# Patient Record
Sex: FEMALE | URBAN - METROPOLITAN AREA
[De-identification: names, ages, dates, MRNs, and addresses within clinical notes are randomized per-mention and may not be internally consistent; named-entity substitution may affect disease eponyms.]

---

## 2022-09-03 ENCOUNTER — HOSPITAL ENCOUNTER (EMERGENCY)
Age: 47
Discharge: HOME | End: 2022-09-04
Attending: EMERGENCY MEDICINE
Payer: COMMERCIAL

## 2022-09-03 ENCOUNTER — APPOINTMENT (OUTPATIENT)
Dept: GENERAL RADIOLOGY | Age: 47
End: 2022-09-03
Payer: COMMERCIAL

## 2022-09-03 VITALS
WEIGHT: 130 LBS | DIASTOLIC BLOOD PRESSURE: 92 MMHG | OXYGEN SATURATION: 97 % | HEART RATE: 91 BPM | TEMPERATURE: 98.8 F | BODY MASS INDEX: 23.04 KG/M2 | SYSTOLIC BLOOD PRESSURE: 142 MMHG | HEIGHT: 63 IN | RESPIRATION RATE: 16 BRPM

## 2022-09-03 DIAGNOSIS — K05.6 PERIODONTAL DISEASE: ICD-10-CM

## 2022-09-03 DIAGNOSIS — R59.0 ANTERIOR CERVICAL ADENOPATHY: Primary | ICD-10-CM

## 2022-09-03 LAB
ALBUMIN SERPL-MCNC: 4.4 G/DL (ref 3.5–4.6)
ALP BLD-CCNC: 134 U/L (ref 40–130)
ALT SERPL-CCNC: 15 U/L (ref 0–33)
ANION GAP SERPL CALCULATED.3IONS-SCNC: 11 MEQ/L (ref 9–15)
AST SERPL-CCNC: 20 U/L (ref 0–35)
BASOPHILS ABSOLUTE: 0.1 K/UL (ref 0–0.1)
BASOPHILS RELATIVE PERCENT: 0.5 % (ref 0.1–1.2)
BILIRUB SERPL-MCNC: <0.2 MG/DL (ref 0.2–0.7)
BUN BLDV-MCNC: 20 MG/DL (ref 6–20)
CALCIUM SERPL-MCNC: 10.2 MG/DL (ref 8.5–9.9)
CHLORIDE BLD-SCNC: 104 MEQ/L (ref 95–107)
CO2: 25 MEQ/L (ref 20–31)
CREAT SERPL-MCNC: 0.66 MG/DL (ref 0.5–0.9)
EOSINOPHILS ABSOLUTE: 0.4 K/UL (ref 0–0.4)
EOSINOPHILS RELATIVE PERCENT: 3.5 % (ref 0.7–5.8)
GFR AFRICAN AMERICAN: >60
GFR NON-AFRICAN AMERICAN: >60
GLOBULIN: 3.7 G/DL (ref 2.3–3.5)
GLUCOSE BLD-MCNC: 106 MG/DL (ref 70–99)
HCT VFR BLD CALC: 42.6 % (ref 37–47)
HEMOGLOBIN: 13.7 G/DL (ref 11.2–15.7)
IMMATURE GRANULOCYTES #: 0 K/UL
IMMATURE GRANULOCYTES %: 0.4 %
LYMPHOCYTES ABSOLUTE: 2.8 K/UL (ref 1.2–3.7)
LYMPHOCYTES RELATIVE PERCENT: 26 %
MCH RBC QN AUTO: 29.8 PG (ref 25.6–32.2)
MCHC RBC AUTO-ENTMCNC: 32.2 % (ref 32.2–35.5)
MCV RBC AUTO: 92.6 FL (ref 79.4–94.8)
MONOCYTES ABSOLUTE: 0.6 K/UL (ref 0.2–0.9)
MONOCYTES RELATIVE PERCENT: 5.9 % (ref 4.7–12.5)
NEUTROPHILS ABSOLUTE: 6.9 K/UL (ref 1.6–6.1)
NEUTROPHILS RELATIVE PERCENT: 63.7 % (ref 34–71.1)
PDW BLD-RTO: 13.5 % (ref 11.7–14.4)
PLATELET # BLD: 261 K/UL (ref 182–369)
POTASSIUM SERPL-SCNC: 4.1 MEQ/L (ref 3.4–4.9)
RBC # BLD: 4.6 M/UL (ref 3.93–5.22)
SODIUM BLD-SCNC: 140 MEQ/L (ref 135–144)
STREP GRP A PCR: NEGATIVE
TOTAL PROTEIN: 8.1 G/DL (ref 6.3–8)
WBC # BLD: 10.8 K/UL (ref 4–10)

## 2022-09-03 PROCEDURE — 87651 STREP A DNA AMP PROBE: CPT

## 2022-09-03 PROCEDURE — 6370000000 HC RX 637 (ALT 250 FOR IP): Performed by: EMERGENCY MEDICINE

## 2022-09-03 PROCEDURE — 99284 EMERGENCY DEPT VISIT MOD MDM: CPT

## 2022-09-03 PROCEDURE — 80053 COMPREHEN METABOLIC PANEL: CPT

## 2022-09-03 PROCEDURE — 71045 X-RAY EXAM CHEST 1 VIEW: CPT

## 2022-09-03 PROCEDURE — 36415 COLL VENOUS BLD VENIPUNCTURE: CPT

## 2022-09-03 PROCEDURE — 85025 COMPLETE CBC W/AUTO DIFF WBC: CPT

## 2022-09-03 RX ORDER — AMOXICILLIN AND CLAVULANATE POTASSIUM 875; 125 MG/1; MG/1
1 TABLET, FILM COATED ORAL 2 TIMES DAILY
Qty: 20 TABLET | Refills: 0 | Status: SHIPPED | OUTPATIENT
Start: 2022-09-03 | End: 2022-09-13

## 2022-09-03 RX ORDER — CHLORHEXIDINE GLUCONATE 0.12 MG/ML
15 RINSE ORAL 2 TIMES DAILY
Qty: 420 ML | Refills: 0 | Status: SHIPPED | OUTPATIENT
Start: 2022-09-03 | End: 2022-09-17

## 2022-09-03 RX ORDER — KETOROLAC TROMETHAMINE 10 MG/1
10 TABLET, FILM COATED ORAL EVERY 6 HOURS PRN
Qty: 20 TABLET | Refills: 0 | Status: SHIPPED | OUTPATIENT
Start: 2022-09-03

## 2022-09-03 RX ORDER — KETOROLAC TROMETHAMINE 10 MG/1
20 TABLET, FILM COATED ORAL ONCE
Status: COMPLETED | OUTPATIENT
Start: 2022-09-03 | End: 2022-09-03

## 2022-09-03 RX ADMIN — KETOROLAC TROMETHAMINE 20 MG: 10 TABLET, FILM COATED ORAL at 22:50

## 2022-09-03 ASSESSMENT — ENCOUNTER SYMPTOMS
VOMITING: 0
DIARRHEA: 0
WHEEZING: 0
CONSTIPATION: 0
SORE THROAT: 0
EYE PAIN: 0
ABDOMINAL PAIN: 0
SINUS PRESSURE: 0
BACK PAIN: 0
COUGH: 0
RHINORRHEA: 0
SHORTNESS OF BREATH: 0
COLOR CHANGE: 0
NAUSEA: 0
APNEA: 0
ABDOMINAL DISTENTION: 0
PHOTOPHOBIA: 0

## 2022-09-03 ASSESSMENT — PAIN - FUNCTIONAL ASSESSMENT: PAIN_FUNCTIONAL_ASSESSMENT: NONE - DENIES PAIN

## 2022-09-03 ASSESSMENT — PAIN DESCRIPTION - ONSET: ONSET: SUDDEN

## 2022-09-03 ASSESSMENT — PAIN SCALES - GENERAL: PAINLEVEL_OUTOF10: 5

## 2022-09-03 ASSESSMENT — PAIN DESCRIPTION - FREQUENCY: FREQUENCY: CONTINUOUS

## 2022-09-03 ASSESSMENT — PAIN DESCRIPTION - ORIENTATION: ORIENTATION: RIGHT

## 2022-09-03 ASSESSMENT — PAIN DESCRIPTION - PAIN TYPE: TYPE: ACUTE PAIN

## 2022-09-03 ASSESSMENT — PAIN DESCRIPTION - LOCATION: LOCATION: THROAT

## 2022-09-03 ASSESSMENT — PAIN DESCRIPTION - DESCRIPTORS: DESCRIPTORS: SORE

## 2022-09-04 NOTE — ED PROVIDER NOTES
[facility] \[facility]\"" ED  eMERGENCY dEPARTMENT eNCOUnter      Pt Name: January Salcedo  MRN: [id number]  [place] 1975  Date of evaluation: 9/3/2022  Provider: Deysi Sanders MD    CHIEF COMPLAINT       Chief Complaint   Patient presents with    Neck Swelling     Right side of throat swollen x1 day. Positive lymphadenitis to the right anterior cervical area. Right tonsil is reddened and enlarged but denies discomfort when swallowing. HISTORY OF PRESENT ILLNESS   (Location/Symptom, Timing/Onset,Context/Setting, Quality, Duration, Modifying Factors, Severity)  Note limiting factors. January Salcedo is a 40 y.o. female who presents to the emergency department with complaint of swelling right anterior neck node of 6 months duration, more swollen and tender since today. Denies fever or chills. No nausea or vomiting. No cough or congestion. No sore throat, palpitations, orthopnea or PND. No chest pain or shortness of breath. No weight loss or night sweats. Patient is a smoker of 1 pack of cigarettes a day. Denies significant chronic medical conditions. Patient is also complaining of dental decay and cavities of entire mouth with some purulent drainage from time to time. HPI    Nursing Notes were reviewed. REVIEW OF SYSTEMS    (2-9 systems for level 4, 10 or more for level 5)     Review of Systems   Constitutional: Negative. Negative for activity change, appetite change, chills, fatigue and fever. HENT:  Negative for congestion, ear discharge, ear pain, hearing loss, rhinorrhea, sinus pressure and sore throat. Eyes:  Negative for photophobia, pain and visual disturbance. Respiratory:  Negative for apnea, cough, shortness of breath and wheezing. Cardiovascular:  Negative for chest pain, palpitations and leg swelling. Gastrointestinal:  Negative for abdominal distention, abdominal pain, constipation, diarrhea, nausea and vomiting.    Endocrine: Negative for cold intolerance, heat intolerance and polyuria. Genitourinary:  Negative for dysuria, flank pain, frequency and urgency. Musculoskeletal:  Negative for arthralgias, back pain, gait problem, myalgias and neck stiffness. Skin:  Negative for color change, pallor, rash and wound. Allergic/Immunologic: Negative for food allergies and immunocompromised state. Neurological:  Negative for dizziness, tremors, syncope, weakness, light-headedness and headaches. Psychiatric/Behavioral:  Negative for agitation, confusion and hallucinations. All other systems reviewed and are negative. Except as noted above the remainder of the review of systems was reviewed and negative. PAST MEDICAL HISTORY   No past medical history on file. SURGICAL HISTORY     No past surgical history on file. CURRENT MEDICATIONS       Previous Medications    No medications on file       ALLERGIES     Patient has no known allergies. FAMILY HISTORY     No family history on file. SOCIAL HISTORY       Social History     Tobacco Use    Smoking status: Every Day     Packs/day: 1.00     Years: 25.00     Pack years: 25.00     Types: Cigarettes    Smokeless tobacco: Never       SCREENINGS    Nirav Coma Scale  Eye Opening: Spontaneous  Best Verbal Response: Oriented  Best Motor Response: Obeys commands  Nirav Coma Scale Score: 15        PHYSICAL EXAM    (up to 7 for level 4, 8 or more for level 5)     ED Triage Vitals [09/03/22 2214]   BP Temp Temp Source Heart Rate Resp SpO2 Height Weight   (!) 142/92 98.8 °F (37.1 °C) Oral 91 16 97 % 5' 3\" (1.6 m) 130 lb (59 kg)       Physical Exam  Vitals and nursing note reviewed. Constitutional:       General: She is not in acute distress. Appearance: Normal appearance. She is well-developed and normal weight. She is not ill-appearing, toxic-appearing or diaphoretic. HENT:      Head: Normocephalic and atraumatic. Nose: Nose normal. No congestion or rhinorrhea.       Mouth/Throat:      Mouth: Mucous membranes are moist.      Pharynx: Oropharynx is clear. No oropharyngeal exudate or posterior oropharyngeal erythema. Comments: Diffuse dental decay, gingivitis and cavities  Eyes:      General: No scleral icterus. Right eye: No discharge. Left eye: No discharge. Extraocular Movements: Extraocular movements intact. Conjunctiva/sclera: Conjunctivae normal.      Pupils: Pupils are equal, round, and reactive to light. Neck:      Thyroid: No thyromegaly. Vascular: No carotid bruit or JVD. Trachea: No tracheal deviation. Comments: 1 cm palpable right anterior cervical node, tender. Cardiovascular:      Rate and Rhythm: Normal rate and regular rhythm. Pulses: Normal pulses. Heart sounds: Normal heart sounds. No murmur heard. No friction rub. No gallop. Pulmonary:      Effort: Pulmonary effort is normal. No respiratory distress. Breath sounds: Normal breath sounds. No stridor. No wheezing, rhonchi or rales. Chest:      Chest wall: No tenderness. Abdominal:      General: Abdomen is flat. Bowel sounds are normal. There is no distension. Palpations: Abdomen is soft. There is no mass. Tenderness: There is no abdominal tenderness. There is no right CVA tenderness, left CVA tenderness, guarding or rebound. Hernia: No hernia is present. Musculoskeletal:         General: No swelling, tenderness, deformity or signs of injury. Normal range of motion. Cervical back: Normal range of motion and neck supple. No rigidity or tenderness. Right lower leg: No edema. Left lower leg: No edema. Lymphadenopathy:      Cervical: Cervical adenopathy present. Skin:     General: Skin is warm and dry. Capillary Refill: Capillary refill takes less than 2 seconds. Coloration: Skin is not jaundiced or pale. Findings: No bruising, erythema, lesion or rash. Neurological:      General: No focal deficit present.       Mental Status: She is alert and oriented to person, place, and time. Mental status is at baseline. Cranial Nerves: No cranial nerve deficit. Sensory: No sensory deficit. Motor: No weakness or abnormal muscle tone. Coordination: Coordination normal.      Gait: Gait normal.      Deep Tendon Reflexes: Reflexes are normal and symmetric. Reflexes normal.   Psychiatric:         Mood and Affect: Mood normal.         Behavior: Behavior normal.         Thought Content: Thought content normal.         Judgment: Judgment normal.       DIAGNOSTIC RESULTS     EKG: All EKG's are interpreted by the Emergency Department Physician who either signs or Co-signs this chart in the absence of a cardiologist.        RADIOLOGY:   Non-plain film images such as CT, Ultrasound and MRI are read by the radiologist. [facility] radiographicimages are visualized and preliminarily interpreted by the emergency physician with the below findings:        Interpretation per the Radiologist below, if available at the time of this note:    XR CHEST PORTABLE    (Results Pending)         ED BEDSIDE ULTRASOUND:   Performed by ED Physician - none    LABS:  Labs Reviewed   CBC WITH AUTO DIFFERENTIAL - Abnormal; Notable for the following components:       Result Value    WBC 10.8 (*)     Neutrophils Absolute 6.9 (*)     All other components within normal limits   RAPID STREP SCREEN   COMPREHENSIVE METABOLIC PANEL       All other labs were within normal range or not returned as of this dictation. EMERGENCY DEPARTMENT COURSE and DIFFERENTIALDIAGNOSIS/MDM:   Vitals:    Vitals:    09/03/22 2214   BP: (!) 142/92   Pulse: 91   Resp: 16   Temp: 98.8 °F (37.1 °C)   TempSrc: Oral   SpO2: 97%   Weight: 130 lb (59 kg)   Height: 5' 3\" (1.6 m)           MDM      CRITICAL CARE TIME   Total Critical Care time was  minutes, excluding separately reportable procedures.   There was a high probability of clinically significant/life threatening deterioration in the patient's condition which required my urgentintervention. CONSULTS:  None    PROCEDURES:  Unless otherwise noted below, none     Procedures    FINAL IMPRESSION      1. Anterior cervical adenopathy    2.  Periodontal disease          DISPOSITION/PLAN   DISPOSITION Discharge - Pending Orders Complete 09/03/2022 11:52:27 PM      PATIENT REFERRED TO:  [facility]  [address] [facility]  [phone]  In 3 days      Deedee Armendariz MD  [address] [place]  [phone]    In 3 days      DISCHARGE MEDICATIONS:  New Prescriptions    AMOXICILLIN-CLAVULANATE (AUGMENTIN) 875-125 MG PER TABLET    Take 1 tablet by mouth 2 times daily for 10 days    CHLORHEXIDINE (PERIDEX) 0.12 % SOLUTION    Take 15 mLs by mouth 2 times daily for 14 days    KETOROLAC (TORADOL) 10 MG TABLET    Take 1 tablet by mouth every 6 hours as needed for Pain          (Please note that portions of this note were completed with a voice recognitionprogram.  Efforts were made to edit the dictations but occasionally words are mis-transcribed.)    Sal Turcios MD (electronically signed)  Attending Emergency Physician          Sal Turcios MD  09/03/22 [id number]

## 2022-10-24 ENCOUNTER — HOSPITAL ENCOUNTER (EMERGENCY)
Age: 47
Discharge: HOME | End: 2022-10-24
Attending: EMERGENCY MEDICINE
Payer: COMMERCIAL

## 2022-10-24 ENCOUNTER — APPOINTMENT (OUTPATIENT)
Dept: CT IMAGING | Age: 47
End: 2022-10-24
Payer: COMMERCIAL

## 2022-10-24 VITALS
OXYGEN SATURATION: 96 % | RESPIRATION RATE: 18 BRPM | BODY MASS INDEX: 22.15 KG/M2 | DIASTOLIC BLOOD PRESSURE: 69 MMHG | WEIGHT: 125 LBS | TEMPERATURE: 97.8 F | HEART RATE: 85 BPM | HEIGHT: 63 IN | SYSTOLIC BLOOD PRESSURE: 103 MMHG

## 2022-10-24 DIAGNOSIS — R10.9 FLANK PAIN: Primary | ICD-10-CM

## 2022-10-24 LAB
ANION GAP SERPL CALCULATED.3IONS-SCNC: 12 MEQ/L (ref 9–15)
BACTERIA: ABNORMAL /HPF
BASOPHILS ABSOLUTE: 0.1 K/UL (ref 0–0.1)
BASOPHILS RELATIVE PERCENT: 0.8 % (ref 0.1–1.2)
BILIRUBIN URINE: NEGATIVE
BLOOD, URINE: NORMAL
BUN BLDV-MCNC: 18 MG/DL (ref 6–20)
CALCIUM SERPL-MCNC: 9.4 MG/DL (ref 8.5–9.9)
CHLORIDE BLD-SCNC: 103 MEQ/L (ref 95–107)
CLARITY: CLEAR
CO2: 24 MEQ/L (ref 20–31)
COLOR: YELLOW
CREAT SERPL-MCNC: 0.61 MG/DL (ref 0.5–0.9)
EOSINOPHILS ABSOLUTE: 0.3 K/UL (ref 0–0.4)
EOSINOPHILS RELATIVE PERCENT: 3.3 % (ref 0.7–5.8)
EPITHELIAL CELLS, UA: ABNORMAL /HPF
GFR SERPL CREATININE-BSD FRML MDRD: >60 ML/MIN/{1.73_M2}
GLUCOSE BLD-MCNC: 128 MG/DL (ref 70–99)
GLUCOSE URINE: NEGATIVE MG/DL
HCT VFR BLD CALC: 41.5 % (ref 37–47)
HEMOGLOBIN: 13.5 G/DL (ref 11.2–15.7)
IMMATURE GRANULOCYTES #: 0 K/UL
IMMATURE GRANULOCYTES %: 0.3 %
KETONES, URINE: NEGATIVE MG/DL
LEUKOCYTE ESTERASE, URINE: NORMAL
LYMPHOCYTES ABSOLUTE: 2.4 K/UL (ref 1.2–3.7)
LYMPHOCYTES RELATIVE PERCENT: 24.3 %
MCH RBC QN AUTO: 30.1 PG (ref 25.6–32.2)
MCHC RBC AUTO-ENTMCNC: 32.5 % (ref 32.2–35.5)
MCV RBC AUTO: 92.4 FL (ref 79.4–94.8)
MONOCYTES ABSOLUTE: 0.7 K/UL (ref 0.2–0.9)
MONOCYTES RELATIVE PERCENT: 6.8 % (ref 4.7–12.5)
NEUTROPHILS ABSOLUTE: 6.5 K/UL (ref 1.6–6.1)
NEUTROPHILS RELATIVE PERCENT: 64.5 % (ref 34–71.1)
NITRITE, URINE: NEGATIVE
PDW BLD-RTO: 12.7 % (ref 11.7–14.4)
PH UA: 5.5 (ref 5–9)
PLATELET # BLD: 242 K/UL (ref 182–369)
POTASSIUM SERPL-SCNC: 3.9 MEQ/L (ref 3.4–4.9)
PROTEIN UA: NEGATIVE MG/DL
RBC # BLD: 4.49 M/UL (ref 3.93–5.22)
RBC UA: ABNORMAL /HPF (ref 0–2)
SODIUM BLD-SCNC: 139 MEQ/L (ref 135–144)
SPECIFIC GRAVITY UA: 1.02 (ref 1–1.03)
URINE REFLEX TO CULTURE: NORMAL
UROBILINOGEN, URINE: 0.2 E.U./DL
WBC # BLD: 10 K/UL (ref 4–10)
WBC UA: ABNORMAL /HPF (ref 0–5)

## 2022-10-24 PROCEDURE — 36415 COLL VENOUS BLD VENIPUNCTURE: CPT

## 2022-10-24 PROCEDURE — 99284 EMERGENCY DEPT VISIT MOD MDM: CPT

## 2022-10-24 PROCEDURE — 6360000002 HC RX W HCPCS: Performed by: EMERGENCY MEDICINE

## 2022-10-24 PROCEDURE — 81001 URINALYSIS AUTO W/SCOPE: CPT

## 2022-10-24 PROCEDURE — 80048 BASIC METABOLIC PNL TOTAL CA: CPT

## 2022-10-24 PROCEDURE — 96374 THER/PROPH/DIAG INJ IV PUSH: CPT

## 2022-10-24 PROCEDURE — 2580000003 HC RX 258: Performed by: EMERGENCY MEDICINE

## 2022-10-24 PROCEDURE — 85025 COMPLETE CBC W/AUTO DIFF WBC: CPT

## 2022-10-24 PROCEDURE — 74176 CT ABD & PELVIS W/O CONTRAST: CPT

## 2022-10-24 RX ORDER — KETOROLAC TROMETHAMINE 30 MG/ML
30 INJECTION, SOLUTION INTRAMUSCULAR; INTRAVENOUS ONCE
Status: COMPLETED | OUTPATIENT
Start: 2022-10-24 | End: 2022-10-24

## 2022-10-24 RX ORDER — SODIUM CHLORIDE 0.9 % (FLUSH) 0.9 %
3 SYRINGE (ML) INJECTION EVERY 8 HOURS
Status: DISCONTINUED | OUTPATIENT
Start: 2022-10-24 | End: 2022-10-24 | Stop reason: HOSPADM

## 2022-10-24 RX ORDER — CYCLOBENZAPRINE HCL 10 MG
10 TABLET ORAL 3 TIMES DAILY PRN
Qty: 21 TABLET | Refills: 0 | Status: SHIPPED | OUTPATIENT
Start: 2022-10-24 | End: 2022-11-03

## 2022-10-24 RX ORDER — OXYCODONE HYDROCHLORIDE AND ACETAMINOPHEN 5; 325 MG/1; MG/1
1 TABLET ORAL EVERY 6 HOURS PRN
Qty: 12 TABLET | Refills: 0 | Status: SHIPPED | OUTPATIENT
Start: 2022-10-24 | End: 2022-10-27

## 2022-10-24 RX ORDER — 0.9 % SODIUM CHLORIDE 0.9 %
1000 INTRAVENOUS SOLUTION INTRAVENOUS ONCE
Status: COMPLETED | OUTPATIENT
Start: 2022-10-24 | End: 2022-10-24

## 2022-10-24 RX ADMIN — SODIUM CHLORIDE 1000 ML: 9 INJECTION, SOLUTION INTRAVENOUS at 20:14

## 2022-10-24 RX ADMIN — KETOROLAC TROMETHAMINE 30 MG: 30 INJECTION, SOLUTION INTRAMUSCULAR at 20:14

## 2022-10-24 ASSESSMENT — ENCOUNTER SYMPTOMS
BOWEL INCONTINENCE: 0
BACK PAIN: 1
EYE REDNESS: 0
NAUSEA: 0
ABDOMINAL PAIN: 0
SORE THROAT: 0
SHORTNESS OF BREATH: 0
EYE DISCHARGE: 0
VOMITING: 0
COUGH: 0
ABDOMINAL SWELLING: 0

## 2022-10-24 NOTE — Clinical Note
Mitzy Torres was seen and treated in our emergency department on 10/24/2022. She may return to work on 10/26/2022. If you have any questions or concerns, please don't hesitate to call.       Shamir Love, DO

## 2022-10-24 NOTE — ED PROVIDER NOTES
[facility] ED  EMERGENCY DEPARTMENT ENCOUNTER      Pt Name: Lorenzo Nunez  MRN: [id number]  [place] 1975  Date of evaluation: 10/24/2022  Provider: Raffy Lee DO        HISTORY OF PRESENT ILLNESS    Lorenzo Nunez is a 40 y.o. female per chart review has ah/o dental abscess. She presents with left flank pain. She states it started today along with dysuria. The history is provided by the patient. Back Pain  Location:  Lumbar spine  Quality:  Aching  Pain severity:  Severe  Pain is:  Same all the time  Timing:  Intermittent  Progression:  Waxing and waning  Chronicity:  New  Relieved by:  Nothing  Worsened by:  Nothing  Ineffective treatments:  None tried  Associated symptoms: dysuria    Associated symptoms: no abdominal pain, no abdominal swelling, no bladder incontinence, no bowel incontinence, no chest pain and no fever           REVIEW OF SYSTEMS       Review of Systems   Constitutional:  Negative for chills and fever. HENT:  Negative for ear pain and sore throat. Eyes:  Negative for discharge and redness. Respiratory:  Negative for cough and shortness of breath. Cardiovascular:  Negative for chest pain and palpitations. Gastrointestinal:  Negative for abdominal pain, bowel incontinence, nausea and vomiting. Genitourinary:  Positive for dysuria. Negative for bladder incontinence and difficulty urinating. Musculoskeletal:  Positive for back pain. Negative for neck pain. Skin:  Negative for rash and wound. Neurological:  Negative for dizziness and syncope. Psychiatric/Behavioral:  Negative for confusion. The patient is not nervous/anxious. All other systems reviewed and are negative. Except as noted above the remainder of the review of systems was reviewed and negative. PAST MEDICAL HISTORY   History reviewed. No pertinent past medical history. SURGICAL HISTORY     History reviewed. No pertinent surgical history.       CURRENT MEDICATIONS       Discharge Medication List as of 10/24/2022  9:01 PM        CONTINUE these medications which have NOT CHANGED    Details   ketorolac (TORADOL) 10 MG tablet Take 1 tablet by mouth every 6 hours as needed for Pain, Disp-20 tablet, R-0Print             ALLERGIES     Patient has no known allergies. FAMILY HISTORY     History reviewed. No pertinent family history. SOCIAL HISTORY       Social History     Socioeconomic History    Marital status:      Spouse name: None    Number of children: None    Years of education: None    Highest education level: None   Tobacco Use    Smoking status: Every Day     Packs/day: 1.00     Years: 25.00     Pack years: 25.00     Types: Cigarettes    Smokeless tobacco: Never         PHYSICAL EXAM       ED Triage Vitals [10/24/22 1905]   BP Temp Temp Source Heart Rate Resp SpO2 Height Weight   (!) 142/80 97.8 °F (36.6 °C) Temporal 88 20 96 % 5' 3\" (1.6 m) 125 lb (56.7 kg)       Physical Exam  Vitals and nursing note reviewed. Constitutional:       Appearance: Normal appearance. HENT:      Head: Normocephalic and atraumatic. Right Ear: Tympanic membrane normal.      Left Ear: Tympanic membrane normal.      Nose: Nose normal.      Mouth/Throat:      Mouth: Mucous membranes are moist.      Pharynx: Oropharynx is clear. Eyes:      General: Lids are normal.      Extraocular Movements: Extraocular movements intact. Conjunctiva/sclera: Conjunctivae normal.      Pupils: Pupils are equal, round, and reactive to light. Cardiovascular:      Rate and Rhythm: Normal rate and regular rhythm. Pulses: Normal pulses. Heart sounds: Normal heart sounds. Pulmonary:      Effort: Pulmonary effort is normal.      Breath sounds: Normal breath sounds. Abdominal:      General: Abdomen is flat. Bowel sounds are normal.      Palpations: Abdomen is soft. Musculoskeletal:         General: Normal range of motion.       Cervical back: Full passive range of motion without pain, normal range of motion and neck supple. Lumbar back: Spasms and tenderness present. Back:    Skin:     General: Skin is warm. Capillary Refill: Capillary refill takes less than 2 seconds. Neurological:      General: No focal deficit present. Mental Status: She is alert and oriented to person, place, and time. Deep Tendon Reflexes: Reflexes are normal and symmetric. Psychiatric:         Attention and Perception: Attention and perception normal.         Mood and Affect: Mood normal.         Behavior: Behavior normal. Behavior is cooperative. LABS:  Labs Reviewed   MICROSCOPIC URINALYSIS - Abnormal; Notable for the following components:       Result Value    RBC, UA 3-5 (*)     Bacteria, UA RARE (*)     All other components within normal limits   BASIC METABOLIC PANEL - Abnormal; Notable for the following components:    Glucose 128 (*)     All other components within normal limits   CBC WITH AUTO DIFFERENTIAL - Abnormal; Notable for the following components:    Neutrophils Absolute 6.5 (*)     All other components within normal limits   URINALYSIS WITH REFLEX TO CULTURE         MDM:   Vitals:    Vitals:    10/24/22 1905 10/24/22 2108   BP: (!) 142/80 103/69   Pulse: 88 85   Resp: 20 18   Temp: 97.8 °F (36.6 °C)    TempSrc: Temporal    SpO2: 96% 96%   Weight: 125 lb (56.7 kg)    Height: 5' 3\" (1.6 m)        MDM  Number of Diagnoses or Management Options  Flank pain  Diagnosis management comments: Patient presents with left flank pain. Labs ordered, 1 liter IVF NS and toradol 30mg IV. Ct ab/pelvis: no acute changes  She will be discharged home. She was given a work note. She will follow up in 2 days with her primary care doctor.         Amount and/or Complexity of Data Reviewed  Clinical lab tests: ordered and reviewed  Tests in the radiology section of CPT®: ordered and reviewed         CT ABDOMEN PELVIS WO CONTRAST Additional Contrast? None    (Results Pending)           LAKESHA LIN DO LATANYA     The lab results, radiology and test results were reviewed with the patient and family. The patient will follow up in 2 days with their primary care doctor. If their symptoms change or get worse they will return to the ER. CRITICAL CARE TIME   Total CriticalCare time was 0 minutes, excluding separately reportable procedures. There was a high probability of clinically significant/life threatening deterioration in the patient's condition which required my urgent intervention. PROCEDURES:  Unlessotherwise noted below, none     Procedures      FINAL IMPRESSION      1.  Flank pain          DISPOSITION/PLAN   DISPOSITION Decision To Discharge 10/24/2022 08:58:20 PM          LAKESHA Atkins DO (electronically signed)  Attending Emergency Physician         Sunshine Argueta DO  10/25/22 8434

## 2022-10-25 NOTE — ED TRIAGE NOTES
Pt a/ox3 skin w d intact respiratory rate even and unlabored  localized bee sting reaction noted to right underarm  pt shows no distress

## 2023-01-12 ENCOUNTER — OFFICE VISIT (OUTPATIENT)
Dept: FAMILY MEDICINE CLINIC | Age: 48
End: 2023-01-12
Payer: COMMERCIAL

## 2023-01-12 VITALS
SYSTOLIC BLOOD PRESSURE: 110 MMHG | OXYGEN SATURATION: 96 % | TEMPERATURE: 97.7 F | RESPIRATION RATE: 16 BRPM | HEART RATE: 72 BPM | WEIGHT: 155.8 LBS | BODY MASS INDEX: 27.61 KG/M2 | HEIGHT: 63 IN | DIASTOLIC BLOOD PRESSURE: 60 MMHG

## 2023-01-12 DIAGNOSIS — R22.1 MASS OF RIGHT SIDE OF NECK: Primary | ICD-10-CM

## 2023-01-12 DIAGNOSIS — K02.9 DENTAL CARIES: ICD-10-CM

## 2023-01-12 DIAGNOSIS — R73.03 PREDIABETES: ICD-10-CM

## 2023-01-12 DIAGNOSIS — J45.20 MILD INTERMITTENT ASTHMA WITHOUT COMPLICATION: ICD-10-CM

## 2023-01-12 PROCEDURE — G8484 FLU IMMUNIZE NO ADMIN: HCPCS

## 2023-01-12 PROCEDURE — 99214 OFFICE O/P EST MOD 30 MIN: CPT

## 2023-01-12 PROCEDURE — G8419 CALC BMI OUT NRM PARAM NOF/U: HCPCS

## 2023-01-12 PROCEDURE — G8427 DOCREV CUR MEDS BY ELIG CLIN: HCPCS

## 2023-01-12 PROCEDURE — 4004F PT TOBACCO SCREEN RCVD TLK: CPT

## 2023-01-12 RX ORDER — ALBUTEROL SULFATE 90 UG/1
2 AEROSOL, METERED RESPIRATORY (INHALATION) EVERY 6 HOURS PRN
Qty: 18 G | Refills: 3 | Status: SHIPPED | OUTPATIENT
Start: 2023-01-12

## 2023-01-12 SDOH — ECONOMIC STABILITY: FOOD INSECURITY: WITHIN THE PAST 12 MONTHS, YOU WORRIED THAT YOUR FOOD WOULD RUN OUT BEFORE YOU GOT MONEY TO BUY MORE.: NEVER TRUE

## 2023-01-12 SDOH — ECONOMIC STABILITY: FOOD INSECURITY: WITHIN THE PAST 12 MONTHS, THE FOOD YOU BOUGHT JUST DIDN'T LAST AND YOU DIDN'T HAVE MONEY TO GET MORE.: NEVER TRUE

## 2023-01-12 ASSESSMENT — ENCOUNTER SYMPTOMS
DIARRHEA: 0
EYES NEGATIVE: 1
COUGH: 0
SHORTNESS OF BREATH: 0
SINUS PRESSURE: 0
WHEEZING: 1
NAUSEA: 0
RHINORRHEA: 0
ABDOMINAL PAIN: 0
VOMITING: 0
SORE THROAT: 0
CHEST TIGHTNESS: 0

## 2023-01-12 ASSESSMENT — PATIENT HEALTH QUESTIONNAIRE - PHQ9
6. FEELING BAD ABOUT YOURSELF - OR THAT YOU ARE A FAILURE OR HAVE LET YOURSELF OR YOUR FAMILY DOWN: 0
SUM OF ALL RESPONSES TO PHQ QUESTIONS 1-9: 0
3. TROUBLE FALLING OR STAYING ASLEEP: 0
SUM OF ALL RESPONSES TO PHQ9 QUESTIONS 1 & 2: 0
4. FEELING TIRED OR HAVING LITTLE ENERGY: 0
SUM OF ALL RESPONSES TO PHQ QUESTIONS 1-9: 0
10. IF YOU CHECKED OFF ANY PROBLEMS, HOW DIFFICULT HAVE THESE PROBLEMS MADE IT FOR YOU TO DO YOUR WORK, TAKE CARE OF THINGS AT HOME, OR GET ALONG WITH OTHER PEOPLE: 0
SUM OF ALL RESPONSES TO PHQ QUESTIONS 1-9: 0
SUM OF ALL RESPONSES TO PHQ QUESTIONS 1-9: 0
7. TROUBLE CONCENTRATING ON THINGS, SUCH AS READING THE NEWSPAPER OR WATCHING TELEVISION: 0
2. FEELING DOWN, DEPRESSED OR HOPELESS: 0
5. POOR APPETITE OR OVEREATING: 0
1. LITTLE INTEREST OR PLEASURE IN DOING THINGS: 0
9. THOUGHTS THAT YOU WOULD BE BETTER OFF DEAD, OR OF HURTING YOURSELF: 0
8. MOVING OR SPEAKING SO SLOWLY THAT OTHER PEOPLE COULD HAVE NOTICED. OR THE OPPOSITE, BEING SO FIGETY OR RESTLESS THAT YOU HAVE BEEN MOVING AROUND A LOT MORE THAN USUAL: 0

## 2023-01-12 ASSESSMENT — COLUMBIA-SUICIDE SEVERITY RATING SCALE - C-SSRS
2. HAVE YOU ACTUALLY HAD ANY THOUGHTS OF KILLING YOURSELF?: NO
6. HAVE YOU EVER DONE ANYTHING, STARTED TO DO ANYTHING, OR PREPARED TO DO ANYTHING TO END YOUR LIFE?: NO
1. WITHIN THE PAST MONTH, HAVE YOU WISHED YOU WERE DEAD OR WISHED YOU COULD GO TO SLEEP AND NOT WAKE UP?: NO

## 2023-01-12 ASSESSMENT — SOCIAL DETERMINANTS OF HEALTH (SDOH): HOW HARD IS IT FOR YOU TO PAY FOR THE VERY BASICS LIKE FOOD, HOUSING, MEDICAL CARE, AND HEATING?: NOT HARD AT ALL

## 2023-01-12 NOTE — PROGRESS NOTES
[address] [place] [address] Encounter        ASSESSMENT/PLAN     Zoya Hinojosa is a 56 y.o. female who presents with:  Reporting mass to the right side of her neck for 2-1/2 years. Patient reports size of mass increases and decreases at times. Starting 2 days ago she began having mild constant pain she has never had pain with this in the past.  Examination there is a symmetry to the right side of patient's neck mass approximately 6 cm x 8 cm to the upper anterior cervical area. There is half centimeter diameter lymph node palpated at the lower anterior cervical area. Neck is otherwise supple there is no enlargement of the submandibular area patient has multiple dental caries throughout her upper and lower jaws bilaterally. Lung sounds are clear throughout heart sounds normal regular. She is not taking anything for pain currently. Previous treatment with Augmentin did not improve symptoms per patient. History 1 pack a day smoker for 25 years. History of prediabetes, asthma. 1. Mass of right side of neck   Orders for CBC CMP CT with IV contrast of the soft tissues   of the neck. 2. Mild intermittent asthma without complication   Refill order placed for albuterol inhaler   3. Dental caries   Patient to follow-up with dentist.   4. Prediabetes   Denies recent change in weight, feelings of thirst, increased urination, blurry vision, tingling in the hands or feet or new fatigue. Orders for A1c, CMP, lipid panel. PATIENT REFERRED TO:  Return I will call you with appropriate followup after CT is resulted, for Follow up with PCP In 1 month for wellness visit. DISCHARGE MEDICATIONS:  New Prescriptions    ALBUTEROL SULFATE HFA (PROVENTIL HFA) 108 (90 BASE) MCG/ACT INHALER    Inhale 2 puffs into the lungs every 6 hours as needed for Wheezing     Cannot display discharge medications since this is not an admission.        Arsenio Esteban, APRN - CNP    CHIEF COMPLAINT Chief Complaint   Patient presents with    New Patient     Patient presents today to establish care. 2.5 years. Lump on R side of neck. Swollen tonsils. Pt reports pain is radiating up to R ear drum. SUBJECTIVE/REVIEW OF SYSTEMS     Review of Systems   Constitutional:  Negative for chills, fatigue and fever. HENT:  Negative for congestion, ear pain, hearing loss, rhinorrhea, sinus pressure and sore throat. Eyes: Negative. Respiratory:  Positive for wheezing. Negative for cough, chest tightness and shortness of breath. Cardiovascular:  Negative for chest pain and palpitations. Gastrointestinal:  Negative for abdominal pain, diarrhea, nausea and vomiting. Endocrine: Negative. Musculoskeletal:  Positive for neck pain and neck stiffness. Negative for arthralgias and myalgias. Skin:  Negative for rash. Neurological:  Negative for dizziness, weakness, light-headedness and headaches. Hematological:  Negative for adenopathy. Psychiatric/Behavioral: Negative. OBJECTIVE/PHYSICAL EXAM     Physical Exam  Constitutional:       General: She is not in acute distress. Appearance: Normal appearance. HENT:      Head: Normocephalic. Nose: Nose normal.      Mouth/Throat:      Lips: Pink. Mouth: Mucous membranes are moist.      Pharynx: Oropharynx is clear. No posterior oropharyngeal erythema. Tonsils: No tonsillar exudate. 2+ on the right. 0 on the left. Eyes:      Conjunctiva/sclera: Conjunctivae normal.   Neck:      Thyroid: No thyroid mass, thyromegaly or thyroid tenderness. Trachea: No tracheal tenderness. Comments: 6 x 8 cm movable firm mass  Cardiovascular:      Rate and Rhythm: Normal rate. Pulses: Normal pulses. Heart sounds: Normal heart sounds. No murmur heard. No gallop. Pulmonary:      Effort: Pulmonary effort is normal.      Breath sounds: Normal breath sounds. No wheezing or rhonchi.    Abdominal:      General: Bowel sounds are normal. There is no distension. Musculoskeletal:         General: No swelling, deformity or signs of injury. Normal range of motion. Cervical back: Full passive range of motion without pain, normal range of motion and neck supple. Tenderness present. No rigidity. Right lower leg: No edema. Left lower leg: No edema. Lymphadenopathy:      Cervical: Cervical adenopathy present. Right cervical: Superficial cervical adenopathy present. No posterior cervical adenopathy. Left cervical: No superficial or posterior cervical adenopathy. Skin:     General: Skin is warm and dry. Capillary Refill: Capillary refill takes less than 2 seconds. Coloration: Skin is not pale. Neurological:      General: No focal deficit present. Mental Status: She is alert and oriented to person, place, and time. Motor: No weakness. Coordination: Coordination normal.      Gait: Gait normal.   Psychiatric:         Mood and Affect: Mood normal.         Speech: Speech normal.         Behavior: Behavior normal.         Thought Content: Thought content normal.       VITALS  BP: 110/60, Temp: 97.7 °F (36.5 °C), Temp Source: Temporal, Heart Rate: 72, Resp: 16, SpO2: 96 %      PAST MEDICAL HISTORY         Diagnosis Date    Asthma      SURGICAL HISTORY     Patient  has a past surgical history that includes Hysterectomy, vaginal and Partial hysterectomy. CURRENT MEDICATIONS       Previous Medications    No medications on file     ALLERGIES     Patient is has No Known Allergies.   FAMILY HISTORY     Patient'sfamily history includes Alcohol Abuse in her brother, father, and mother; COPD in her father and mother; Cancer in her mother; Diabetes in her mother; Diabetes Type 1  in her brother and mother; Emphysema in her father and mother; Esophageal Cancer in her father; [place] Disease in her brother; Heart Disease in her mother; Liver Cancer in her father; [place] Abler in her father; Stroke in her maternal grandmother. HISTORY     Patient  reports that she has been smoking cigarettes. She has a 25.00 pack-year smoking history. She has never used smokeless tobacco. She reports that she does not currently use drugs. She reports that she does not drink alcohol. READY CARE COURSE     Orders Placed This Encounter   Procedures    CT SOFT TISSUE NECK W CONTRAST     Standing Status:   Future     Standing Expiration Date:   1/12/2024     Order Specific Question:   STAT Creatinine as needed:     Answer:   Yes    CBC with Auto Differential     Standing Status:   Future     Standing Expiration Date:   1/12/2024    Comprehensive Metabolic Panel     Standing Status:   Future     Standing Expiration Date:   1/12/2024    Lipid Panel     Standing Status:   Future     Standing Expiration Date:   1/12/2024     Order Specific Question:   Is Patient Fasting?/# of Hours     Answer:   8    Hemoglobin A1C     Standing Status:   Future     Standing Expiration Date:   1/12/2024        Labs:  No results found for this visit on 01/12/23. IMAGING:  CT SOFT TISSUE NECK W CONTRAST    (Results Pending)     Scheduled Meds:  Continuous Infusions:  PRN Meds:.

## 2023-01-13 ENCOUNTER — HOSPITAL ENCOUNTER (OUTPATIENT)
Dept: LAB | Age: 48
Discharge: HOME | End: 2023-01-13
Payer: COMMERCIAL

## 2023-01-13 DIAGNOSIS — R22.1 MASS OF RIGHT SIDE OF NECK: ICD-10-CM

## 2023-01-13 DIAGNOSIS — R73.03 PREDIABETES: ICD-10-CM

## 2023-01-13 LAB
ALBUMIN SERPL-MCNC: 3.9 G/DL (ref 3.5–4.6)
ALP BLD-CCNC: 117 U/L (ref 40–130)
ALT SERPL-CCNC: 6 U/L (ref 0–33)
ANION GAP SERPL CALCULATED.3IONS-SCNC: 14 MEQ/L (ref 9–15)
AST SERPL-CCNC: 25 U/L (ref 0–35)
BASOPHILS ABSOLUTE: 0.1 K/UL (ref 0–0.2)
BASOPHILS RELATIVE PERCENT: 0.8 %
BILIRUB SERPL-MCNC: 0.3 MG/DL (ref 0.2–0.7)
BUN BLDV-MCNC: 15 MG/DL (ref 6–20)
CALCIUM SERPL-MCNC: 9.6 MG/DL (ref 8.5–9.9)
CHLORIDE BLD-SCNC: 101 MEQ/L (ref 95–107)
CHOLESTEROL, TOTAL: 203 MG/DL (ref 0–199)
CO2: 22 MEQ/L (ref 20–31)
CREAT SERPL-MCNC: 0.65 MG/DL (ref 0.5–0.9)
EOSINOPHILS ABSOLUTE: 0.3 K/UL (ref 0–0.7)
EOSINOPHILS RELATIVE PERCENT: 2.6 %
GFR SERPL CREATININE-BSD FRML MDRD: >60 ML/MIN/{1.73_M2}
GLOBULIN: 4.1 G/DL (ref 2.3–3.5)
GLUCOSE BLD-MCNC: 87 MG/DL (ref 70–99)
HBA1C MFR BLD: 6.1 % (ref 4.8–5.9)
HCT VFR BLD CALC: 40 % (ref 37–47)
HDLC SERPL-MCNC: 40 MG/DL (ref 40–59)
HEMOGLOBIN: 13.2 G/DL (ref 12–16)
LDL CHOLESTEROL CALCULATED: 145 MG/DL (ref 0–129)
LYMPHOCYTES ABSOLUTE: 2.7 K/UL (ref 1–4.8)
LYMPHOCYTES RELATIVE PERCENT: 21.6 %
MCH RBC QN AUTO: 28.9 PG (ref 27–31.3)
MCHC RBC AUTO-ENTMCNC: 33 % (ref 33–37)
MCV RBC AUTO: 87.5 FL (ref 79.4–94.8)
MONOCYTES ABSOLUTE: 0.9 K/UL (ref 0.2–0.8)
MONOCYTES RELATIVE PERCENT: 7.3 %
NEUTROPHILS ABSOLUTE: 8.5 K/UL (ref 1.4–6.5)
NEUTROPHILS RELATIVE PERCENT: 67.7 %
PDW BLD-RTO: 13.2 % (ref 11.5–14.5)
PLATELET # BLD: 309 K/UL (ref 130–400)
POTASSIUM SERPL-SCNC: 4.7 MEQ/L (ref 3.4–4.9)
RBC # BLD: 4.57 M/UL (ref 4.2–5.4)
SODIUM BLD-SCNC: 137 MEQ/L (ref 135–144)
TOTAL PROTEIN: 8 G/DL (ref 6.3–8)
TRIGL SERPL-MCNC: 91 MG/DL (ref 0–150)
WBC # BLD: 12.6 K/UL (ref 4.8–10.8)

## 2023-01-13 PROCEDURE — 80053 COMPREHEN METABOLIC PANEL: CPT

## 2023-01-13 PROCEDURE — 36415 COLL VENOUS BLD VENIPUNCTURE: CPT

## 2023-01-13 PROCEDURE — 85025 COMPLETE CBC W/AUTO DIFF WBC: CPT

## 2023-01-13 PROCEDURE — 83036 HEMOGLOBIN GLYCOSYLATED A1C: CPT

## 2023-01-13 PROCEDURE — 80061 LIPID PANEL: CPT

## 2023-01-14 DIAGNOSIS — R59.1 LYMPHADENOPATHY: Primary | ICD-10-CM

## 2023-01-14 RX ORDER — CLINDAMYCIN HYDROCHLORIDE 300 MG/1
300 CAPSULE ORAL 3 TIMES DAILY
Qty: 30 CAPSULE | Refills: 0 | Status: SHIPPED | OUTPATIENT
Start: 2023-01-14 | End: 2023-01-24

## 2023-01-24 ENCOUNTER — TELEPHONE (OUTPATIENT)
Dept: FAMILY MEDICINE CLINIC | Age: 48
End: 2023-01-24

## 2023-01-24 ENCOUNTER — HOSPITAL ENCOUNTER (OUTPATIENT)
Dept: CT IMAGING | Age: 48
Discharge: HOME | End: 2023-01-26
Payer: COMMERCIAL

## 2023-01-24 DIAGNOSIS — R22.1 MASS OF RIGHT SIDE OF NECK: ICD-10-CM

## 2023-01-24 DIAGNOSIS — R59.0 ANTERIOR CERVICAL LYMPHADENOPATHY: Primary | ICD-10-CM

## 2023-01-24 DIAGNOSIS — R59.0 LYMPHADENOPATHY OF RIGHT CERVICAL REGION: Primary | ICD-10-CM

## 2023-01-24 PROCEDURE — 70491 CT SOFT TISSUE NECK W/DYE: CPT

## 2023-01-24 PROCEDURE — 6360000004 HC RX CONTRAST MEDICATION

## 2023-01-24 RX ADMIN — IOPAMIDOL 75 ML: 755 INJECTION, SOLUTION INTRAVENOUS at 08:33

## 2023-01-24 NOTE — PROGRESS NOTES
Patient reports reduction in swelling of the neck around lymph node after 7 days of clindamycin. Lymph node enlargement his not improved.   Referrals placed for ENT

## 2023-01-25 NOTE — RESULT ENCOUNTER NOTE
Patient reports soft tissue swelling around enlarged lymph node on right side of her neck has improved however lymph node has not reduced in size. Referral for ENT placed, Dr. Ebenezer Flower.   Patient will call today for appointment

## 2023-05-13 ENCOUNTER — HOSPITAL ENCOUNTER (EMERGENCY)
Age: 48
Discharge: HOME | End: 2023-05-13
Attending: EMERGENCY MEDICINE
Payer: COMMERCIAL

## 2023-05-13 ENCOUNTER — APPOINTMENT (OUTPATIENT)
Dept: GENERAL RADIOLOGY | Age: 48
End: 2023-05-13
Payer: COMMERCIAL

## 2023-05-13 VITALS
BODY MASS INDEX: 28.35 KG/M2 | TEMPERATURE: 96.8 F | WEIGHT: 160 LBS | SYSTOLIC BLOOD PRESSURE: 119 MMHG | OXYGEN SATURATION: 98 % | RESPIRATION RATE: 16 BRPM | HEART RATE: 83 BPM | HEIGHT: 63 IN | DIASTOLIC BLOOD PRESSURE: 79 MMHG

## 2023-05-13 DIAGNOSIS — R09.1 PLEURISY: Primary | ICD-10-CM

## 2023-05-13 DIAGNOSIS — M62.838 SPASM OF MUSCLE: ICD-10-CM

## 2023-05-13 LAB
ALBUMIN SERPL-MCNC: 3.7 G/DL (ref 3.5–4.6)
ALP SERPL-CCNC: 161 U/L (ref 40–130)
ALT SERPL-CCNC: 10 U/L (ref 0–33)
ANION GAP SERPL CALCULATED.3IONS-SCNC: 11 MEQ/L (ref 9–15)
AST SERPL-CCNC: 15 U/L (ref 0–35)
BASOPHILS # BLD: 0.1 K/UL (ref 0–0.1)
BASOPHILS NFR BLD: 0.7 % (ref 0.1–1.2)
BILIRUB SERPL-MCNC: <0.2 MG/DL (ref 0.2–0.7)
BUN SERPL-MCNC: 13 MG/DL (ref 6–20)
CALCIUM SERPL-MCNC: 9.3 MG/DL (ref 8.5–9.9)
CHLORIDE SERPL-SCNC: 105 MEQ/L (ref 95–107)
CO2 SERPL-SCNC: 22 MEQ/L (ref 20–31)
CREAT SERPL-MCNC: 0.6 MG/DL (ref 0.5–0.9)
EOSINOPHIL # BLD: 0.4 K/UL (ref 0–0.4)
EOSINOPHIL NFR BLD: 3.7 % (ref 0.7–5.8)
ERYTHROCYTE [DISTWIDTH] IN BLOOD BY AUTOMATED COUNT: 13.3 % (ref 11.7–14.4)
GLOBULIN SER CALC-MCNC: 4.6 G/DL (ref 2.3–3.5)
GLUCOSE SERPL-MCNC: 102 MG/DL (ref 70–99)
HCT VFR BLD AUTO: 41.9 % (ref 37–47)
HGB BLD-MCNC: 13.4 G/DL (ref 11.2–15.7)
IMM GRANULOCYTES # BLD: 0 K/UL
IMM GRANULOCYTES NFR BLD: 0.3 %
INFLUENZA A BY PCR: NEGATIVE
INFLUENZA B BY PCR: NEGATIVE
LYMPHOCYTES # BLD: 3.2 K/UL (ref 1.2–3.7)
LYMPHOCYTES NFR BLD: 26.6 %
MAGNESIUM SERPL-MCNC: 2 MG/DL (ref 1.7–2.4)
MCH RBC QN AUTO: 26.7 PG (ref 25.6–32.2)
MCHC RBC AUTO-ENTMCNC: 32 % (ref 32.2–35.5)
MCV RBC AUTO: 83.6 FL (ref 79.4–94.8)
MONOCYTES # BLD: 0.8 K/UL (ref 0.2–0.9)
MONOCYTES NFR BLD: 6.9 % (ref 4.7–12.5)
NEUTROPHILS # BLD: 7.4 K/UL (ref 1.6–6.1)
NEUTS SEG NFR BLD: 61.8 % (ref 34–71.1)
PLATELET # BLD AUTO: 334 K/UL (ref 182–369)
POTASSIUM SERPL-SCNC: 4.2 MEQ/L (ref 3.4–4.9)
PROT SERPL-MCNC: 8.3 G/DL (ref 6.3–8)
RBC # BLD AUTO: 5.01 M/UL (ref 3.93–5.22)
SARS-COV-2 RDRP RESP QL NAA+PROBE: NOT DETECTED
SODIUM SERPL-SCNC: 138 MEQ/L (ref 135–144)
TROPONIN T SERPL-MCNC: <0.01 NG/ML (ref 0–0.01)
WBC # BLD AUTO: 12 K/UL (ref 4–10)

## 2023-05-13 PROCEDURE — 83735 ASSAY OF MAGNESIUM: CPT

## 2023-05-13 PROCEDURE — 85025 COMPLETE CBC W/AUTO DIFF WBC: CPT

## 2023-05-13 PROCEDURE — 84484 ASSAY OF TROPONIN QUANT: CPT

## 2023-05-13 PROCEDURE — 80053 COMPREHEN METABOLIC PANEL: CPT

## 2023-05-13 PROCEDURE — 99285 EMERGENCY DEPT VISIT HI MDM: CPT

## 2023-05-13 PROCEDURE — 36415 COLL VENOUS BLD VENIPUNCTURE: CPT

## 2023-05-13 PROCEDURE — 87635 SARS-COV-2 COVID-19 AMP PRB: CPT

## 2023-05-13 PROCEDURE — 87502 INFLUENZA DNA AMP PROBE: CPT

## 2023-05-13 PROCEDURE — 71045 X-RAY EXAM CHEST 1 VIEW: CPT

## 2023-05-13 PROCEDURE — 6370000000 HC RX 637 (ALT 250 FOR IP): Performed by: NURSE PRACTITIONER

## 2023-05-13 PROCEDURE — 93005 ELECTROCARDIOGRAM TRACING: CPT

## 2023-05-13 RX ORDER — LIDOCAINE 50 MG/G
1 PATCH TOPICAL DAILY
Qty: 10 PATCH | Refills: 0 | Status: SHIPPED | OUTPATIENT
Start: 2023-05-13 | End: 2023-05-23

## 2023-05-13 RX ORDER — ORPHENADRINE CITRATE 30 MG/ML
60 INJECTION INTRAMUSCULAR; INTRAVENOUS ONCE
Status: DISCONTINUED | OUTPATIENT
Start: 2023-05-13 | End: 2023-05-13 | Stop reason: HOSPADM

## 2023-05-13 RX ORDER — CYCLOBENZAPRINE HCL 10 MG
10 TABLET ORAL ONCE
Status: COMPLETED | OUTPATIENT
Start: 2023-05-13 | End: 2023-05-13

## 2023-05-13 RX ORDER — CYCLOBENZAPRINE HCL 5 MG
5 TABLET ORAL 2 TIMES DAILY PRN
Qty: 10 TABLET | Refills: 0 | Status: SHIPPED | OUTPATIENT
Start: 2023-05-13 | End: 2023-05-23

## 2023-05-13 RX ADMIN — CYCLOBENZAPRINE 10 MG: 10 TABLET, FILM COATED ORAL at 20:24

## 2023-05-13 ASSESSMENT — PAIN SCALES - GENERAL
PAINLEVEL_OUTOF10: 5
PAINLEVEL_OUTOF10: 8
PAINLEVEL_OUTOF10: 5

## 2023-05-13 ASSESSMENT — ENCOUNTER SYMPTOMS
BLOOD IN STOOL: 0
PHOTOPHOBIA: 0
ABDOMINAL PAIN: 0
SINUS PAIN: 0
EYE REDNESS: 0
NAUSEA: 0
COLOR CHANGE: 0
CONSTIPATION: 0
SHORTNESS OF BREATH: 1
APNEA: 0
STRIDOR: 0
COUGH: 0
EYE ITCHING: 0
RHINORRHEA: 0
DIARRHEA: 0
CHOKING: 0
SORE THROAT: 0
ABDOMINAL DISTENTION: 0
EYE PAIN: 0
SINUS PRESSURE: 0
FACIAL SWELLING: 0
BACK PAIN: 1
WHEEZING: 0
ALLERGIC/IMMUNOLOGIC NEGATIVE: 1
TROUBLE SWALLOWING: 0
EYE DISCHARGE: 0
VOICE CHANGE: 0
VOMITING: 0
CHEST TIGHTNESS: 0

## 2023-05-13 ASSESSMENT — PAIN DESCRIPTION - DESCRIPTORS: DESCRIPTORS: ACHING

## 2023-05-13 ASSESSMENT — HEART SCORE: ECG: 0

## 2023-05-13 ASSESSMENT — PAIN DESCRIPTION - ONSET: ONSET: ON-GOING

## 2023-05-13 ASSESSMENT — PAIN - FUNCTIONAL ASSESSMENT: PAIN_FUNCTIONAL_ASSESSMENT: 0-10

## 2023-05-13 ASSESSMENT — PAIN DESCRIPTION - PAIN TYPE: TYPE: ACUTE PAIN;CHRONIC PAIN

## 2023-05-13 ASSESSMENT — PAIN DESCRIPTION - LOCATION
LOCATION: BACK
LOCATION: RIB CAGE
LOCATION: RIB CAGE

## 2023-05-13 ASSESSMENT — PAIN DESCRIPTION - FREQUENCY: FREQUENCY: CONTINUOUS

## 2023-05-13 NOTE — ED PROVIDER NOTES
[facility] ED  EMERGENCY DEPARTMENT ENCOUNTER      Pt Name: Earl Leavitt  MRN: [id number]  [place] 1975  Date of evaluation: 5/13/2023  Provider: Terrell Fitch       Chief Complaint   Patient presents with    Spasms     Onset 2.5 weeks,          HISTORY OF PRESENT ILLNESS   (Location/Symptom, Timing/Onset, Context/Setting, Quality, Duration, Modifying Factors, Severity)  Note limiting factors. Earl Leavitt is a 56 y.o. female who, per chart review, has past medical history of asthma presents to the emergency department with c/o intermittent, sharp, stabbing, spasm like back in R side of her back x 2.5 weeks, worse today. Pain is reproducible. Pt reports she feels the pain every time her heart beats. States pain is on the R side of her back and wraps around to her chest.  Denies cough, denies injuries. States pain makes her feel short of breath. Pt is a smoker. Pt denies  palpitations, fevers, abdominal pain, nausea, vomiting, diarrhea, dysuria, hematuria, flank pain, incontinence. Nursing Notes were reviewed. REVIEW OF SYSTEMS    (2-9 systems for level 4, 10 or more for level 5)     Review of Systems   Constitutional:  Negative for chills, diaphoresis, fatigue and fever. HENT:  Negative for congestion, dental problem, drooling, ear discharge, ear pain, facial swelling, hearing loss, mouth sores, nosebleeds, postnasal drip, rhinorrhea, sinus pressure, sinus pain, sneezing, sore throat, tinnitus, trouble swallowing and voice change. Eyes:  Negative for photophobia, pain, discharge, redness, itching and visual disturbance. Respiratory:  Positive for shortness of breath. Negative for apnea, cough, choking, chest tightness, wheezing and stridor. Cardiovascular:  Positive for chest pain. Negative for palpitations and leg swelling.    Gastrointestinal:  Negative for abdominal distention, abdominal pain, blood in stool, constipation, diarrhea,

## 2023-05-13 NOTE — ED PROVIDER NOTES
[facility] ED  EMERGENCY DEPARTMENT ENCOUNTER      Pt Name: Suzan Castro  MRN: [id number]  [place] 1975  Date of evaluation: 5/13/2023  Provider: Terrell Monroy       Chief Complaint   Patient presents with    Spasms     Onset 2.5 weeks,          HISTORY OF PRESENT ILLNESS   (Location/Symptom, Timing/Onset, Context/Setting, Quality, Duration, Modifying Factors, Severity)  Note limiting factors. Suzan Castro is a 56 y.o. female who, per chart review, has past medical history of asthma presents to the emergency department with c/o intermittent, sharp, stabbing, spasm like back in R side of her back x 2.5 weeks, worse today. Pain is reproducible. Pt reports she feels the pain every time her heart beats. States pain is on the R side of her back and wraps around to her chest.  Denies cough, denies injuries. States pain makes her feel short of breath. Pt is a smoker. Pt denies  palpitations, fevers, abdominal pain, nausea, vomiting, diarrhea, dysuria, hematuria, flank pain, incontinence. Nursing Notes were reviewed. REVIEW OF SYSTEMS    (2-9 systems for level 4, 10 or more for level 5)     Review of Systems   Constitutional:  Negative for chills, diaphoresis, fatigue and fever. HENT:  Negative for congestion, dental problem, drooling, ear discharge, ear pain, facial swelling, hearing loss, mouth sores, nosebleeds, postnasal drip, rhinorrhea, sinus pressure, sinus pain, sneezing, sore throat, tinnitus, trouble swallowing and voice change. Eyes:  Negative for photophobia, pain, discharge, redness, itching and visual disturbance. Respiratory:  Positive for shortness of breath. Negative for apnea, cough, choking, chest tightness, wheezing and stridor. Cardiovascular:  Positive for chest pain. Negative for palpitations and leg swelling.    Gastrointestinal:  Negative for abdominal distention, abdominal pain, blood in stool, constipation, diarrhea,

## 2023-05-14 LAB
EKG ATRIAL RATE: 109 BPM
EKG P AXIS: 16 DEGREES
EKG P-R INTERVAL: 114 MS
EKG Q-T INTERVAL: 330 MS
EKG QRS DURATION: 72 MS
EKG QTC CALCULATION (BAZETT): 444 MS
EKG R AXIS: -20 DEGREES
EKG T AXIS: 46 DEGREES
EKG VENTRICULAR RATE: 109 BPM

## 2023-05-15 ASSESSMENT — ENCOUNTER SYMPTOMS
BACK PAIN: 1
RHINORRHEA: 0
CHEST TIGHTNESS: 0
EYE REDNESS: 0
SINUS PRESSURE: 0
ALLERGIC/IMMUNOLOGIC NEGATIVE: 1
EYE DISCHARGE: 0
SHORTNESS OF BREATH: 1
FACIAL SWELLING: 0
SINUS PAIN: 0
CONSTIPATION: 0
COUGH: 0
CHOKING: 0
STRIDOR: 0
BLOOD IN STOOL: 0
TROUBLE SWALLOWING: 0
EYE ITCHING: 0
DIARRHEA: 0
COLOR CHANGE: 0
ABDOMINAL PAIN: 0
APNEA: 0
SORE THROAT: 0
NAUSEA: 0
PHOTOPHOBIA: 0
ABDOMINAL DISTENTION: 0
WHEEZING: 0
VOICE CHANGE: 0
EYE PAIN: 0
VOMITING: 0

## 2023-05-22 ENCOUNTER — TELEPHONE (OUTPATIENT)
Dept: FAMILY MEDICINE CLINIC | Age: 48
End: 2023-05-22

## 2023-05-22 ENCOUNTER — TELEPHONE (OUTPATIENT)
Dept: GASTROENTEROLOGY | Age: 48
End: 2023-05-22

## 2023-05-22 DIAGNOSIS — Z12.31 ENCOUNTER FOR SCREENING MAMMOGRAM FOR MALIGNANT NEOPLASM OF BREAST: Primary | ICD-10-CM

## 2023-05-30 ENCOUNTER — OFFICE VISIT (OUTPATIENT)
Dept: FAMILY MEDICINE CLINIC | Age: 48
End: 2023-05-30
Payer: COMMERCIAL

## 2023-05-30 ENCOUNTER — HOSPITAL ENCOUNTER (OUTPATIENT)
Dept: GENERAL RADIOLOGY | Age: 48
Discharge: HOME | End: 2023-06-01
Payer: COMMERCIAL

## 2023-05-30 ENCOUNTER — HOSPITAL ENCOUNTER (OUTPATIENT)
Age: 48
Discharge: HOME | End: 2023-06-01
Payer: COMMERCIAL

## 2023-05-30 VITALS
OXYGEN SATURATION: 98 % | DIASTOLIC BLOOD PRESSURE: 68 MMHG | WEIGHT: 160 LBS | HEART RATE: 99 BPM | SYSTOLIC BLOOD PRESSURE: 116 MMHG | BODY MASS INDEX: 28.34 KG/M2 | TEMPERATURE: 97.6 F

## 2023-05-30 DIAGNOSIS — M54.9 MID BACK PAIN: Primary | ICD-10-CM

## 2023-05-30 DIAGNOSIS — M54.9 MID BACK PAIN: ICD-10-CM

## 2023-05-30 PROCEDURE — G8419 CALC BMI OUT NRM PARAM NOF/U: HCPCS

## 2023-05-30 PROCEDURE — 99213 OFFICE O/P EST LOW 20 MIN: CPT

## 2023-05-30 PROCEDURE — G8427 DOCREV CUR MEDS BY ELIG CLIN: HCPCS

## 2023-05-30 PROCEDURE — 72072 X-RAY EXAM THORAC SPINE 3VWS: CPT

## 2023-05-30 PROCEDURE — 4004F PT TOBACCO SCREEN RCVD TLK: CPT

## 2023-05-30 RX ORDER — LIDOCAINE 4 G/G
1 PATCH TOPICAL DAILY
Qty: 30 PATCH | Refills: 1 | Status: SHIPPED | OUTPATIENT
Start: 2023-05-30 | End: 2023-07-29

## 2023-05-30 RX ORDER — KETOROLAC TROMETHAMINE 10 MG/1
10 TABLET, FILM COATED ORAL EVERY 6 HOURS PRN
Qty: 20 TABLET | Refills: 0 | Status: SHIPPED | OUTPATIENT
Start: 2023-05-30 | End: 2024-05-29

## 2023-05-30 SDOH — ECONOMIC STABILITY: FOOD INSECURITY: WITHIN THE PAST 12 MONTHS, THE FOOD YOU BOUGHT JUST DIDN'T LAST AND YOU DIDN'T HAVE MONEY TO GET MORE.: NEVER TRUE

## 2023-05-30 SDOH — ECONOMIC STABILITY: FOOD INSECURITY: WITHIN THE PAST 12 MONTHS, YOU WORRIED THAT YOUR FOOD WOULD RUN OUT BEFORE YOU GOT MONEY TO BUY MORE.: NEVER TRUE

## 2023-05-30 SDOH — ECONOMIC STABILITY: INCOME INSECURITY: HOW HARD IS IT FOR YOU TO PAY FOR THE VERY BASICS LIKE FOOD, HOUSING, MEDICAL CARE, AND HEATING?: NOT HARD AT ALL

## 2023-05-30 SDOH — ECONOMIC STABILITY: HOUSING INSECURITY
IN THE LAST 12 MONTHS, WAS THERE A TIME WHEN YOU DID NOT HAVE A STEADY PLACE TO SLEEP OR SLEPT IN A SHELTER (INCLUDING NOW)?: NO

## 2023-05-30 ASSESSMENT — ENCOUNTER SYMPTOMS
COLOR CHANGE: 0
RESPIRATORY NEGATIVE: 1
BACK PAIN: 1

## 2023-05-30 NOTE — PROGRESS NOTES
pain and myalgias. Negative for joint swelling, neck pain and neck stiffness. Skin:  Negative for color change and wound. Neurological:  Positive for weakness. Negative for numbness. Hematological:  Negative for adenopathy. Does not bruise/bleed easily. Psychiatric/Behavioral:  Negative for agitation. OBJECTIVE/PHYSICAL EXAM     Physical Exam  Vitals reviewed. Constitutional:       Appearance: Normal appearance. Cardiovascular:      Rate and Rhythm: Normal rate. Pulses: Normal pulses. Pulmonary:      Effort: Pulmonary effort is normal.   Abdominal:      General: Abdomen is flat. Musculoskeletal:         General: Tenderness present. No swelling, deformity or signs of injury. Normal range of motion. Thoracic back: Spasms, tenderness and bony tenderness present. No swelling, edema or signs of trauma. Back:    Skin:     General: Skin is warm and dry. Capillary Refill: Capillary refill takes less than 2 seconds. Findings: No bruising, lesion or rash. Neurological:      Mental Status: She is alert and oriented to person, place, and time. Mental status is at baseline. Psychiatric:         Mood and Affect: Mood normal.       VITALS  BP: 116/68, Temp: 97.6 °F (36.4 °C),  , Pulse: 99,  , SpO2: 98 %      PAST MEDICAL HISTORY         Diagnosis Date    Asthma      SURGICAL HISTORY     Patient  has a past surgical history that includes Hysterectomy, vaginal and Partial hysterectomy. CURRENT MEDICATIONS       Previous Medications    ALBUTEROL SULFATE HFA (PROVENTIL HFA) 108 (90 BASE) MCG/ACT INHALER    Inhale 2 puffs into the lungs every 6 hours as needed for Wheezing     ALLERGIES     Patient is has No Known Allergies.   FAMILY HISTORY     Patient'sfamily history includes Alcohol Abuse in her brother, father, and mother; COPD in her father and mother; Cancer in her mother; Diabetes in her mother; Diabetes Type 1  in her brother and mother; Emphysema in her father and mother;

## 2023-06-01 DIAGNOSIS — M54.9 MID BACK PAIN: Primary | ICD-10-CM

## 2023-06-01 RX ORDER — METHYLPREDNISOLONE 4 MG/1
TABLET ORAL
Qty: 1 KIT | Refills: 0 | Status: SHIPPED | OUTPATIENT
Start: 2023-06-01 | End: 2023-06-07

## 2023-06-22 ENCOUNTER — HOSPITAL ENCOUNTER (OUTPATIENT)
Dept: DATA CONVERSION | Facility: HOSPITAL | Age: 48
End: 2023-06-22
Attending: OTOLARYNGOLOGY | Admitting: OTOLARYNGOLOGY
Payer: COMMERCIAL

## 2023-06-22 DIAGNOSIS — J35.1 HYPERTROPHY OF TONSILS: ICD-10-CM

## 2023-06-22 DIAGNOSIS — C81.91 HODGKIN LYMPHOMA, UNSPECIFIED, LYMPH NODES OF HEAD, FACE, AND NECK (MULTI): ICD-10-CM

## 2023-06-22 DIAGNOSIS — E78.5 HYPERLIPIDEMIA, UNSPECIFIED: ICD-10-CM

## 2023-06-22 DIAGNOSIS — K76.9 LIVER DISEASE, UNSPECIFIED: ICD-10-CM

## 2023-06-22 DIAGNOSIS — R59.0 LOCALIZED ENLARGED LYMPH NODES: ICD-10-CM

## 2023-06-22 DIAGNOSIS — J45.909 UNSPECIFIED ASTHMA, UNCOMPLICATED (HHS-HCC): ICD-10-CM

## 2023-06-22 DIAGNOSIS — G95.9 DISEASE OF SPINAL CORD, UNSPECIFIED (MULTI): ICD-10-CM

## 2023-06-30 LAB
CCOLL: NORMAL PER TUBE
CCOUN: 53.86 X10E9/L
COMPLETE PATHOLOGY REPORT: NORMAL
CONVERTED CLINICAL DIAGNOSIS-HISTORY: NORMAL
CONVERTED FINAL DIAGNOSIS: NORMAL
CONVERTED FINAL REPORT PDF LINK TO COPY AND PASTE: NORMAL
CONVERTED GROSS DESCRIPTION: NORMAL
FCTOR: NORMAL
FCTSO: NORMAL
FSITE: NORMAL
GPERC: 2 %
LANTB: NORMAL
LCD19: 46 % OF LYMPH
LCD4: 41 % OF LYMPH
LCD8: 12 % OF LYMPH
LGPD1: NORMAL
LGPNO: NORMAL
LNK: 1 % OF LYMPH
LPERC: 95 %
MPERC: 1 %
PV194: NORMAL
VIAB: NORMAL

## 2023-07-10 ENCOUNTER — HOSPITAL ENCOUNTER (OUTPATIENT)
Dept: DATA CONVERSION | Facility: HOSPITAL | Age: 48
End: 2023-07-10
Attending: RADIOLOGY | Admitting: RADIOLOGY
Payer: COMMERCIAL

## 2023-07-10 DIAGNOSIS — C81.48: ICD-10-CM

## 2023-09-07 VITALS — WEIGHT: 158.73 LBS | BODY MASS INDEX: 28.12 KG/M2 | HEIGHT: 63 IN

## 2023-09-20 VITALS — WEIGHT: 137.35 LBS | BODY MASS INDEX: 25.27 KG/M2 | HEIGHT: 62 IN

## 2023-09-20 DIAGNOSIS — C81.48: Primary | ICD-10-CM

## 2023-09-21 RX ORDER — HEPARIN 100 UNIT/ML
500 SYRINGE INTRAVENOUS AS NEEDED
Status: CANCELLED | OUTPATIENT
Start: 2023-09-30

## 2023-09-21 RX ORDER — DOXORUBICIN HYDROCHLORIDE 2 MG/ML
25 INJECTION, SOLUTION INTRAVENOUS ONCE
Status: CANCELLED | OUTPATIENT
Start: 2023-10-09

## 2023-09-21 RX ORDER — PROCHLORPERAZINE MALEATE 10 MG
10 TABLET ORAL EVERY 6 HOURS PRN
Status: CANCELLED | OUTPATIENT
Start: 2023-10-09

## 2023-09-21 RX ORDER — PROCHLORPERAZINE EDISYLATE 5 MG/ML
10 INJECTION INTRAMUSCULAR; INTRAVENOUS EVERY 6 HOURS PRN
Status: CANCELLED | OUTPATIENT
Start: 2023-10-09

## 2023-09-21 RX ORDER — ALBUTEROL SULFATE 0.83 MG/ML
3 SOLUTION RESPIRATORY (INHALATION) AS NEEDED
Status: CANCELLED | OUTPATIENT
Start: 2023-10-09

## 2023-09-21 RX ORDER — FAMOTIDINE 10 MG/ML
20 INJECTION INTRAVENOUS ONCE AS NEEDED
Status: CANCELLED | OUTPATIENT
Start: 2023-10-09

## 2023-09-21 RX ORDER — DIPHENHYDRAMINE HYDROCHLORIDE 50 MG/ML
50 INJECTION INTRAMUSCULAR; INTRAVENOUS AS NEEDED
Status: CANCELLED | OUTPATIENT
Start: 2023-10-09

## 2023-09-21 RX ORDER — LORAZEPAM 2 MG/ML
0.5 INJECTION INTRAMUSCULAR ONCE
Status: CANCELLED
Start: 2023-10-09 | End: 2023-10-09

## 2023-09-21 RX ORDER — HEPARIN SODIUM,PORCINE/PF 10 UNIT/ML
50 SYRINGE (ML) INTRAVENOUS AS NEEDED
Status: CANCELLED | OUTPATIENT
Start: 2023-09-30

## 2023-09-21 RX ORDER — EPINEPHRINE 0.3 MG/.3ML
0.3 INJECTION SUBCUTANEOUS EVERY 5 MIN PRN
Status: CANCELLED | OUTPATIENT
Start: 2023-10-09

## 2023-09-27 PROBLEM — R53.81 MALAISE AND FATIGUE: Status: ACTIVE | Noted: 2023-09-27

## 2023-09-27 PROBLEM — K02.9 DENTAL CARIES: Status: ACTIVE | Noted: 2023-01-12

## 2023-09-27 PROBLEM — M54.9 CHRONIC BACK PAIN: Status: ACTIVE | Noted: 2023-09-27

## 2023-09-27 PROBLEM — F19.11 HISTORY OF DRUG ABUSE (MULTI): Status: ACTIVE | Noted: 2023-09-27

## 2023-09-27 PROBLEM — R00.1 BRADYCARDIA BY ELECTROCARDIOGRAM: Status: ACTIVE | Noted: 2023-09-27

## 2023-09-27 PROBLEM — E86.0 DEHYDRATION: Status: ACTIVE | Noted: 2023-09-27

## 2023-09-27 PROBLEM — R53.83 MALAISE AND FATIGUE: Status: ACTIVE | Noted: 2023-09-27

## 2023-09-27 PROBLEM — E87.6 HYPOKALEMIA: Status: ACTIVE | Noted: 2023-09-27

## 2023-09-27 PROBLEM — G89.29 CHRONIC BACK PAIN: Status: ACTIVE | Noted: 2023-09-27

## 2023-09-27 PROBLEM — J45.20 MILD INTERMITTENT ASTHMA WITHOUT COMPLICATION (HHS-HCC): Status: ACTIVE | Noted: 2023-01-12

## 2023-09-27 PROBLEM — T81.82XA EMPHYSEMA (SUBCUTANEOUS) (SURGICAL) RESULTING FROM A PROCEDURE: Status: ACTIVE | Noted: 2023-09-27

## 2023-09-27 PROBLEM — T81.82XA EMPHYSEMA (SUBCUTANEOUS) (SURGICAL) RESULTING FROM A PROCEDURE: Status: RESOLVED | Noted: 2023-09-27 | Resolved: 2023-09-27

## 2023-09-27 PROBLEM — R73.03 PREDIABETES: Status: ACTIVE | Noted: 2023-01-12

## 2023-09-27 PROBLEM — F43.23 ADJUSTMENT DISORDER WITH MIXED ANXIETY AND DEPRESSED MOOD: Status: ACTIVE | Noted: 2023-09-27

## 2023-09-27 PROBLEM — L80 VITILIGO: Status: ACTIVE | Noted: 2023-09-27

## 2023-09-27 PROBLEM — R94.31 ABNORMAL EKG: Status: ACTIVE | Noted: 2023-09-27

## 2023-09-27 PROBLEM — J35.8 TONSILLAR MASS: Status: ACTIVE | Noted: 2023-09-27

## 2023-09-27 RX ORDER — ESCITALOPRAM OXALATE 20 MG/1
1 TABLET ORAL DAILY
COMMUNITY
Start: 2023-06-29 | End: 2023-11-07 | Stop reason: SDUPTHER

## 2023-09-27 RX ORDER — IBUPROFEN 600 MG/1
600 TABLET ORAL
COMMUNITY
Start: 2023-07-19 | End: 2023-12-02 | Stop reason: ENTERED-IN-ERROR

## 2023-09-27 RX ORDER — PANTOPRAZOLE SODIUM 40 MG/1
40 TABLET, DELAYED RELEASE ORAL DAILY
COMMUNITY
Start: 2023-09-18 | End: 2024-01-15 | Stop reason: SDUPTHER

## 2023-09-27 RX ORDER — BUSPIRONE HYDROCHLORIDE 5 MG/1
5 TABLET ORAL
COMMUNITY
Start: 2023-07-18 | End: 2023-12-02 | Stop reason: ENTERED-IN-ERROR

## 2023-09-27 RX ORDER — GABAPENTIN 100 MG/1
2 CAPSULE ORAL 3 TIMES DAILY
COMMUNITY
Start: 2023-08-27 | End: 2023-11-06 | Stop reason: WASHOUT

## 2023-09-27 RX ORDER — GABAPENTIN 300 MG/1
300 CAPSULE ORAL
COMMUNITY
Start: 2023-07-18 | End: 2023-11-06 | Stop reason: SDUPTHER

## 2023-09-27 RX ORDER — MORPHINE SULFATE 30 MG/1
30 TABLET, FILM COATED, EXTENDED RELEASE ORAL 2 TIMES DAILY
COMMUNITY
Start: 2023-09-18 | End: 2023-12-06 | Stop reason: SDUPTHER

## 2023-09-27 RX ORDER — SUCRALFATE 1 G/10ML
1 SUSPENSION ORAL
COMMUNITY
Start: 2023-09-18 | End: 2023-12-03 | Stop reason: ENTERED-IN-ERROR

## 2023-09-27 RX ORDER — FERROUS SULFATE TAB 325 MG (65 MG ELEMENTAL FE) 325 (65 FE) MG
1 TAB ORAL EVERY OTHER DAY
COMMUNITY
Start: 2023-08-27 | End: 2023-10-10 | Stop reason: SDUPTHER

## 2023-09-27 RX ORDER — ACYCLOVIR 400 MG/1
400 TABLET ORAL
COMMUNITY
Start: 2023-07-18 | End: 2023-12-02 | Stop reason: ENTERED-IN-ERROR

## 2023-09-27 RX ORDER — OXYCODONE HYDROCHLORIDE 15 MG/1
1 TABLET ORAL EVERY 4 HOURS PRN
COMMUNITY
Start: 2023-09-02 | End: 2024-04-01 | Stop reason: SDUPTHER

## 2023-09-27 RX ORDER — RISPERIDONE 0.5 MG/1
1 TABLET ORAL NIGHTLY
COMMUNITY
Start: 2023-08-27 | End: 2023-10-10 | Stop reason: SDUPTHER

## 2023-09-27 RX ORDER — ALBUTEROL SULFATE 90 UG/1
2 AEROSOL, METERED RESPIRATORY (INHALATION) EVERY 6 HOURS PRN
COMMUNITY
Start: 2023-01-12

## 2023-09-27 RX ORDER — KETOROLAC TROMETHAMINE 10 MG/1
1 TABLET, FILM COATED ORAL EVERY 6 HOURS PRN
COMMUNITY
Start: 2023-05-30 | End: 2023-12-02 | Stop reason: ENTERED-IN-ERROR

## 2023-09-27 RX ORDER — OLANZAPINE 2.5 MG/1
2.5 TABLET ORAL
COMMUNITY
Start: 2023-07-18 | End: 2023-12-02 | Stop reason: ENTERED-IN-ERROR

## 2023-09-27 RX ORDER — ACYCLOVIR 200 MG/1
1 CAPSULE ORAL 2 TIMES DAILY
COMMUNITY
Start: 2023-09-07 | End: 2023-12-11 | Stop reason: SDUPTHER

## 2023-09-27 RX ORDER — OXYCODONE HYDROCHLORIDE 5 MG/1
3 TABLET ORAL EVERY 4 HOURS PRN
COMMUNITY
Start: 2023-08-29 | End: 2023-10-10 | Stop reason: SDUPTHER

## 2023-09-27 RX ORDER — PSYLLIUM HUSK (WITH SUGAR) 3.4 G/12 G
POWDER (GRAM) ORAL
COMMUNITY
Start: 2023-07-19 | End: 2023-12-03 | Stop reason: ENTERED-IN-ERROR

## 2023-09-27 RX ORDER — DOCUSATE SODIUM 50MG AND SENNOSIDES 8.6MG 8.6; 5 MG/1; MG/1
TABLET, FILM COATED ORAL
COMMUNITY
Start: 2023-07-18 | End: 2023-12-02 | Stop reason: ENTERED-IN-ERROR

## 2023-09-27 RX ORDER — POTASSIUM CHLORIDE 750 MG/1
1 TABLET, EXTENDED RELEASE ORAL 2 TIMES DAILY
COMMUNITY
Start: 2023-09-05 | End: 2023-10-10 | Stop reason: SDUPTHER

## 2023-09-27 RX ORDER — ESCITALOPRAM OXALATE 10 MG/1
1 TABLET ORAL DAILY
COMMUNITY
Start: 2023-06-14 | End: 2023-12-02 | Stop reason: ENTERED-IN-ERROR

## 2023-09-29 VITALS — HEIGHT: 63 IN | WEIGHT: 151.24 LBS | BODY MASS INDEX: 26.8 KG/M2

## 2023-09-30 NOTE — H&P
History & Physical Reviewed:   Pregnant/Lactating:  ·  Are You Pregnant no   ·  Are You Currently Breastfeeding no     I have reviewed the History and Physical dated:  30-Jun-2023   History and Physical reviewed and relevant findings noted. Patient examined to review pertinent physical  findings.: No significant changes   Home Medications Reviewed: no changes noted   Allergies Reviewed: no changes noted       Airway/Sedation Assessment:  ·  Emotional Status agitated  anxious  back pain   ·  Neurologic alert & oriented x 3   ·  Respiratory Faint rhonchi and wheezes   ·  Cardiovascular rhythm & rate regular   ·  GI/ soft, nontender     · Pulses present: Pedal Left, Pedal Right, Radial Left, Radial Right     ·  Mouth Opening OK yes   ·  Neck Flexibility OK yes   ·  Loose Teeth no   ·  Oropharyngeal Classification Class II   ·  ASA PS Classification ASA III   ·  Sedation Plan moderate sedation       ERAS (Enhanced Recovery After Surgery):  ·  ERAS Patient: no     Consent:   COVID-19 Consent:  ·  COVID-19 Risk Consent Surgeon has reviewed key risks related to the risk of bina COVID-19 and if they contract COVID-19 what the risks are.     Assessment/Plan:   ·  Assessment and Plan    47 Year old female presents fo Mediport placement with Dr Erazo.      Electronic Signatures:  Silvia Busby (PAC)  (Signed 10-Jul-2023 08:04)   Authored: History & Physical Reviewed, Airway/Sedation,  ERAS, Consent, Assessment/Plan, Note Completion      Last Updated: 10-Jul-2023 08:04 by Silvia Busby (PAC)

## 2023-09-30 NOTE — H&P
History & Physical Reviewed:   Pregnant/Lactating:  ·  Are You Pregnant no   ·  Are You Currently Breastfeeding no     I have reviewed the History and Physical dated:  19-Jun-2023   History and Physical reviewed and relevant findings noted. Patient examined to review pertinent physical  findings.: No significant changes   Home Medications Reviewed: no changes noted   Allergies Reviewed: no changes noted       ERAS (Enhanced Recovery After Surgery):  ·  ERAS Patient: no     Consent:   COVID-19 Consent:  ·  COVID-19 Risk Consent Surgeon has reviewed key risks related to the risk of bina COVID-19 and if they contract COVID-19 what the risks are.     Attestation:   Note Completion:  I am a:  Resident/Fellow   Attending Attestation I saw and evaluated the patient.  I personally obtained the key and critical portions of the history and physical exam or was physically present for key and  critical portions performed by the resident/fellow. I reviewed the resident/fellow?s documentation and discussed the patient with the resident/fellow.  I agree with the resident/fellow?s medical decision making as documented in the note.     I personally evaluated the patient on 22-Jun-2023         Electronic Signatures:  Trav Lovelace (Resident))  (Signed 22-Jun-2023 05:42)   Authored: History & Physical Reviewed, ERAS, Consent,  Note Completion  Jeffery Lemus)  (Signed 22-Jun-2023 06:12)   Authored: Note Completion   Co-Signer: History & Physical Reviewed, ERAS, Consent, Note Completion      Last Updated: 22-Jun-2023 06:12 by Jeffery Lemus)

## 2023-10-02 ENCOUNTER — APPOINTMENT (OUTPATIENT)
Dept: HEMATOLOGY/ONCOLOGY | Facility: CLINIC | Age: 48
End: 2023-10-02
Payer: COMMERCIAL

## 2023-10-02 ENCOUNTER — INFUSION (OUTPATIENT)
Dept: HEMATOLOGY/ONCOLOGY | Facility: CLINIC | Age: 48
End: 2023-10-02
Payer: COMMERCIAL

## 2023-10-02 ENCOUNTER — APPOINTMENT (OUTPATIENT)
Dept: LAB | Facility: CLINIC | Age: 48
End: 2023-10-02
Payer: COMMERCIAL

## 2023-10-02 VITALS
WEIGHT: 130.95 LBS | OXYGEN SATURATION: 97 % | BODY MASS INDEX: 24.1 KG/M2 | HEART RATE: 98 BPM | DIASTOLIC BLOOD PRESSURE: 60 MMHG | RESPIRATION RATE: 14 BRPM | SYSTOLIC BLOOD PRESSURE: 98 MMHG | TEMPERATURE: 98.6 F | HEIGHT: 62 IN

## 2023-10-02 DIAGNOSIS — C81.48: Primary | ICD-10-CM

## 2023-10-02 DIAGNOSIS — C81.48: ICD-10-CM

## 2023-10-02 LAB
ANION GAP SERPL CALC-SCNC: 12 MMOL/L (ref 10–20)
BUN SERPL-MCNC: 11 MG/DL (ref 6–23)
CALCIUM SERPL-MCNC: 9.4 MG/DL (ref 8.6–10.3)
CHLORIDE SERPL-SCNC: 104 MMOL/L (ref 98–107)
CO2 SERPL-SCNC: 26 MMOL/L (ref 21–32)
CREAT SERPL-MCNC: 0.5 MG/DL (ref 0.5–1.05)
GFR SERPL CREATININE-BSD FRML MDRD: >90 ML/MIN/1.73M*2
GLUCOSE SERPL-MCNC: 87 MG/DL (ref 74–99)
MAGNESIUM SERPL-MCNC: 1.5 MG/DL (ref 1.6–2.4)
POTASSIUM SERPL-SCNC: 3.5 MMOL/L (ref 3.5–5.3)
SODIUM SERPL-SCNC: 138 MMOL/L (ref 136–145)

## 2023-10-02 PROCEDURE — 2500000004 HC RX 250 GENERAL PHARMACY W/ HCPCS (ALT 636 FOR OP/ED): Performed by: INTERNAL MEDICINE

## 2023-10-02 PROCEDURE — 96360 HYDRATION IV INFUSION INIT: CPT

## 2023-10-02 PROCEDURE — 80048 BASIC METABOLIC PNL TOTAL CA: CPT

## 2023-10-02 PROCEDURE — 83735 ASSAY OF MAGNESIUM: CPT

## 2023-10-02 RX ADMIN — SODIUM CHLORIDE 1000 ML: 9 INJECTION, SOLUTION INTRAVENOUS at 14:36

## 2023-10-02 ASSESSMENT — PAIN SCALES - GENERAL: PAINLEVEL: 0-NO PAIN

## 2023-10-02 NOTE — OP NOTE
PROCEDURE DETAILS    Preoperative Diagnosis:  Right tonsillar hypertrophy  Cervical lymphadenopathy  Spine lesion  Postoperative Diagnosis:  Right tonsillar hypertrophy  Cervical lymphadenopathy  Spine lesion  Surgeon: Allan  Resident/Fellow/Other Assistant: [place]    Procedure:  1. Direct laryngoscopy  2. Right tonsillectomy  Anesthesia: GET  Estimated Blood Loss: 5cc  Findings: 4+ Asymmetric right tonsillar hypertrophy, tonsillectomy performed with no deep invasion of constrictors, rest of DL negative  Specimens(s) Collected: yes,  Right tonsil   Complications: None        Operative Report:   Indications:   Patient is a 47yoF with right asymmetric FDG avid tonsillar hypertrophy, right cervical lymphadenopathy, and a spine lesion  on PET/CT. Risks, benefits, and alternatives to the above procedures were discussed in great detail and the patient elected to  proceed with surgery. Written informed consent was obtained.    Procedure in Detail:   Patient was seen and evaluated in the pre-operative area. Informed consent was obtained as described above. Patient was then taken to the operative room by the anesthesia team. General anesthesia was induced, and patient was orotracheally intubated without  issue. Patient was   then turned 90 degrees towards the ENT team. Time out was performed by Dr. Lemus.    First, direct laryngoscopy was performed using the Dedo laryngoscope. All sites of the upper aerodigestive tract were visualized including the uvula, soft palate, tonsils, vallecula, epiglottis, base of tongue, arytenoids, false and true vocal cords,  post-cricoid region and pyriform sinuses. There was noted to be asymmetric right tonsillar hypertrophy, otherwise no masses or lesions were identified.    Attention was then turned to the right tonsillectomy. The upper face and eyes were covered with a surgical towel. The McIvor mouth gag was then used to retract the tongue and mandible. The right tonsil was  then removed in the extracapsular plane from  a superior to inferior and lateral to medial fashion using bovie cautery. Hemostasis was obtained using suction bipolar cauterization. The oropharynx was irrigated using normal saline. The tonsillar fossas were examined and excellent hemostasis was assured.  The mouth gag was then released from suspension and the jaw was allowed to rest before re-checking for complete hemostasis. Any bleeds were cauterized. All instruments were removed. The patient was turned over to anesthesia and extubated, having tolerated  the procedure well. The patient was then extubated and escorted to the post anesthesia care unit in stable condition.    Dr. Lemus was present for the key portions of the procedure.                         Attestation:   Note Completion:  Attending Attestation I was present for key portions of the procedure and the procedure lasted longer than 5 minutes.    I am a: Resident/Fellow         Electronic Signatures:  Trav Lovelace (Resident))  (Signed 22-Jun-2023 08:32)   Authored: Post-Operative Note, Chart Review, Note Completion  Jeffery Lemus)  (Signed 22-Jun-2023 09:44)   Authored: Post-Operative Note, Chart Review, Note Completion   Co-Signer: Post-Operative Note, Chart Review, Note Completion      Last Updated: 22-Jun-2023 09:44 by Jeffery Lemus)

## 2023-10-06 ENCOUNTER — ANCILLARY PROCEDURE (OUTPATIENT)
Dept: RADIOLOGY | Facility: CLINIC | Age: 48
End: 2023-10-06
Payer: COMMERCIAL

## 2023-10-06 ENCOUNTER — APPOINTMENT (OUTPATIENT)
Dept: RADIOLOGY | Facility: CLINIC | Age: 48
End: 2023-10-06
Payer: COMMERCIAL

## 2023-10-06 ENCOUNTER — APPOINTMENT (OUTPATIENT)
Dept: HEMATOLOGY/ONCOLOGY | Facility: CLINIC | Age: 48
End: 2023-10-06
Payer: COMMERCIAL

## 2023-10-06 ENCOUNTER — INFUSION (OUTPATIENT)
Dept: HEMATOLOGY/ONCOLOGY | Facility: CLINIC | Age: 48
End: 2023-10-06
Payer: COMMERCIAL

## 2023-10-06 DIAGNOSIS — C81.48: ICD-10-CM

## 2023-10-06 PROCEDURE — 96523 IRRIG DRUG DELIVERY DEVICE: CPT

## 2023-10-06 PROCEDURE — 78815 PET IMAGE W/CT SKULL-THIGH: CPT | Performed by: STUDENT IN AN ORGANIZED HEALTH CARE EDUCATION/TRAINING PROGRAM

## 2023-10-06 NOTE — PROGRESS NOTES
Pt here for port access for CT scan. Port was accessed and positive blood return present.   Patient educated to go scan today and report back to [facility] for port deaccess. Patient verbalized understanding and will be back after her scan.

## 2023-10-09 ENCOUNTER — OFFICE VISIT (OUTPATIENT)
Dept: HEMATOLOGY/ONCOLOGY | Facility: CLINIC | Age: 48
End: 2023-10-09
Payer: COMMERCIAL

## 2023-10-09 ENCOUNTER — APPOINTMENT (OUTPATIENT)
Dept: HEMATOLOGY/ONCOLOGY | Facility: CLINIC | Age: 48
End: 2023-10-09
Payer: COMMERCIAL

## 2023-10-09 ENCOUNTER — APPOINTMENT (OUTPATIENT)
Dept: RADIOLOGY | Facility: CLINIC | Age: 48
End: 2023-10-09
Payer: COMMERCIAL

## 2023-10-09 ENCOUNTER — ANCILLARY PROCEDURE (OUTPATIENT)
Dept: RADIOLOGY | Facility: CLINIC | Age: 48
End: 2023-10-09
Payer: COMMERCIAL

## 2023-10-09 ENCOUNTER — INFUSION (OUTPATIENT)
Dept: HEMATOLOGY/ONCOLOGY | Facility: CLINIC | Age: 48
End: 2023-10-09
Payer: COMMERCIAL

## 2023-10-09 VITALS
BODY MASS INDEX: 25.39 KG/M2 | WEIGHT: 137.79 LBS | SYSTOLIC BLOOD PRESSURE: 97 MMHG | HEART RATE: 95 BPM | DIASTOLIC BLOOD PRESSURE: 64 MMHG | RESPIRATION RATE: 15 BRPM | OXYGEN SATURATION: 92 % | TEMPERATURE: 96.4 F

## 2023-10-09 DIAGNOSIS — C85.90 LYMPHOMA (MULTI): Primary | ICD-10-CM

## 2023-10-09 DIAGNOSIS — C81.48: Primary | ICD-10-CM

## 2023-10-09 DIAGNOSIS — C81.48: ICD-10-CM

## 2023-10-09 LAB
ALBUMIN SERPL BCP-MCNC: 3.3 G/DL (ref 3.4–5)
ALP SERPL-CCNC: 83 U/L (ref 33–110)
ALT SERPL W P-5'-P-CCNC: 13 U/L (ref 7–45)
ANION GAP SERPL CALC-SCNC: 13 MMOL/L (ref 10–20)
AST SERPL W P-5'-P-CCNC: 14 U/L (ref 9–39)
BASOPHILS # BLD AUTO: 0.05 X10*3/UL (ref 0–0.1)
BASOPHILS NFR BLD AUTO: 0.4 %
BILIRUB SERPL-MCNC: 0.4 MG/DL (ref 0–1.2)
BUN SERPL-MCNC: 13 MG/DL (ref 6–23)
CALCIUM SERPL-MCNC: 8.6 MG/DL (ref 8.6–10.3)
CHLORIDE SERPL-SCNC: 101 MMOL/L (ref 98–107)
CO2 SERPL-SCNC: 24 MMOL/L (ref 21–32)
CREAT SERPL-MCNC: 0.51 MG/DL (ref 0.5–1.05)
DACRYOCYTES BLD QL SMEAR: NORMAL
EOSINOPHIL # BLD AUTO: 0.23 X10*3/UL (ref 0–0.7)
EOSINOPHIL NFR BLD AUTO: 1.7 %
ERYTHROCYTE [DISTWIDTH] IN BLOOD BY AUTOMATED COUNT: 22.9 % (ref 11.5–14.5)
GFR SERPL CREATININE-BSD FRML MDRD: >90 ML/MIN/1.73M*2
GLUCOSE SERPL-MCNC: 114 MG/DL (ref 74–99)
HCT VFR BLD AUTO: 29.4 % (ref 36–46)
HGB BLD-MCNC: 9.5 G/DL (ref 12–16)
IMM GRANULOCYTES # BLD AUTO: 0.16 X10*3/UL (ref 0–0.7)
IMM GRANULOCYTES NFR BLD AUTO: 1.2 % (ref 0–0.9)
LYMPHOCYTES # BLD AUTO: 1.34 X10*3/UL (ref 1.2–4.8)
LYMPHOCYTES NFR BLD AUTO: 9.9 %
MAGNESIUM SERPL-MCNC: 1.4 MG/DL (ref 1.6–2.4)
MCH RBC QN AUTO: 30.5 PG (ref 26–34)
MCHC RBC AUTO-ENTMCNC: 32.3 G/DL (ref 32–36)
MCV RBC AUTO: 95 FL (ref 80–100)
MONOCYTES # BLD AUTO: 1.69 X10*3/UL (ref 0.1–1)
MONOCYTES NFR BLD AUTO: 12.5 %
NEUTROPHILS # BLD AUTO: 10.02 X10*3/UL (ref 1.2–7.7)
NEUTROPHILS NFR BLD AUTO: 74.3 %
NRBC BLD-RTO: ABNORMAL /100{WBCS}
OVALOCYTES BLD QL SMEAR: NORMAL
PLATELET # BLD AUTO: 221 X10*3/UL (ref 150–450)
PMV BLD AUTO: 10.8 FL (ref 7.5–11.5)
POLYCHROMASIA BLD QL SMEAR: NORMAL
POTASSIUM SERPL-SCNC: 3.7 MMOL/L (ref 3.5–5.3)
PROT SERPL-MCNC: 5.7 G/DL (ref 6.4–8.2)
RBC # BLD AUTO: 3.11 X10*6/UL (ref 4–5.2)
RBC MORPH BLD: NORMAL
SODIUM SERPL-SCNC: 134 MMOL/L (ref 136–145)
WBC # BLD AUTO: 13.5 X10*3/UL (ref 4.4–11.3)

## 2023-10-09 PROCEDURE — 85025 COMPLETE CBC W/AUTO DIFF WBC: CPT

## 2023-10-09 PROCEDURE — 96375 TX/PRO/DX INJ NEW DRUG ADDON: CPT | Mod: 59

## 2023-10-09 PROCEDURE — 96372 THER/PROPH/DIAG INJ SC/IM: CPT | Mod: 59

## 2023-10-09 PROCEDURE — 96417 CHEMO IV INFUS EACH ADDL SEQ: CPT

## 2023-10-09 PROCEDURE — 2500000004 HC RX 250 GENERAL PHARMACY W/ HCPCS (ALT 636 FOR OP/ED): Mod: JZ | Performed by: INTERNAL MEDICINE

## 2023-10-09 PROCEDURE — 99215 OFFICE O/P EST HI 40 MIN: CPT | Performed by: INTERNAL MEDICINE

## 2023-10-09 PROCEDURE — A9552 F18 FDG: HCPCS | Mod: MUE | Performed by: INTERNAL MEDICINE

## 2023-10-09 PROCEDURE — 96374 THER/PROPH/DIAG INJ IV PUSH: CPT

## 2023-10-09 PROCEDURE — 3430000001 HC RX 343 DIAGNOSTIC RADIOPHARMACEUTICALS: Mod: MUE | Performed by: INTERNAL MEDICINE

## 2023-10-09 PROCEDURE — 96413 CHEMO IV INFUSION 1 HR: CPT

## 2023-10-09 PROCEDURE — 3008F BODY MASS INDEX DOCD: CPT | Performed by: INTERNAL MEDICINE

## 2023-10-09 PROCEDURE — 96523 IRRIG DRUG DELIVERY DEVICE: CPT

## 2023-10-09 PROCEDURE — 36415 COLL VENOUS BLD VENIPUNCTURE: CPT

## 2023-10-09 PROCEDURE — 96365 THER/PROPH/DIAG IV INF INIT: CPT

## 2023-10-09 PROCEDURE — 80053 COMPREHEN METABOLIC PANEL: CPT

## 2023-10-09 PROCEDURE — 96409 CHEMO IV PUSH SNGL DRUG: CPT

## 2023-10-09 PROCEDURE — 1036F TOBACCO NON-USER: CPT | Performed by: INTERNAL MEDICINE

## 2023-10-09 PROCEDURE — 83735 ASSAY OF MAGNESIUM: CPT

## 2023-10-09 PROCEDURE — 96367 TX/PROPH/DG ADDL SEQ IV INF: CPT

## 2023-10-09 PROCEDURE — 96411 CHEMO IV PUSH ADDL DRUG: CPT

## 2023-10-09 PROCEDURE — 78815 PET IMAGE W/CT SKULL-THIGH: CPT

## 2023-10-09 RX ORDER — PALONOSETRON 0.05 MG/ML
0.25 INJECTION, SOLUTION INTRAVENOUS ONCE
Status: CANCELLED | OUTPATIENT
Start: 2023-11-06

## 2023-10-09 RX ORDER — DOXORUBICIN HYDROCHLORIDE 2 MG/ML
25 INJECTION, SOLUTION INTRAVENOUS ONCE
Status: COMPLETED | OUTPATIENT
Start: 2023-10-09 | End: 2023-10-09

## 2023-10-09 RX ORDER — PALONOSETRON 0.05 MG/ML
0.25 INJECTION, SOLUTION INTRAVENOUS ONCE
Status: CANCELLED | OUTPATIENT
Start: 2023-10-23

## 2023-10-09 RX ORDER — DIPHENHYDRAMINE HYDROCHLORIDE 50 MG/ML
50 INJECTION INTRAMUSCULAR; INTRAVENOUS AS NEEDED
Status: DISCONTINUED | OUTPATIENT
Start: 2023-10-09 | End: 2023-10-09 | Stop reason: HOSPADM

## 2023-10-09 RX ORDER — PROCHLORPERAZINE EDISYLATE 5 MG/ML
10 INJECTION INTRAMUSCULAR; INTRAVENOUS EVERY 6 HOURS PRN
Status: CANCELLED | OUTPATIENT
Start: 2023-11-06

## 2023-10-09 RX ORDER — ALBUTEROL SULFATE 0.83 MG/ML
3 SOLUTION RESPIRATORY (INHALATION) AS NEEDED
Status: CANCELLED | OUTPATIENT
Start: 2023-11-06

## 2023-10-09 RX ORDER — PROCHLORPERAZINE MALEATE 10 MG
10 TABLET ORAL EVERY 6 HOURS PRN
Status: CANCELLED | OUTPATIENT
Start: 2023-11-06

## 2023-10-09 RX ORDER — EPINEPHRINE 0.3 MG/.3ML
0.3 INJECTION SUBCUTANEOUS EVERY 5 MIN PRN
Status: CANCELLED | OUTPATIENT
Start: 2023-10-23

## 2023-10-09 RX ORDER — DIPHENHYDRAMINE HYDROCHLORIDE 50 MG/ML
50 INJECTION INTRAMUSCULAR; INTRAVENOUS AS NEEDED
Status: CANCELLED | OUTPATIENT
Start: 2023-11-06

## 2023-10-09 RX ORDER — DOXORUBICIN HYDROCHLORIDE 2 MG/ML
25 INJECTION, SOLUTION INTRAVENOUS ONCE
Status: CANCELLED | OUTPATIENT
Start: 2023-11-06

## 2023-10-09 RX ORDER — EPINEPHRINE 0.3 MG/.3ML
0.3 INJECTION SUBCUTANEOUS EVERY 5 MIN PRN
Status: CANCELLED | OUTPATIENT
Start: 2023-11-06

## 2023-10-09 RX ORDER — DIPHENHYDRAMINE HYDROCHLORIDE 50 MG/ML
50 INJECTION INTRAMUSCULAR; INTRAVENOUS AS NEEDED
Status: CANCELLED | OUTPATIENT
Start: 2023-10-23

## 2023-10-09 RX ORDER — PROCHLORPERAZINE EDISYLATE 5 MG/ML
10 INJECTION INTRAMUSCULAR; INTRAVENOUS EVERY 6 HOURS PRN
Status: CANCELLED | OUTPATIENT
Start: 2023-10-23

## 2023-10-09 RX ORDER — EPINEPHRINE 0.3 MG/.3ML
0.3 INJECTION SUBCUTANEOUS EVERY 5 MIN PRN
Status: DISCONTINUED | OUTPATIENT
Start: 2023-10-09 | End: 2023-10-09 | Stop reason: HOSPADM

## 2023-10-09 RX ORDER — DOXORUBICIN HYDROCHLORIDE 2 MG/ML
25 INJECTION, SOLUTION INTRAVENOUS ONCE
Status: CANCELLED | OUTPATIENT
Start: 2023-10-23

## 2023-10-09 RX ORDER — FAMOTIDINE 10 MG/ML
20 INJECTION INTRAVENOUS ONCE AS NEEDED
Status: DISCONTINUED | OUTPATIENT
Start: 2023-10-09 | End: 2023-10-09 | Stop reason: HOSPADM

## 2023-10-09 RX ORDER — FAMOTIDINE 10 MG/ML
20 INJECTION INTRAVENOUS ONCE AS NEEDED
Status: CANCELLED | OUTPATIENT
Start: 2023-11-06

## 2023-10-09 RX ORDER — LANOLIN ALCOHOL/MO/W.PET/CERES
400 CREAM (GRAM) TOPICAL DAILY
Qty: 30 TABLET | Refills: 11 | Status: SHIPPED | OUTPATIENT
Start: 2023-10-09 | End: 2023-12-06 | Stop reason: SDUPTHER

## 2023-10-09 RX ORDER — PALONOSETRON 0.05 MG/ML
0.25 INJECTION, SOLUTION INTRAVENOUS ONCE
Status: CANCELLED | OUTPATIENT
Start: 2023-10-09

## 2023-10-09 RX ORDER — FLUDEOXYGLUCOSE F 18 200 MCI/ML
12.3 INJECTION, SOLUTION INTRAVENOUS
Status: COMPLETED | OUTPATIENT
Start: 2023-10-09 | End: 2023-10-09

## 2023-10-09 RX ORDER — PROCHLORPERAZINE MALEATE 10 MG
10 TABLET ORAL EVERY 6 HOURS PRN
Status: DISCONTINUED | OUTPATIENT
Start: 2023-10-09 | End: 2023-10-09 | Stop reason: HOSPADM

## 2023-10-09 RX ORDER — PROCHLORPERAZINE EDISYLATE 5 MG/ML
10 INJECTION INTRAMUSCULAR; INTRAVENOUS EVERY 6 HOURS PRN
Status: DISCONTINUED | OUTPATIENT
Start: 2023-10-09 | End: 2023-10-09 | Stop reason: HOSPADM

## 2023-10-09 RX ORDER — ALBUTEROL SULFATE 0.83 MG/ML
3 SOLUTION RESPIRATORY (INHALATION) AS NEEDED
Status: CANCELLED | OUTPATIENT
Start: 2023-10-23

## 2023-10-09 RX ORDER — PALONOSETRON 0.05 MG/ML
0.25 INJECTION, SOLUTION INTRAVENOUS ONCE
Status: COMPLETED | OUTPATIENT
Start: 2023-10-09 | End: 2023-10-09

## 2023-10-09 RX ORDER — FAMOTIDINE 10 MG/ML
20 INJECTION INTRAVENOUS ONCE AS NEEDED
Status: CANCELLED | OUTPATIENT
Start: 2023-10-23

## 2023-10-09 RX ORDER — ALBUTEROL SULFATE 0.83 MG/ML
3 SOLUTION RESPIRATORY (INHALATION) AS NEEDED
Status: DISCONTINUED | OUTPATIENT
Start: 2023-10-09 | End: 2023-10-09 | Stop reason: HOSPADM

## 2023-10-09 RX ORDER — PROCHLORPERAZINE MALEATE 10 MG
10 TABLET ORAL EVERY 6 HOURS PRN
Status: CANCELLED | OUTPATIENT
Start: 2023-10-23

## 2023-10-09 RX ADMIN — BRENTUXIMAB VEDOTIN 74.76 MG: 50 INJECTION, POWDER, LYOPHILIZED, FOR SOLUTION INTRAVENOUS at 15:00

## 2023-10-09 RX ADMIN — SODIUM CHLORIDE 150 MG: 9 INJECTION, SOLUTION INTRAVENOUS at 12:59

## 2023-10-09 RX ADMIN — DOXORUBICIN HYDROCHLORIDE 41.25 MG: 2 INJECTION, SOLUTION INTRAVENOUS at 13:48

## 2023-10-09 RX ADMIN — PALONOSETRON 250 MCG: 0.05 INJECTION, SOLUTION INTRAVENOUS at 12:20

## 2023-10-09 RX ADMIN — DIPHENHYDRAMINE HYDROCHLORIDE 50 MG: 50 INJECTION, SOLUTION INTRAMUSCULAR; INTRAVENOUS at 12:20

## 2023-10-09 RX ADMIN — FLUDEOXYGLUCOSE F 18 12.3 MILLICURIE: 200 INJECTION, SOLUTION INTRAVENOUS at 07:30

## 2023-10-09 RX ADMIN — PEGFILGRASTIM 6 MG: KIT SUBCUTANEOUS at 15:02

## 2023-10-09 RX ADMIN — DACARBAZINE 600 MG: 10 INJECTION, POWDER, FOR SOLUTION INTRAVENOUS at 14:21

## 2023-10-09 RX ADMIN — DEXAMETHASONE SODIUM PHOSPHATE 12 MG: 4 INJECTION, SOLUTION INTRA-ARTICULAR; INTRALESIONAL; INTRAMUSCULAR; INTRAVENOUS; SOFT TISSUE at 12:40

## 2023-10-09 RX ADMIN — VINBLASTINE SULFATE 9.9 MG: 1 INJECTION INTRAVENOUS at 14:03

## 2023-10-09 ASSESSMENT — PATIENT HEALTH QUESTIONNAIRE - PHQ9
4. FEELING TIRED OR HAVING LITTLE ENERGY: NEARLY EVERY DAY
9. THOUGHTS THAT YOU WOULD BE BETTER OFF DEAD, OR OF HURTING YOURSELF: NOT AT ALL
6. FEELING BAD ABOUT YOURSELF - OR THAT YOU ARE A FAILURE OR HAVE LET YOURSELF OR YOUR FAMILY DOWN: 3
6. FEELING BAD ABOUT YOURSELF - OR THAT YOU ARE A FAILURE OR HAVE LET YOURSELF OR YOUR FAMILY DOWN: NEARLY EVERY DAY
3. TROUBLE FALLING OR STAYING ASLEEP OR SLEEPING TOO MUCH: SEVERAL DAYS
10. IF YOU CHECKED OFF ANY PROBLEMS, HOW DIFFICULT HAVE THESE PROBLEMS MADE IT FOR YOU TO DO YOUR WORK, TAKE CARE OF THINGS AT HOME, OR GET ALONG WITH OTHER PEOPLE: SOMEWHAT DIFFICULT
1. LITTLE INTEREST OR PLEASURE IN DOING THINGS: NEARLY EVERY DAY
9. THOUGHTS THAT YOU WOULD BE BETTER OFF DEAD, OR OF HURTING YOURSELF: NOT AT ALL
7. TROUBLE CONCENTRATING ON THINGS, SUCH AS READING THE NEWSPAPER OR WATCHING TELEVISION: 3
2. FEELING DOWN, DEPRESSED, IRRITABLE, OR HOPELESS: NEARLY EVERY DAY
2. FEELING DOWN, DEPRESSED OR HOPELESS: NEARLY EVERY DAY
SUM OF ALL RESPONSES TO PHQ QUESTIONS 1-9: 19
4. FEELING TIRED OR HAVING LITTLE ENERGY: NEARLY EVERY DAY
7. TROUBLE CONCENTRATING ON THINGS, SUCH AS READING THE NEWSPAPER OR WATCHING TELEVISION: 3
5. POOR APPETITE OR OVEREATING: NEARLY EVERY DAY
3. TROUBLE FALLING OR STAYING ASLEEP OR SLEEPING TOO MUCH: SEVERAL DAYS
1. LITTLE INTEREST OR PLEASURE IN DOING THINGS: NEARLY EVERY DAY
8. MOVING OR SPEAKING SO SLOWLY THAT OTHER PEOPLE COULD HAVE NOTICED. OR THE OPPOSITE, BEING SO FIGETY OR RESTLESS THAT YOU HAVE BEEN MOVING AROUND A LOT MORE THAN USUAL: NOT AT ALL
5. POOR APPETITE OR OVEREATING: NEARLY EVERY DAY
5. POOR APPETITE OR OVEREATING: 3
3. TROUBLE FALLING OR STAYING ASLEEP OR SLEEPING TOO MUCH: SEVERAL DAYS
SUM OF ALL RESPONSES TO PHQ QUESTIONS 1-9: 19
8. MOVING OR SPEAKING SO SLOWLY THAT OTHER PEOPLE COULD HAVE NOTICED. OR THE OPPOSITE, BEING SO FIGETY OR RESTLESS THAT YOU HAVE BEEN MOVING AROUND A LOT MORE THAN USUAL: NOT AT ALL
2. FEELING DOWN, DEPRESSED, IRRITABLE, OR HOPELESS: 3
8. MOVING OR SPEAKING SO SLOWLY THAT OTHER PEOPLE COULD HAVE NOTICED. OR THE OPPOSITE, BEING SO FIGETY OR RESTLESS THAT YOU HAVE BEEN MOVING AROUND A LOT MORE THAN USUAL: 0
3. TROUBLE FALLING OR STAYING ASLEEP OR SLEEPING TOO MUCH: SEVERAL DAYS
2. FEELING DOWN, DEPRESSED, IRRITABLE, OR HOPELESS: NEARLY EVERY DAY
7. TROUBLE CONCENTRATING ON THINGS, SUCH AS READING THE NEWSPAPER OR WATCHING TELEVISION: NEARLY EVERY DAY
6. FEELING BAD ABOUT YOURSELF - OR THAT YOU ARE A FAILURE OR HAVE LET YOURSELF OR YOUR FAMILY DOWN: NEARLY EVERY DAY
9. THOUGHTS THAT YOU WOULD BE BETTER OFF DEAD, OR OF HURTING YOURSELF: 0
7. TROUBLE CONCENTRATING ON THINGS, SUCH AS READING THE NEWSPAPER OR WATCHING TELEVISION: NEARLY EVERY DAY
6. FEELING BAD ABOUT YOURSELF - OR THAT YOU ARE A FAILURE OR HAVE LET YOURSELF OR YOUR FAMILY DOWN: NEARLY EVERY DAY
4. FEELING TIRED OR HAVING LITTLE ENERGY: NEARLY EVERY DAY
2. FEELING DOWN, DEPRESSED OR HOPELESS: NEARLY EVERY DAY
6. FEELING BAD ABOUT YOURSELF - OR THAT YOU ARE A FAILURE OR HAVE LET YOURSELF OR YOUR FAMILY DOWN: NEARLY EVERY DAY
3. TROUBLE FALLING OR STAYING ASLEEP OR SLEEPING TOO MUCH: SEVERAL DAYS
SUM OF ALL RESPONSES TO PHQ QUESTIONS 1-9: 19
3. TROUBLE FALLING OR STAYING ASLEEP OR SLEEPING TOO MUCH: 1
2. FEELING DOWN, DEPRESSED, IRRITABLE, OR HOPELESS: 3
4. FEELING TIRED OR HAVING LITTLE ENERGY: 3
1. LITTLE INTEREST OR PLEASURE IN DOING THINGS: 3
6. FEELING BAD ABOUT YOURSELF - OR THAT YOU ARE A FAILURE OR HAVE LET YOURSELF OR YOUR FAMILY DOWN: NEARLY EVERY DAY
9. THOUGHTS THAT YOU WOULD BE BETTER OFF DEAD, OR OF HURTING YOURSELF: NOT AT ALL
5. POOR APPETITE OR OVEREATING: 3
4. FEELING TIRED OR HAVING LITTLE ENERGY: NEARLY EVERY DAY
9. THOUGHTS THAT YOU WOULD BE BETTER OFF DEAD, OR OF HURTING YOURSELF: 0
1. LITTLE INTEREST OR PLEASURE IN DOING THINGS: NEARLY EVERY DAY
8. MOVING OR SPEAKING SO SLOWLY THAT OTHER PEOPLE COULD HAVE NOTICED. OR THE OPPOSITE, BEING SO FIGETY OR RESTLESS THAT YOU HAVE BEEN MOVING AROUND A LOT MORE THAN USUAL: 0
2. FEELING DOWN, DEPRESSED, IRRITABLE, OR HOPELESS: 3
SUM OF ALL RESPONSES TO PHQ QUESTIONS 1-9: 19
7. TROUBLE CONCENTRATING ON THINGS, SUCH AS READING THE NEWSPAPER OR WATCHING TELEVISION: NEARLY EVERY DAY
5. POOR APPETITE OR OVEREATING: 3
9. THOUGHTS THAT YOU WOULD BE BETTER OFF DEAD, OR OF HURTING YOURSELF: NOT AT ALL
5. POOR APPETITE OR OVEREATING: NEARLY EVERY DAY
4. FEELING TIRED OR HAVING LITTLE ENERGY: NEARLY EVERY DAY
8. MOVING OR SPEAKING SO SLOWLY THAT OTHER PEOPLE COULD HAVE NOTICED. OR THE OPPOSITE, BEING SO FIGETY OR RESTLESS THAT YOU HAVE BEEN MOVING AROUND A LOT MORE THAN USUAL: NOT AT ALL
1. LITTLE INTEREST OR PLEASURE IN DOING THINGS: NEARLY EVERY DAY
6. FEELING BAD ABOUT YOURSELF - OR THAT YOU ARE A FAILURE OR HAVE LET YOURSELF OR YOUR FAMILY DOWN: NEARLY EVERY DAY
3. TROUBLE FALLING OR STAYING ASLEEP OR SLEEPING TOO MUCH: 1
2. FEELING DOWN, DEPRESSED, IRRITABLE, OR HOPELESS: NEARLY EVERY DAY
7. TROUBLE CONCENTRATING ON THINGS, SUCH AS READING THE NEWSPAPER OR WATCHING TELEVISION: NEARLY EVERY DAY
2. FEELING DOWN, DEPRESSED OR HOPELESS: NEARLY EVERY DAY
8. MOVING OR SPEAKING SO SLOWLY THAT OTHER PEOPLE COULD HAVE NOTICED. OR THE OPPOSITE, BEING SO FIGETY OR RESTLESS THAT YOU HAVE BEEN MOVING AROUND A LOT MORE THAN USUAL: NOT AT ALL
4. FEELING TIRED OR HAVING LITTLE ENERGY: 3
8. MOVING OR SPEAKING SO SLOWLY THAT OTHER PEOPLE COULD HAVE NOTICED. OR THE OPPOSITE, BEING SO FIGETY OR RESTLESS THAT YOU HAVE BEEN MOVING AROUND A LOT MORE THAN USUAL: NOT AT ALL
6. FEELING BAD ABOUT YOURSELF - OR THAT YOU ARE A FAILURE OR HAVE LET YOURSELF OR YOUR FAMILY DOWN: 3
1. LITTLE INTEREST OR PLEASURE IN DOING THINGS: 3
4. FEELING TIRED OR HAVING LITTLE ENERGY: NEARLY EVERY DAY
9. THOUGHTS THAT YOU WOULD BE BETTER OFF DEAD, OR OF HURTING YOURSELF: NOT AT ALL
8. MOVING OR SPEAKING SO SLOWLY THAT OTHER PEOPLE COULD HAVE NOTICED. OR THE OPPOSITE, BEING SO FIGETY OR RESTLESS THAT YOU HAVE BEEN MOVING AROUND A LOT MORE THAN USUAL: 0
9. THOUGHTS THAT YOU WOULD BE BETTER OFF DEAD, OR OF HURTING YOURSELF: NOT AT ALL
5. POOR APPETITE OR OVEREATING: NEARLY EVERY DAY
1. LITTLE INTEREST OR PLEASURE IN DOING THINGS: NEARLY EVERY DAY
10. IF YOU CHECKED OFF ANY PROBLEMS, HOW DIFFICULT HAVE THESE PROBLEMS MADE IT FOR YOU TO DO YOUR WORK, TAKE CARE OF THINGS AT HOME, OR GET ALONG WITH OTHER PEOPLE: SOMEWHAT DIFFICULT
9. THOUGHTS THAT YOU WOULD BE BETTER OFF DEAD, OR OF HURTING YOURSELF: 0
1. LITTLE INTEREST OR PLEASURE IN DOING THINGS: NEARLY EVERY DAY
7. TROUBLE CONCENTRATING ON THINGS, SUCH AS READING THE NEWSPAPER OR WATCHING TELEVISION: NEARLY EVERY DAY
7. TROUBLE CONCENTRATING ON THINGS, SUCH AS READING THE NEWSPAPER OR WATCHING TELEVISION: 3
5. POOR APPETITE OR OVEREATING: NEARLY EVERY DAY
1. LITTLE INTEREST OR PLEASURE IN DOING THINGS: 3
10. IF YOU CHECKED OFF ANY PROBLEMS, HOW DIFFICULT HAVE THESE PROBLEMS MADE IT FOR YOU TO DO YOUR WORK, TAKE CARE OF THINGS AT HOME, OR GET ALONG WITH OTHER PEOPLE: SOMEWHAT DIFFICULT
7. TROUBLE CONCENTRATING ON THINGS, SUCH AS READING THE NEWSPAPER OR WATCHING TELEVISION: NEARLY EVERY DAY
4. FEELING TIRED OR HAVING LITTLE ENERGY: 3
SUM OF ALL RESPONSES TO PHQ QUESTIONS 1-9: 19
6. FEELING BAD ABOUT YOURSELF - OR THAT YOU ARE A FAILURE OR HAVE LET YOURSELF OR YOUR FAMILY DOWN: 3
SUM OF ALL RESPONSES TO PHQ QUESTIONS 1-9: 19
3. TROUBLE FALLING OR STAYING ASLEEP OR SLEEPING TOO MUCH: 1
3. TROUBLE FALLING OR STAYING ASLEEP OR SLEEPING TOO MUCH: SEVERAL DAYS
5. POOR APPETITE OR OVEREATING: NEARLY EVERY DAY
10. IF YOU CHECKED OFF ANY PROBLEMS, HOW DIFFICULT HAVE THESE PROBLEMS MADE IT FOR YOU TO DO YOUR WORK, TAKE CARE OF THINGS AT HOME, OR GET ALONG WITH OTHER PEOPLE: SOMEWHAT DIFFICULT
8. MOVING OR SPEAKING SO SLOWLY THAT OTHER PEOPLE COULD HAVE NOTICED. OR THE OPPOSITE, BEING SO FIGETY OR RESTLESS THAT YOU HAVE BEEN MOVING AROUND A LOT MORE THAN USUAL: NOT AT ALL

## 2023-10-09 ASSESSMENT — PAIN SCALES - GENERAL: PAINLEVEL: 5

## 2023-10-09 NOTE — PROGRESS NOTES
Patient ID: Pamela Lopez is a 47 y.o. female.    Cancer Staging   No matching staging information was found for the patient.    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 Pamela Lopez is a 46 y/o F presenting for follow-up for Hodgkin's lymphoma. Here for consideration of cycle 3 day 15.     Patient did have repeat imaging today and is having a good clinical response with much decreased FDG activity in the bones.     Since last visit, patient reports that overall she is feeling good. States that her last cycle was much easier to tolerate. Denies any recent infections, fevers or chills. She reports some chest discomfort that she thinks might be due to muscle strain. No new chest pain, cough or SOB. No new GI issues. No new lumps or bumps. No other complaints today.          PMHx: Lymphoma, hyperlipemia and asthma.      PSHx:   none     FHx:  None     Social Hx:  Not . Lives with her 3 grown children. Former smoker. No EtOH use. Hx of drug use with meth x8 years. Quit 1 year ago. 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     Objective    Vitals:    10/09/23 0937   BP: 97/64   Pulse: 95   Resp: 15   Temp: 35.8 °C (96.4 °F)   SpO2: 92%      Review of Systems:   Review of Systems:    Positive per HPI, otherwise negative.      Physical Exam:      Constitutional: Well nourished, alert/oriented, cooperative,  no acute distress, pacing in pain   Eyes: PERRL. sclera anicteric   ENMT: mucous membranes moist   Head/Neck: Neck supple with no apparent injury. Trachea  midline.   Respiratory/Thorax: Breathing equal and unlabored   Cardiovascular: bradycardia, regular  rhythm   Gastrointestinal: Nondistended, soft. Normoactive  bowel sounds.   Musculoskeletal: ROM intact, no joint swelling, normal  strength   Extremities: normal extremities, no cyanosis, no  edema, no clubbing   Neurological: Patient is alert and oriented x3. Nonfocal  exam. No myoclonus   Psychological: Appropriate mood, behavior, and interactions;  verbalizes concerns.   Skin: Warm and dry, well hydrated, no cyanosis or  jaundice, no rashes, generalized scabbing of skin without appearing infected      Performance Status:  Symptomatic; fully ambulatory    Labs:  Personally reviewed.    Component      Latest Ref Rng 9/18/2023 9/25/2023 10/2/2023   MAGNESIUM      1.60 - 2.40 mg/dL 1.30 (L)  CANCELED  1.50 (L)    MAGNESIUM        1.60        Component      Latest Ref Rng 9/18/2023 9/25/2023 10/2/2023   GLUCOSE      74 - 99 mg/dL 122 (H)  102 (H)  87    SODIUM      136 - 145 mmol/L 138  137  138    POTASSIUM      3.5 - 5.3 mmol/L 3.2 (L)  3.5  3.5    CHLORIDE      98 - 107 mmol/L 101  103  104    Bicarbonate      21 - 32 mmol/L 26  23  26    Anion Gap      10 - 20 mmol/L 14  15  12    Blood Urea Nitrogen      6 - 23 mg/dL 9  9  11    Creatinine      0.50 - 1.05 mg/dL 0.70  0.69  0.50    Calcium      8.6 - 10.3 mg/dL 9.3  9.3  9.4    EGFR      >60 mL/min/1.73m*2   >90       Component      Latest Ref Rng 9/18/2023 9/25/2023 10/2/2023   MAGNESIUM      1.60 - 2.40 mg/dL 1.30 (L)  CANCELED  1.50 (L)    MAGNESIUM        1.60         9/25/2023   RBC Morphology See Below    Polychromasia Mild    Ovalocytes Few    Teardrop Cells Few    Geovanna Cells Few        Component      Latest Ref Rng 9/25/2023   WBC      4.4 - 11.3 x10E9/L 9.0    RBC      4.00 - 5.20 x10E12/L 3.74 (L)    HEMOGLOBIN      12.0 - 16.0 g/dL 11.0 (L)    HEMATOCRIT      36.0 - 46.0 % 34.5 (L)    MCV      80 - 100 fL 92    MCHC      32.0 - 36.0 g/dL 31.9 (L)    Platelets      150 - 450 x10E9/L 229    RED CELL DISTRIBUTION WIDTH      11.5 - 14.5 % 22.2 (H)     Neutrophils %      40.0 - 80.0 % 71.4    Immature Granulocytes %, Automated      0.0 - 0.9 % 0.8    Lymphocytes %      13.0 - 44.0 % 15.2    Monocytes %      2.0 - 10.0 % 11.2    Eosinophils %      0.0 - 6.0 % 0.1    Basophils %      0.0 - 2.0 % 1.3    Neutrophils Absolute      1.20 - 7.70 x10E9/L 6.44    Lymphocytes Absolute      1.20 - 4.80 x10E9/L 1.37    Monocytes Absolute      0.10 - 1.00 x10E9/L 1.01 (H)    Eosinophils Absolute      0.00 - 0.70 x10E9/L 0.01    Basophils Absolute      0.00 - 0.10 x10E9/L 0.12 (H)    nRBC      0.0 - 0.0 /100 WBC    MANUAL DIFFERENTIAL Y/N       Cardiology:  Personally reviewed.     Electrocardiogram 12 Lead    Height: Not recorded   Weight: Not recorded   Blood Pressure: Not recorded    Date of Study: 09/18/2023   Ordering Provider: THOMAS Arguelles   Clinical Indications: None Listed       Interpreting Physicians  Performing Staff   Isaiah Joseph MD No performing staff assigned to study.           Indications  Priority: Routine  Not on file       Interpretation Summary    Marked sinus bradycardia  T wave abnormality, consider anterior ischemia  Abnormal ECG  When compared with ECG of 25-AUG-2023 10:47,  Premature supraventricular complexes are no longer Present  Sinus rhythm is no longer with 2nd degree A-V block (Mobitz I)  Vent. rate has decreased BY  51 BPM  Inverted T waves have replaced nonspecific T wave abnormality in Anterior leads  QT has shortened  Confirmed by Isaiah Joseph ([id number]) on 9/18/2023 10:29:24 AM    Measurements    P Axis 35 degrees         P Offset 194 ms         MO Interval 142 ms         Q Onset 213 ms         QTC Calculation(Bazett) 400 ms         QTC Fredericia 436 ms         R Axis -14 degrees          T Axis -27 degrees         Ventricular Rate 36 BPM         Atrial Rate 36 BPM         P Onset 142 ms          QRS Count 6 beats         QRS Duration 86 ms         QT Interval 518 ms         T Offset 472 ms                Assessment/Plan     1. Classical Hodgkin lymphoma, lymphocyte- rich subtype, stage IVB     - developed tonsil enlargement initially over two years ago and thought to be related to infection  - 6/5/23- 6/723 admitted for new pain and found to have multiple spine mets, neck lymphadenopathy and possible liver lesions  -  biopsy 6/9/23 of her L3 was nondiagnostic  - 6/13/23 PET/CT with FDG avid right palantine tonsil, b/l cervical, portacaval, RP nodes, hepatic met, osseous lesions in axial skeleton  - 6/19/23 planned for 10 fx to T7  - 6/22/23 tonsillectomy path consistent with classical Hodgkin lymphoma  - Discussed diagnosis of classical Hodgkin's lymphoma with patient and given stage IV disease with B symptom along with history of strong tobacco use, recommend brentuximab plus AVD per ECHELON-1 trial (Lisa, et al. [facility] 2018)  - Reviewed side effects and consent signed  - pt is planning to see dental next week as she has active abscess with pain and still has RT next week  - 7/17/12 AVD+ brentuximab  -hospitalized for fever and discharge 8/27/23 and no infectious cause found.  - delays due to neutropenia, cycle 3 day on 9/25/23.  - 10/9/23 PET CT interim with significant treatment response    10/9/23:  - We reviewed her PET images from this morning. Overall, she is having an excellent clinical response with much of the FDG actvity decreased in her bones, lymph nodes and neck and I do not see any new sites of disease  which is consistent with a strong clinical response.  - Patient does state her last cycle was much easier to tolerate without any new toxicities.   - We will proceed with day 15 today and I will have her come back in 1 week for hydration and supportive care with Lamonte.  - I will see her again with cycle 4 day 1.  - RTC on day 15 with Lamonte and I will see her again with cycle 4 day 1.       RTC on day 15 with Lamonte and I will see her again with cycle 4 day 1. This note has been transcribed using a medical scribe and there  is a possibility of unintentional typing misprints.           Jess Gomez MD  Hematology/Oncology  [facility]    Amauri Attestation  By signing my name below, I, Amauri Bolton attest that this documentation has been prepared under the direction and in the presence of Jess Gomez MD.

## 2023-10-09 NOTE — PATIENT INSTRUCTIONS
Will return as instructed for next cycle chemo.   Aware that she may come in between for fluids/labs if needed.

## 2023-10-10 ENCOUNTER — TELEMEDICINE (OUTPATIENT)
Dept: PALLIATIVE MEDICINE | Facility: CLINIC | Age: 48
End: 2023-10-10
Payer: COMMERCIAL

## 2023-10-10 DIAGNOSIS — G89.3 CANCER RELATED PAIN: ICD-10-CM

## 2023-10-10 DIAGNOSIS — G47.00 INSOMNIA, UNSPECIFIED TYPE: Primary | ICD-10-CM

## 2023-10-10 DIAGNOSIS — Z51.5 ENCOUNTER FOR PALLIATIVE CARE: ICD-10-CM

## 2023-10-10 PROCEDURE — 99213 OFFICE O/P EST LOW 20 MIN: CPT

## 2023-10-10 PROCEDURE — 99213 OFFICE O/P EST LOW 20 MIN: CPT | Mod: GT

## 2023-10-10 RX ORDER — MORPHINE SULFATE 30 MG/1
30 TABLET, FILM COATED, EXTENDED RELEASE ORAL EVERY 12 HOURS
Qty: 60 TABLET | Refills: 0 | Status: SHIPPED | OUTPATIENT
Start: 2023-10-10 | End: 2023-11-06 | Stop reason: WASHOUT

## 2023-10-10 RX ORDER — RISPERIDONE 0.5 MG/1
0.5 TABLET ORAL NIGHTLY
Qty: 30 TABLET | Refills: 2 | Status: SHIPPED | OUTPATIENT
Start: 2023-10-10 | End: 2024-04-01 | Stop reason: SDUPTHER

## 2023-10-10 RX ORDER — FERROUS SULFATE TAB 325 MG (65 MG ELEMENTAL FE) 325 (65 FE) MG
1 TAB ORAL EVERY OTHER DAY
Qty: 15 TABLET | Refills: 3 | Status: SHIPPED | OUTPATIENT
Start: 2023-10-10 | End: 2023-12-06 | Stop reason: SDUPTHER

## 2023-10-10 RX ORDER — OXYCODONE HYDROCHLORIDE 5 MG/1
10 TABLET ORAL EVERY 6 HOURS PRN
Qty: 180 TABLET | Refills: 0 | Status: SHIPPED | OUTPATIENT
Start: 2023-10-10 | End: 2023-11-09

## 2023-10-10 RX ORDER — POTASSIUM CHLORIDE 750 MG/1
10 TABLET, EXTENDED RELEASE ORAL 2 TIMES DAILY
Qty: 60 TABLET | Refills: 0 | Status: SHIPPED | OUTPATIENT
Start: 2023-10-10 | End: 2023-11-13 | Stop reason: SDUPTHER

## 2023-10-10 NOTE — PROGRESS NOTES
SUPPORTIVE AND PALLIATIVE ONCOLOGY OUTPATIENT FOLLOW-UP      SERVICE DATE: 10/10/2023    Virtual or Telephone Consent    A telephone visit (audio only) between the patient (at the originating site) and the provider (at the distant site) was utilized to provide this telehealth service.   Verbal consent was requested and obtained from Pamela Lopez on this date, 10/12/23 for a telehealth visit.     Subjective   HISTORY OF PRESENT ILLNESS: Pamela Lopez is a 47 y.o. female who presents with newly diagnosed Hodgkin Lymphoma. She was admitted d/t back pain and a CT c/a/p revealed multiple hepatic masses and several osteoblastic  lesions. MRI brain was negative. She underwent biopsy of L3 on 6/9/23 which was nondiagnostic. PET scan showed FDG-avid lymph nodes in the neck, tonsil, bones, and liver. Biopsy of right tonsil 6/22 revealed Hodgkin Lymphoma. She underwent RT to T7 x10  fractions. She started treatment with AVD + Brentuximab 7/17/23. Recent PET scan with excellent response to treatment.     Pain Assessment:  Pain Score: Pain at its worst is an 8/10. At its best, pain is a 4/10 which is tolerable.   Location: legs, arms, and shoulders  Description: Describes aching in the legs. She has numbness and tingling in her legs, arms, and shoulders. She also describes pins and needles in her arms. The numbness and tingling in her legs is exacerbated by walking. She has been taking oxycodone 5-15 mg daily in the morning. She does occasionally take another dose at bedtime with good relief. She continues to take Morphine Er 30 mg BID.     Symptom Assessment:  Pain:a little  Headache: none  Dizziness:none  Lack of energy: a little  Difficulty sleeping: a little. Up and down. 3-4 good days.   Worrying: none  Anxiety: a little  Depression: a little. She is crying less now since increasing her Lexapro back to 20 mg.   Pain in mouth/swallowing: none  Dry mouth: none  Taste changes: none  Shortness of breath: none  Lack of  appetite: a little   Nausea: none  Vomiting: none  Constipation: none  Diarrhea: none  Sore muscles: none  Numbness or tingling in hands/feet/other: somewhat  Weight loss: a little. States that she lost 6 pounds and then gained it back.   Other: none      Information obtained from: interview of patient  ______________________________________________________________________        Objective     Results for orders placed or performed in visit on 10/09/23 (from the past 96 hour(s))   Magnesium   Result Value Ref Range    Magnesium 1.40 (L) 1.60 - 2.40 mg/dL   Comprehensive metabolic panel   Result Value Ref Range    Glucose 114 (H) 74 - 99 mg/dL    Sodium 134 (L) 136 - 145 mmol/L    Potassium 3.7 3.5 - 5.3 mmol/L    Chloride 101 98 - 107 mmol/L    Bicarbonate 24 21 - 32 mmol/L    Anion Gap 13 10 - 20 mmol/L    Urea Nitrogen 13 6 - 23 mg/dL    Creatinine 0.51 0.50 - 1.05 mg/dL    eGFR >90 >60 mL/min/1.73m*2    Calcium 8.6 8.6 - 10.3 mg/dL    Albumin 3.3 (L) 3.4 - 5.0 g/dL    Alkaline Phosphatase 83 33 - 110 U/L    Total Protein 5.7 (L) 6.4 - 8.2 g/dL    AST 14 9 - 39 U/L    Bilirubin, Total 0.4 0.0 - 1.2 mg/dL    ALT 13 7 - 45 U/L   CBC and Auto Differential   Result Value Ref Range    WBC 13.5 (H) 4.4 - 11.3 x10*3/uL    nRBC      RBC 3.11 (L) 4.00 - 5.20 x10*6/uL    Hemoglobin 9.5 (L) 12.0 - 16.0 g/dL    Hematocrit 29.4 (L) 36.0 - 46.0 %    MCV 95 80 - 100 fL    MCH 30.5 26.0 - 34.0 pg    MCHC 32.3 32.0 - 36.0 g/dL    RDW 22.9 (H) 11.5 - 14.5 %    Platelets 221 150 - 450 x10*3/uL    MPV 10.8 7.5 - 11.5 fL    Neutrophils % 74.3 40.0 - 80.0 %    Immature Granulocytes %, Automated 1.2 (H) 0.0 - 0.9 %    Lymphocytes % 9.9 13.0 - 44.0 %    Monocytes % 12.5 2.0 - 10.0 %    Eosinophils % 1.7 0.0 - 6.0 %    Basophils % 0.4 0.0 - 2.0 %    Neutrophils Absolute 10.02 (H) 1.20 - 7.70 x10*3/uL    Immature Granulocytes Absolute, Automated 0.16 0.00 - 0.70 x10*3/uL    Lymphocytes Absolute 1.34 1.20 - 4.80 x10*3/uL    Monocytes  Absolute 1.69 (H) 0.10 - 1.00 x10*3/uL    Eosinophils Absolute 0.23 0.00 - 0.70 x10*3/uL    Basophils Absolute 0.05 0.00 - 0.10 x10*3/uL   Morphology   Result Value Ref Range    RBC Morphology See Below     Polychromasia Mild     Ovalocytes Few     Teardrop Cells Few      NM PET CT lymphoma staging    Result Date: 10/9/2023  Interpreted By:  Russell Elise and Afshari Mirak Sohrab STUDY: NM PET CT LYMPHOMA STAGING;  10/9/2023 9:43 am   INDICATION: Signs/Symptoms:patient with lymphoma on treatmetn assessing disease. Per EMR 47-year-old with Hodgkin's lymphoma status post 2 cycles of chemotherapy   COMPARISON: PET-CT 06/13/2023. CT chest 07/10/2023. CT abdomen pelvis 07/03/2023.   ACCESSION NUMBER(S): [id number]   ORDERING CLINICIAN: DANILO GERBER   TECHNIQUE: DIVISION OF NUCLEAR MEDICINE POSITRON EMISSION TOMOGRAPHY (PET-CT)   The patient received an intravenous dose of 12.5 mCi of Fluorine-18 fluorodeoxyglucose (FDG).   Positron emission tomographic (PET) images from skull base to mid-thigh were then acquired after a one hour delay.  Also acquired was a contemporaneous low dose noncontrast CT scan performed for attenuation correction of PET images and anatomic localization.  The PET and CT images were digitally fused for display.  All images were acquired on a combined PET-CT scanner unit.  Some areas of FDG accumulation may be described in standardized uptake value (SUV) units.   CODING:   Subsequent Treatment Strategy (PS)   CALIBRATION:   Dose Injection-to-Scan Interval (mins): 73 Mediastinal bloodpool SUV (normal 1.5-2.5): 0.1 Blood glucose: 109 mg/dL   FINDINGS: NECK: *No evidence of paranasal sinus disease *Interval metabolic resolution of FDG avid right palatine tonsil. FDG avidity in the left palatine tonsil, likely reactive *Interval metabolic resolution of previously seen FDG avid bilateral cervical nodes. No new enlarged or FDG avid lymphadenopathy in the head and neck. *Thyroid glands are unremarkable.    Chest: *Right chest MediPort with the tip at the superior cavoatrial junction. *Again noted is distal esophageal hypermetabolic activity with an SUV of 3.5, likely representing esophagitis. *No enlarged or FDG avid hilar, mediastinal, or axillary lymphadenopathy. *No concerning pulmonary nodules. *Interval slightly increased small pericardial effusion. There is a small right pleural effusion. *Both breasts are unremarkable   ABDOMEN and pelvis: *Interval metabolic resolution of previously seen FDG avid lesion in hepatic segment 4B. *Interval metabolic resolution of previously seen FDG avid kelly hepatis, aortocaval and retro aortic nodes. No new enlarged or FDG avid lymphadenopathy in the abdomen and pelvis. *Again noted is colonic diverticulosis.   MUSCULOSKELETAL: *There is diffuse heterogenous uptake within the bone marrow. This finding can be seen with activated bone marrow, which is often seen in a patient on chemotherapy. Interval complete metabolic resolution of hypermetabolic activity of the lytic lesions involving the left hemisacrum, left 2nd anterior rib, right posterior 5th rib and T7 vertebral body. There is no new  FDG avid osseous lesion identified       1. Interval metabolic resolution of previously seen FDG avid right palatine tonsil, lymph nodes above and below diaphragm, liver and bone lesions, suggestive of great response to therapy. Deauville 1 2. No PET evidence of new FDG avid kylah or extranodal disease 3. Slight increased small pericardial effusion and grossly stable small right pleural effusion.   This study has been interpreted at [facility] in [place], [place].   MACRO: None   Signed by: Russell Elise 10/9/2023 10:24 AM Dictation workstation:   [id number]      PHYSICAL EXAMINATION   Vital Signs: not completed due to phone visit      Physical Exam not completed due to phone visit     Social  Not . Lives with her 3 grown children.  History of drug use  with Meth x8 years. Quit 1 year ago.  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     ASSESSMENT/PLAN    Pain  Pain is: cancer related pain  Type: somatic and neuropathic  Pain control: well-controlled  Home regimen:   - Continue oxycodone 15 mg every 4 hours as needed  - Continue Morphine ER 30 mg BID.   - Continue Tylenol 500 mg every 4 hours as needed  - increase back to Gabapentin 300 mg TID- she was decreased to 200 mg in the hospital r/t kidney function but this is back to normal so will slowly increase  - referred to aquatherapy for pain and weakness    Opioid Use  Medication Management:   - OARRS report reviewed with no aberrant behavior; consistent with  prescriptions/records and patient history  - MED 45.  Overdose Risk Score 290.   This has been discussed with patient.   - We will continue to closely monitor the patient for signs of prescription misuse including UDS, OARRS review and subjective reports at each visit.  - No concurrent benzodiazepine use   - I am a provider who either is or has consulted and collaborated with a provider certified in Hospice and Palliative Medicine and have conducted a face-face visit and examination for this patient.  - Routine Urine Drug Screen completed 6/6/23 appropriately positive for opioids and negative for illicit substances. repeat yearly   - Controlled Substance Agreement: completed 9/12/23. repeat yearly   - Specifically discussed that controlled substance prescriptions will only be provided by our group as outlined in the completed agreement  - Prescribed naloxone: Has prescription at home  - Red Flags: History of drug use with Meth. Quit 1 year ago     Nausea   Intermittent nausea without vomiting related to chemotherapy   - Continue Prochlorperazine 10 mg every 6 hours as needed  - Continue Ondansetron 8 mg every 8 hours as needed    Constipation  At risk for constipation related to opioids,  currently not  constipated  Usual bowel pattern: daily  Home regimen:   - Continue Senna-S 2 tabs BID  - Start Magnesium Citrate - may start with 1/2 bottle to 1 bottle if no BM within 48-72 hours     Altered Mood  Chronic anxiety and depression related to health concerns   controlled with home regimen  Home regimen:   - Continue Lexapro back to 20 mg- she was decreased to 10 mg inpatient and feels that she has been more depressed and crying  - She is scheduled to see Trav Arriaga in onco-psychiatry 6/29/23. She did not attend this visit.   - Discussed meeting with a community therapist for therapy as well    Sleeping Difficulty:  Home regimen:    - Trazodone stopped inpatient r/t QTc concerns  - Continue Risperidone 0.5 mg every night     Decreased appetite  Related to malignancy, chemotherapy, and disease process  Nutrition following  Home regimen:    - Olanzapine 2.5 mg every day at bedtime- this was stopped inpatient d/t QTc  - Following with Martha Khan      Advance Directives  Existence of Advance Directives:Yes, documentation or copy in medical record  Decision maker: STORMYOA is Cynthia Xie  Code Status: Full code    Next Follow-Up Visit:  Return to clinic in 1 month    Signature and billing  Medical complexity was low level due to due to complexity of problems, extensive data review, and high risk of management/treatment.  Time was spent on the following: Prep Time, Time Directly with Patient/Family/Caregiver, Documentation Time. Total time spent: 25      Data  Diagnostic tests and information reviewed for today's visit:  Most recent labs, Most recent imaging, Medications         Some elements copied from Cynthia Johnson note on 9/12/23, the elements have been updated and all reflect current decision making from today, 10/10/2023.      Plan of Care discussed with: Patient    SIGNATURE: MAGDA Avilez-CNP    Contact information:  Supportive and Palliative Oncology  Monday-Friday 8 AM-5 PM  Phone:   [phone], press option #5, then option #1.   Or Epic Secure Chat

## 2023-10-15 NOTE — PROGRESS NOTES
"Patient ID: Pamela Lopez is a 47 y.o. female.    Cancer Staging   No matching staging information was found for the patient.    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 Pamela Lopez is a 48 y/o F presenting for follow-up for Hodgkin's lymphoma.     Patient is here for a supportive care visit. She reports having diarrhea 2-3 episodes per day and has continued taking her senna, other than last night, due to fear of constipation from her pain medications. She says the stool is non-bloody. She denies fevers and chills. She does endorse body aches especially in the legs. She denies nausea, vomiting, chest pain, SOB, mouth sores, edema, lightheaded or dizziness, and abdominal pain. She says she has worse fatigue and low appetite, she is not drinking nutritional shakes but does have them at home. She is hopeful IV fluids and steroids today would help her feel improved. She is drinking 2 liters per day she estimates.        PMHx: Lymphoma, hyperlipemia and asthma.      PSHx:   none     FHx:  None     Social Hx:  Not . Lives with her 3 grown children. Former smoker. No EtOH use. Hx of drug use with meth x8 years. Quit 1 year ago. 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     Objective    Visit Vitals  BP 88/59  Pulse 98  Temp 36.5 °C (97.7 °F) (Temporal)  Resp 16  Ht 1.569 m (5' 1.77\")  Wt 58.6 kg (129 lb 3 oz)  SpO2 98%  BMI 23.80 kg/m²  Smoking Status Former  BSA 1.6 m²        Review of Systems:   Review of Systems:    Positive per HPI, otherwise negative.      Physical Exam:      Constitutional: Well nourished, alert/oriented, cooperative,  no acute " distress, pacing in pain   Eyes: PERRL. sclera anicteric   ENMT: mucous membranes moist   Head/Neck: Neck supple with no apparent injury. Trachea  midline.   Respiratory/Thorax: Breathing equal and unlabored   Cardiovascular: bradycardia, regular rhythm   Gastrointestinal: Nondistended, soft. Normoactive  bowel sounds.   Musculoskeletal: ROM intact, no joint swelling, normal  strength   Extremities: normal extremities, no cyanosis, no  edema, no clubbing   Neurological: Patient is alert and oriented x3. Nonfocal  exam. No myoclonus   Psychological: Appropriate mood, behavior, and interactions;  verbalizes concerns.   Skin: Warm and dry, well hydrated, no cyanosis or  jaundice, no rashes, generalized scabbing of skin without appearing infected      Performance Status:  Symptomatic; fully ambulatory    Labs:  Personally reviewed.    Labs:  Lab Results  Component Value Date   WBC 13.5 (H) 10/09/2023   NEUTROABS 10.02 (H) 10/09/2023   IGABSOL 0.16 10/09/2023   LYMPHSABS 1.34 10/09/2023   MONOSABS 1.69 (H) 10/09/2023   EOSABS 0.23 10/09/2023   BASOSABS 0.05 10/09/2023   RBC 3.11 (L) 10/09/2023   MCV 95 10/09/2023   MCHC 32.3 10/09/2023   HGB 9.5 (L) 10/09/2023   HCT 29.4 (L) 10/09/2023    10/09/2023    Lab Results  Component Value Date   CREATININE 0.50 10/16/2023   BUN 11 10/16/2023   EGFR >90 10/16/2023    10/16/2023   K 3.4 (L) 10/16/2023    10/16/2023   CO2 26 10/16/2023     Lab Results  Component Value Date   ALT 13 10/09/2023   AST 14 10/09/2023   ALKPHOS 83 10/09/2023   BILITOT 0.4 10/09/2023         Assessment/Plan    1. Classical Hodgkin lymphoma, lymphocyte- rich subtype, stage IVB     - developed tonsil enlargement initially over two years ago and thought to be related to infection  - 6/5/23- 6/723 admitted for new pain and found to have multiple spine mets, neck lymphadenopathy and possible liver lesions  -  biopsy 6/9/23 of her L3 was nondiagnostic  - 6/13/23 PET/CT with FDG avid right  palantine tonsil, b/l cervical, portacaval, RP nodes, hepatic met, osseous lesions in axial skeleton  - 6/19/23 planned for 10 fx to T7  - 6/22/23 tonsillectomy path consistent with classical Hodgkin lymphoma  - Discussed diagnosis of classical Hodgkin's lymphoma with patient and given stage IV disease with B symptom along with history of strong tobacco use, recommend brentuximab plus AVD per ECHELON-1 trial (Lisa, et al. [facility] 2018)  - Reviewed side effects and consent signed  - pt is planning to see dental next week as she has active abscess with pain and still has RT next week  - 7/17/12 AVD+ brentuximab  -hospitalized for fever and discharge 8/27/23 and no infectious cause found.  - delays due to neutropenia, cycle 3 day on 9/25/23.  - 10/9/23 PET CT interim with significant treatment response    10/9/23:  - We reviewed her PET images from this morning. Overall, she is having an excellent clinical response with much of the FDG actvity decreased in her bones, lymph nodes and neck and I do not see any new sites of disease  which is consistent with a strong clinical response.  - Patient does state her last cycle was much easier to tolerate without any new toxicities.   - We will proceed with day 15 today and I will have her come back in 1 week for hydration and supportive care with Lamonte.  - I will see her again with cycle 4 day 1.  - RTC on day 15 with Lamonte and I will see her again with cycle 4 day 1.       10/16/2023:   - Presents for supportive care visit  - Having diarrhea 2-3 times per day   - Advised her to hold Senna or at least decrease to 1 tab/day until diarrhea resolves   - Encouraged ongoing oral hydration & report signs of symptoms of dehydration and infection  - 1 liter of NS today over 1 hour  - Will replete electrolytes if needed  - IV decadron for fatigue and appetite   - Will recheck BP after IV fluids as she was below baseline when she arrived   - Instructed patient to call if diarrhea worsens  or other symptoms arise as we can bring her in for another supportive care visit this week as she is due to start next cycle of treatment next Monday   - RTC as scheduled in 1 week for FU, labs, and treatment (cycle 4 day 1)      Lamonte Mckenzie MSN, APRN, FNP-C  [facility]  Division of Hematology & Medical Oncology   Collaborating Physician Dr. Jess Gomez  [facility] [place] [address]  Phone: [phone]  Available via Secure Chat

## 2023-10-16 ENCOUNTER — OFFICE VISIT (OUTPATIENT)
Dept: HEMATOLOGY/ONCOLOGY | Facility: CLINIC | Age: 48
End: 2023-10-16
Payer: COMMERCIAL

## 2023-10-16 ENCOUNTER — INFUSION (OUTPATIENT)
Dept: HEMATOLOGY/ONCOLOGY | Facility: CLINIC | Age: 48
End: 2023-10-16
Payer: COMMERCIAL

## 2023-10-16 VITALS
WEIGHT: 129.19 LBS | RESPIRATION RATE: 16 BRPM | BODY MASS INDEX: 23.77 KG/M2 | OXYGEN SATURATION: 98 % | SYSTOLIC BLOOD PRESSURE: 88 MMHG | HEART RATE: 98 BPM | TEMPERATURE: 97.7 F | HEIGHT: 62 IN | DIASTOLIC BLOOD PRESSURE: 59 MMHG

## 2023-10-16 DIAGNOSIS — C81.48: ICD-10-CM

## 2023-10-16 LAB
ANION GAP SERPL CALC-SCNC: 15 MMOL/L (ref 10–20)
BUN SERPL-MCNC: 11 MG/DL (ref 6–23)
CALCIUM SERPL-MCNC: 9.8 MG/DL (ref 8.6–10.3)
CHLORIDE SERPL-SCNC: 101 MMOL/L (ref 98–107)
CO2 SERPL-SCNC: 26 MMOL/L (ref 21–32)
CREAT SERPL-MCNC: 0.5 MG/DL (ref 0.5–1.05)
GFR SERPL CREATININE-BSD FRML MDRD: >90 ML/MIN/1.73M*2
GLUCOSE SERPL-MCNC: 130 MG/DL (ref 74–99)
MAGNESIUM SERPL-MCNC: 1.7 MG/DL (ref 1.6–2.4)
POTASSIUM SERPL-SCNC: 3.4 MMOL/L (ref 3.5–5.3)
SODIUM SERPL-SCNC: 139 MMOL/L (ref 136–145)

## 2023-10-16 PROCEDURE — 99213 OFFICE O/P EST LOW 20 MIN: CPT | Mod: 25

## 2023-10-16 PROCEDURE — 1036F TOBACCO NON-USER: CPT

## 2023-10-16 PROCEDURE — 82374 ASSAY BLOOD CARBON DIOXIDE: CPT

## 2023-10-16 PROCEDURE — 2500000004 HC RX 250 GENERAL PHARMACY W/ HCPCS (ALT 636 FOR OP/ED): Performed by: INTERNAL MEDICINE

## 2023-10-16 PROCEDURE — 3008F BODY MASS INDEX DOCD: CPT

## 2023-10-16 PROCEDURE — 83735 ASSAY OF MAGNESIUM: CPT

## 2023-10-16 PROCEDURE — 2500000004 HC RX 250 GENERAL PHARMACY W/ HCPCS (ALT 636 FOR OP/ED)

## 2023-10-16 PROCEDURE — 96360 HYDRATION IV INFUSION INIT: CPT

## 2023-10-16 PROCEDURE — 96523 IRRIG DRUG DELIVERY DEVICE: CPT

## 2023-10-16 PROCEDURE — 99213 OFFICE O/P EST LOW 20 MIN: CPT

## 2023-10-16 PROCEDURE — 96365 THER/PROPH/DIAG IV INF INIT: CPT

## 2023-10-16 RX ORDER — DEXAMETHASONE SODIUM PHOSPHATE 4 MG/ML
10 INJECTION, SOLUTION INTRA-ARTICULAR; INTRALESIONAL; INTRAMUSCULAR; INTRAVENOUS; SOFT TISSUE ONCE
Status: COMPLETED | OUTPATIENT
Start: 2023-10-16 | End: 2023-10-16

## 2023-10-16 RX ORDER — POTASSIUM CHLORIDE 14.9 MG/ML
20 INJECTION INTRAVENOUS ONCE
Status: CANCELLED | OUTPATIENT
Start: 2023-10-16 | End: 2023-10-16

## 2023-10-16 RX ORDER — POTASSIUM CHLORIDE 14.9 MG/ML
20 INJECTION INTRAVENOUS ONCE
Status: COMPLETED | OUTPATIENT
Start: 2023-10-16 | End: 2023-10-16

## 2023-10-16 RX ADMIN — DEXAMETHASONE SODIUM PHOSPHATE 10 MG: 4 INJECTION, SOLUTION INTRA-ARTICULAR; INTRALESIONAL; INTRAMUSCULAR; INTRAVENOUS; SOFT TISSUE at 13:27

## 2023-10-16 RX ADMIN — POTASSIUM CHLORIDE 20 MEQ: 14.9 INJECTION, SOLUTION INTRAVENOUS at 14:01

## 2023-10-16 RX ADMIN — SODIUM CHLORIDE 1000 ML: 9 INJECTION, SOLUTION INTRAVENOUS at 13:03

## 2023-10-16 ASSESSMENT — PAIN SCALES - GENERAL: PAINLEVEL: 0-NO PAIN

## 2023-10-23 ENCOUNTER — OFFICE VISIT (OUTPATIENT)
Dept: HEMATOLOGY/ONCOLOGY | Facility: CLINIC | Age: 48
End: 2023-10-23
Payer: COMMERCIAL

## 2023-10-23 ENCOUNTER — INFUSION (OUTPATIENT)
Dept: HEMATOLOGY/ONCOLOGY | Facility: CLINIC | Age: 48
End: 2023-10-23
Payer: COMMERCIAL

## 2023-10-23 ENCOUNTER — NUTRITION (OUTPATIENT)
Dept: HEMATOLOGY/ONCOLOGY | Facility: CLINIC | Age: 48
End: 2023-10-23
Payer: COMMERCIAL

## 2023-10-23 VITALS
BODY MASS INDEX: 24.29 KG/M2 | SYSTOLIC BLOOD PRESSURE: 86 MMHG | DIASTOLIC BLOOD PRESSURE: 57 MMHG | TEMPERATURE: 97.3 F | OXYGEN SATURATION: 94 % | WEIGHT: 131.84 LBS | HEART RATE: 79 BPM | RESPIRATION RATE: 16 BRPM

## 2023-10-23 VITALS — HEIGHT: 62 IN | WEIGHT: 131.84 LBS | BODY MASS INDEX: 24.26 KG/M2

## 2023-10-23 VITALS — DIASTOLIC BLOOD PRESSURE: 65 MMHG | SYSTOLIC BLOOD PRESSURE: 94 MMHG

## 2023-10-23 DIAGNOSIS — C81.48: ICD-10-CM

## 2023-10-23 DIAGNOSIS — C81.48: Primary | ICD-10-CM

## 2023-10-23 LAB
ALBUMIN SERPL BCP-MCNC: 3.6 G/DL (ref 3.4–5)
ALP SERPL-CCNC: 88 U/L (ref 33–110)
ALT SERPL W P-5'-P-CCNC: 11 U/L (ref 7–45)
ANION GAP SERPL CALC-SCNC: 14 MMOL/L (ref 10–20)
AST SERPL W P-5'-P-CCNC: 14 U/L (ref 9–39)
BASOPHILS # BLD AUTO: 0.05 X10*3/UL (ref 0–0.1)
BASOPHILS NFR BLD AUTO: 0.5 %
BILIRUB SERPL-MCNC: 0.2 MG/DL (ref 0–1.2)
BUN SERPL-MCNC: 14 MG/DL (ref 6–23)
CALCIUM SERPL-MCNC: 9.2 MG/DL (ref 8.6–10.3)
CHLORIDE SERPL-SCNC: 105 MMOL/L (ref 98–107)
CO2 SERPL-SCNC: 26 MMOL/L (ref 21–32)
CREAT SERPL-MCNC: 0.57 MG/DL (ref 0.5–1.05)
DACRYOCYTES BLD QL SMEAR: NORMAL
EOSINOPHIL # BLD AUTO: 0.03 X10*3/UL (ref 0–0.7)
EOSINOPHIL NFR BLD AUTO: 0.3 %
ERYTHROCYTE [DISTWIDTH] IN BLOOD BY AUTOMATED COUNT: 22.2 % (ref 11.5–14.5)
GFR SERPL CREATININE-BSD FRML MDRD: >90 ML/MIN/1.73M*2
GIANT PLATELETS BLD QL SMEAR: NORMAL
GLUCOSE SERPL-MCNC: 94 MG/DL (ref 74–99)
HCT VFR BLD AUTO: 29.4 % (ref 36–46)
HGB BLD-MCNC: 9.3 G/DL (ref 12–16)
HYPOCHROMIA BLD QL SMEAR: NORMAL
IMM GRANULOCYTES # BLD AUTO: 0.44 X10*3/UL (ref 0–0.7)
IMM GRANULOCYTES NFR BLD AUTO: 4.4 % (ref 0–0.9)
LDH SERPL L TO P-CCNC: 197 U/L (ref 84–246)
LYMPHOCYTES # BLD AUTO: 1.57 X10*3/UL (ref 1.2–4.8)
LYMPHOCYTES NFR BLD AUTO: 15.8 %
MAGNESIUM SERPL-MCNC: 1.8 MG/DL (ref 1.6–2.4)
MCH RBC QN AUTO: 31.4 PG (ref 26–34)
MCHC RBC AUTO-ENTMCNC: 31.6 G/DL (ref 32–36)
MCV RBC AUTO: 99 FL (ref 80–100)
MONOCYTES # BLD AUTO: 1.4 X10*3/UL (ref 0.1–1)
MONOCYTES NFR BLD AUTO: 14.1 %
NEUTROPHILS # BLD AUTO: 6.46 X10*3/UL (ref 1.2–7.7)
NEUTROPHILS NFR BLD AUTO: 64.9 %
NRBC BLD-RTO: ABNORMAL /100{WBCS}
OVALOCYTES BLD QL SMEAR: NORMAL
PLATELET # BLD AUTO: 263 X10*3/UL (ref 150–450)
PMV BLD AUTO: 11.1 FL (ref 7.5–11.5)
POLYCHROMASIA BLD QL SMEAR: NORMAL
POTASSIUM SERPL-SCNC: 3.8 MMOL/L (ref 3.5–5.3)
PROT SERPL-MCNC: 6.2 G/DL (ref 6.4–8.2)
RBC # BLD AUTO: 2.96 X10*6/UL (ref 4–5.2)
RBC MORPH BLD: NORMAL
SODIUM SERPL-SCNC: 141 MMOL/L (ref 136–145)
URATE SERPL-MCNC: 7.3 MG/DL (ref 2.3–6.7)
WBC # BLD AUTO: 10 X10*3/UL (ref 4.4–11.3)

## 2023-10-23 PROCEDURE — 80053 COMPREHEN METABOLIC PANEL: CPT

## 2023-10-23 PROCEDURE — 96375 TX/PRO/DX INJ NEW DRUG ADDON: CPT | Mod: INF

## 2023-10-23 PROCEDURE — 96365 THER/PROPH/DIAG IV INF INIT: CPT | Mod: INF

## 2023-10-23 PROCEDURE — 96417 CHEMO IV INFUS EACH ADDL SEQ: CPT

## 2023-10-23 PROCEDURE — 96413 CHEMO IV INFUSION 1 HR: CPT

## 2023-10-23 PROCEDURE — 83735 ASSAY OF MAGNESIUM: CPT

## 2023-10-23 PROCEDURE — 2500000004 HC RX 250 GENERAL PHARMACY W/ HCPCS (ALT 636 FOR OP/ED): Performed by: INTERNAL MEDICINE

## 2023-10-23 PROCEDURE — 1036F TOBACCO NON-USER: CPT

## 2023-10-23 PROCEDURE — 99213 OFFICE O/P EST LOW 20 MIN: CPT

## 2023-10-23 PROCEDURE — 83615 LACTATE (LD) (LDH) ENZYME: CPT

## 2023-10-23 PROCEDURE — 96409 CHEMO IV PUSH SNGL DRUG: CPT | Mod: 59

## 2023-10-23 PROCEDURE — 96411 CHEMO IV PUSH ADDL DRUG: CPT

## 2023-10-23 PROCEDURE — 96374 THER/PROPH/DIAG INJ IV PUSH: CPT | Mod: INF

## 2023-10-23 PROCEDURE — 84550 ASSAY OF BLOOD/URIC ACID: CPT

## 2023-10-23 PROCEDURE — 85025 COMPLETE CBC W/AUTO DIFF WBC: CPT

## 2023-10-23 PROCEDURE — 2500000004 HC RX 250 GENERAL PHARMACY W/ HCPCS (ALT 636 FOR OP/ED): Mod: JZ | Performed by: INTERNAL MEDICINE

## 2023-10-23 PROCEDURE — 99213 OFFICE O/P EST LOW 20 MIN: CPT | Mod: 25

## 2023-10-23 PROCEDURE — 3008F BODY MASS INDEX DOCD: CPT

## 2023-10-23 PROCEDURE — 96372 THER/PROPH/DIAG INJ SC/IM: CPT | Mod: 59

## 2023-10-23 RX ORDER — PALONOSETRON 0.05 MG/ML
0.25 INJECTION, SOLUTION INTRAVENOUS ONCE
Status: CANCELLED | OUTPATIENT
Start: 2023-12-27

## 2023-10-23 RX ORDER — ALBUTEROL SULFATE 0.83 MG/ML
3 SOLUTION RESPIRATORY (INHALATION) AS NEEDED
Status: CANCELLED | OUTPATIENT
Start: 2023-11-20

## 2023-10-23 RX ORDER — FAMOTIDINE 10 MG/ML
20 INJECTION INTRAVENOUS ONCE AS NEEDED
Status: DISCONTINUED | OUTPATIENT
Start: 2023-10-23 | End: 2023-10-23 | Stop reason: HOSPADM

## 2023-10-23 RX ORDER — DIPHENHYDRAMINE HYDROCHLORIDE 50 MG/ML
50 INJECTION INTRAMUSCULAR; INTRAVENOUS AS NEEDED
Status: CANCELLED | OUTPATIENT
Start: 2023-11-20

## 2023-10-23 RX ORDER — PALONOSETRON 0.05 MG/ML
0.25 INJECTION, SOLUTION INTRAVENOUS ONCE
Status: CANCELLED | OUTPATIENT
Start: 2023-11-20

## 2023-10-23 RX ORDER — DOXORUBICIN HYDROCHLORIDE 2 MG/ML
25 INJECTION, SOLUTION INTRAVENOUS ONCE
Status: CANCELLED | OUTPATIENT
Start: 2023-12-27

## 2023-10-23 RX ORDER — PROCHLORPERAZINE EDISYLATE 5 MG/ML
10 INJECTION INTRAMUSCULAR; INTRAVENOUS EVERY 6 HOURS PRN
Status: DISCONTINUED | OUTPATIENT
Start: 2023-10-23 | End: 2023-10-23 | Stop reason: HOSPADM

## 2023-10-23 RX ORDER — FAMOTIDINE 10 MG/ML
20 INJECTION INTRAVENOUS ONCE AS NEEDED
Status: CANCELLED | OUTPATIENT
Start: 2023-11-20

## 2023-10-23 RX ORDER — PROCHLORPERAZINE MALEATE 10 MG
10 TABLET ORAL EVERY 6 HOURS PRN
Status: CANCELLED | OUTPATIENT
Start: 2023-12-27

## 2023-10-23 RX ORDER — PROCHLORPERAZINE EDISYLATE 5 MG/ML
10 INJECTION INTRAMUSCULAR; INTRAVENOUS EVERY 6 HOURS PRN
Status: CANCELLED | OUTPATIENT
Start: 2023-12-27

## 2023-10-23 RX ORDER — DIPHENHYDRAMINE HYDROCHLORIDE 50 MG/ML
50 INJECTION INTRAMUSCULAR; INTRAVENOUS AS NEEDED
Status: CANCELLED | OUTPATIENT
Start: 2023-12-27

## 2023-10-23 RX ORDER — EPINEPHRINE 0.3 MG/.3ML
0.3 INJECTION SUBCUTANEOUS EVERY 5 MIN PRN
Status: DISCONTINUED | OUTPATIENT
Start: 2023-10-23 | End: 2023-10-23 | Stop reason: HOSPADM

## 2023-10-23 RX ORDER — EPINEPHRINE 0.3 MG/.3ML
0.3 INJECTION SUBCUTANEOUS EVERY 5 MIN PRN
Status: CANCELLED | OUTPATIENT
Start: 2023-12-27

## 2023-10-23 RX ORDER — DIPHENHYDRAMINE HYDROCHLORIDE 50 MG/ML
50 INJECTION INTRAMUSCULAR; INTRAVENOUS AS NEEDED
Status: DISCONTINUED | OUTPATIENT
Start: 2023-10-23 | End: 2023-10-23 | Stop reason: HOSPADM

## 2023-10-23 RX ORDER — ALBUTEROL SULFATE 0.83 MG/ML
3 SOLUTION RESPIRATORY (INHALATION) AS NEEDED
Status: DISCONTINUED | OUTPATIENT
Start: 2023-10-23 | End: 2023-10-23 | Stop reason: HOSPADM

## 2023-10-23 RX ORDER — ALBUTEROL SULFATE 0.83 MG/ML
3 SOLUTION RESPIRATORY (INHALATION) AS NEEDED
Status: CANCELLED | OUTPATIENT
Start: 2023-12-27

## 2023-10-23 RX ORDER — EPINEPHRINE 0.3 MG/.3ML
0.3 INJECTION SUBCUTANEOUS EVERY 5 MIN PRN
Status: CANCELLED | OUTPATIENT
Start: 2023-11-20

## 2023-10-23 RX ORDER — PROCHLORPERAZINE EDISYLATE 5 MG/ML
10 INJECTION INTRAMUSCULAR; INTRAVENOUS EVERY 6 HOURS PRN
Status: CANCELLED | OUTPATIENT
Start: 2023-11-20

## 2023-10-23 RX ORDER — PROCHLORPERAZINE MALEATE 10 MG
10 TABLET ORAL EVERY 6 HOURS PRN
Status: DISCONTINUED | OUTPATIENT
Start: 2023-10-23 | End: 2023-10-23 | Stop reason: HOSPADM

## 2023-10-23 RX ORDER — DOXORUBICIN HYDROCHLORIDE 2 MG/ML
25 INJECTION, SOLUTION INTRAVENOUS ONCE
Status: COMPLETED | OUTPATIENT
Start: 2023-10-23 | End: 2023-10-23

## 2023-10-23 RX ORDER — PROCHLORPERAZINE MALEATE 10 MG
10 TABLET ORAL EVERY 6 HOURS PRN
Status: CANCELLED | OUTPATIENT
Start: 2023-11-20

## 2023-10-23 RX ORDER — FAMOTIDINE 10 MG/ML
20 INJECTION INTRAVENOUS ONCE AS NEEDED
Status: CANCELLED | OUTPATIENT
Start: 2023-12-27

## 2023-10-23 RX ORDER — PALONOSETRON 0.05 MG/ML
0.25 INJECTION, SOLUTION INTRAVENOUS ONCE
Status: COMPLETED | OUTPATIENT
Start: 2023-10-23 | End: 2023-10-23

## 2023-10-23 RX ORDER — DOXORUBICIN HYDROCHLORIDE 2 MG/ML
25 INJECTION, SOLUTION INTRAVENOUS ONCE
Status: CANCELLED | OUTPATIENT
Start: 2023-11-20

## 2023-10-23 RX ADMIN — FOSAPREPITANT DIMEGLUMINE 150 MG: 150 INJECTION, POWDER, LYOPHILIZED, FOR SOLUTION INTRAVENOUS at 12:39

## 2023-10-23 RX ADMIN — DEXAMETHASONE SODIUM PHOSPHATE 12 MG: 10 INJECTION, SOLUTION INTRAMUSCULAR; INTRAVENOUS at 12:15

## 2023-10-23 RX ADMIN — DACARBAZINE 600 MG: 10 INJECTION, POWDER, FOR SOLUTION INTRAVENOUS at 13:55

## 2023-10-23 RX ADMIN — PALONOSETRON 250 MCG: 0.05 INJECTION, SOLUTION INTRAVENOUS at 12:15

## 2023-10-23 RX ADMIN — VINBLASTINE SULFATE 9.9 MG: 1 INJECTION INTRAVENOUS at 13:41

## 2023-10-23 RX ADMIN — BRENTUXIMAB VEDOTIN 74.76 MG: 50 INJECTION, POWDER, LYOPHILIZED, FOR SOLUTION INTRAVENOUS at 14:39

## 2023-10-23 RX ADMIN — DOXORUBICIN HYDROCHLORIDE 41.25 MG: 2 INJECTION, SOLUTION INTRAVENOUS at 13:23

## 2023-10-23 RX ADMIN — PEGFILGRASTIM 6 MG: KIT SUBCUTANEOUS at 14:39

## 2023-10-23 ASSESSMENT — PATIENT HEALTH QUESTIONNAIRE - PHQ9
1. LITTLE INTEREST OR PLEASURE IN DOING THINGS: NOT AT ALL
2. FEELING DOWN, DEPRESSED OR HOPELESS: NOT AT ALL
SUM OF ALL RESPONSES TO PHQ9 QUESTIONS 1 AND 2: 0

## 2023-10-23 ASSESSMENT — PAIN SCALES - GENERAL: PAINLEVEL: 4

## 2023-10-23 ASSESSMENT — COLUMBIA-SUICIDE SEVERITY RATING SCALE - C-SSRS
6. HAVE YOU EVER DONE ANYTHING, STARTED TO DO ANYTHING, OR PREPARED TO DO ANYTHING TO END YOUR LIFE?: NO
1. IN THE PAST MONTH, HAVE YOU WISHED YOU WERE DEAD OR WISHED YOU COULD GO TO SLEEP AND NOT WAKE UP?: NO
2. HAVE YOU ACTUALLY HAD ANY THOUGHTS OF KILLING YOURSELF?: NO

## 2023-10-23 NOTE — PROGRESS NOTES
"Patient ID: Pamela Lopez is a 47 y.o. female.    Cancer Staging   No matching staging information was found for the patient.    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 Pamlea Lopez is a 48 y/o F presenting for follow-up for Hodgkin's lymphoma.     Pamela presents for consideration for cycle 4 day 1. She says her diarrhea has improved after stopping the Senna. She reports some left neck this morning with palpitation and swallowing, She says it feels it is improving. She denies fevers, chills, nausea, vomiting, diarrhea, constipation, edema, chest pain and SOB. She is eating better and drinking water throughout the day. She took her morphine this morning and her pain is controlled.      ROS 14 points performed, See HPI for exceptions    PMHx: Lymphoma, hyperlipemia and asthma.      PSHx:   none     FHx:  None     Social Hx:  Not . Lives with her 3 grown children. Former smoker. No EtOH use. Hx of drug use with meth x8 years. Quit 1 year ago. 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     Objective    Visit Vitals  BP 88/59  Pulse 98  Temp 36.5 °C (97.7 °F) (Temporal)  Resp 16  Ht 1.569 m (5' 1.77\")  Wt 58.6 kg (129 lb 3 oz)  SpO2 98%  BMI 23.80 kg/m²  Smoking Status Former  BSA 1.6 m²        Review of Systems:   Review of Systems:    Positive per HPI, otherwise negative.      Physical Exam:      Constitutional: Well nourished, alert/oriented, cooperative,  no acute distress, pacing in pain   Eyes: PERRL. sclera anicteric   ENMT: mucous membranes moist   Head/Neck: Neck supple with no apparent injury. Trachea  midline.   Respiratory/Thorax: Breathing " equal and unlabored   Cardiovascular: bradycardia, regular rhythm   Gastrointestinal: Nondistended, soft. Normoactive  bowel sounds.   Musculoskeletal: ROM intact, no joint swelling, normal  strength   Extremities: normal extremities, no cyanosis, no  edema, no clubbing   Neurological: Patient is alert and oriented x3. Nonfocal  exam. No myoclonus   Psychological: Appropriate mood, behavior, and interactions;  verbalizes concerns.   Skin: Warm and dry, well hydrated, no cyanosis or  jaundice, no rashes, generalized scabbing of skin without appearing infected      Performance Status:  Symptomatic; fully ambulatory    Labs:  Personally reviewed.    Labs:  Lab Results  Component Value Date   WBC 13.5 (H) 10/09/2023   NEUTROABS 10.02 (H) 10/09/2023   IGABSOL 0.16 10/09/2023   LYMPHSABS 1.34 10/09/2023   MONOSABS 1.69 (H) 10/09/2023   EOSABS 0.23 10/09/2023   BASOSABS 0.05 10/09/2023   RBC 3.11 (L) 10/09/2023   MCV 95 10/09/2023   MCHC 32.3 10/09/2023   HGB 9.5 (L) 10/09/2023   HCT 29.4 (L) 10/09/2023    10/09/2023    Lab Results  Component Value Date   CREATININE 0.50 10/16/2023   BUN 11 10/16/2023   EGFR >90 10/16/2023    10/16/2023   K 3.4 (L) 10/16/2023    10/16/2023   CO2 26 10/16/2023     Lab Results  Component Value Date   ALT 13 10/09/2023   AST 14 10/09/2023   ALKPHOS 83 10/09/2023   BILITOT 0.4 10/09/2023         Assessment/Plan    1. Classical Hodgkin lymphoma, lymphocyte- rich subtype, stage IVB     - developed tonsil enlargement initially over two years ago and thought to be related to infection  - 6/5/23- 6/723 admitted for new pain and found to have multiple spine mets, neck lymphadenopathy and possible liver lesions  -  biopsy 6/9/23 of her L3 was nondiagnostic  - 6/13/23 PET/CT with FDG avid right palantine tonsil, b/l cervical, portacaval, RP nodes, hepatic met, osseous lesions in axial skeleton  - 6/19/23 planned for 10 fx to T7  - 6/22/23 tonsillectomy path consistent with classical  Hodgkin lymphoma  - Discussed diagnosis of classical Hodgkin's lymphoma with patient and given stage IV disease with B symptom along with history of strong tobacco use, recommend brentuximab plus AVD per ECHELON-1 trial (Lisa, et al. [facility] 2018)  - Reviewed side effects and consent signed  - pt is planning to see dental next week as she has active abscess with pain and still has RT next week  - 7/17/12 AVD+ brentuximab  -hospitalized for fever and discharge 8/27/23 and no infectious cause found.  - delays due to neutropenia, cycle 3 day on 9/25/23.  - 10/9/23 PET CT interim with significant treatment response    10/9/23:  - We reviewed her PET images from this morning. Overall, she is having an excellent clinical response with much of the FDG actvity decreased in her bones, lymph nodes and neck and I do not see any new sites of disease  which is consistent with a strong clinical response.  - Patient does state her last cycle was much easier to tolerate without any new toxicities.   - We will proceed with day 15 today and I will have her come back in 1 week for hydration and supportive care with Lamonte.  - I will see her again with cycle 4 day 1.  - RTC on day 15 with Lamonte and I will see her again with cycle 4 day 1.       10/16/2023:   - Presents for supportive care visit  - Having diarrhea 2-3 times per day   - Advised her to hold Senna or at least decrease to 1 tab/day until diarrhea resolves   - Encouraged ongoing oral hydration & report signs of symptoms of dehydration and infection  - 1 liter of NS today over 1 hour  - Will replete electrolytes if needed  - IV decadron for fatigue and appetite   - Will recheck BP after IV fluids as she was below baseline when she arrived   - Instructed patient to call if diarrhea worsens or other symptoms arise as we can bring her in for another supportive care visit this week as she is due to start next cycle of treatment next Monday   - RTC as scheduled in 1 week for FU,  labs, and treatment (cycle 4 day 1)    10/23/2023:  - Here for cycle 4 day 1  - Reports the diarrhea has resolved after holding senna, educated to monitor for constipation and resume senna if BM slow down  - Feeling well and appetite is improving, weight it up a bit   - Left neck discomfort started this morning, nothing on exam, says it is improving but we will reassess at next visit  - Proceed with cycle 4 day 1 without changes to doses  - Return for day 8 IV fluids  - Return for day 15 and a FU with Dr. Gomez that day      Lamonte Mckenzie MSN, APRN, FNP-C  [facility]  Division of Hematology & Medical Oncology   Collaborating Physician Dr. Jess Gomez  [facility] at [place] [address]  Phone: [phone]  Available via Secure Chat

## 2023-10-23 NOTE — PROGRESS NOTES
"NUTRITION Assessment NOTE  Reason for Visit:  Pamela Lopez is a 47 y.o. female who presents for A+AVD.     Lab Results   Component Value Date/Time    GLUCOSE 94 10/23/2023 1043     10/23/2023 1043    K 3.8 10/23/2023 1043     10/23/2023 1043    CO2 26 10/23/2023 1043    ANIONGAP 14 10/23/2023 1043    BUN 14 10/23/2023 1043    CREATININE 0.57 10/23/2023 1043    EGFR >90 10/23/2023 1043    CALCIUM 9.2 10/23/2023 1043    ALBUMIN 3.6 10/23/2023 1043    ALKPHOS 88 10/23/2023 1043    PROT 6.2 (L) 10/23/2023 1043    AST 14 10/23/2023 1043    BILITOT 0.2 10/23/2023 1043    ALT 11 10/23/2023 1043     Anthropometrics:  Anthropometrics  Height: 156.9 cm (5' 1.77\")  Weight: 59.8 kg (131 lb 13.4 oz)  BMI (Calculated): 24.29  IBW/kg (Dietitian Calculated): 49.32 kg  Percent of IBW: 82.47 %  Weight Change  Weight History / % Weight Change: 10 kg (14%) weight loss x 4 months  Significant Weight Loss: Yes  Interpretation of Weight Loss: >10% in 6 months    Wt Readings from Last 10 Encounters:   10/23/23 59.8 kg (131 lb 13.4 oz)   10/23/23 59.8 kg (131 lb 13.4 oz)   10/16/23 58.6 kg (129 lb 3 oz)   10/09/23 62.5 kg (137 lb 12.6 oz)   10/02/23 59.4 kg (130 lb 15.3 oz)   09/20/23 62.3 kg (137 lb 5.6 oz)   06/30/23 69.8 kg (153 lb 14.1 oz)       Food And Nutrient Intake:    Weight fluctuating by 3-4 lbs per chemo cycle.  No appetite. Sometimes the thought of food makes her want to gag. No vomiting.     1st week after chemo is rough with appetite. Taste changes affecting PO intake. Cereal feels good to eat this week. Protein shakes that week. Tries to drink quickly. Drinking Ensure (220 kcal, 30g protein). Will drink between 1-2 per day depending on PO intake. Trying to drink water and milk during 1st week. Drinking a gallon in 4 days. Has metallic taste.     Denies N/V/D/C. No mouth sores. Taste returns second week.    2nd week: appetite starts to return and she starts to eat better.       Nutrition Focused Physical " "Exam:   Deferred    Energy Needs  Calculated Energy Needs Using Equations  Height: 156.9 cm (5' 1.77\")  Estimated Energy Needs  Total Energy Estimated Needs (kCal): 1794 kCal  Method for Estimating Needs: 30 kcal/kg CBW  Estimated Fluid Needs  Total Fluid Estimated Needs (mL): 1794 mL  Method for Estimating Needs: 1 mL/kcal  Estimated Protein Needs  Total Protein Estimated Needs (g/kg): 71.76 g/kg  Method for Estimating Needs: 1.2g/kg CBW        Diagnosis      Nutrition Diagnosis  Patient has Nutrition Diagnosis: Yes  Nutrition Diagnosis 1: Inadequate energy intake  Related to (1): decreased appetite during the 1st week after chemo  As Evidenced by (1): subjective reports of inadequate oral intake x 1 week; weight loss  Additional Assessment Information (1): Patient reports that PO intake recovers during second week    Interventions/Recommendations   Food and Nutrition Delivery  Meals & Snacks: Energy-modified diet, Protein-modified diet, Texture-Modified Diet  Goals: Pateint to adopt a high protein/high calorie diet to meet EEN. Suggseted focusing on soft, cold foods as these are often easier to eat when appetite is poor. Also advised small, frequent meals/snacks. Provided info re: taste changes as well.  Medical Food Supplement: Commercial beverage  Goals: Continue to utilize Ensure (220 kcal, 30g PRO) to help meet kcal and PRO needs when appetite is poor. Suggested mixing into shake/smoothie to increase energy density. Verbally reviewed high protein/high calorie ingredients with patient and son.        Monitoring and Evaluation   Food/Nutrient Related History Monitoring  Monitoring and Evaluation Plan: Energy intake, Fluid intake, Amount of food, Meal/snack pattern, Protein intake  Nutrition Focused Physical Findings  Monitoring and Evaluation Plan: Digestive System  Digestive System: Other (Comment)  Criteria: Implent strategies for dysgeusia management--try baking soda/salt rinse pre/post meals.    This service " will continue to follow.

## 2023-10-23 NOTE — PATIENT INSTRUCTIONS
Tolerated todays infusions without incident.   Discussed options for appointment later this week if she is not feeling well for IVF/anti-emetics.   Son with patient today.   Encouraging fluid intake.    Encouraging temperature taking daily.   Will return Monday for IVF if no issues this week.

## 2023-10-30 ENCOUNTER — INFUSION (OUTPATIENT)
Dept: HEMATOLOGY/ONCOLOGY | Facility: CLINIC | Age: 48
End: 2023-10-30
Payer: COMMERCIAL

## 2023-10-30 VITALS
WEIGHT: 125.66 LBS | OXYGEN SATURATION: 96 % | BODY MASS INDEX: 23.15 KG/M2 | TEMPERATURE: 98.4 F | SYSTOLIC BLOOD PRESSURE: 106 MMHG | RESPIRATION RATE: 16 BRPM | HEART RATE: 95 BPM | DIASTOLIC BLOOD PRESSURE: 68 MMHG

## 2023-10-30 DIAGNOSIS — C81.48: ICD-10-CM

## 2023-10-30 LAB
ANION GAP SERPL CALC-SCNC: 12 MMOL/L (ref 10–20)
BUN SERPL-MCNC: 11 MG/DL (ref 6–23)
CALCIUM SERPL-MCNC: 9.4 MG/DL (ref 8.6–10.3)
CHLORIDE SERPL-SCNC: 105 MMOL/L (ref 98–107)
CO2 SERPL-SCNC: 24 MMOL/L (ref 21–32)
CREAT SERPL-MCNC: 0.42 MG/DL (ref 0.5–1.05)
GFR SERPL CREATININE-BSD FRML MDRD: >90 ML/MIN/1.73M*2
GLUCOSE SERPL-MCNC: 109 MG/DL (ref 74–99)
MAGNESIUM SERPL-MCNC: 1.9 MG/DL (ref 1.6–2.4)
POTASSIUM SERPL-SCNC: 3.4 MMOL/L (ref 3.5–5.3)
SODIUM SERPL-SCNC: 138 MMOL/L (ref 136–145)

## 2023-10-30 PROCEDURE — 83735 ASSAY OF MAGNESIUM: CPT

## 2023-10-30 PROCEDURE — 96360 HYDRATION IV INFUSION INIT: CPT | Mod: INF

## 2023-10-30 PROCEDURE — 2500000004 HC RX 250 GENERAL PHARMACY W/ HCPCS (ALT 636 FOR OP/ED): Performed by: INTERNAL MEDICINE

## 2023-10-30 PROCEDURE — 80048 BASIC METABOLIC PNL TOTAL CA: CPT

## 2023-10-30 PROCEDURE — 2500000004 HC RX 250 GENERAL PHARMACY W/ HCPCS (ALT 636 FOR OP/ED)

## 2023-10-30 RX ORDER — POTASSIUM CHLORIDE 750 MG/1
10 TABLET, FILM COATED, EXTENDED RELEASE ORAL ONCE
Status: COMPLETED | OUTPATIENT
Start: 2023-10-30 | End: 2023-10-30

## 2023-10-30 RX ADMIN — SODIUM CHLORIDE 1000 ML: 9 INJECTION, SOLUTION INTRAVENOUS at 10:03

## 2023-10-30 RX ADMIN — POTASSIUM CHLORIDE 10 MEQ: 750 TABLET, FILM COATED, EXTENDED RELEASE ORAL at 11:29

## 2023-10-30 ASSESSMENT — PAIN SCALES - GENERAL: PAINLEVEL: 6

## 2023-11-03 NOTE — PROGRESS NOTES
Patient ID: Pamela Lopez is a 47 y.o. female.    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 Pamela Lopez is a 46 y/o F presenting for follow-up for Hodgkin's lymphoma. Here for cycle 4 day 15.    Since last visit, patient reports that overall she has felt well. She does report that she had a painful infected right tooth, however notes that the pain has now resolved. She also reports generalized body aches and pains. No new GI issues. Denies any new lumps or bumps or rashes. No other complaints today. Patient is interested in a smoking cessation aid.     Patient's past medical history, surgical history, family history and social history reviewed.     Objective    Vitals:    11/06/23 0910   BP: 94/60   Pulse: 101   Resp: 16   Temp: 36 °C (96.8 °F)   SpO2: 93%       Review of Systems:   Review of Systems:    Positive per HPI, otherwise negative.     Physical Exam:   Constitutional: Patient appears in no acute distress.   Sitting comfortably in chair.  Eyes: EOMI, clear sclera  ENMT: mucous membranes moist, no apparent injury  Head/Neck: Neck supple, no JVD  Respiratory/Thorax: Patent airways, CTAB, normal  breath sounds, no increased work of breathing  Cardiovascular: Regular, rate and rhythm, no murmurs  Gastrointestinal: Nondistended, soft, non-tender,  no rebound tenderness or guarding, no masses palpable  Extremities: normal extremities, no cyanosis edema,  no swelling  Neurological: alert and oriented x3, nonfocal, normal  speech and hearing  Lymphatic: No palpable lymphadenopathy in cervical,  axillary  lymph nodes.  Spleen appears normal size.  Psychological: Appropriate mood and behavior, normal   affect  Skin: Warm and dry, no lesions, no rashes     Performance Status:  Symptomatic; fully ambulatory    Labs/Imaging/Pathology: personally reviewed reports and images in Epic electronic medical record system. Pertinent results as it related to the plan represented in below in assessment and plan.     Assessment/Plan   1. Classical Hodgkin lymphoma, lymphocyte- rich subtype, stage IVB     - developed tonsil enlargement initially over two years ago and thought to be related to infection  - 6/5/23- 6/723 admitted for new pain and found to have multiple spine mets, neck lymphadenopathy and possible liver lesions  -  biopsy 6/9/23 of her L3 was nondiagnostic  - 6/13/23 PET/CT with FDG avid right palantine tonsil, b/l cervical, portacaval, RP nodes, hepatic met, osseous lesions in axial skeleton  - 6/19/23 planned for 10 fx to T7  - 6/22/23 tonsillectomy path consistent with classical Hodgkin lymphoma  - Discussed diagnosis of classical Hodgkin's lymphoma with patient and given stage IV disease with B symptom along with history of strong tobacco use, recommend brentuximab plus AVD per ECHELON-1 trial (Lisa, et al. [facility] 2018)  - Reviewed side effects and consent signed  - pt is planning to see dental next week as she has active abscess with pain and still has RT next week  - 7/17/12 AVD+ brentuximab  -hospitalized for fever and discharge 8/27/23 and no infectious cause found.  - delays due to neutropenia, cycle 3 day on 9/25/23.  - 10/9/23 PET CT interim with significant treatment response     10/9/23:  - We reviewed her PET images from this morning. Overall, she is having an excellent clinical response with much of the FDG actvity decreased in her bones, lymph nodes and neck and I do not see any new sites of disease  which is consistent with a strong clinical response.  - Patient does state her last cycle was much easier to tolerate without any new toxicities.   - We will proceed with day 15 today and I will have  her come back in 1 week for hydration and supportive care with Lamonte.  - I will see her again with cycle 4 day 1.  - RTC on day 15 with Lamonte and I will see her again with cycle 4 day 1.      10/16/23:   - Presents for supportive care visit  - Having diarrhea 2-3 times per day   - Advised her to hold Senna or at least decrease to 1 tab/day until diarrhea resolves   - Encouraged ongoing oral hydration & report signs of symptoms of dehydration and infection  - 1 liter of NS today over 1 hour  - Will replete electrolytes if needed  - IV decadron for fatigue and appetite   - Will recheck BP after IV fluids as she was below baseline when she arrived   - Instructed patient to call if diarrhea worsens or other symptoms arise as we can bring her in for another supportive care visit this week as she is due to start next cycle of treatment next Monday   - RTC as scheduled in 1 week for FU, labs, and treatment (cycle 4 day 1)    10/23/23:  - Here for cycle 4 day 1  - Reports the diarrhea has resolved after holding senna, educated to monitor for constipation and resume senna if BM slow down  - Feeling well and appetite is improving, weight it up a bit   - Left neck discomfort started this morning, nothing on exam, says it is improving but we will reassess at next visit  - Proceed with cycle 4 day 1 without changes to doses  - Return for day 8 IV fluids  - Return for day 15 and a FU with Dr. Gomez that day    11/6/23:  - No contraindications to proceed with cycle 4 day 15 today.  - Patient reports that she did have an infected right tooth. She does have a swollen right neck node that I believe is related. We will plan for a course of clindamycin. She knows to call if symptoms get worse, however the pain has now resolved.  - She is interested in helping with tobacco cessation. I will place a prescription for Wellbutrin.   - She also reports that she has generalized aches and pains. We discussed that she can do Aleve as needed  along with the oxycodone. We also recommended medical massage and will place a referral.  - RTC for cycle 5 day 1 with Lamonte and I will see her again with day 15.  - RTC in 2 weeks with Lamonte.    RTC in 2 weeks with Lamonte. This note has been transcribed using a medical scribe and there is a possibility of unintentional typing misprints.      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)  -     Clinic Appointment Request  -     Infusion Appointment Request; Future  -     Magnesium; Future  -     CBC and Auto Differential; Future  -     Comprehensive metabolic panel; Future  -     Lactate dehydrogenase; Future  -     Uric Acid; Future  -     Infusion Appointment Request; Future  -     Clinic Appointment Request; Future  -     Magnesium; Future  -     Magnesium; Future  -     Magnesium; Future  -     Basic Metabolic Panel; Future  -     Infusion Appointment Request; Future  -     Clinic Appointment Request; Future  -     Magnesium; Future  -     Magnesium; Future  -     Infusion Appointment Request; Future  -     CBC and Auto Differential; Future  -     Comprehensive metabolic panel; Future  -     Basic Metabolic Panel; Future  -     Magnesium; Future  -     Infusion Appointment Request; Future  -     Clinic Appointment Request [facility]; Future  -     Clinic Appointment Request LAMONTE FRANCIS; Future  -     Referral to [facility]; Future  Encounter for tobacco use cessation counseling  -     buPROPion SR (Wellbutrin SR) 150 mg 12 hr tablet; Take 1 tablet (150 mg) by mouth 2 times a day. Do not crush, chew, or split.  Generalized body aches  -     Referral to [facility]; Future  Infected tooth  -     clindamycin (Cleocin) 300 mg capsule; Take 1 capsule (300 mg) by mouth 3 times a day for 7 days.  Other orders  -     palonosetron (Aloxi) injection 250 mcg  -      dexAMETHasone (Decadron) in dextrose 5% 50 mL IV 12 mg  -     fosaprepitant (Emend) 150 mg in sodium chloride 0.9% 250 mL IV  -     prochlorperazine (Compazine) tablet 10 mg  -     prochlorperazine (Compazine) injection 10 mg  -     DOXOrubicin (Adriamycin) IV syringe 25 mg/m2 = 42 mg 21 mL  -     vinBLAStine (Velban) 9.9 mg in sodium chloride 0.9% 59.9 mL IV  -     dacarbazine (DTIC) 600 mg in sodium chloride 0.9% 310 mL IV  -     brentuximab vedotin (Adcetris) 74.76 mg in sodium chloride 0.9% 114.95 mL IV  -     pegfilgrastim (Neulasta Onpro) injection 6 mg  -     sodium chloride 0.9 % bolus 500 mL  -     dextrose 5 % in water (D5W) bolus  -     diphenhydrAMINE (BENADryl) injection 50 mg  -     methylPREDNISolone sod succinate (PF) (SOLU-Medrol) 40 mg/mL injection 40 mg  -     famotidine PF (Pepcid) injection 20 mg  -     EPINEPHrine (Epipen) injection syringe 0.3 mg  -     albuterol 2.5 mg /3 mL (0.083 %) nebulizer solution 3 mL  -     sodium chloride 0.9 % bolus 1,000 mL  -     sodium chloride 0.9 % bolus 1,000 mL  -     palonosetron (Aloxi) injection 250 mcg  -     dexAMETHasone (Decadron) in dextrose 5% 50 mL IV 12 mg  -     fosaprepitant (Emend) 150 mg in sodium chloride 0.9% 250 mL IV  -     prochlorperazine (Compazine) tablet 10 mg  -     prochlorperazine (Compazine) injection 10 mg  -     DOXOrubicin (Adriamycin) IV syringe 25 mg/m2 = 42 mg 21 mL  -     vinBLAStine (Velban) 9.9 mg in sodium chloride 0.9% 59.9 mL IV  -     dacarbazine (DTIC) 600 mg in sodium chloride 0.9% 310 mL IV  -     brentuximab vedotin (Adcetris) 74.76 mg in sodium chloride 0.9% 114.95 mL IV  -     pegfilgrastim (Neulasta Onpro) injection 6 mg  -     sodium chloride 0.9 % bolus 500 mL  -     dextrose 5 % in water (D5W) bolus  -     diphenhydrAMINE (BENADryl) injection 50 mg  -     methylPREDNISolone sod succinate (PF) (SOLU-Medrol) 40 mg/mL injection 40 mg  -     famotidine PF (Pepcid) injection 20 mg  -     EPINEPHrine (Epipen)  injection syringe 0.3 mg  -     albuterol 2.5 mg /3 mL (0.083 %) nebulizer solution 3 mL  -     sodium chloride 0.9 % bolus 1,000 mL    Jess Gomez MD  Hematology/Oncology  [facility]    Scribe Attestation  By signing my name below, IVita Scribe attest that this documentation has been prepared under the direction and in the presence of Jess Gomez MD.

## 2023-11-06 ENCOUNTER — OFFICE VISIT (OUTPATIENT)
Dept: HEMATOLOGY/ONCOLOGY | Facility: CLINIC | Age: 48
End: 2023-11-06
Payer: COMMERCIAL

## 2023-11-06 ENCOUNTER — INFUSION (OUTPATIENT)
Dept: HEMATOLOGY/ONCOLOGY | Facility: CLINIC | Age: 48
End: 2023-11-06
Payer: COMMERCIAL

## 2023-11-06 VITALS
WEIGHT: 129.85 LBS | DIASTOLIC BLOOD PRESSURE: 60 MMHG | OXYGEN SATURATION: 93 % | BODY MASS INDEX: 23.93 KG/M2 | HEART RATE: 101 BPM | RESPIRATION RATE: 16 BRPM | SYSTOLIC BLOOD PRESSURE: 94 MMHG | TEMPERATURE: 96.8 F

## 2023-11-06 DIAGNOSIS — G62.0 CHEMOTHERAPY-INDUCED PERIPHERAL NEUROPATHY (MULTI): Primary | ICD-10-CM

## 2023-11-06 DIAGNOSIS — C81.48: ICD-10-CM

## 2023-11-06 DIAGNOSIS — R52 GENERALIZED BODY ACHES: ICD-10-CM

## 2023-11-06 DIAGNOSIS — K04.7 INFECTED TOOTH: ICD-10-CM

## 2023-11-06 DIAGNOSIS — Z71.6 ENCOUNTER FOR TOBACCO USE CESSATION COUNSELING: ICD-10-CM

## 2023-11-06 DIAGNOSIS — T45.1X5A CHEMOTHERAPY-INDUCED PERIPHERAL NEUROPATHY (MULTI): Primary | ICD-10-CM

## 2023-11-06 LAB
ALBUMIN SERPL BCP-MCNC: 3.8 G/DL (ref 3.4–5)
ALP SERPL-CCNC: 102 U/L (ref 33–110)
ALT SERPL W P-5'-P-CCNC: 16 U/L (ref 7–45)
ANION GAP SERPL CALC-SCNC: 12 MMOL/L (ref 10–20)
AST SERPL W P-5'-P-CCNC: 19 U/L (ref 9–39)
BASOPHILS # BLD AUTO: 0.04 X10*3/UL (ref 0–0.1)
BASOPHILS NFR BLD AUTO: 0.5 %
BILIRUB SERPL-MCNC: 0.3 MG/DL (ref 0–1.2)
BUN SERPL-MCNC: 13 MG/DL (ref 6–23)
CALCIUM SERPL-MCNC: 9.3 MG/DL (ref 8.6–10.3)
CHLORIDE SERPL-SCNC: 107 MMOL/L (ref 98–107)
CO2 SERPL-SCNC: 26 MMOL/L (ref 21–32)
CREAT SERPL-MCNC: 0.5 MG/DL (ref 0.5–1.05)
EOSINOPHIL # BLD AUTO: 0.04 X10*3/UL (ref 0–0.7)
EOSINOPHIL NFR BLD AUTO: 0.5 %
ERYTHROCYTE [DISTWIDTH] IN BLOOD BY AUTOMATED COUNT: 19.8 % (ref 11.5–14.5)
GFR SERPL CREATININE-BSD FRML MDRD: >90 ML/MIN/1.73M*2
GLUCOSE SERPL-MCNC: 103 MG/DL (ref 74–99)
HCT VFR BLD AUTO: 28.5 % (ref 36–46)
HGB BLD-MCNC: 9 G/DL (ref 12–16)
IMM GRANULOCYTES # BLD AUTO: 0.15 X10*3/UL (ref 0–0.7)
IMM GRANULOCYTES NFR BLD AUTO: 1.8 % (ref 0–0.9)
LYMPHOCYTES # BLD AUTO: 0.9 X10*3/UL (ref 1.2–4.8)
LYMPHOCYTES NFR BLD AUTO: 11 %
MAGNESIUM SERPL-MCNC: 1.9 MG/DL (ref 1.6–2.4)
MCH RBC QN AUTO: 33 PG (ref 26–34)
MCHC RBC AUTO-ENTMCNC: 31.6 G/DL (ref 32–36)
MCV RBC AUTO: 104 FL (ref 80–100)
MONOCYTES # BLD AUTO: 1.02 X10*3/UL (ref 0.1–1)
MONOCYTES NFR BLD AUTO: 12.4 %
NEUTROPHILS # BLD AUTO: 6.06 X10*3/UL (ref 1.2–7.7)
NEUTROPHILS NFR BLD AUTO: 73.8 %
PLATELET # BLD AUTO: 195 X10*3/UL (ref 150–450)
POTASSIUM SERPL-SCNC: 3.9 MMOL/L (ref 3.5–5.3)
PROT SERPL-MCNC: 6.2 G/DL (ref 6.4–8.2)
RBC # BLD AUTO: 2.73 X10*6/UL (ref 4–5.2)
SODIUM SERPL-SCNC: 141 MMOL/L (ref 136–145)
WBC # BLD AUTO: 8.2 X10*3/UL (ref 4.4–11.3)

## 2023-11-06 PROCEDURE — 3008F BODY MASS INDEX DOCD: CPT | Performed by: INTERNAL MEDICINE

## 2023-11-06 PROCEDURE — 83735 ASSAY OF MAGNESIUM: CPT

## 2023-11-06 PROCEDURE — 1036F TOBACCO NON-USER: CPT | Performed by: INTERNAL MEDICINE

## 2023-11-06 PROCEDURE — 2500000004 HC RX 250 GENERAL PHARMACY W/ HCPCS (ALT 636 FOR OP/ED): Performed by: INTERNAL MEDICINE

## 2023-11-06 PROCEDURE — 96372 THER/PROPH/DIAG INJ SC/IM: CPT

## 2023-11-06 PROCEDURE — 96375 TX/PRO/DX INJ NEW DRUG ADDON: CPT | Mod: INF

## 2023-11-06 PROCEDURE — 84075 ASSAY ALKALINE PHOSPHATASE: CPT

## 2023-11-06 PROCEDURE — 96413 CHEMO IV INFUSION 1 HR: CPT

## 2023-11-06 PROCEDURE — 96417 CHEMO IV INFUS EACH ADDL SEQ: CPT

## 2023-11-06 PROCEDURE — 99214 OFFICE O/P EST MOD 30 MIN: CPT | Performed by: INTERNAL MEDICINE

## 2023-11-06 PROCEDURE — 96409 CHEMO IV PUSH SNGL DRUG: CPT

## 2023-11-06 PROCEDURE — 96411 CHEMO IV PUSH ADDL DRUG: CPT

## 2023-11-06 PROCEDURE — 96377 APPLICATON ON-BODY INJECTOR: CPT

## 2023-11-06 PROCEDURE — 96365 THER/PROPH/DIAG IV INF INIT: CPT | Mod: INF

## 2023-11-06 PROCEDURE — 99214 OFFICE O/P EST MOD 30 MIN: CPT | Mod: 25 | Performed by: INTERNAL MEDICINE

## 2023-11-06 PROCEDURE — 85025 COMPLETE CBC W/AUTO DIFF WBC: CPT

## 2023-11-06 RX ORDER — DOXORUBICIN HYDROCHLORIDE 2 MG/ML
25 INJECTION, SOLUTION INTRAVENOUS ONCE
Status: CANCELLED | OUTPATIENT
Start: 2024-01-08

## 2023-11-06 RX ORDER — DIPHENHYDRAMINE HYDROCHLORIDE 50 MG/ML
50 INJECTION INTRAMUSCULAR; INTRAVENOUS AS NEEDED
Status: CANCELLED | OUTPATIENT
Start: 2024-01-08

## 2023-11-06 RX ORDER — FAMOTIDINE 10 MG/ML
20 INJECTION INTRAVENOUS ONCE AS NEEDED
Status: DISCONTINUED | OUTPATIENT
Start: 2023-11-06 | End: 2023-11-06 | Stop reason: HOSPADM

## 2023-11-06 RX ORDER — GABAPENTIN 300 MG/1
300 CAPSULE ORAL NIGHTLY
Qty: 90 CAPSULE | Refills: 3 | Status: SHIPPED | OUTPATIENT
Start: 2023-11-06 | End: 2023-11-07 | Stop reason: SDUPTHER

## 2023-11-06 RX ORDER — FAMOTIDINE 10 MG/ML
20 INJECTION INTRAVENOUS ONCE AS NEEDED
Status: CANCELLED | OUTPATIENT
Start: 2024-01-08

## 2023-11-06 RX ORDER — EPINEPHRINE 0.3 MG/.3ML
0.3 INJECTION SUBCUTANEOUS EVERY 5 MIN PRN
Status: CANCELLED | OUTPATIENT
Start: 2024-01-22

## 2023-11-06 RX ORDER — PALONOSETRON 0.05 MG/ML
0.25 INJECTION, SOLUTION INTRAVENOUS ONCE
Status: DISCONTINUED | OUTPATIENT
Start: 2023-11-06 | End: 2023-11-06 | Stop reason: HOSPADM

## 2023-11-06 RX ORDER — DIPHENHYDRAMINE HYDROCHLORIDE 50 MG/ML
50 INJECTION INTRAMUSCULAR; INTRAVENOUS AS NEEDED
Status: CANCELLED | OUTPATIENT
Start: 2024-01-22

## 2023-11-06 RX ORDER — PALONOSETRON 0.05 MG/ML
0.25 INJECTION, SOLUTION INTRAVENOUS ONCE
Status: CANCELLED | OUTPATIENT
Start: 2024-01-08

## 2023-11-06 RX ORDER — CLINDAMYCIN HYDROCHLORIDE 300 MG/1
300 CAPSULE ORAL 3 TIMES DAILY
Qty: 21 CAPSULE | Refills: 0 | Status: SHIPPED | OUTPATIENT
Start: 2023-11-06 | End: 2023-11-13

## 2023-11-06 RX ORDER — EPINEPHRINE 0.3 MG/.3ML
0.3 INJECTION SUBCUTANEOUS EVERY 5 MIN PRN
Status: CANCELLED | OUTPATIENT
Start: 2024-01-08

## 2023-11-06 RX ORDER — PROCHLORPERAZINE MALEATE 10 MG
10 TABLET ORAL EVERY 6 HOURS PRN
Status: CANCELLED | OUTPATIENT
Start: 2024-01-22

## 2023-11-06 RX ORDER — PROCHLORPERAZINE EDISYLATE 5 MG/ML
10 INJECTION INTRAMUSCULAR; INTRAVENOUS EVERY 6 HOURS PRN
Status: DISCONTINUED | OUTPATIENT
Start: 2023-11-06 | End: 2023-11-06 | Stop reason: HOSPADM

## 2023-11-06 RX ORDER — PALONOSETRON 0.05 MG/ML
0.25 INJECTION, SOLUTION INTRAVENOUS ONCE
Status: CANCELLED | OUTPATIENT
Start: 2024-01-22

## 2023-11-06 RX ORDER — DOXORUBICIN HYDROCHLORIDE 2 MG/ML
25 INJECTION, SOLUTION INTRAVENOUS ONCE
Status: COMPLETED | OUTPATIENT
Start: 2023-11-06 | End: 2023-11-06

## 2023-11-06 RX ORDER — DIPHENHYDRAMINE HYDROCHLORIDE 50 MG/ML
50 INJECTION INTRAMUSCULAR; INTRAVENOUS AS NEEDED
Status: DISCONTINUED | OUTPATIENT
Start: 2023-11-06 | End: 2023-11-06 | Stop reason: HOSPADM

## 2023-11-06 RX ORDER — PROCHLORPERAZINE EDISYLATE 5 MG/ML
10 INJECTION INTRAMUSCULAR; INTRAVENOUS EVERY 6 HOURS PRN
Status: CANCELLED | OUTPATIENT
Start: 2024-01-22

## 2023-11-06 RX ORDER — ALBUTEROL SULFATE 0.83 MG/ML
3 SOLUTION RESPIRATORY (INHALATION) AS NEEDED
Status: CANCELLED | OUTPATIENT
Start: 2024-01-08

## 2023-11-06 RX ORDER — ALBUTEROL SULFATE 0.83 MG/ML
3 SOLUTION RESPIRATORY (INHALATION) AS NEEDED
Status: CANCELLED | OUTPATIENT
Start: 2024-01-22

## 2023-11-06 RX ORDER — FAMOTIDINE 10 MG/ML
20 INJECTION INTRAVENOUS ONCE AS NEEDED
Status: CANCELLED | OUTPATIENT
Start: 2024-01-22

## 2023-11-06 RX ORDER — PROCHLORPERAZINE MALEATE 10 MG
10 TABLET ORAL EVERY 6 HOURS PRN
Status: CANCELLED | OUTPATIENT
Start: 2024-01-08

## 2023-11-06 RX ORDER — BUPROPION HYDROCHLORIDE 150 MG/1
150 TABLET, EXTENDED RELEASE ORAL 2 TIMES DAILY
Qty: 60 TABLET | Refills: 11 | Status: SHIPPED | OUTPATIENT
Start: 2023-11-06 | End: 2024-04-01 | Stop reason: SDUPTHER

## 2023-11-06 RX ORDER — EPINEPHRINE 0.3 MG/.3ML
0.3 INJECTION SUBCUTANEOUS EVERY 5 MIN PRN
Status: DISCONTINUED | OUTPATIENT
Start: 2023-11-06 | End: 2023-11-06 | Stop reason: HOSPADM

## 2023-11-06 RX ORDER — DOXORUBICIN HYDROCHLORIDE 2 MG/ML
25 INJECTION, SOLUTION INTRAVENOUS ONCE
Status: CANCELLED | OUTPATIENT
Start: 2024-01-22

## 2023-11-06 RX ORDER — PROCHLORPERAZINE MALEATE 10 MG
10 TABLET ORAL EVERY 6 HOURS PRN
Status: DISCONTINUED | OUTPATIENT
Start: 2023-11-06 | End: 2023-11-06 | Stop reason: HOSPADM

## 2023-11-06 RX ORDER — ALBUTEROL SULFATE 0.83 MG/ML
3 SOLUTION RESPIRATORY (INHALATION) AS NEEDED
Status: DISCONTINUED | OUTPATIENT
Start: 2023-11-06 | End: 2023-11-06 | Stop reason: HOSPADM

## 2023-11-06 RX ORDER — PROCHLORPERAZINE EDISYLATE 5 MG/ML
10 INJECTION INTRAMUSCULAR; INTRAVENOUS EVERY 6 HOURS PRN
Status: CANCELLED | OUTPATIENT
Start: 2024-01-08

## 2023-11-06 RX ADMIN — BRENTUXIMAB VEDOTIN 74.76 MG: 50 INJECTION, POWDER, LYOPHILIZED, FOR SOLUTION INTRAVENOUS at 13:34

## 2023-11-06 RX ADMIN — FOSAPREPITANT 150 MG: 150 INJECTION, POWDER, LYOPHILIZED, FOR SOLUTION INTRAVENOUS at 11:28

## 2023-11-06 RX ADMIN — VINBLASTINE SULFATE 9.9 MG: 1 INJECTION INTRAVENOUS at 12:36

## 2023-11-06 RX ADMIN — PEGFILGRASTIM 6 MG: KIT SUBCUTANEOUS at 13:39

## 2023-11-06 RX ADMIN — DOXORUBICIN HYDROCHLORIDE 42 MG: 2 INJECTION, SOLUTION INTRAVENOUS at 12:21

## 2023-11-06 RX ADMIN — DACARBAZINE 600 MG: 10 INJECTION, POWDER, FOR SOLUTION INTRAVENOUS at 12:48

## 2023-11-06 ASSESSMENT — PAIN SCALES - GENERAL: PAINLEVEL: 0-NO PAIN

## 2023-11-13 ENCOUNTER — INFUSION (OUTPATIENT)
Dept: HEMATOLOGY/ONCOLOGY | Facility: CLINIC | Age: 48
End: 2023-11-13
Payer: COMMERCIAL

## 2023-11-13 VITALS
BODY MASS INDEX: 23.03 KG/M2 | RESPIRATION RATE: 20 BRPM | SYSTOLIC BLOOD PRESSURE: 115 MMHG | OXYGEN SATURATION: 96 % | DIASTOLIC BLOOD PRESSURE: 78 MMHG | WEIGHT: 125 LBS | TEMPERATURE: 97.7 F | HEART RATE: 112 BPM

## 2023-11-13 DIAGNOSIS — Z51.5 ENCOUNTER FOR PALLIATIVE CARE: ICD-10-CM

## 2023-11-13 DIAGNOSIS — C81.48: ICD-10-CM

## 2023-11-13 LAB
ANION GAP SERPL CALC-SCNC: 12 MMOL/L (ref 10–20)
BUN SERPL-MCNC: 13 MG/DL (ref 6–23)
CALCIUM SERPL-MCNC: 9.6 MG/DL (ref 8.6–10.3)
CHLORIDE SERPL-SCNC: 103 MMOL/L (ref 98–107)
CO2 SERPL-SCNC: 24 MMOL/L (ref 21–32)
CREAT SERPL-MCNC: 0.4 MG/DL (ref 0.5–1.05)
GFR SERPL CREATININE-BSD FRML MDRD: >90 ML/MIN/1.73M*2
GLUCOSE SERPL-MCNC: 117 MG/DL (ref 74–99)
MAGNESIUM SERPL-MCNC: 1.8 MG/DL (ref 1.6–2.4)
POTASSIUM SERPL-SCNC: 3.6 MMOL/L (ref 3.5–5.3)
SODIUM SERPL-SCNC: 135 MMOL/L (ref 136–145)

## 2023-11-13 PROCEDURE — 80048 BASIC METABOLIC PNL TOTAL CA: CPT

## 2023-11-13 PROCEDURE — 83735 ASSAY OF MAGNESIUM: CPT

## 2023-11-13 PROCEDURE — 2500000004 HC RX 250 GENERAL PHARMACY W/ HCPCS (ALT 636 FOR OP/ED): Performed by: INTERNAL MEDICINE

## 2023-11-13 PROCEDURE — 96360 HYDRATION IV INFUSION INIT: CPT | Mod: INF

## 2023-11-13 RX ORDER — HEPARIN SODIUM,PORCINE/PF 10 UNIT/ML
50 SYRINGE (ML) INTRAVENOUS AS NEEDED
Status: CANCELLED | OUTPATIENT
Start: 2023-11-13

## 2023-11-13 RX ORDER — HEPARIN SODIUM,PORCINE/PF 10 UNIT/ML
50 SYRINGE (ML) INTRAVENOUS AS NEEDED
Status: DISCONTINUED | OUTPATIENT
Start: 2023-11-13 | End: 2023-11-13 | Stop reason: HOSPADM

## 2023-11-13 RX ORDER — POTASSIUM CHLORIDE 750 MG/1
10 TABLET, EXTENDED RELEASE ORAL 2 TIMES DAILY
Qty: 60 TABLET | Refills: 0 | Status: SHIPPED | OUTPATIENT
Start: 2023-11-13 | End: 2023-12-06 | Stop reason: SDUPTHER

## 2023-11-13 RX ORDER — HEPARIN 100 UNIT/ML
500 SYRINGE INTRAVENOUS AS NEEDED
Status: CANCELLED | OUTPATIENT
Start: 2023-11-13

## 2023-11-13 RX ORDER — HEPARIN 100 UNIT/ML
500 SYRINGE INTRAVENOUS AS NEEDED
Status: DISCONTINUED | OUTPATIENT
Start: 2023-11-13 | End: 2023-11-13 | Stop reason: HOSPADM

## 2023-11-13 RX ADMIN — SODIUM CHLORIDE 1000 ML: 9 INJECTION, SOLUTION INTRAVENOUS at 09:40

## 2023-11-13 ASSESSMENT — PAIN SCALES - GENERAL: PAINLEVEL: 7

## 2023-11-15 ENCOUNTER — TELEPHONE (OUTPATIENT)
Dept: HEMATOLOGY/ONCOLOGY | Facility: CLINIC | Age: 48
End: 2023-11-15
Payer: COMMERCIAL

## 2023-11-15 NOTE — TELEPHONE ENCOUNTER
Per office request, this  called patient to advise of upcoming appointment on 11/20/23 with Lamonte Mckenzie and infusion.  Patient to arrive at 8:10am.  Left this detailed information on patient's identifiable voicemail asking patient to also verify with office receipt of message.

## 2023-11-20 ENCOUNTER — OFFICE VISIT (OUTPATIENT)
Dept: HEMATOLOGY/ONCOLOGY | Facility: CLINIC | Age: 48
End: 2023-11-20
Payer: COMMERCIAL

## 2023-11-20 ENCOUNTER — INFUSION (OUTPATIENT)
Dept: HEMATOLOGY/ONCOLOGY | Facility: CLINIC | Age: 48
End: 2023-11-20
Payer: COMMERCIAL

## 2023-11-20 VITALS
OXYGEN SATURATION: 95 % | BODY MASS INDEX: 23.78 KG/M2 | SYSTOLIC BLOOD PRESSURE: 90 MMHG | WEIGHT: 129.08 LBS | DIASTOLIC BLOOD PRESSURE: 48 MMHG | RESPIRATION RATE: 16 BRPM | HEART RATE: 81 BPM | TEMPERATURE: 97.5 F

## 2023-11-20 DIAGNOSIS — C81.48: ICD-10-CM

## 2023-11-20 LAB
ALBUMIN SERPL BCP-MCNC: 3.6 G/DL (ref 3.4–5)
ALP SERPL-CCNC: 93 U/L (ref 33–110)
ALT SERPL W P-5'-P-CCNC: 8 U/L (ref 7–45)
ANION GAP SERPL CALC-SCNC: 11 MMOL/L (ref 10–20)
AST SERPL W P-5'-P-CCNC: 13 U/L (ref 9–39)
BASOPHILS # BLD AUTO: 0.06 X10*3/UL (ref 0–0.1)
BASOPHILS NFR BLD AUTO: 1.3 %
BILIRUB SERPL-MCNC: 0.3 MG/DL (ref 0–1.2)
BUN SERPL-MCNC: 11 MG/DL (ref 6–23)
CALCIUM SERPL-MCNC: 9 MG/DL (ref 8.6–10.3)
CHLORIDE SERPL-SCNC: 104 MMOL/L (ref 98–107)
CO2 SERPL-SCNC: 27 MMOL/L (ref 21–32)
CREAT SERPL-MCNC: 0.56 MG/DL (ref 0.5–1.05)
EOSINOPHIL # BLD AUTO: 0.09 X10*3/UL (ref 0–0.7)
EOSINOPHIL NFR BLD AUTO: 1.9 %
ERYTHROCYTE [DISTWIDTH] IN BLOOD BY AUTOMATED COUNT: 18.3 % (ref 11.5–14.5)
GFR SERPL CREATININE-BSD FRML MDRD: >90 ML/MIN/1.73M*2
GLUCOSE SERPL-MCNC: 88 MG/DL (ref 74–99)
HCT VFR BLD AUTO: 26.9 % (ref 36–46)
HGB BLD-MCNC: 8.6 G/DL (ref 12–16)
IMM GRANULOCYTES # BLD AUTO: 0.05 X10*3/UL (ref 0–0.7)
IMM GRANULOCYTES NFR BLD AUTO: 1 % (ref 0–0.9)
LDH SERPL L TO P-CCNC: 199 U/L (ref 84–246)
LYMPHOCYTES # BLD AUTO: 0.87 X10*3/UL (ref 1.2–4.8)
LYMPHOCYTES NFR BLD AUTO: 18.2 %
MAGNESIUM SERPL-MCNC: 1.9 MG/DL (ref 1.6–2.4)
MCH RBC QN AUTO: 34.3 PG (ref 26–34)
MCHC RBC AUTO-ENTMCNC: 32 G/DL (ref 32–36)
MCV RBC AUTO: 107 FL (ref 80–100)
MONOCYTES # BLD AUTO: 0.79 X10*3/UL (ref 0.1–1)
MONOCYTES NFR BLD AUTO: 16.5 %
NEUTROPHILS # BLD AUTO: 2.93 X10*3/UL (ref 1.2–7.7)
NEUTROPHILS NFR BLD AUTO: 61.1 %
PLATELET # BLD AUTO: 205 X10*3/UL (ref 150–450)
POTASSIUM SERPL-SCNC: 3.9 MMOL/L (ref 3.5–5.3)
PROT SERPL-MCNC: 6 G/DL (ref 6.4–8.2)
RBC # BLD AUTO: 2.51 X10*6/UL (ref 4–5.2)
SODIUM SERPL-SCNC: 138 MMOL/L (ref 136–145)
URATE SERPL-MCNC: 5.3 MG/DL (ref 2.3–6.7)
WBC # BLD AUTO: 4.8 X10*3/UL (ref 4.4–11.3)

## 2023-11-20 PROCEDURE — 96365 THER/PROPH/DIAG IV INF INIT: CPT | Mod: INF

## 2023-11-20 PROCEDURE — 84550 ASSAY OF BLOOD/URIC ACID: CPT

## 2023-11-20 PROCEDURE — 83615 LACTATE (LD) (LDH) ENZYME: CPT

## 2023-11-20 PROCEDURE — 96417 CHEMO IV INFUS EACH ADDL SEQ: CPT

## 2023-11-20 PROCEDURE — 96360 HYDRATION IV INFUSION INIT: CPT | Mod: INF

## 2023-11-20 PROCEDURE — 3008F BODY MASS INDEX DOCD: CPT

## 2023-11-20 PROCEDURE — 2500000004 HC RX 250 GENERAL PHARMACY W/ HCPCS (ALT 636 FOR OP/ED)

## 2023-11-20 PROCEDURE — 96409 CHEMO IV PUSH SNGL DRUG: CPT

## 2023-11-20 PROCEDURE — 2500000004 HC RX 250 GENERAL PHARMACY W/ HCPCS (ALT 636 FOR OP/ED): Mod: JZ | Performed by: INTERNAL MEDICINE

## 2023-11-20 PROCEDURE — 96367 TX/PROPH/DG ADDL SEQ IV INF: CPT

## 2023-11-20 PROCEDURE — 80053 COMPREHEN METABOLIC PANEL: CPT

## 2023-11-20 PROCEDURE — 99213 OFFICE O/P EST LOW 20 MIN: CPT

## 2023-11-20 PROCEDURE — 1036F TOBACCO NON-USER: CPT

## 2023-11-20 PROCEDURE — 2500000004 HC RX 250 GENERAL PHARMACY W/ HCPCS (ALT 636 FOR OP/ED): Performed by: INTERNAL MEDICINE

## 2023-11-20 PROCEDURE — 96375 TX/PRO/DX INJ NEW DRUG ADDON: CPT | Mod: INF

## 2023-11-20 PROCEDURE — 96411 CHEMO IV PUSH ADDL DRUG: CPT

## 2023-11-20 PROCEDURE — 83735 ASSAY OF MAGNESIUM: CPT

## 2023-11-20 PROCEDURE — 96413 CHEMO IV INFUSION 1 HR: CPT

## 2023-11-20 PROCEDURE — 96377 APPLICATON ON-BODY INJECTOR: CPT

## 2023-11-20 PROCEDURE — 96374 THER/PROPH/DIAG INJ IV PUSH: CPT | Mod: INF

## 2023-11-20 PROCEDURE — 85025 COMPLETE CBC W/AUTO DIFF WBC: CPT

## 2023-11-20 PROCEDURE — 96361 HYDRATE IV INFUSION ADD-ON: CPT | Mod: INF

## 2023-11-20 PROCEDURE — 96372 THER/PROPH/DIAG INJ SC/IM: CPT

## 2023-11-20 RX ORDER — EPINEPHRINE 0.3 MG/.3ML
0.3 INJECTION SUBCUTANEOUS EVERY 5 MIN PRN
Status: DISCONTINUED | OUTPATIENT
Start: 2023-11-20 | End: 2023-11-20 | Stop reason: HOSPADM

## 2023-11-20 RX ORDER — ALBUTEROL SULFATE 0.83 MG/ML
3 SOLUTION RESPIRATORY (INHALATION) AS NEEDED
Status: DISCONTINUED | OUTPATIENT
Start: 2023-11-20 | End: 2023-11-20 | Stop reason: HOSPADM

## 2023-11-20 RX ORDER — DOXORUBICIN HYDROCHLORIDE 2 MG/ML
25 INJECTION, SOLUTION INTRAVENOUS ONCE
Status: COMPLETED | OUTPATIENT
Start: 2023-11-20 | End: 2023-11-20

## 2023-11-20 RX ORDER — FAMOTIDINE 10 MG/ML
20 INJECTION INTRAVENOUS ONCE AS NEEDED
Status: DISCONTINUED | OUTPATIENT
Start: 2023-11-20 | End: 2023-11-20 | Stop reason: HOSPADM

## 2023-11-20 RX ORDER — PROCHLORPERAZINE MALEATE 10 MG
10 TABLET ORAL EVERY 6 HOURS PRN
Status: DISCONTINUED | OUTPATIENT
Start: 2023-11-20 | End: 2023-11-20 | Stop reason: HOSPADM

## 2023-11-20 RX ORDER — DIPHENHYDRAMINE HYDROCHLORIDE 50 MG/ML
50 INJECTION INTRAMUSCULAR; INTRAVENOUS AS NEEDED
Status: DISCONTINUED | OUTPATIENT
Start: 2023-11-20 | End: 2023-11-20 | Stop reason: HOSPADM

## 2023-11-20 RX ORDER — PALONOSETRON 0.05 MG/ML
0.25 INJECTION, SOLUTION INTRAVENOUS ONCE
Status: COMPLETED | OUTPATIENT
Start: 2023-11-20 | End: 2023-11-20

## 2023-11-20 RX ORDER — PROCHLORPERAZINE EDISYLATE 5 MG/ML
10 INJECTION INTRAMUSCULAR; INTRAVENOUS EVERY 6 HOURS PRN
Status: DISCONTINUED | OUTPATIENT
Start: 2023-11-20 | End: 2023-11-20 | Stop reason: HOSPADM

## 2023-11-20 RX ADMIN — DOXORUBICIN HYDROCHLORIDE 42 MG: 2 INJECTION, SOLUTION INTRAVENOUS at 12:02

## 2023-11-20 RX ADMIN — DEXAMETHASONE SODIUM PHOSPHATE 12 MG: 10 INJECTION, SOLUTION INTRAMUSCULAR; INTRAVENOUS at 11:00

## 2023-11-20 RX ADMIN — BRENTUXIMAB VEDOTIN 56.07 MG: 50 INJECTION, POWDER, LYOPHILIZED, FOR SOLUTION INTRAVENOUS at 13:16

## 2023-11-20 RX ADMIN — VINBLASTINE SULFATE 9.9 MG: 1 INJECTION INTRAVENOUS at 12:18

## 2023-11-20 RX ADMIN — FOSAPREPITANT 150 MG: 150 INJECTION, POWDER, LYOPHILIZED, FOR SOLUTION INTRAVENOUS at 11:19

## 2023-11-20 RX ADMIN — SODIUM CHLORIDE 600 MG: 9 INJECTION, SOLUTION INTRAVENOUS at 12:33

## 2023-11-20 RX ADMIN — PALONOSETRON 250 MCG: 0.05 INJECTION, SOLUTION INTRAVENOUS at 10:32

## 2023-11-20 RX ADMIN — PEGFILGRASTIM 6 MG: KIT SUBCUTANEOUS at 13:16

## 2023-11-20 RX ADMIN — SODIUM CHLORIDE 500 ML: 9 INJECTION, SOLUTION INTRAVENOUS at 10:00

## 2023-11-20 NOTE — PATIENT INSTRUCTIONS
Tolerated Treatment without incident today.    Labs look great!   We will see you Monday for hydration and counts.    Please call us if you run into issues with side effects (N/V/D) or if the numbness in your hands begins to interfere with your daily living activities.    Cynthia Johnson is also available to you as well.   Read over decreased appetite handout we provided to you.  It may be helpful while you feel like you have no appetite.

## 2023-11-20 NOTE — PROGRESS NOTES
Patient ID: Pamela Lopez is a 47 y.o. female.    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 Pamela Lopez is a 46 y/o F presenting for follow-up for Hodgkin's lymphoma. Here for cycle 5 day 1.    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 She reports the numbness is constant and it is affecting her ADL's for example she cannot button her shirts. Palliative care has her on gabapentin 300 mg three times per day. Her feet are less severe. She reports some occasional lightheadedness. She says her appetite is stable and no GI issues. She reports her tooth is now fine. She denies fevers, chills, new or worsening lumps or bumps, falls, edema, SOB or cough.     Patient's past medical history, surgical history, family history and social history reviewed.     Objective       Physical Exam:   Constitutional: Patient appears in no acute distress.   Sitting comfortably in chair.  Eyes: EOMI, clear sclera  ENMT: mucous membranes moist, no apparent injury  Head/Neck: Neck supple, no JVD  Respiratory/Thorax: Patent airways, CTAB, normal  breath sounds, no increased work of breathing  Cardiovascular: Regular, rate and rhythm, no murmurs  Gastrointestinal: Nondistended, soft, non-tender,  no rebound tenderness or guarding, no masses palpable  Extremities: normal extremities, no cyanosis edema,  no swelling  Neurological: alert and oriented x3, nonfocal, normal  speech and hearing  Lymphatic: No palpable lymphadenopathy in cervical,  axillary  lymph nodes.  Spleen appears normal size.  Psychological: Appropriate mood and behavior, normal  affect  Skin: Warm and dry, no  lesions, no rashes     Performance Status:  Symptomatic; fully ambulatory    Labs:  Lab Results  Component Value Date   WBC 4.8 11/20/2023   NEUTROABS 2.93 11/20/2023   IGABSOL 0.05 11/20/2023   LYMPHSABS 0.87 (L) 11/20/2023   MONOSABS 0.79 11/20/2023   EOSABS 0.09 11/20/2023   BASOSABS 0.06 11/20/2023   RBC 2.51 (L) 11/20/2023    (H) 11/20/2023   MCHC 32.0 11/20/2023   HGB 8.6 (L) 11/20/2023   HCT 26.9 (L) 11/20/2023    11/20/2023    Lab Results  Component Value Date   CREATININE 0.56 11/20/2023   BUN 11 11/20/2023   EGFR >90 11/20/2023    11/20/2023   K 3.9 11/20/2023    11/20/2023   CO2 27 11/20/2023     Lab Results  Component Value Date   ALT 8 11/20/2023   AST 13 11/20/2023   ALKPHOS 93 11/20/2023   BILITOT 0.3 11/20/2023       Assessment/Plan   1. Classical Hodgkin lymphoma, lymphocyte- rich subtype, stage IVB     - developed tonsil enlargement initially over two years ago and thought to be related to infection  - 6/5/23- 6/723 admitted for new pain and found to have multiple spine mets, neck lymphadenopathy and possible liver lesions  -  biopsy 6/9/23 of her L3 was nondiagnostic  - 6/13/23 PET/CT with FDG avid right palantine tonsil, b/l cervical, portacaval, RP nodes, hepatic met, osseous lesions in axial skeleton  - 6/19/23 planned for 10 fx to T7  - 6/22/23 tonsillectomy path consistent with classical Hodgkin lymphoma  - Discussed diagnosis of classical Hodgkin's lymphoma with patient and given stage IV disease with B symptom along with history of strong tobacco use, recommend brentuximab plus AVD per ECHELON-1 trial (Lisa, et al. [facility] 2018)  - Reviewed side effects and consent signed  - pt is planning to see dental next week as she has active abscess with pain and still has RT next week  - 7/17/12 AVD+ brentuximab  -hospitalized for fever and discharge 8/27/23 and no infectious cause found.  - delays due to neutropenia, cycle 3 day on 9/25/23.  - 10/9/23 PET CT interim with  significant treatment response     10/9/23:  - We reviewed her PET images from this morning. Overall, she is having an excellent clinical response with much of the FDG actvity decreased in her bones, lymph nodes and neck and I do not see any new sites of disease  which is consistent with a strong clinical response.  - Patient does state her last cycle was much easier to tolerate without any new toxicities.   - We will proceed with day 15 today and I will have her come back in 1 week for hydration and supportive care with Lamonte.  - I will see her again with cycle 4 day 1.  - RTC on day 15 with Lamonte and I will see her again with cycle 4 day 1.      10/16/23:   - Presents for supportive care visit  - Having diarrhea 2-3 times per day   - Advised her to hold Senna or at least decrease to 1 tab/day until diarrhea resolves   - Encouraged ongoing oral hydration & report signs of symptoms of dehydration and infection  - 1 liter of NS today over 1 hour  - Will replete electrolytes if needed  - IV decadron for fatigue and appetite   - Will recheck BP after IV fluids as she was below baseline when she arrived   - Instructed patient to call if diarrhea worsens or other symptoms arise as we can bring her in for another supportive care visit this week as she is due to start next cycle of treatment next Monday   - RTC as scheduled in 1 week for FU, labs, and treatment (cycle 4 day 1)    10/23/23:  - Here for cycle 4 day 1  - Reports the diarrhea has resolved after holding senna, educated to monitor for constipation and resume senna if BM slow down  - Feeling well and appetite is improving, weight it up a bit   - Left neck discomfort started this morning, nothing on exam, says it is improving but we will reassess at next visit  - Proceed with cycle 4 day 1 without changes to doses  - Return for day 8 IV fluids  - Return for day 15 and a FU with Dr. Gomez that day    11/6/23:  - No contraindications to proceed with cycle 4 day 15  today.  - Patient reports that she did have an infected right tooth. She does have a swollen right neck node that I believe is related. We will plan for a course of clindamycin. She knows to call if symptoms get worse, however the pain has now resolved.  - She is interested in helping with tobacco cessation. I will place a prescription for Wellbutrin.   - She also reports that she has generalized aches and pains. We discussed that she can do Aleve as needed along with the oxycodone. We also recommended medical massage and will place a referral.  - RTC for cycle 5 day 1 with Lamonte and I will see her again with day 15.  - RTC in 2 weeks with Lamonte.    11/19/23:  - Presents for cycle 5 day 1  - Reports feeling well overall but her neuropathy has progressed to grade 2-3   - Dr. Gomez therefore dose reduced her Brentuximab to 0.9 mg/kg from 1.2 mg/kg  - Patinet declined further intervention such as vitamin B6 daily   - She will receive a 500 ml NS bolus for BP below her baseline with slight lightheadedness   - She is already scheduled to return for supportive care visit on ly in 1 week  - RTC 2 weeks for cycle 2 day 15 and a FUV same day with Dr. 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 Jess Gomez  [facility] at [place] [address]  Phone: [phone]  Available via Secure Chat

## 2023-11-27 ENCOUNTER — INFUSION (OUTPATIENT)
Dept: HEMATOLOGY/ONCOLOGY | Facility: CLINIC | Age: 48
End: 2023-11-27
Payer: COMMERCIAL

## 2023-11-27 VITALS
RESPIRATION RATE: 16 BRPM | DIASTOLIC BLOOD PRESSURE: 58 MMHG | BODY MASS INDEX: 22.46 KG/M2 | WEIGHT: 121.91 LBS | TEMPERATURE: 98.8 F | HEART RATE: 90 BPM | SYSTOLIC BLOOD PRESSURE: 89 MMHG | OXYGEN SATURATION: 95 %

## 2023-11-27 DIAGNOSIS — C81.48: Primary | ICD-10-CM

## 2023-11-27 DIAGNOSIS — C81.48: ICD-10-CM

## 2023-11-27 LAB
ANION GAP SERPL CALC-SCNC: 12 MMOL/L (ref 10–20)
BASOPHILS # BLD AUTO: 0.03 X10*3/UL (ref 0–0.1)
BASOPHILS NFR BLD AUTO: 1.4 %
BUN SERPL-MCNC: 17 MG/DL (ref 6–23)
CALCIUM SERPL-MCNC: 9.5 MG/DL (ref 8.6–10.3)
CHLORIDE SERPL-SCNC: 105 MMOL/L (ref 98–107)
CO2 SERPL-SCNC: 26 MMOL/L (ref 21–32)
CREAT SERPL-MCNC: 0.51 MG/DL (ref 0.5–1.05)
EOSINOPHIL # BLD AUTO: 0.02 X10*3/UL (ref 0–0.7)
EOSINOPHIL NFR BLD AUTO: 0.9 %
ERYTHROCYTE [DISTWIDTH] IN BLOOD BY AUTOMATED COUNT: 16.5 % (ref 11.5–14.5)
GFR SERPL CREATININE-BSD FRML MDRD: >90 ML/MIN/1.73M*2
GLUCOSE SERPL-MCNC: 112 MG/DL (ref 74–99)
HCT VFR BLD AUTO: 28.8 % (ref 36–46)
HGB BLD-MCNC: 9.2 G/DL (ref 12–16)
HOLD SPECIMEN: NORMAL
IMM GRANULOCYTES # BLD AUTO: 0.02 X10*3/UL (ref 0–0.7)
IMM GRANULOCYTES NFR BLD AUTO: 0.9 % (ref 0–0.9)
LYMPHOCYTES # BLD AUTO: 0.78 X10*3/UL (ref 1.2–4.8)
LYMPHOCYTES NFR BLD AUTO: 36.1 %
MAGNESIUM SERPL-MCNC: 1.9 MG/DL (ref 1.6–2.4)
MCH RBC QN AUTO: 33.7 PG (ref 26–34)
MCHC RBC AUTO-ENTMCNC: 31.9 G/DL (ref 32–36)
MCV RBC AUTO: 106 FL (ref 80–100)
MONOCYTES # BLD AUTO: 0.14 X10*3/UL (ref 0.1–1)
MONOCYTES NFR BLD AUTO: 6.5 %
NEUTROPHILS # BLD AUTO: 1.17 X10*3/UL (ref 1.2–7.7)
NEUTROPHILS NFR BLD AUTO: 54.2 %
PLATELET # BLD AUTO: 87 X10*3/UL (ref 150–450)
POTASSIUM SERPL-SCNC: 3.9 MMOL/L (ref 3.5–5.3)
RBC # BLD AUTO: 2.73 X10*6/UL (ref 4–5.2)
SODIUM SERPL-SCNC: 139 MMOL/L (ref 136–145)
WBC # BLD AUTO: 2.2 X10*3/UL (ref 4.4–11.3)

## 2023-11-27 PROCEDURE — 80048 BASIC METABOLIC PNL TOTAL CA: CPT

## 2023-11-27 PROCEDURE — 85025 COMPLETE CBC W/AUTO DIFF WBC: CPT

## 2023-11-27 PROCEDURE — 83735 ASSAY OF MAGNESIUM: CPT

## 2023-11-27 PROCEDURE — 96360 HYDRATION IV INFUSION INIT: CPT | Mod: INF

## 2023-11-27 PROCEDURE — 2500000004 HC RX 250 GENERAL PHARMACY W/ HCPCS (ALT 636 FOR OP/ED): Performed by: INTERNAL MEDICINE

## 2023-11-27 RX ORDER — SODIUM CHLORIDE 9 MG/ML
1000 INJECTION, SOLUTION INTRAVENOUS ONCE
OUTPATIENT
Start: 2023-11-27 | End: 2023-11-27

## 2023-11-27 RX ORDER — HEPARIN 100 UNIT/ML
500 SYRINGE INTRAVENOUS AS NEEDED
Status: CANCELLED | OUTPATIENT
Start: 2023-11-27

## 2023-11-27 RX ORDER — HEPARIN SODIUM,PORCINE/PF 10 UNIT/ML
50 SYRINGE (ML) INTRAVENOUS AS NEEDED
Status: CANCELLED | OUTPATIENT
Start: 2023-11-27

## 2023-11-27 RX ORDER — HEPARIN 100 UNIT/ML
500 SYRINGE INTRAVENOUS AS NEEDED
Status: DISCONTINUED | OUTPATIENT
Start: 2023-11-27 | End: 2023-11-27 | Stop reason: HOSPADM

## 2023-11-27 RX ORDER — HEPARIN SODIUM,PORCINE/PF 10 UNIT/ML
50 SYRINGE (ML) INTRAVENOUS AS NEEDED
Status: DISCONTINUED | OUTPATIENT
Start: 2023-11-27 | End: 2023-11-27 | Stop reason: HOSPADM

## 2023-11-27 RX ADMIN — SODIUM CHLORIDE 1000 ML: 9 INJECTION, SOLUTION INTRAVENOUS at 15:14

## 2023-12-01 NOTE — PROGRESS NOTES
Patient ID: Pamela Lopez is a 47 y.o. female.    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 Pamela Lopez is a 48 y/o F presenting for follow-up for Hodgkin's lymphoma. Here for cycle 5 day 15.    Since last visit, patient reports that she was seen in the Emergency Department on 12/2/2023 for left facial swelling and pain due to a tooth abscess. Currently on Augmentin. She was also diagnosed with a UTI. Denies any fevers or chills or new GI issues. Denies any new chest pain, cough or shortness of breath. No other complaints today.     Patient's past medical history, surgical history, family history and social history reviewed.     Objective    Vitals:    12/04/23 0906   BP: (!) 64/42   Pulse: 69   Resp: 16   Temp: 36.3 °C (97.3 °F)   SpO2: 95%       Review of Systems:   Review of Systems:    Positive per HPI, otherwise negative.     Physical Exam:   Constitutional: Patient appears in no acute distress.   Sitting comfortably in chair.  Eyes: EOMI, clear sclera  ENMT: mucous membranes moist, no apparent injury  Head/Neck: Neck supple, no JVD  Respiratory/Thorax: Patent airways, CTAB, normal  breath sounds, no increased work of breathing  Cardiovascular: Regular, rate and rhythm, no murmurs  Gastrointestinal: Nondistended, soft, non-tender,  no rebound tenderness or guarding, no masses palpable  Extremities: normal extremities, no cyanosis edema,  no swelling  Neurological: alert and oriented x3, nonfocal, normal  speech and hearing  Lymphatic: No palpable lymphadenopathy in cervical,  axillary  lymph nodes.  Spleen appears normal size.  Psychological: Appropriate mood and behavior, normal  affect  Skin:  Warm and dry, no lesions, no rashes     Performance Status:  Symptomatic; fully ambulatory    Labs/Imaging/Pathology: personally reviewed reports and images in Epic electronic medical record system. Pertinent results as it related to the plan represented in below in assessment and plan.     Assessment/Plan   1. Classical Hodgkin lymphoma, lymphocyte- rich subtype, stage IVB     - developed tonsil enlargement initially over two years ago and thought to be related to infection  - 6/5/23- 6/723 admitted for new pain and found to have multiple spine mets, neck lymphadenopathy and possible liver lesions  -  biopsy 6/9/23 of her L3 was nondiagnostic  - 6/13/23 PET/CT with FDG avid right palantine tonsil, b/l cervical, portacaval, RP nodes, hepatic met, osseous lesions in axial skeleton  - 6/19/23 planned for 10 fx to T7  - 6/22/23 tonsillectomy path consistent with classical Hodgkin lymphoma  - Discussed diagnosis of classical Hodgkin's lymphoma with patient and given stage IV disease with B symptom along with history of strong tobacco use, recommend brentuximab plus AVD per ECHELON-1 trial (Lisa, et al. [facility] 2018)  - Reviewed side effects and consent signed  - pt is planning to see dental next week as she has active abscess with pain and still has RT next week  - 7/17/12 AVD+ brentuximab  -hospitalized for fever and discharge 8/27/23 and no infectious cause found.  - delays due to neutropenia, cycle 3 day on 9/25/23.  - 10/9/23 PET CT interim with significant treatment response   11/19/23 C5D1 reduced brentuximab to 0.9 mg/mkg     12/4/23:  - Since last visit, patient had a left-side infection in her mouth related to a molar that is likely infected. She was started on antibiotics in the hospital with Augmentin BID and then took herself out of the hospital to come for treatment today.   - Today her blood pressure is 60s over 40s and recommended ED for further management  - We discussed that we will hold her  chemotherapy with a plan to get her in with [facility] as soon as possible.   - At this point, we will delay cycle 5 day 15 by 1 week.  - RTC in 1 week with Lamonte.    RTC in 1 week with Lamonte. This note has been transcribed using a medical scribe and there is a possibility of unintentional typing misprints.      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

## 2023-12-02 ENCOUNTER — APPOINTMENT (OUTPATIENT)
Dept: RADIOLOGY | Facility: HOSPITAL | Age: 48
End: 2023-12-02
Payer: COMMERCIAL

## 2023-12-02 ENCOUNTER — HOSPITAL ENCOUNTER (OUTPATIENT)
Facility: HOSPITAL | Age: 48
Setting detail: OBSERVATION
Discharge: HOME | End: 2023-12-03
Attending: EMERGENCY MEDICINE | Admitting: INTERNAL MEDICINE
Payer: COMMERCIAL

## 2023-12-02 DIAGNOSIS — K04.7 DENTAL INFECTION: ICD-10-CM

## 2023-12-02 DIAGNOSIS — D84.9 IMMUNOSUPPRESSION (MULTI): ICD-10-CM

## 2023-12-02 DIAGNOSIS — R22.0 MANDIBULAR MASS: Primary | ICD-10-CM

## 2023-12-02 DIAGNOSIS — Z85.72 HISTORY OF LYMPHOMA: ICD-10-CM

## 2023-12-02 LAB
ALBUMIN SERPL BCP-MCNC: 3.6 G/DL (ref 3.4–5)
ALP SERPL-CCNC: 95 U/L (ref 33–110)
ALT SERPL W P-5'-P-CCNC: 12 U/L (ref 7–45)
AMPHETAMINES UR QL SCN: ABNORMAL
ANION GAP SERPL CALC-SCNC: 11 MMOL/L (ref 10–20)
APPEARANCE UR: ABNORMAL
APTT PPP: 30 SECONDS (ref 27–38)
AST SERPL W P-5'-P-CCNC: 15 U/L (ref 9–39)
BACTERIA #/AREA URNS AUTO: ABNORMAL /HPF
BARBITURATES UR QL SCN: ABNORMAL
BASOPHILS # BLD AUTO: 0.1 X10*3/UL (ref 0–0.1)
BASOPHILS NFR BLD AUTO: 1.2 %
BENZODIAZ UR QL SCN: ABNORMAL
BILIRUB SERPL-MCNC: 0.3 MG/DL (ref 0–1.2)
BILIRUB UR STRIP.AUTO-MCNC: NEGATIVE MG/DL
BUN SERPL-MCNC: 11 MG/DL (ref 6–23)
BZE UR QL SCN: ABNORMAL
CALCIUM SERPL-MCNC: 9.5 MG/DL (ref 8.6–10.3)
CANNABINOIDS UR QL SCN: ABNORMAL
CHLORIDE SERPL-SCNC: 101 MMOL/L (ref 98–107)
CO2 SERPL-SCNC: 25 MMOL/L (ref 21–32)
COLOR UR: YELLOW
CREAT SERPL-MCNC: 0.64 MG/DL (ref 0.5–1.05)
EOSINOPHIL # BLD AUTO: 0.07 X10*3/UL (ref 0–0.7)
EOSINOPHIL NFR BLD AUTO: 0.9 %
ERYTHROCYTE [DISTWIDTH] IN BLOOD BY AUTOMATED COUNT: 17.3 % (ref 11.5–14.5)
FENTANYL+NORFENTANYL UR QL SCN: ABNORMAL
GFR SERPL CREATININE-BSD FRML MDRD: >90 ML/MIN/1.73M*2
GLUCOSE SERPL-MCNC: 85 MG/DL (ref 74–99)
GLUCOSE UR STRIP.AUTO-MCNC: NEGATIVE MG/DL
HCT VFR BLD AUTO: 30.6 % (ref 36–46)
HGB BLD-MCNC: 9.8 G/DL (ref 12–16)
IMM GRANULOCYTES # BLD AUTO: 0.22 X10*3/UL (ref 0–0.7)
IMM GRANULOCYTES NFR BLD AUTO: 2.7 % (ref 0–0.9)
INR PPP: 1.2 (ref 0.9–1.1)
KETONES UR STRIP.AUTO-MCNC: NEGATIVE MG/DL
LACTATE SERPL-SCNC: 1 MMOL/L (ref 0.4–2)
LEUKOCYTE ESTERASE UR QL STRIP.AUTO: ABNORMAL
LYMPHOCYTES # BLD AUTO: 1.11 X10*3/UL (ref 1.2–4.8)
LYMPHOCYTES NFR BLD AUTO: 13.8 %
MCH RBC QN AUTO: 33.7 PG (ref 26–34)
MCHC RBC AUTO-ENTMCNC: 32 G/DL (ref 32–36)
MCV RBC AUTO: 105 FL (ref 80–100)
MONOCYTES # BLD AUTO: 1.05 X10*3/UL (ref 0.1–1)
MONOCYTES NFR BLD AUTO: 13.1 %
MUCOUS THREADS #/AREA URNS AUTO: ABNORMAL /LPF
NEUTROPHILS # BLD AUTO: 5.48 X10*3/UL (ref 1.2–7.7)
NEUTROPHILS NFR BLD AUTO: 68.3 %
NITRITE UR QL STRIP.AUTO: NEGATIVE
NRBC BLD-RTO: 0.5 /100 WBCS (ref 0–0)
OPIATES UR QL SCN: ABNORMAL
OXYCODONE+OXYMORPHONE UR QL SCN: ABNORMAL
PCP UR QL SCN: ABNORMAL
PH UR STRIP.AUTO: 6 [PH]
PLATELET # BLD AUTO: 225 X10*3/UL (ref 150–450)
POTASSIUM SERPL-SCNC: 4.2 MMOL/L (ref 3.5–5.3)
PROT SERPL-MCNC: 6.3 G/DL (ref 6.4–8.2)
PROT UR STRIP.AUTO-MCNC: NEGATIVE MG/DL
PROTHROMBIN TIME: 13.6 SECONDS (ref 9.8–12.8)
RBC # BLD AUTO: 2.91 X10*6/UL (ref 4–5.2)
RBC # UR STRIP.AUTO: NEGATIVE /UL
RBC #/AREA URNS AUTO: ABNORMAL /HPF
SODIUM SERPL-SCNC: 133 MMOL/L (ref 136–145)
SP GR UR STRIP.AUTO: 1.01
SQUAMOUS #/AREA URNS AUTO: ABNORMAL /HPF
UROBILINOGEN UR STRIP.AUTO-MCNC: <2 MG/DL
WBC # BLD AUTO: 8 X10*3/UL (ref 4.4–11.3)
WBC #/AREA URNS AUTO: ABNORMAL /HPF
WBC CLUMPS #/AREA URNS AUTO: ABNORMAL /HPF

## 2023-12-02 PROCEDURE — 87075 CULTR BACTERIA EXCEPT BLOOD: CPT | Mod: PARLAB | Performed by: PHYSICIAN ASSISTANT

## 2023-12-02 PROCEDURE — 2500000004 HC RX 250 GENERAL PHARMACY W/ HCPCS (ALT 636 FOR OP/ED): Performed by: PHYSICIAN ASSISTANT

## 2023-12-02 PROCEDURE — 87086 URINE CULTURE/COLONY COUNT: CPT | Mod: PARLAB | Performed by: STUDENT IN AN ORGANIZED HEALTH CARE EDUCATION/TRAINING PROGRAM

## 2023-12-02 PROCEDURE — 87040 BLOOD CULTURE FOR BACTERIA: CPT | Mod: PARLAB | Performed by: PHYSICIAN ASSISTANT

## 2023-12-02 PROCEDURE — 80307 DRUG TEST PRSMV CHEM ANLYZR: CPT | Performed by: STUDENT IN AN ORGANIZED HEALTH CARE EDUCATION/TRAINING PROGRAM

## 2023-12-02 PROCEDURE — 99285 EMERGENCY DEPT VISIT HI MDM: CPT | Performed by: EMERGENCY MEDICINE

## 2023-12-02 PROCEDURE — 83605 ASSAY OF LACTIC ACID: CPT | Performed by: PHYSICIAN ASSISTANT

## 2023-12-02 PROCEDURE — 80053 COMPREHEN METABOLIC PANEL: CPT | Performed by: PHYSICIAN ASSISTANT

## 2023-12-02 PROCEDURE — 81001 URINALYSIS AUTO W/SCOPE: CPT | Performed by: STUDENT IN AN ORGANIZED HEALTH CARE EDUCATION/TRAINING PROGRAM

## 2023-12-02 PROCEDURE — G0378 HOSPITAL OBSERVATION PER HR: HCPCS

## 2023-12-02 PROCEDURE — 36415 COLL VENOUS BLD VENIPUNCTURE: CPT | Performed by: PHYSICIAN ASSISTANT

## 2023-12-02 PROCEDURE — 96375 TX/PRO/DX INJ NEW DRUG ADDON: CPT | Mod: 59

## 2023-12-02 PROCEDURE — 2550000001 HC RX 255 CONTRASTS: Performed by: STUDENT IN AN ORGANIZED HEALTH CARE EDUCATION/TRAINING PROGRAM

## 2023-12-02 PROCEDURE — 70487 CT MAXILLOFACIAL W/DYE: CPT | Performed by: RADIOLOGY

## 2023-12-02 PROCEDURE — 99222 1ST HOSP IP/OBS MODERATE 55: CPT | Performed by: NURSE PRACTITIONER

## 2023-12-02 PROCEDURE — 85610 PROTHROMBIN TIME: CPT | Performed by: PHYSICIAN ASSISTANT

## 2023-12-02 PROCEDURE — 99235 HOSP IP/OBS SAME DATE MOD 70: CPT | Performed by: INTERNAL MEDICINE

## 2023-12-02 PROCEDURE — 85730 THROMBOPLASTIN TIME PARTIAL: CPT | Performed by: PHYSICIAN ASSISTANT

## 2023-12-02 PROCEDURE — 96361 HYDRATE IV INFUSION ADD-ON: CPT

## 2023-12-02 PROCEDURE — 70487 CT MAXILLOFACIAL W/DYE: CPT

## 2023-12-02 PROCEDURE — 85025 COMPLETE CBC W/AUTO DIFF WBC: CPT | Performed by: PHYSICIAN ASSISTANT

## 2023-12-02 PROCEDURE — 96365 THER/PROPH/DIAG IV INF INIT: CPT | Mod: 59

## 2023-12-02 RX ORDER — HYDROMORPHONE HYDROCHLORIDE 1 MG/ML
1 INJECTION, SOLUTION INTRAMUSCULAR; INTRAVENOUS; SUBCUTANEOUS ONCE
Status: COMPLETED | OUTPATIENT
Start: 2023-12-02 | End: 2023-12-02

## 2023-12-02 RX ORDER — ONDANSETRON HYDROCHLORIDE 2 MG/ML
4 INJECTION, SOLUTION INTRAVENOUS ONCE
Status: COMPLETED | OUTPATIENT
Start: 2023-12-02 | End: 2023-12-02

## 2023-12-02 RX ADMIN — ONDANSETRON 4 MG: 2 INJECTION INTRAMUSCULAR; INTRAVENOUS at 18:09

## 2023-12-02 RX ADMIN — SODIUM CHLORIDE 500 ML: 9 INJECTION, SOLUTION INTRAVENOUS at 18:08

## 2023-12-02 RX ADMIN — SODIUM CHLORIDE 3 G: 900 INJECTION INTRAVENOUS at 18:09

## 2023-12-02 RX ADMIN — IOHEXOL 60 ML: 350 INJECTION, SOLUTION INTRAVENOUS at 18:53

## 2023-12-02 RX ADMIN — HYDROMORPHONE HYDROCHLORIDE 1 MG: 1 INJECTION, SOLUTION INTRAMUSCULAR; INTRAVENOUS; SUBCUTANEOUS at 18:09

## 2023-12-02 ASSESSMENT — COLUMBIA-SUICIDE SEVERITY RATING SCALE - C-SSRS
1. IN THE PAST MONTH, HAVE YOU WISHED YOU WERE DEAD OR WISHED YOU COULD GO TO SLEEP AND NOT WAKE UP?: NO
6. HAVE YOU EVER DONE ANYTHING, STARTED TO DO ANYTHING, OR PREPARED TO DO ANYTHING TO END YOUR LIFE?: NO
2. HAVE YOU ACTUALLY HAD ANY THOUGHTS OF KILLING YOURSELF?: NO

## 2023-12-02 ASSESSMENT — LIFESTYLE VARIABLES
EVER HAD A DRINK FIRST THING IN THE MORNING TO STEADY YOUR NERVES TO GET RID OF A HANGOVER: NO
REASON UNABLE TO ASSESS: NO
EVER FELT BAD OR GUILTY ABOUT YOUR DRINKING: NO
HAVE PEOPLE ANNOYED YOU BY CRITICIZING YOUR DRINKING: NO
HAVE YOU EVER FELT YOU SHOULD CUT DOWN ON YOUR DRINKING: NO

## 2023-12-02 ASSESSMENT — PAIN DESCRIPTION - PAIN TYPE: TYPE: ACUTE PAIN

## 2023-12-02 ASSESSMENT — PAIN SCALES - GENERAL: PAINLEVEL_OUTOF10: 4

## 2023-12-02 ASSESSMENT — PAIN - FUNCTIONAL ASSESSMENT: PAIN_FUNCTIONAL_ASSESSMENT: 0-10

## 2023-12-02 ASSESSMENT — PAIN DESCRIPTION - LOCATION: LOCATION: MOUTH

## 2023-12-02 NOTE — PROGRESS NOTES
I received Pamela Lopez in signout from Dr. Rodriguez.  Please see the previous note for all HPI, PE and MDM up to the time of signout at 1720.    In brief Pamela Lopez is an 47 y.o. female presenting for   Chief Complaint   Patient presents with    Abscess     PT. STATES STARTED WITH SWELLING TO LEFT LOWER FACE YESTERDAY, TODAY AWOKE WITH INCREASED SWELLING. PT. STATES RECEIVING TREATMENT FOR HODGKINS LYMPHOMA, STATES WBC WAS 2.2 ON MONDAY. DENIES FEVERS.    At the time of signout we were awaiting:  -CT imaging for concerns of neck abscess in setting of lymphoma on chemotherapy    ED Course as of 12/02/23 2346   Sat Dec 02, 2023   1920 CT IMPRESSION:  Asymmetric soft tissue edema with fat stranding in the left buccal  and submandibular region. This is probably associated with the  periapical abscess involving the left mandibular 1st molar tooth. No  definite soft tissue abscess is identified. Clinical correlation is  recommended.      Scattered nonspecific bilateral neck nodes may reflect reactive  adenopathy, however these findings may relate to patient's underlying  lymphoma.   [DS]   1920 WBC: 8.0  UA concerning for UTI [DS]   1924 WBC, Urine(!): 11-20 [AB]   1924 Bacteria, Urine(!): 3+ [AB]   1924 Leukocyte Esterase, Urine(!): TRACE [AB]   1924 CT maxillofacial bones w IV contrast  Will discuss with OMFS regarding need for transfer or IV Abx at [facility], given no obvious drainable fluid collection on exam. [AB]   2001 Spoke with Dr. Medel with OMFS.  He does state that it sounds reasonable to start the patient at [facility] with IV antibiotics.  If symptoms worsen, we could escalate to [facility]. [AB]   2228 Urine toxicology screen added on, as per the request of the admitting physician, Dr. Raghu Fuller. [AB]   2228 Spoke with Dr. Raghu Fuller, who accepts the patient for admission. [AB]      ED Course User Index  [AB] Artemio Garces MD  [DS] oKdy Naidu PA-C         Diagnoses as of 12/02/23  2346   Mandibular mass   History of lymphoma   Immunosuppression (CMS/HCC)   Dental infection       Pt Disposition: Admission

## 2023-12-02 NOTE — ED TRIAGE NOTES
PT. STATES STARTED WITH SWELLING TO LEFT LOWER FACE YESTERDAY, TODAY AWOKE WITH INCREASED SWELLING. PT. STATES RECEIVING TREATMENT FOR HODGKINS LYMPHOMA, STATES WBC WAS 2.2 ON MONDAY. DENIES FEVERS.

## 2023-12-02 NOTE — ED PROVIDER NOTES
HPI   Chief Complaint   Patient presents with    Abscess     PT. STATES STARTED WITH SWELLING TO LEFT LOWER FACE YESTERDAY, TODAY AWOKE WITH INCREASED SWELLING. PT. STATES RECEIVING TREATMENT FOR HODGKINS LYMPHOMA, STATES WBC WAS 2.2 ON MONDAY. DENIES FEVERS.        HPI  This is a 47 year old female with a history of Hodgkin's lymphoma who is currently actively on treatment who presents  for left facial mass.  Her next treatment is tomorrow.  States it just popped up a few days ago.  She does have a bad tooth and she does believe she needs antibiotic.  She states her original lymphoma occurred on the right side of her neck and that was removed.  She states her last white count was 2.2.  She is denying any fever or any cough.  She states that she does have pain in the area is very sore and tender on that left side of her face.  She is able to open and close her mouth without that much difficulty.  Denies any headache or visual symptoms denies any chest pain or shortness of breath.  No nausea or vomiting.                  Mal[place] Coma Scale Score: 15                  Patient History   Past Medical History:   Diagnosis Date    Asthma     Hodgkin's lymphoma (CMS/HCC)     Hyperlipidemia      Past Surgical History:   Procedure Laterality Date    CT GUIDED PERCUTANEOUS BIOPSY BONE DEEP  2023    CT GUIDED PERCUTANEOUS BIOPSY BONE DEEP 2023 [facility] CT     No family history on file.  Social History     Tobacco Use    Smoking status: Former     Packs/day: 0.25     Years: 33.00     Additional pack years: 0.00     Total pack years: 8.25     Types: Cigarettes     Quit date:      Years since quittin.9    Smokeless tobacco: Never   Substance Use Topics    Alcohol use: Never    Drug use: Never       Physical Exam   ED Triage Vitals [23 1345]   Temp Heart Rate Resp BP   36.5 °C (97.7 °F) 52 16 95/53      SpO2 Temp Source Heart Rate Source Patient Position   95 % Temporal Monitor Sitting      BP Location FiO2 (%)      Right arm --       Physical Exam    Reviewed family history social history and allergies and were noncontributory to current problem.    Review of systems as noted in history of present illness  otherwise negative. All other systems were reviewed and negative.     PMHX: Chronic conditions: reviewd in EMR, relevant history noted in HPI                Surgeries, hospitalizations: reviewed in EMR , relevant history noted in HPI                Medications: reviewed in EMR, relevant history noted in HPI                Allergies: reviewed in EMR, relevant history noted in HPI      PHYSICAL EXAM:    GENERAL/ CONSTITUTIONAL: Vitals noted, no distress. Alert and oriented  x 3. Non-toxic.  No Drooling or stridor .    HEAD: Normocephalic Atraumatic    EENT: Posterior oropharynx unremarkable. No meningismus. No LAD.  No exudate present.      MOUTH/DENTITION: There is a large broken tooth on the bottom molar aspect.  There is minimal tenderness in the cheek area internally however on the external mandibular aspect there is a palpable 2 cm lesion noted. No pus or drainage noted.    EYES: PERRLA EOMI     NECK: Supple. Nontender. No midline tenderness.  Full range of motion    CARDIAC: bradycardic rate, rhythm. No murmurs rubs or gallops. No JVD    PULMONARY: Lungs clear bilaterally with good aeration. No wheezes rales or rhonchi. No respiratory distress.     GI: Soft, . Nontender. No peritoneal signs. Normoactive bowel sounds. No pulsatile masses.  No guarding or rebound    EXTREMITIES/MUSCULOSKELTAL: No peripheral edema. Negative Homans bilaterally, NVIT, dorsal pedis pulses +2 /4 equal. FROM in all extremities and equal.     SKIN: No rash. Intact.     NEURO: No focal neurologic deficits,     PSYCH: appropriate mood and affect    MEDICAL DECISION MAKING:    ED Course & MDM   Diagnoses as of 12/02/23 1641   Mandibular mass   Entertain the possibility of Incision and drainage ;however ,with patient's history we will first perform  a scan.  I did do blood work in case patient needs to be admitted for surgical drainage and assess further course needed. Pain meds given and antibiotic.     Medical Decision Making  CAT scan labs pending at time of this dictation.  Case signed out to Kody Naidu PA-C for final disposition    Procedure  Procedures     Caitlyn Chávez PA-C  12/02/23 1666

## 2023-12-03 VITALS
TEMPERATURE: 97.3 F | DIASTOLIC BLOOD PRESSURE: 55 MMHG | HEIGHT: 62 IN | RESPIRATION RATE: 16 BRPM | OXYGEN SATURATION: 96 % | SYSTOLIC BLOOD PRESSURE: 96 MMHG | WEIGHT: 120 LBS | HEART RATE: 85 BPM | BODY MASS INDEX: 22.08 KG/M2

## 2023-12-03 LAB
ANION GAP SERPL CALC-SCNC: 9 MMOL/L (ref 10–20)
BUN SERPL-MCNC: 10 MG/DL (ref 6–23)
CALCIUM SERPL-MCNC: 8.5 MG/DL (ref 8.6–10.3)
CHLORIDE SERPL-SCNC: 106 MMOL/L (ref 98–107)
CO2 SERPL-SCNC: 26 MMOL/L (ref 21–32)
CREAT SERPL-MCNC: 0.85 MG/DL (ref 0.5–1.05)
ERYTHROCYTE [DISTWIDTH] IN BLOOD BY AUTOMATED COUNT: 17.5 % (ref 11.5–14.5)
GFR SERPL CREATININE-BSD FRML MDRD: 86 ML/MIN/1.73M*2
GLUCOSE SERPL-MCNC: 93 MG/DL (ref 74–99)
HCT VFR BLD AUTO: 27.2 % (ref 36–46)
HGB BLD-MCNC: 8.6 G/DL (ref 12–16)
MCH RBC QN AUTO: 33.9 PG (ref 26–34)
MCHC RBC AUTO-ENTMCNC: 31.6 G/DL (ref 32–36)
MCV RBC AUTO: 107 FL (ref 80–100)
NRBC BLD-RTO: 0.3 /100 WBCS (ref 0–0)
PLATELET # BLD AUTO: 213 X10*3/UL (ref 150–450)
POTASSIUM SERPL-SCNC: 4.1 MMOL/L (ref 3.5–5.3)
RBC # BLD AUTO: 2.54 X10*6/UL (ref 4–5.2)
SODIUM SERPL-SCNC: 137 MMOL/L (ref 136–145)
WBC # BLD AUTO: 6.3 X10*3/UL (ref 4.4–11.3)

## 2023-12-03 PROCEDURE — 96366 THER/PROPH/DIAG IV INF ADDON: CPT

## 2023-12-03 PROCEDURE — 96376 TX/PRO/DX INJ SAME DRUG ADON: CPT

## 2023-12-03 PROCEDURE — 85027 COMPLETE CBC AUTOMATED: CPT | Performed by: NURSE PRACTITIONER

## 2023-12-03 PROCEDURE — 80048 BASIC METABOLIC PNL TOTAL CA: CPT | Performed by: NURSE PRACTITIONER

## 2023-12-03 PROCEDURE — 96361 HYDRATE IV INFUSION ADD-ON: CPT

## 2023-12-03 PROCEDURE — 2500000004 HC RX 250 GENERAL PHARMACY W/ HCPCS (ALT 636 FOR OP/ED): Performed by: NURSE PRACTITIONER

## 2023-12-03 PROCEDURE — 36415 COLL VENOUS BLD VENIPUNCTURE: CPT | Performed by: NURSE PRACTITIONER

## 2023-12-03 PROCEDURE — G0378 HOSPITAL OBSERVATION PER HR: HCPCS

## 2023-12-03 PROCEDURE — 2500000001 HC RX 250 WO HCPCS SELF ADMINISTERED DRUGS (ALT 637 FOR MEDICARE OP): Performed by: NURSE PRACTITIONER

## 2023-12-03 RX ORDER — AMOXICILLIN AND CLAVULANATE POTASSIUM 875; 125 MG/1; MG/1
1 TABLET, FILM COATED ORAL EVERY 12 HOURS
Qty: 20 TABLET | Refills: 0 | Status: SHIPPED | OUTPATIENT
Start: 2023-12-03 | End: 2023-12-13

## 2023-12-03 RX ORDER — ESCITALOPRAM OXALATE 10 MG/1
20 TABLET ORAL DAILY
Status: DISCONTINUED | OUTPATIENT
Start: 2023-12-03 | End: 2023-12-03 | Stop reason: HOSPADM

## 2023-12-03 RX ORDER — SUCRALFATE 1 G/10ML
1 SUSPENSION ORAL
Status: DISCONTINUED | OUTPATIENT
Start: 2023-12-03 | End: 2023-12-03 | Stop reason: HOSPADM

## 2023-12-03 RX ORDER — GABAPENTIN 300 MG/1
300 CAPSULE ORAL NIGHTLY
Status: DISCONTINUED | OUTPATIENT
Start: 2023-12-03 | End: 2023-12-03 | Stop reason: HOSPADM

## 2023-12-03 RX ORDER — SODIUM CHLORIDE 9 MG/ML
50 INJECTION, SOLUTION INTRAVENOUS CONTINUOUS
Status: DISCONTINUED | OUTPATIENT
Start: 2023-12-03 | End: 2023-12-03 | Stop reason: HOSPADM

## 2023-12-03 RX ORDER — FERROUS SULFATE 325(65) MG
1 TABLET ORAL EVERY OTHER DAY
Status: DISCONTINUED | OUTPATIENT
Start: 2023-12-04 | End: 2023-12-03 | Stop reason: HOSPADM

## 2023-12-03 RX ORDER — MORPHINE SULFATE 15 MG/1
30 TABLET, FILM COATED, EXTENDED RELEASE ORAL EVERY 12 HOURS SCHEDULED
Status: DISCONTINUED | OUTPATIENT
Start: 2023-12-03 | End: 2023-12-03 | Stop reason: HOSPADM

## 2023-12-03 RX ORDER — PANTOPRAZOLE SODIUM 40 MG/1
40 TABLET, DELAYED RELEASE ORAL DAILY
Status: DISCONTINUED | OUTPATIENT
Start: 2023-12-03 | End: 2023-12-03 | Stop reason: HOSPADM

## 2023-12-03 RX ORDER — RISPERIDONE 0.25 MG/1
0.5 TABLET ORAL NIGHTLY
Status: DISCONTINUED | OUTPATIENT
Start: 2023-12-03 | End: 2023-12-03 | Stop reason: HOSPADM

## 2023-12-03 RX ORDER — ALBUTEROL SULFATE 90 UG/1
2 AEROSOL, METERED RESPIRATORY (INHALATION) EVERY 2 HOUR PRN
Status: DISCONTINUED | OUTPATIENT
Start: 2023-12-03 | End: 2023-12-03 | Stop reason: HOSPADM

## 2023-12-03 RX ORDER — ACYCLOVIR 200 MG/1
200 CAPSULE ORAL 2 TIMES DAILY
Status: DISCONTINUED | OUTPATIENT
Start: 2023-12-03 | End: 2023-12-03 | Stop reason: HOSPADM

## 2023-12-03 RX ORDER — AMOXICILLIN 250 MG
2 CAPSULE ORAL 2 TIMES DAILY PRN
Status: DISCONTINUED | OUTPATIENT
Start: 2023-12-03 | End: 2023-12-03 | Stop reason: HOSPADM

## 2023-12-03 RX ORDER — NALOXONE HYDROCHLORIDE 4 MG/.1ML
4 SPRAY NASAL AS NEEDED
Status: DISCONTINUED | OUTPATIENT
Start: 2023-12-03 | End: 2023-12-03 | Stop reason: HOSPADM

## 2023-12-03 RX ORDER — OXYCODONE HYDROCHLORIDE 5 MG/1
15 TABLET ORAL EVERY 4 HOURS PRN
Status: DISCONTINUED | OUTPATIENT
Start: 2023-12-03 | End: 2023-12-03 | Stop reason: HOSPADM

## 2023-12-03 RX ORDER — ALBUTEROL SULFATE 90 UG/1
2 AEROSOL, METERED RESPIRATORY (INHALATION) EVERY 6 HOURS PRN
Status: DISCONTINUED | OUTPATIENT
Start: 2023-12-03 | End: 2023-12-03

## 2023-12-03 RX ORDER — ONDANSETRON HYDROCHLORIDE 2 MG/ML
4 INJECTION, SOLUTION INTRAVENOUS EVERY 4 HOURS PRN
Status: DISCONTINUED | OUTPATIENT
Start: 2023-12-03 | End: 2023-12-03 | Stop reason: HOSPADM

## 2023-12-03 RX ORDER — PROCHLORPERAZINE MALEATE 10 MG
10 TABLET ORAL EVERY 8 HOURS PRN
Status: DISCONTINUED | OUTPATIENT
Start: 2023-12-03 | End: 2023-12-03 | Stop reason: HOSPADM

## 2023-12-03 RX ORDER — BUPROPION HYDROCHLORIDE 150 MG/1
150 TABLET, EXTENDED RELEASE ORAL 2 TIMES DAILY
Status: DISCONTINUED | OUTPATIENT
Start: 2023-12-03 | End: 2023-12-03 | Stop reason: HOSPADM

## 2023-12-03 RX ORDER — POTASSIUM CHLORIDE 20 MEQ/1
10 TABLET, EXTENDED RELEASE ORAL 2 TIMES DAILY
Status: DISCONTINUED | OUTPATIENT
Start: 2023-12-03 | End: 2023-12-03 | Stop reason: HOSPADM

## 2023-12-03 RX ORDER — LANOLIN ALCOHOL/MO/W.PET/CERES
400 CREAM (GRAM) TOPICAL DAILY
Status: DISCONTINUED | OUTPATIENT
Start: 2023-12-03 | End: 2023-12-03 | Stop reason: HOSPADM

## 2023-12-03 RX ADMIN — ACYCLOVIR 200 MG: 200 CAPSULE ORAL at 11:05

## 2023-12-03 RX ADMIN — PANTOPRAZOLE SODIUM 40 MG: 40 TABLET, DELAYED RELEASE ORAL at 11:04

## 2023-12-03 RX ADMIN — ONDANSETRON 4 MG: 2 INJECTION INTRAMUSCULAR; INTRAVENOUS at 02:02

## 2023-12-03 RX ADMIN — POTASSIUM CHLORIDE 10 MEQ: 1500 TABLET, EXTENDED RELEASE ORAL at 11:05

## 2023-12-03 RX ADMIN — SODIUM CHLORIDE 3 G: 900 INJECTION INTRAVENOUS at 08:02

## 2023-12-03 RX ADMIN — SODIUM CHLORIDE 3 G: 900 INJECTION INTRAVENOUS at 11:58

## 2023-12-03 RX ADMIN — ESCITALOPRAM OXALATE 20 MG: 10 TABLET ORAL at 11:05

## 2023-12-03 RX ADMIN — MORPHINE SULFATE 30 MG: 15 TABLET, EXTENDED RELEASE ORAL at 11:05

## 2023-12-03 RX ADMIN — SODIUM CHLORIDE 3 G: 900 INJECTION INTRAVENOUS at 02:02

## 2023-12-03 RX ADMIN — BUPROPION HYDROCHLORIDE 150 MG: 150 TABLET, EXTENDED RELEASE ORAL at 11:05

## 2023-12-03 RX ADMIN — SODIUM CHLORIDE 50 ML/HR: 9 INJECTION, SOLUTION INTRAVENOUS at 02:02

## 2023-12-03 RX ADMIN — OXYCODONE HYDROCHLORIDE 15 MG: 5 TABLET ORAL at 11:10

## 2023-12-03 RX ADMIN — MAGNESIUM OXIDE TAB 400 MG (241.3 MG ELEMENTAL MG) 400 MG: 400 (241.3 MG) TAB at 11:05

## 2023-12-03 ASSESSMENT — ACTIVITIES OF DAILY LIVING (ADL): LACK_OF_TRANSPORTATION: NO

## 2023-12-03 ASSESSMENT — PAIN SCALES - GENERAL
PAINLEVEL_OUTOF10: 10 - WORST POSSIBLE PAIN
PAINLEVEL_OUTOF10: 4
PAINLEVEL_OUTOF10: 10 - WORST POSSIBLE PAIN

## 2023-12-03 ASSESSMENT — PAIN - FUNCTIONAL ASSESSMENT
PAIN_FUNCTIONAL_ASSESSMENT: 0-10
PAIN_FUNCTIONAL_ASSESSMENT: 0-10

## 2023-12-03 NOTE — PROGRESS NOTES
Pharmacy Medication History Review    Pamela Lopez is a 47 y.o. female admitted for Mandibular mass. Pharmacy reviewed the patient's [id number] medications and allergies for accuracy.    The list below reflectives the updated PTA list. Please review each medication in order reconciliation for additional clarification and justification.  (Not in a hospital admission)       The list below reflectives the updated allergy list. Please review each documented allergy for additional clarification and justification.  Allergies  Reviewed by Cynthia Vee CPhT on 12/3/2023        Severity Reactions Comments    Benadryl [diphenhydramine Hcl] Medium Confusion, Drowsiness     Lorazepam Medium Confusion, Drowsiness             Below are additional concerns with the patient's PTA list.    See PTA med list    Cynthia Vee CPhT

## 2023-12-03 NOTE — ED PROVIDER NOTES
Patient was handed off to me by the previous provider.  At the time of handoff blood work and imaging was pending.  Patient presented with left-sided facial swelling.  Patient has a history of Hodgkin's lymphoma and is currently receiving active treatment.  Previous provider ordered blood work which revealed no evidence of leukocytosis.  CT contrast imaging was obtained and revealed asymmetric soft tissue edema with fat stranding in the left buccal and submandibular region. This is probably associated with the periapical abscess involving the left mandibular 1st molar tooth. No definite soft tissue abscess is identified.  Patient was notified of the findings.  Previous provider provided patient with Unasyn.  Case will be discussed with OMFS through the transfer center.  Plan will be to admit the patient to [facility] for further evaluation     Kody Naidu PA-C  12/02/23 1942

## 2023-12-03 NOTE — PROGRESS NOTES
This TCC met with patient at bedside, introduced self and explained role.  Demographic information and insurance verified.  Patient is from home with children.  Independent, driving PTA.  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  Patient is discharged today after dose of IV abx and plans to return home.  Patient's son will transport patient home.  TCC will continue to follow care progression for discharge planning needs.     12/03/23 1155   Discharge Planning   Living Arrangements Children   Support Systems Children   Assistance Needed Independent, driving   Type of Residence Private residence   Do you have animals or pets at home? Yes   Type of Animals or Pets 7 dogs, 1 cat   Home or Post Acute Services None   Patient expects to be discharged to: Home   Does the patient need discharge transport arranged? No   Financial Resource Strain   How hard is it for you to pay for the very basics like food, housing, medical care, and heating?   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 N   In the last 12 months, how many places have you lived? 1   In the last 12 months, was there a time when you did not have a steady place to sleep or slept in a shelter (including now)? N   Transportation Needs   In the past 12 months, has lack of transportation kept you from medical appointments or from getting medications? no   In the past 12 months, has lack of transportation kept you from meetings, work, or from getting things needed for daily living? No     Darya Whitehead RN ED TCC

## 2023-12-03 NOTE — DISCHARGE SUMMARY
Discharge Diagnosis  Mandibular mass    Issues Requiring Follow-Up  Dental abscess    Test Results Pending At Discharge  Pending Labs       Order Current Status    Urinalysis with Reflex Microscopic and Culture In process    Urine Culture In process    Blood Culture Preliminary result    Blood Culture Preliminary result            Hospital Course   Pamela Lopez is a 47 y.o. female presenting to emergency department for evaluation of a left facial mass.  Patient has a history of Hodgkin's lymphoma and is currently receiving active treatment.  Patient's next treatment is tomorrow.  Patient states that the mass developed a few days ago.  Patient also admits to having a bad tooth and does believe she needs antibiotics.  Patient states her original lymphoma occurred on the right side of her neck and that was removed.  Patient states her last white blood cell count was 2.2.  Patient denies fever or cough.  Patient states that she does have pain in the area and it is very sore and she is tender on the left side of her face.  Patient denies chest pain or dizziness.  Patient denies nausea, vomiting, diarrhea.     In ED, sodium 133, PT 13.6, INR 1.2, hemoglobin 9.8, hematocrit 30.6.  Urinalysis showing trace leukocytes.  Maxillofacial CT showing asymmetric soft tissue edema with fat stranding concerning for tooth abscess, bilateral neck nodes concerning for adenopathy and suggesting follow-up due to patient's history of lymphoma per radiology review.  Blood pressure 80/50, heart rate 82, respirations 16, temperature 36.5 °C, SPO2 94% on room air.  Patient medicated for pain and nausea in ED, started on IV antibiotics and IV fluids infusing.      Patient's swelling improved overnight with IV Unasyn. The next morning patient was anxious for discharge to make it to chemotherapy the next day. She states she felt significantly better and denied fever, chills, and respiratory issues. She was given a final dose of IV Unasyn and  discharged on Augmentin x10 days.    Pertinent Physical Exam At Time of Discharge  Physical Exam  Constitutional:       Appearance: She is normal weight.   HENT:      Mouth/Throat:      Mouth: Mucous membranes are moist.      Dentition: Dental caries present.      Pharynx: Oropharynx is clear.      Comments: Broken tooth  Eyes:      Pupils: Pupils are equal, round, and reactive to light.   Neck:      Trachea: Trachea normal.   Cardiovascular:      Rate and Rhythm: Normal rate and regular rhythm.   Pulmonary:      Effort: Pulmonary effort is normal.      Breath sounds: Normal breath sounds.   Abdominal:      General: Bowel sounds are normal.      Palpations: Abdomen is soft.   Musculoskeletal:         General: Normal range of motion.      Cervical back: Tenderness present.   Skin:     General: Skin is warm and dry.      Capillary Refill: Capillary refill takes less than 2 seconds.   Neurological:      General: No focal deficit present.      Mental Status: She is alert and oriented to person, place, and time.   Psychiatric:         Mood and Affect: Mood normal.         Behavior: Behavior normal.     Home Medications     Medication List      START taking these medications     amoxicillin-pot clavulanate 875-125 mg tablet; Commonly known as:   Augmentin; Take 1 tablet (875 mg) by mouth every 12 hours for 10 days.     CHANGE how you take these medications     gabapentin 300 mg capsule; Commonly known as: Neurontin; Take 1 capsule   (300 mg) by mouth once daily at bedtime.; What changed: when to take this     CONTINUE taking these medications     acyclovir 200 mg capsule; Commonly known as: Zovirax   albuterol 90 mcg/actuation inhaler   buPROPion  mg 12 hr tablet; Commonly known as: Wellbutrin SR; Take   1 tablet (150 mg) by mouth 2 times a day. Do not crush, chew, or split.   escitalopram 20 mg tablet; Commonly known as: Lexapro; Take 1 tablet (20   mg) by mouth once daily.   FeroSuL tablet; Generic drug: ferrous  sulfate (325 mg ferrous sulfate);   Take 1 tablet (325 mg) by mouth every other day.   magnesium oxide 400 mg (241.3 mg magnesium) tablet; Commonly known as:   Mag-Ox; Take 1 tablet (400 mg) by mouth once daily.   morphine CR 30 mg 12 hr tablet; Commonly known as: MS Contin   naloxone 4 mg/0.1 mL nasal spray; Commonly known as: Narcan; INSTILL 1   SPRAY IN ONE NOSTRIL AS NEEDED FOR ACCIDENTAL OPIOID OVERDOSE; REPEAT WITH   SECOND DOSE IN 5 MINUTES IF NO RESPONSE   oxyCODONE 15 mg immediate release tablet; Commonly known as: Roxicodone   pantoprazole 40 mg EC tablet; Commonly known as: ProtoNix   potassium chloride CR 10 mEq ER tablet; Commonly known as: Klor-Con;   Take 1 tablet (10 mEq) by mouth 2 times a day.   prochlorperazine 10 mg tablet; Commonly known as: Compazine; TAKE 1   TABLET BY MOUTH EVERY 8 HOURS, AS NEEDED FOR NAUSEA AND VOMITING, DO NOT   TAKE AT THE SAME TIME AS ZOFRAN WAIT AT LEAST 1 HOUR IN BETWEEN   MEDICATIONS   risperiDONE 0.5 mg tablet; Commonly known as: RisperDAL; Take 1 tablet   (0.5 mg) by mouth once daily at bedtime.   Senexon-S 8.6-50 mg tablet; Generic drug: sennosides-docusate sodium;   TAKE 2 TABLETS BY MOUTH TWO TIMES A DAY AS NEEDED FOR CONSTIPATION       Outpatient Follow-Up  Future Appointments   Date Time Provider Department [place]   12/4/2023  9:00 AM Jess Gomez MD [id number] [place]   12/4/2023  9:00 AM INF 12 [id number] [place]   12/11/2023 11:30 AM Cynthia Johnson APRN-CNP [id number] [place]   12/11/2023  1:45 PM INF 16 [id number] [place]   3/21/2024  2:30 PM Constantino Wagner MD PhD [id number] [place]       Raghu Fuller DO

## 2023-12-03 NOTE — H&P
History Of Present Illness  Pamela Lopez is a 47 y.o. female presenting to emergency department for evaluation of a left facial mass.  Patient has a history of Hodgkin's lymphoma and is currently receiving active treatment.  Patient's next treatment is tomorrow.  Patient states that the mass developed a few days ago.  Patient also admits to having a bad tooth and does believe she needs antibiotics.  Patient states her original lymphoma occurred on the right side of her neck and that was removed.  Patient states her last white blood cell count was 2.2.  Patient denies fever or cough.  Patient states that she does have pain in the area and it is very sore and she is tender on the left side of her face.  Patient denies chest pain or dizziness.  Patient denies nausea, vomiting, diarrhea.    In ED, sodium 133, PT 13.6, INR 1.2, hemoglobin 9.8, hematocrit 30.6.  Urinalysis showing trace leukocytes.  Maxillofacial CT showing asymmetric soft tissue edema with fat stranding concerning for tooth abscess, bilateral neck nodes concerning for adenopathy and suggesting follow-up due to patient's history of lymphoma per radiology review.  Blood pressure 80/50, heart rate 82, respirations 16, temperature 36.5 °C, SPO2 94% on room air.  Patient medicated for pain and nausea in ED, started on IV antibiotics and IV fluids infusing.  Patient admitted to telemetry observation under the care of Dr. Raghu Fuller will continue to follow.  I was asked to do H&P and place initial admission orders.     Past Medical History  Asthma, Hodgkin's lymphoma    Surgical History  Hysterectomy     Social History  Former smoker, no drug use, no alcohol use  Family History  Alcohol abuse, diabetes     Allergies  Benadryl [diphenhydramine hcl] and Lorazepam    Review of Systems  A 10 point review of systems was completed and negative except what is listed in HPI  Physical Exam  Constitutional:       Appearance: She is normal weight.   HENT:       "Mouth/Throat:      Mouth: Mucous membranes are moist.      Dentition: Dental caries present.      Pharynx: Oropharynx is clear.      Comments: Broken tooth  Eyes:      Pupils: Pupils are equal, round, and reactive to light.   Neck:      Trachea: Trachea normal.   Cardiovascular:      Rate and Rhythm: Normal rate and regular rhythm.   Pulmonary:      Effort: Pulmonary effort is normal.      Breath sounds: Normal breath sounds.   Abdominal:      General: Bowel sounds are normal.      Palpations: Abdomen is soft.   Musculoskeletal:         General: Normal range of motion.      Cervical back: Tenderness present.   Skin:     General: Skin is warm and dry.      Capillary Refill: Capillary refill takes less than 2 seconds.   Neurological:      General: No focal deficit present.      Mental Status: She is alert and oriented to person, place, and time.   Psychiatric:         Mood and Affect: Mood normal.         Behavior: Behavior normal.          Last Recorded Vitals  Blood pressure 79/50, pulse 81, temperature 36.5 °C (97.7 °F), temperature source Temporal, resp. rate 16, height 1.575 m (5' 2\"), weight 54.4 kg (120 lb), SpO2 93 %.    Relevant Results  CT maxillofacial bones w IV contrast    Result Date: 12/2/2023  Interpreted By:  Polly Campos, STUDY: CT MAXILLOFACIAL BONES W IV CONTRAST  12/2/2023 6:54 pm   INDICATION: Signs/Symptoms:LEFT MANDIBULAR MASS, LYMPHOMA   COMPARISON: None.   ACCESSION NUMBER(S): [id number]   ORDERING CLINICIAN: ELY SAUCEDA   TECHNIQUE: Thin cut axial CT images through the facial bones were obtained and reconstructed in the coronal  and sagittal plane. Patient received intravenous contrast agent.   FINDINGS: Oral cavity:   Evaluation is mildly degraded by artifact from dental hardware. Several teeth demonstrate periapical lucencies and cavitations. There is a large cavitation involving the left mandibular 1st molar tooth with prominent periapical lucencies surrounding its anterior and " posterior roots. There is mild sclerosis of the surrounding mandible. There is associated gingival soft tissue thickening without any definite subperiosteal fluid collection. There is extensive edema and fat stranding involving the left buccal space and within the left submandibular region with thickening of the left platysma. There is mild edema along the left parotid duct, however the left parotid gland is unremarkable.   Parotid and submandibular glands. Bilateral parotid glands are unremarkable. Bilateral submandibular glands are also unremarkable. There is a prominent venous structure wrapping around the lateral aspect of the left submandibular gland which may cause focal soft tissue asymmetry in this region on external examination.   Lymph nodes:   There are scattered bilateral neck nodes, the largest right level 2 node measures 8 mm in short axis and demonstrates mild surrounding fat stranding. There are additional scattered subcentimeter nodes bilaterally.   Orbits: The bony orbits are intact. The orbital contents are unremarkable.   Facial Bones: There is no displaced facial bone fracture.   Mandible/Temporomandibular Joints: Visualized portions of mandible and bilateral temporomandibular joints are intact.   Paranasal Sinuses/Mastoids: Mild mucosal thickening is noted within bilateral maxillary sinuses. Bilateral mastoids are clear.       Asymmetric soft tissue edema with fat stranding in the left buccal and submandibular region. This is probably associated with the periapical abscess involving the left mandibular 1st molar tooth. No definite soft tissue abscess is identified. Clinical correlation is recommended.   Scattered nonspecific bilateral neck nodes may reflect reactive adenopathy, however these findings may relate to patient's underlying lymphoma.   Signed by: Polly Campos 12/2/2023 7:04 PM Dictation workstation:   [id number]         Results for orders placed or performed during the hospital  encounter of 12/02/23 (from the past 24 hour(s))   CBC and Auto Differential   Result Value Ref Range    WBC 8.0 4.4 - 11.3 x10*3/uL    nRBC 0.5 (H) 0.0 - 0.0 /100 WBCs    RBC 2.91 (L) 4.00 - 5.20 x10*6/uL    Hemoglobin 9.8 (L) 12.0 - 16.0 g/dL    Hematocrit 30.6 (L) 36.0 - 46.0 %     (H) 80 - 100 fL    MCH 33.7 26.0 - 34.0 pg    MCHC 32.0 32.0 - 36.0 g/dL    RDW 17.3 (H) 11.5 - 14.5 %    Platelets 225 150 - 450 x10*3/uL    Neutrophils % 68.3 40.0 - 80.0 %    Immature Granulocytes %, Automated 2.7 (H) 0.0 - 0.9 %    Lymphocytes % 13.8 13.0 - 44.0 %    Monocytes % 13.1 2.0 - 10.0 %    Eosinophils % 0.9 0.0 - 6.0 %    Basophils % 1.2 0.0 - 2.0 %    Neutrophils Absolute 5.48 1.20 - 7.70 x10*3/uL    Immature Granulocytes Absolute, Automated 0.22 0.00 - 0.70 x10*3/uL    Lymphocytes Absolute 1.11 (L) 1.20 - 4.80 x10*3/uL    Monocytes Absolute 1.05 (H) 0.10 - 1.00 x10*3/uL    Eosinophils Absolute 0.07 0.00 - 0.70 x10*3/uL    Basophils Absolute 0.10 0.00 - 0.10 x10*3/uL   Comprehensive metabolic panel   Result Value Ref Range    Glucose 85 74 - 99 mg/dL    Sodium 133 (L) 136 - 145 mmol/L    Potassium 4.2 3.5 - 5.3 mmol/L    Chloride 101 98 - 107 mmol/L    Bicarbonate 25 21 - 32 mmol/L    Anion Gap 11 10 - 20 mmol/L    Urea Nitrogen 11 6 - 23 mg/dL    Creatinine 0.64 0.50 - 1.05 mg/dL    eGFR >90 >60 mL/min/1.73m*2    Calcium 9.5 8.6 - 10.3 mg/dL    Albumin 3.6 3.4 - 5.0 g/dL    Alkaline Phosphatase 95 33 - 110 U/L    Total Protein 6.3 (L) 6.4 - 8.2 g/dL    AST 15 9 - 39 U/L    Bilirubin, Total 0.3 0.0 - 1.2 mg/dL    ALT 12 7 - 45 U/L   Coagulation Screen   Result Value Ref Range    Protime 13.6 (H) 9.8 - 12.8 seconds    INR 1.2 (H) 0.9 - 1.1    aPTT 30 27 - 38 seconds   Lactate   Result Value Ref Range    Lactate 1.0 0.4 - 2.0 mmol/L   Blood Culture    Specimen: Peripheral Venipuncture; Blood culture   Result Value Ref Range    Blood Culture Loaded on Instrument - Culture in progress    Urinalysis with Reflex  Microscopic and Culture   Result Value Ref Range    Color, Urine Yellow Straw, Yellow    Appearance, Urine Hazy (N) Clear    Specific Gravity, Urine 1.008 1.005 - 1.035    pH, Urine 6.0 5.0, 5.5, 6.0, 6.5, 7.0, 7.5, 8.0    Protein, Urine NEGATIVE NEGATIVE mg/dL    Glucose, Urine NEGATIVE NEGATIVE mg/dL    Blood, Urine NEGATIVE NEGATIVE    Ketones, Urine NEGATIVE NEGATIVE mg/dL    Bilirubin, Urine NEGATIVE NEGATIVE    Urobilinogen, Urine <2.0 <2.0 mg/dL    Nitrite, Urine NEGATIVE NEGATIVE    Leukocyte Esterase, Urine TRACE (A) NEGATIVE   Microscopic Only, Urine   Result Value Ref Range    WBC, Urine 11-20 (A) 1-5, NONE /HPF    WBC Clumps, Urine RARE Reference range not established. /HPF    RBC, Urine NONE NONE, 1-2, 3-5 /HPF    Squamous Epithelial Cells, Urine 1-9 (SPARSE) Reference range not established. /HPF    Bacteria, Urine 3+ (A) NONE SEEN /HPF    Mucus, Urine 2+ Reference range not established. /LPF   Blood Culture    Specimen: Peripheral Venipuncture; Blood culture   Result Value Ref Range    Blood Culture Loaded on Instrument - Culture in progress    Drug Screen, Urine   Result Value Ref Range    Amphetamine Screen, Urine Presumptive Negative Presumptive Negative    Barbiturate Screen, Urine Presumptive Negative Presumptive Negative    Benzodiazepines Screen, Urine Presumptive Negative Presumptive Negative    Cannabinoid Screen, Urine Presumptive Negative Presumptive Negative    Cocaine Metabolite Screen, Urine Presumptive Negative Presumptive Negative    Fentanyl Screen, Urine Presumptive Negative Presumptive Negative    Opiate Screen, Urine Presumptive Positive (A) Presumptive Negative    Oxycodone Screen, Urine Presumptive Positive (A) Presumptive Negative    PCP Screen, Urine Presumptive Negative Presumptive Negative          Assessment/Plan   Pamela is a 47-year-old female patient with a history of Hodgkin's lymphoma presenting to emergency department for complaint of a left neck mass.  Patient states  that she noticed this a few days ago.  Patient also admits to having cavities and a bad tooth.  Patient denies fever or cough.  CT showing concern for neck lymph nodes due to patient's history of lymphoma.  Patient requesting more pain medications and very upset.  Patient's blood pressure 80/50.  Patient was explained that she cannot receive pain medications with a blood pressure 80/50.  Patient states that she typically runs low.  Will contact attending to proceed with pain medications.  Patient given IV fluids, started on IV antibiotics, admission for further medical management.    Mandibular mass  Admit to telemetry observation per Dr. Raghu Fuller  See imaging results above  Continue IV fluids  Continue IV antibiotics  Zofran as needed  Continue home medications when med rec is complete  Hold pain medications 2/2 hypotension (contact attending)  History of Hodgkin's lymphoma  Repeat labs in a.m.    DVT Ppx  SCDs  Up to chair/activity as tolerated          I spent 25 minutes in the professional and overall care of this patient.      Monica Stokes, APRN-CNP

## 2023-12-04 ENCOUNTER — OFFICE VISIT (OUTPATIENT)
Dept: HEMATOLOGY/ONCOLOGY | Facility: CLINIC | Age: 48
End: 2023-12-04
Payer: COMMERCIAL

## 2023-12-04 ENCOUNTER — INFUSION (OUTPATIENT)
Dept: HEMATOLOGY/ONCOLOGY | Facility: CLINIC | Age: 48
End: 2023-12-04
Payer: COMMERCIAL

## 2023-12-04 ENCOUNTER — HOSPITAL ENCOUNTER (EMERGENCY)
Facility: HOSPITAL | Age: 48
Discharge: HOME | End: 2023-12-04
Attending: EMERGENCY MEDICINE
Payer: COMMERCIAL

## 2023-12-04 VITALS
RESPIRATION RATE: 16 BRPM | OXYGEN SATURATION: 95 % | TEMPERATURE: 97.3 F | BODY MASS INDEX: 22.78 KG/M2 | SYSTOLIC BLOOD PRESSURE: 64 MMHG | WEIGHT: 124.56 LBS | HEART RATE: 69 BPM | DIASTOLIC BLOOD PRESSURE: 42 MMHG

## 2023-12-04 VITALS
TEMPERATURE: 96.8 F | SYSTOLIC BLOOD PRESSURE: 99 MMHG | DIASTOLIC BLOOD PRESSURE: 60 MMHG | HEIGHT: 62 IN | OXYGEN SATURATION: 95 % | HEART RATE: 80 BPM | WEIGHT: 124.6 LBS | RESPIRATION RATE: 18 BRPM | BODY MASS INDEX: 22.93 KG/M2

## 2023-12-04 VITALS — SYSTOLIC BLOOD PRESSURE: 66 MMHG | DIASTOLIC BLOOD PRESSURE: 38 MMHG

## 2023-12-04 DIAGNOSIS — C81.48: ICD-10-CM

## 2023-12-04 DIAGNOSIS — T45.1X5S ADVERSE EFFECT OF CHEMOTHERAPY, SEQUELA: Primary | ICD-10-CM

## 2023-12-04 LAB
ANION GAP SERPL CALC-SCNC: 14 MMOL/L (ref 10–20)
APPEARANCE UR: CLEAR
BASOPHILS # BLD AUTO: 0.09 X10*3/UL (ref 0–0.1)
BASOPHILS NFR BLD AUTO: 1 %
BILIRUB UR STRIP.AUTO-MCNC: NEGATIVE MG/DL
BUN SERPL-MCNC: 6 MG/DL (ref 6–23)
CALCIUM SERPL-MCNC: 8.8 MG/DL (ref 8.6–10.6)
CHLORIDE SERPL-SCNC: 105 MMOL/L (ref 98–107)
CO2 SERPL-SCNC: 23 MMOL/L (ref 21–32)
COLOR UR: YELLOW
CREAT SERPL-MCNC: 0.65 MG/DL (ref 0.5–1.05)
EOSINOPHIL # BLD AUTO: 0.08 X10*3/UL (ref 0–0.7)
EOSINOPHIL NFR BLD AUTO: 0.9 %
ERYTHROCYTE [DISTWIDTH] IN BLOOD BY AUTOMATED COUNT: 17.7 % (ref 11.5–14.5)
GFR SERPL CREATININE-BSD FRML MDRD: >90 ML/MIN/1.73M*2
GLUCOSE SERPL-MCNC: 100 MG/DL (ref 74–99)
GLUCOSE UR STRIP.AUTO-MCNC: NEGATIVE MG/DL
HCT VFR BLD AUTO: 27 % (ref 36–46)
HGB BLD-MCNC: 8.6 G/DL (ref 12–16)
HYALINE CASTS #/AREA URNS AUTO: ABNORMAL /LPF
IMM GRANULOCYTES # BLD AUTO: 0.19 X10*3/UL (ref 0–0.7)
IMM GRANULOCYTES NFR BLD AUTO: 2.1 % (ref 0–0.9)
KETONES UR STRIP.AUTO-MCNC: NEGATIVE MG/DL
LEUKOCYTE ESTERASE UR QL STRIP.AUTO: NEGATIVE
LYMPHOCYTES # BLD AUTO: 1.27 X10*3/UL (ref 1.2–4.8)
LYMPHOCYTES NFR BLD AUTO: 13.8 %
MCH RBC QN AUTO: 33.9 PG (ref 26–34)
MCHC RBC AUTO-ENTMCNC: 31.9 G/DL (ref 32–36)
MCV RBC AUTO: 106 FL (ref 80–100)
MONOCYTES # BLD AUTO: 1.01 X10*3/UL (ref 0.1–1)
MONOCYTES NFR BLD AUTO: 11 %
NEUTROPHILS # BLD AUTO: 6.56 X10*3/UL (ref 1.2–7.7)
NEUTROPHILS NFR BLD AUTO: 71.2 %
NITRITE UR QL STRIP.AUTO: NEGATIVE
NRBC BLD-RTO: 0.2 /100 WBCS (ref 0–0)
PH UR STRIP.AUTO: 6 [PH]
PLATELET # BLD AUTO: 228 X10*3/UL (ref 150–450)
POTASSIUM SERPL-SCNC: 3.9 MMOL/L (ref 3.5–5.3)
PROT UR STRIP.AUTO-MCNC: NEGATIVE MG/DL
RBC # BLD AUTO: 2.54 X10*6/UL (ref 4–5.2)
RBC # UR STRIP.AUTO: ABNORMAL /UL
RBC #/AREA URNS AUTO: ABNORMAL /HPF
SODIUM SERPL-SCNC: 138 MMOL/L (ref 136–145)
SP GR UR STRIP.AUTO: 1.01
SQUAMOUS #/AREA URNS AUTO: ABNORMAL /HPF
UROBILINOGEN UR STRIP.AUTO-MCNC: <2 MG/DL
WBC # BLD AUTO: 9.2 X10*3/UL (ref 4.4–11.3)
WBC #/AREA URNS AUTO: ABNORMAL /HPF

## 2023-12-04 PROCEDURE — 3008F BODY MASS INDEX DOCD: CPT | Performed by: INTERNAL MEDICINE

## 2023-12-04 PROCEDURE — 2500000004 HC RX 250 GENERAL PHARMACY W/ HCPCS (ALT 636 FOR OP/ED): Mod: SE

## 2023-12-04 PROCEDURE — 81003 URINALYSIS AUTO W/O SCOPE: CPT

## 2023-12-04 PROCEDURE — 80048 BASIC METABOLIC PNL TOTAL CA: CPT

## 2023-12-04 PROCEDURE — 96360 HYDRATION IV INFUSION INIT: CPT | Performed by: EMERGENCY MEDICINE

## 2023-12-04 PROCEDURE — 99214 OFFICE O/P EST MOD 30 MIN: CPT | Performed by: INTERNAL MEDICINE

## 2023-12-04 PROCEDURE — 99284 EMERGENCY DEPT VISIT MOD MDM: CPT | Performed by: EMERGENCY MEDICINE

## 2023-12-04 PROCEDURE — 85025 COMPLETE CBC W/AUTO DIFF WBC: CPT

## 2023-12-04 PROCEDURE — 1036F TOBACCO NON-USER: CPT | Performed by: INTERNAL MEDICINE

## 2023-12-04 RX ORDER — SODIUM CHLORIDE 9 MG/ML
1000 INJECTION, SOLUTION INTRAVENOUS ONCE
OUTPATIENT
Start: 2023-12-04 | End: 2023-12-04

## 2023-12-04 RX ORDER — HEPARIN SODIUM (PORCINE) LOCK FLUSH IV SOLN 100 UNIT/ML 100 UNIT/ML
5 SOLUTION INTRAVENOUS ONCE
Status: DISCONTINUED | OUTPATIENT
Start: 2023-12-04 | End: 2023-12-04 | Stop reason: HOSPADM

## 2023-12-04 RX ORDER — HEPARIN 100 UNIT/ML
SYRINGE INTRAVENOUS
Status: COMPLETED
Start: 2023-12-04 | End: 2023-12-04

## 2023-12-04 RX ADMIN — ALTEPLASE 1 MG: 2.2 INJECTION, POWDER, LYOPHILIZED, FOR SOLUTION INTRAVENOUS at 15:02

## 2023-12-04 RX ADMIN — SODIUM CHLORIDE, POTASSIUM CHLORIDE, SODIUM LACTATE AND CALCIUM CHLORIDE 1000 ML: 600; 310; 30; 20 INJECTION, SOLUTION INTRAVENOUS at 12:34

## 2023-12-04 RX ADMIN — HEPARIN 500 UNITS: 100 SYRINGE at 15:21

## 2023-12-04 ASSESSMENT — LIFESTYLE VARIABLES
REASON UNABLE TO ASSESS: NO
HAVE PEOPLE ANNOYED YOU BY CRITICIZING YOUR DRINKING: NO
HAVE YOU EVER FELT YOU SHOULD CUT DOWN ON YOUR DRINKING: NO
EVER FELT BAD OR GUILTY ABOUT YOUR DRINKING: NO
EVER HAD A DRINK FIRST THING IN THE MORNING TO STEADY YOUR NERVES TO GET RID OF A HANGOVER: NO

## 2023-12-04 ASSESSMENT — PAIN SCALES - GENERAL
PAINLEVEL: 4
PAINLEVEL_OUTOF10: 5 - MODERATE PAIN

## 2023-12-04 ASSESSMENT — PAIN - FUNCTIONAL ASSESSMENT: PAIN_FUNCTIONAL_ASSESSMENT: 0-10

## 2023-12-04 ASSESSMENT — PAIN DESCRIPTION - LOCATION: LOCATION: BACK

## 2023-12-04 NOTE — ED TRIAGE NOTES
Pt with hx of lymphoma reports to ED from oncologist's office with c/o hypotension. Was there to get chemo and was sent to ED for BP. Was 64/42 at [facility], but 86/46 in ED triage, which is a normal blood pressure for her since her CA dx. Denies any symptoms or complaints.

## 2023-12-04 NOTE — ED PROVIDER NOTES
HPI   Chief Complaint   Patient presents with    Hypotension       HPI     Patient is a 47-year-old female with past medical history significant for Hodgkin's lymphoma currently receiving treatment who presents to the emergency department after referral from her chemotherapy clinic where she was hypotensive with a blood pressure of 60s over 40s. Patient was recently seen at [facility] where she was a questionable left upper periapical abscess as well as a UTI. There, she was admitted for IV Unasyn and was adamant about discharge the next day to make her chemotherapy appointment. She was sent home with course of Augmentin for her infection. Today, she went to her chemotherapy appointment where she was found to be hypotensive and they referred her to the emergency department. Here, was 84/46 which she states is normal for her ever since she was diagnosed with cancer. She states that since she was diagnosed with cancer, her blood pressures have been persistently 80s over 50s which is also confirmed with the son in the room. She states that she does not feel lightheaded, has not fainted, denies shortness of breath or chest pains. No palpitations. She states that she feels like her normal self ever since beginning treatment. No fevers at home. No cough. She states that her urine has been very cloudy, but that has been ever since she started the chemotherapy. She reports being adherent to the Augmentin that she was sent home on.                 Syed Coma Scale Score: 15                  Patient History   Past Medical History:   Diagnosis Date    Asthma     Hodgkin's lymphoma (CMS/HCC)     Hyperlipidemia      Past Surgical History:   Procedure Laterality Date    CT GUIDED PERCUTANEOUS BIOPSY BONE DEEP  6/9/2023    CT GUIDED PERCUTANEOUS BIOPSY BONE DEEP 6/9/2023 [facility] CT     No family history on file.  Social History     Tobacco Use    Smoking status: Former     Packs/day: 0.25     Years: 33.00     Additional pack years: 0.00      Total pack years: 8.25     Types: Cigarettes     Quit date:      Years since quittin.9    Smokeless tobacco: Never   Substance Use Topics    Alcohol use: Never    Drug use: Never       Physical Exam   ED Triage Vitals [23 1058]   Temp Heart Rate Resp BP   37.1 °C (98.8 °F) 100 18 (!) 86/46      SpO2 Temp src Heart Rate Source Patient Position   94 % -- -- --      BP Location FiO2 (%)     -- --       Physical Exam  Vitals and nursing note reviewed.   Constitutional:       Appearance: She is well-developed.      Comments: A chronically ill appearing woman who appears older than her stated age, but very energetic and standing throughout interview.   HENT:      Head: Normocephalic and atraumatic.      Mouth/Throat:      Comments: Severe decay throughout the mouth. No obvious drainable abscess or fluctuance in the left upper mouth where questionable periapical abscess was noted on CT yesterday.  Eyes:      Conjunctiva/sclera: Conjunctivae normal.   Cardiovascular:      Rate and Rhythm: Normal rate and regular rhythm.      Heart sounds: No murmur heard.  Pulmonary:      Effort: Pulmonary effort is normal. No respiratory distress.      Breath sounds: Normal breath sounds.   Abdominal:      Palpations: Abdomen is soft.      Tenderness: There is no abdominal tenderness.   Musculoskeletal:         General: No swelling.      Cervical back: Neck supple.   Skin:     General: Skin is warm and dry.      Capillary Refill: Capillary refill takes less than 2 seconds.      Coloration: Skin is pale.   Psychiatric:         Mood and Affect: Mood normal.       ED Course & MDM   Diagnoses as of 23 1453   Adverse effect of chemotherapy, sequela       Medical Decision Making  Patient is a 47-year-old female with past medical history significant for Hodgkin lymphoma coming in for hypotension from her chemotherapy session. Patient states that her normal blood pressure is 80s over 60s which is how she presented in triage  today. Despite patient stating that this was normal for her and that her blood pressure improved from her chemotherapy session, patient was given 1 L of LR and CBC/BMP were collected urinalysis also collected, but expected to be positive given positive culture recently. BMP is reassuring. CBC shows a white blood cell count of 9.2 and patient is anemic at 8.6 which is stable from yesterday's labs. After 1 L of LR, patient's blood pressure responded and is now 106/59 which is reassuring. She feels well for being on chemotherapy and does not have clinical signs of anemia at this time. She feels like her normal self with cancer she now reports. On evaluation, patient does not have any drainable abscess of the mouth or periapical abscess. I advised patient to continue taking her Augmentin at home and finish the clinical course. Given patient's reassuring physical exam and response of blood pressure to 1 L of LR, I feel comfortable sending the patient home given the very low likelihood of sepsis at this time. Discussed with the patient all of these findings and she was agreeable for discharge home and to return for any concerning symptoms that we went over. As a result of the work-up, patient was discharged home.  They were informed of their diagnosis and instructed to come back with any concerns or worsening of condition and was agreeable to the plan as discussed above.  The patient was given the opportunity to ask questions.  All of the patient's questions were answered.  The patient remained stable under my care.      Procedure  Procedures     Eleazar Fu MD  Resident  12/04/23 [id number]

## 2023-12-04 NOTE — PROGRESS NOTES
Rechecked patient's BP prior to bringing her back to infusion.    66/40.  Pt denies light-headedness, dizzienss, CP, SOB.   States she feels fine and needs a bag of fluids.     Unfortunately, patient cannot be treated here d/t ambulatory protocol.   Pt going to ED with son as .     Report called to Avelina at [facility] ED.

## 2023-12-05 LAB — BACTERIA UR CULT: ABNORMAL

## 2023-12-06 ENCOUNTER — TELEPHONE (OUTPATIENT)
Dept: PHARMACY | Facility: HOSPITAL | Age: 48
End: 2023-12-06
Payer: COMMERCIAL

## 2023-12-06 ENCOUNTER — TELEPHONE (OUTPATIENT)
Dept: HEMATOLOGY/ONCOLOGY | Facility: CLINIC | Age: 48
End: 2023-12-06
Payer: COMMERCIAL

## 2023-12-06 DIAGNOSIS — C81.48: ICD-10-CM

## 2023-12-06 DIAGNOSIS — G89.3 CANCER RELATED PAIN: ICD-10-CM

## 2023-12-06 DIAGNOSIS — Z51.5 ENCOUNTER FOR PALLIATIVE CARE: ICD-10-CM

## 2023-12-06 RX ORDER — MORPHINE SULFATE 30 MG/1
30 TABLET, FILM COATED, EXTENDED RELEASE ORAL 2 TIMES DAILY
Qty: 60 TABLET | Refills: 0 | Status: SHIPPED | OUTPATIENT
Start: 2023-12-06 | End: 2024-01-03 | Stop reason: SDUPTHER

## 2023-12-06 RX ORDER — LANOLIN ALCOHOL/MO/W.PET/CERES
400 CREAM (GRAM) TOPICAL DAILY
Qty: 30 TABLET | Refills: 3 | Status: SHIPPED | OUTPATIENT
Start: 2023-12-06 | End: 2024-04-01 | Stop reason: SDUPTHER

## 2023-12-06 RX ORDER — FERROUS SULFATE TAB 325 MG (65 MG ELEMENTAL FE) 325 (65 FE) MG
1 TAB ORAL EVERY OTHER DAY
Qty: 15 TABLET | Refills: 3 | Status: SHIPPED | OUTPATIENT
Start: 2023-12-06 | End: 2024-01-08 | Stop reason: SDUPTHER

## 2023-12-06 RX ORDER — POTASSIUM CHLORIDE 750 MG/1
10 TABLET, EXTENDED RELEASE ORAL 2 TIMES DAILY
Qty: 60 TABLET | Refills: 0 | Status: SHIPPED | OUTPATIENT
Start: 2023-12-06 | End: 2024-02-07 | Stop reason: SDUPTHER

## 2023-12-06 NOTE — TELEPHONE ENCOUNTER
VM  Refills:  Potassium Chloride XR 10mg tablet.  1 tablet PO daily  Magnesium Oxide 400mg tablets.  1 tablet PO 1x daily  Ferrous Sulfate 325mg 1 tablet PO 1x daily  Send to [facility] on [address] in [place]

## 2023-12-06 NOTE — TELEPHONE ENCOUNTER
Patient last seen by MAGDA Johnson on 11/7 with plan to continue MSER 30mg BID and gabapentin 300mg TID. Patient has refills on gabapentin.  Follow up visit is scheduled for 12/11. OARRS reviewed and no aberrancy noted. Patient last filld MSER 30mg #60/30 days on 10/20. I spoke with daughter and patient has been out since Friday. She also spent a few days in the hospital which is why she is overdue. Daughter confirmed she takes this BID. Prescription pended to provider to approve.

## 2023-12-06 NOTE — TELEPHONE ENCOUNTER
Magnesium pended to Dr. Gomze to review and send. I did send a message to Cynthia regarding other refills. I will update note as needed

## 2023-12-06 NOTE — TELEPHONE ENCOUNTER
Refill requests received for Morphine 30mg ER and Gabapentin 300mg.  Preferred pharmacy is [facility] in [place].  Message sent to Palliative Care team.

## 2023-12-06 NOTE — TELEPHONE ENCOUNTER
Refills sent to Dr. Gomez to review and send. I confirmed with Cynthia that these are filled by Dr. Gomez

## 2023-12-06 NOTE — PROGRESS NOTES
EDPD Note: Antibiotics Reviewed and Warranted    Contacted Mr./Mrs./Ms. Pamela Lopez regarding a positive urine culture/result that was taken during their recent emergency room visit. I completed education with son . The patient is being treated appropriately with Augmentin 875-125mg BID x10. Patient was asymptomatic in ED and still reports no urinary symptoms at the time of call. Patient was taking Augmentin for a dental infection, but will treat UTI as well. Advised patient to follow up with PCP or urgent care if urinary symptoms due arise.        Susceptibility data from last 90 days.  Collected Specimen Info Organism Ampicillin Cefazolin Cefazolin (uncomplicated UTIs only) Ciprofloxacin Gentamicin Nitrofurantoin Piperacillin/Tazobactam Trimethoprim/Sulfamethoxazole   12/02/23 Urine from Clean Catch/Voided Escherichia coli S S S S S S S S        No further follow up needed from EDPD Team.     Jaqueline Cornelius, PharmD

## 2023-12-07 ENCOUNTER — TELEPHONE (OUTPATIENT)
Dept: HEMATOLOGY/ONCOLOGY | Facility: CLINIC | Age: 48
End: 2023-12-07
Payer: COMMERCIAL

## 2023-12-07 LAB
BACTERIA BLD CULT: NORMAL
BACTERIA BLD CULT: NORMAL

## 2023-12-07 NOTE — TELEPHONE ENCOUNTER
I spoke with Pamela's son about an oral maxillary surgeon contact. Number provided: [phone] and did let him know that he will be talking to Estrellita. He was appreciative and will keep our office posted on appointment. He was appreciative

## 2023-12-07 NOTE — TELEPHONE ENCOUNTER
I spoke with Pradeep and did let him know that unfortunately we do not have a way to push thorough with insurance. I did provide another Oral Maxillary Surgeon Dr. Max [phone]. He was appreciative and will call her office now and keep us posted

## 2023-12-07 NOTE — TELEPHONE ENCOUNTER
TERE Fernández calling back- 2nd oral surgeon Dr. Gomez recommended is not in network with Medicaid.  Asking for another provider, if Dr. Gomez can recommend.  Pradeep asking for call back.

## 2023-12-07 NOTE — TELEPHONE ENCOUNTER
TERE Fernández, Pamela's son, calling office back.  Spoke with surgeon's office- they do not accept Pamela's insurance.  Asking what can be done to push through?

## 2023-12-08 NOTE — TELEPHONE ENCOUNTER
I spoke with Pradeep about Pamela. I did let him know that we unfortunately do not have an oral maxillary surgeon that accepts her insurance. I did let him know that I spoke with Dr. Gomez and we do recommend that she goes back to her oral maxillary surgeon at [facility] to be seen. He was agreeable and will call them now and keep us posted. All questions were answered.

## 2023-12-11 ENCOUNTER — APPOINTMENT (OUTPATIENT)
Dept: HEMATOLOGY/ONCOLOGY | Facility: CLINIC | Age: 48
End: 2023-12-11
Payer: COMMERCIAL

## 2023-12-11 ENCOUNTER — APPOINTMENT (OUTPATIENT)
Dept: PALLIATIVE MEDICINE | Facility: CLINIC | Age: 48
End: 2023-12-11
Payer: COMMERCIAL

## 2023-12-11 ENCOUNTER — OFFICE VISIT (OUTPATIENT)
Dept: HEMATOLOGY/ONCOLOGY | Facility: CLINIC | Age: 48
End: 2023-12-11
Payer: COMMERCIAL

## 2023-12-11 ENCOUNTER — APPOINTMENT (OUTPATIENT)
Dept: RADIOLOGY | Facility: HOSPITAL | Age: 48
End: 2023-12-11
Payer: COMMERCIAL

## 2023-12-11 ENCOUNTER — HOSPITAL ENCOUNTER (INPATIENT)
Facility: HOSPITAL | Age: 48
LOS: 2 days | Discharge: HOME | End: 2023-12-13
Attending: EMERGENCY MEDICINE | Admitting: INTERNAL MEDICINE
Payer: COMMERCIAL

## 2023-12-11 ENCOUNTER — INFUSION (OUTPATIENT)
Dept: HEMATOLOGY/ONCOLOGY | Facility: CLINIC | Age: 48
End: 2023-12-11
Payer: COMMERCIAL

## 2023-12-11 VITALS
OXYGEN SATURATION: 95 % | BODY MASS INDEX: 23.27 KG/M2 | DIASTOLIC BLOOD PRESSURE: 59 MMHG | HEART RATE: 87 BPM | TEMPERATURE: 102 F | RESPIRATION RATE: 16 BRPM | SYSTOLIC BLOOD PRESSURE: 101 MMHG | WEIGHT: 127.21 LBS

## 2023-12-11 DIAGNOSIS — R50.9 FEVER, UNSPECIFIED FEVER CAUSE: Primary | ICD-10-CM

## 2023-12-11 DIAGNOSIS — U07.1 COVID: ICD-10-CM

## 2023-12-11 DIAGNOSIS — G62.0 CHEMOTHERAPY-INDUCED PERIPHERAL NEUROPATHY (MULTI): ICD-10-CM

## 2023-12-11 DIAGNOSIS — C81.48: ICD-10-CM

## 2023-12-11 DIAGNOSIS — J18.9 PNEUMONIA OF LEFT LUNG DUE TO INFECTIOUS ORGANISM, UNSPECIFIED PART OF LUNG: ICD-10-CM

## 2023-12-11 DIAGNOSIS — T45.1X5A CHEMOTHERAPY-INDUCED PERIPHERAL NEUROPATHY (MULTI): ICD-10-CM

## 2023-12-11 DIAGNOSIS — C81.48: Primary | ICD-10-CM

## 2023-12-11 LAB
ALBUMIN SERPL BCP-MCNC: 3.5 G/DL (ref 3.4–5)
ALP SERPL-CCNC: 70 U/L (ref 33–110)
ALT SERPL W P-5'-P-CCNC: 6 U/L (ref 7–45)
ANION GAP SERPL CALC-SCNC: 17 MMOL/L (ref 10–20)
APPEARANCE UR: ABNORMAL
APTT PPP: 32 SECONDS (ref 27–38)
AST SERPL W P-5'-P-CCNC: 18 U/L (ref 9–39)
BASOPHILS # BLD AUTO: 0.09 X10*3/UL (ref 0–0.1)
BASOPHILS NFR BLD AUTO: 1.6 %
BILIRUB SERPL-MCNC: 0.3 MG/DL (ref 0–1.2)
BILIRUB UR STRIP.AUTO-MCNC: NEGATIVE MG/DL
BUN SERPL-MCNC: 10 MG/DL (ref 6–23)
CALCIUM SERPL-MCNC: 8.6 MG/DL (ref 8.6–10.6)
CHLORIDE SERPL-SCNC: 101 MMOL/L (ref 98–107)
CO2 SERPL-SCNC: 20 MMOL/L (ref 21–32)
COLOR UR: ABNORMAL
CREAT SERPL-MCNC: 0.55 MG/DL (ref 0.5–1.05)
D DIMER PPP FEU-MCNC: 5370 NG/ML FEU
EOSINOPHIL # BLD AUTO: 0 X10*3/UL (ref 0–0.7)
EOSINOPHIL NFR BLD AUTO: 0 %
ERYTHROCYTE [DISTWIDTH] IN BLOOD BY AUTOMATED COUNT: 17.2 % (ref 11.5–14.5)
FIBRINOGEN PPP-MCNC: 325 MG/DL (ref 200–400)
FLUAV RNA RESP QL NAA+PROBE: NOT DETECTED
FLUBV RNA RESP QL NAA+PROBE: NOT DETECTED
GFR SERPL CREATININE-BSD FRML MDRD: >90 ML/MIN/1.73M*2
GLUCOSE SERPL-MCNC: 79 MG/DL (ref 74–99)
GLUCOSE UR STRIP.AUTO-MCNC: NEGATIVE MG/DL
HCT VFR BLD AUTO: 28.2 % (ref 36–46)
HGB BLD-MCNC: 9.2 G/DL (ref 12–16)
HOLD SPECIMEN: NORMAL
HYALINE CASTS #/AREA URNS AUTO: ABNORMAL /LPF
IMM GRANULOCYTES # BLD AUTO: 0.03 X10*3/UL (ref 0–0.7)
IMM GRANULOCYTES NFR BLD AUTO: 0.5 % (ref 0–0.9)
INR PPP: 1.2 (ref 0.9–1.1)
KETONES UR STRIP.AUTO-MCNC: NEGATIVE MG/DL
LACTATE SERPL-SCNC: 0.8 MMOL/L (ref 0.4–2)
LEUKOCYTE ESTERASE UR QL STRIP.AUTO: NEGATIVE
LYMPHOCYTES # BLD AUTO: 0.54 X10*3/UL (ref 1.2–4.8)
LYMPHOCYTES NFR BLD AUTO: 9.7 %
MCH RBC QN AUTO: 33.3 PG (ref 26–34)
MCHC RBC AUTO-ENTMCNC: 32.6 G/DL (ref 32–36)
MCV RBC AUTO: 102 FL (ref 80–100)
MONOCYTES # BLD AUTO: 1.06 X10*3/UL (ref 0.1–1)
MONOCYTES NFR BLD AUTO: 19.1 %
MUCOUS THREADS #/AREA URNS AUTO: ABNORMAL /LPF
NEUTROPHILS # BLD AUTO: 3.82 X10*3/UL (ref 1.2–7.7)
NEUTROPHILS NFR BLD AUTO: 69.1 %
NITRITE UR QL STRIP.AUTO: NEGATIVE
NRBC BLD-RTO: 0 /100 WBCS (ref 0–0)
PH UR STRIP.AUTO: 5 [PH]
PLATELET # BLD AUTO: 229 X10*3/UL (ref 150–450)
POTASSIUM SERPL-SCNC: 3.9 MMOL/L (ref 3.5–5.3)
PROT SERPL-MCNC: 6.2 G/DL (ref 6.4–8.2)
PROT UR STRIP.AUTO-MCNC: ABNORMAL MG/DL
PROTHROMBIN TIME: 13.1 SECONDS (ref 9.8–12.8)
RBC # BLD AUTO: 2.76 X10*6/UL (ref 4–5.2)
RBC # UR STRIP.AUTO: ABNORMAL /UL
RBC #/AREA URNS AUTO: ABNORMAL /HPF
RSV RNA RESP QL NAA+PROBE: NOT DETECTED
SARS-COV-2 RNA RESP QL NAA+PROBE: DETECTED
SODIUM SERPL-SCNC: 134 MMOL/L (ref 136–145)
SP GR UR STRIP.AUTO: 1.02
SQUAMOUS #/AREA URNS AUTO: ABNORMAL /HPF
UROBILINOGEN UR STRIP.AUTO-MCNC: 2 MG/DL
WBC # BLD AUTO: 5.5 X10*3/UL (ref 4.4–11.3)
WBC #/AREA URNS AUTO: ABNORMAL /HPF

## 2023-12-11 PROCEDURE — 96365 THER/PROPH/DIAG IV INF INIT: CPT | Performed by: EMERGENCY MEDICINE

## 2023-12-11 PROCEDURE — 96367 TX/PROPH/DG ADDL SEQ IV INF: CPT | Performed by: EMERGENCY MEDICINE

## 2023-12-11 PROCEDURE — 93010 ELECTROCARDIOGRAM REPORT: CPT | Performed by: EMERGENCY MEDICINE

## 2023-12-11 PROCEDURE — 83605 ASSAY OF LACTIC ACID: CPT | Performed by: EMERGENCY MEDICINE

## 2023-12-11 PROCEDURE — 2500000004 HC RX 250 GENERAL PHARMACY W/ HCPCS (ALT 636 FOR OP/ED): Performed by: HOME HEALTH AIDE

## 2023-12-11 PROCEDURE — 3008F BODY MASS INDEX DOCD: CPT

## 2023-12-11 PROCEDURE — 71045 X-RAY EXAM CHEST 1 VIEW: CPT | Mod: FR

## 2023-12-11 PROCEDURE — 1210000001 HC SEMI-PRIVATE ROOM DAILY

## 2023-12-11 PROCEDURE — 99285 EMERGENCY DEPT VISIT HI MDM: CPT | Performed by: EMERGENCY MEDICINE

## 2023-12-11 PROCEDURE — 81001 URINALYSIS AUTO W/SCOPE: CPT | Performed by: EMERGENCY MEDICINE

## 2023-12-11 PROCEDURE — 87040 BLOOD CULTURE FOR BACTERIA: CPT | Performed by: EMERGENCY MEDICINE

## 2023-12-11 PROCEDURE — 71045 X-RAY EXAM CHEST 1 VIEW: CPT | Mod: FOREIGN READ | Performed by: RADIOLOGY

## 2023-12-11 PROCEDURE — 96361 HYDRATE IV INFUSION ADD-ON: CPT | Performed by: EMERGENCY MEDICINE

## 2023-12-11 PROCEDURE — 36415 COLL VENOUS BLD VENIPUNCTURE: CPT | Performed by: EMERGENCY MEDICINE

## 2023-12-11 PROCEDURE — 99214 OFFICE O/P EST MOD 30 MIN: CPT | Mod: 25

## 2023-12-11 PROCEDURE — 99223 1ST HOSP IP/OBS HIGH 75: CPT | Performed by: HOME HEALTH AIDE

## 2023-12-11 PROCEDURE — 96366 THER/PROPH/DIAG IV INF ADDON: CPT | Performed by: EMERGENCY MEDICINE

## 2023-12-11 PROCEDURE — 85025 COMPLETE CBC W/AUTO DIFF WBC: CPT | Performed by: EMERGENCY MEDICINE

## 2023-12-11 PROCEDURE — 85384 FIBRINOGEN ACTIVITY: CPT | Performed by: HOME HEALTH AIDE

## 2023-12-11 PROCEDURE — 99214 OFFICE O/P EST MOD 30 MIN: CPT

## 2023-12-11 PROCEDURE — 85610 PROTHROMBIN TIME: CPT | Performed by: HOME HEALTH AIDE

## 2023-12-11 PROCEDURE — 85379 FIBRIN DEGRADATION QUANT: CPT | Performed by: HOME HEALTH AIDE

## 2023-12-11 PROCEDURE — 87075 CULTR BACTERIA EXCEPT BLOOD: CPT | Performed by: EMERGENCY MEDICINE

## 2023-12-11 PROCEDURE — 2500000001 HC RX 250 WO HCPCS SELF ADMINISTERED DRUGS (ALT 637 FOR MEDICARE OP): Performed by: HOME HEALTH AIDE

## 2023-12-11 PROCEDURE — 2500000004 HC RX 250 GENERAL PHARMACY W/ HCPCS (ALT 636 FOR OP/ED): Mod: SE | Performed by: EMERGENCY MEDICINE

## 2023-12-11 PROCEDURE — 87637 SARSCOV2&INF A&B&RSV AMP PRB: CPT | Performed by: EMERGENCY MEDICINE

## 2023-12-11 PROCEDURE — 84155 ASSAY OF PROTEIN SERUM: CPT | Performed by: EMERGENCY MEDICINE

## 2023-12-11 PROCEDURE — 1036F TOBACCO NON-USER: CPT

## 2023-12-11 RX ORDER — ESCITALOPRAM OXALATE 20 MG/1
20 TABLET ORAL DAILY
Status: DISCONTINUED | OUTPATIENT
Start: 2023-12-11 | End: 2023-12-13 | Stop reason: HOSPADM

## 2023-12-11 RX ORDER — AMOXICILLIN 250 MG
2 CAPSULE ORAL 2 TIMES DAILY PRN
Status: DISCONTINUED | OUTPATIENT
Start: 2023-12-11 | End: 2023-12-13 | Stop reason: HOSPADM

## 2023-12-11 RX ORDER — PROCHLORPERAZINE MALEATE 10 MG
10 TABLET ORAL EVERY 6 HOURS PRN
Status: DISCONTINUED | OUTPATIENT
Start: 2023-12-11 | End: 2023-12-13 | Stop reason: HOSPADM

## 2023-12-11 RX ORDER — NIRMATRELVIR AND RITONAVIR 300-100 MG
3 KIT ORAL 2 TIMES DAILY
Qty: 30 TABLET | Refills: 0 | Status: SHIPPED | OUTPATIENT
Start: 2023-12-11 | End: 2023-12-17

## 2023-12-11 RX ORDER — VANCOMYCIN HYDROCHLORIDE 1 G/200ML
1 INJECTION, SOLUTION INTRAVENOUS ONCE
Status: COMPLETED | OUTPATIENT
Start: 2023-12-11 | End: 2023-12-11

## 2023-12-11 RX ORDER — BUPROPION HYDROCHLORIDE 150 MG/1
150 TABLET, EXTENDED RELEASE ORAL 2 TIMES DAILY
Status: DISCONTINUED | OUTPATIENT
Start: 2023-12-11 | End: 2023-12-13 | Stop reason: HOSPADM

## 2023-12-11 RX ORDER — GABAPENTIN 300 MG/1
300 CAPSULE ORAL
Status: DISCONTINUED | OUTPATIENT
Start: 2023-12-11 | End: 2023-12-13 | Stop reason: HOSPADM

## 2023-12-11 RX ORDER — ALBUTEROL SULFATE 90 UG/1
2 AEROSOL, METERED RESPIRATORY (INHALATION) EVERY 6 HOURS PRN
Status: DISCONTINUED | OUTPATIENT
Start: 2023-12-11 | End: 2023-12-13 | Stop reason: HOSPADM

## 2023-12-11 RX ORDER — LANOLIN ALCOHOL/MO/W.PET/CERES
400 CREAM (GRAM) TOPICAL DAILY
Status: DISCONTINUED | OUTPATIENT
Start: 2023-12-11 | End: 2023-12-13 | Stop reason: HOSPADM

## 2023-12-11 RX ORDER — POLYETHYLENE GLYCOL 3350 17 G/17G
17 POWDER, FOR SOLUTION ORAL DAILY PRN
Status: DISCONTINUED | OUTPATIENT
Start: 2023-12-11 | End: 2023-12-13 | Stop reason: HOSPADM

## 2023-12-11 RX ORDER — VANCOMYCIN HYDROCHLORIDE 500 MG/100ML
500 INJECTION, SOLUTION INTRAVENOUS ONCE
Status: COMPLETED | OUTPATIENT
Start: 2023-12-11 | End: 2023-12-11

## 2023-12-11 RX ORDER — ACYCLOVIR 200 MG/1
400 CAPSULE ORAL 2 TIMES DAILY
Qty: 120 CAPSULE | Refills: 0 | Status: SHIPPED | OUTPATIENT
Start: 2023-12-11 | End: 2024-01-08 | Stop reason: SDUPTHER

## 2023-12-11 RX ORDER — OXYCODONE HYDROCHLORIDE 5 MG/1
15 TABLET ORAL EVERY 4 HOURS PRN
Status: DISCONTINUED | OUTPATIENT
Start: 2023-12-11 | End: 2023-12-13 | Stop reason: HOSPADM

## 2023-12-11 RX ORDER — ENOXAPARIN SODIUM 100 MG/ML
1 INJECTION SUBCUTANEOUS EVERY 12 HOURS
Status: DISCONTINUED | OUTPATIENT
Start: 2023-12-11 | End: 2023-12-13 | Stop reason: HOSPADM

## 2023-12-11 RX ORDER — RISPERIDONE 0.5 MG/1
0.5 TABLET ORAL NIGHTLY
Status: DISCONTINUED | OUTPATIENT
Start: 2023-12-11 | End: 2023-12-13 | Stop reason: HOSPADM

## 2023-12-11 RX ORDER — POTASSIUM CHLORIDE 750 MG/1
10 TABLET, FILM COATED, EXTENDED RELEASE ORAL 2 TIMES DAILY
Status: DISCONTINUED | OUTPATIENT
Start: 2023-12-11 | End: 2023-12-13 | Stop reason: HOSPADM

## 2023-12-11 RX ORDER — ACYCLOVIR 200 MG/1
400 CAPSULE ORAL 2 TIMES DAILY
Status: DISCONTINUED | OUTPATIENT
Start: 2023-12-11 | End: 2023-12-13 | Stop reason: HOSPADM

## 2023-12-11 RX ORDER — FERROUS SULFATE 325(65) MG
1 TABLET ORAL EVERY OTHER DAY
Status: DISCONTINUED | OUTPATIENT
Start: 2023-12-12 | End: 2023-12-13 | Stop reason: HOSPADM

## 2023-12-11 RX ORDER — MORPHINE SULFATE 30 MG/1
30 TABLET, FILM COATED, EXTENDED RELEASE ORAL 2 TIMES DAILY
Status: DISCONTINUED | OUTPATIENT
Start: 2023-12-11 | End: 2023-12-13 | Stop reason: HOSPADM

## 2023-12-11 RX ORDER — PANTOPRAZOLE SODIUM 40 MG/1
40 TABLET, DELAYED RELEASE ORAL DAILY
Status: DISCONTINUED | OUTPATIENT
Start: 2023-12-11 | End: 2023-12-13 | Stop reason: HOSPADM

## 2023-12-11 RX ORDER — NALOXONE HYDROCHLORIDE 4 MG/.1ML
4 SPRAY NASAL AS NEEDED
Status: DISCONTINUED | OUTPATIENT
Start: 2023-12-11 | End: 2023-12-13 | Stop reason: HOSPADM

## 2023-12-11 RX ADMIN — SODIUM CHLORIDE 1000 ML: 9 INJECTION, SOLUTION INTRAVENOUS at 13:10

## 2023-12-11 RX ADMIN — ACYCLOVIR 400 MG: 200 CAPSULE ORAL at 21:00

## 2023-12-11 RX ADMIN — PIPERACILLIN SODIUM AND TAZOBACTAM SODIUM 4.5 G: 4; .5 INJECTION, SOLUTION INTRAVENOUS at 10:40

## 2023-12-11 RX ADMIN — OXYCODONE HYDROCHLORIDE 15 MG: 5 TABLET ORAL at 16:27

## 2023-12-11 RX ADMIN — VANCOMYCIN HYDROCHLORIDE 500 MG: 500 INJECTION, SOLUTION INTRAVENOUS at 15:11

## 2023-12-11 RX ADMIN — PANTOPRAZOLE SODIUM 40 MG: 40 TABLET, DELAYED RELEASE ORAL at 18:09

## 2023-12-11 RX ADMIN — ESCITALOPRAM OXALATE 20 MG: 20 TABLET ORAL at 18:09

## 2023-12-11 RX ADMIN — PIPERACILLIN SODIUM AND TAZOBACTAM SODIUM 3.38 G: 3; .375 INJECTION, SOLUTION INTRAVENOUS at 23:37

## 2023-12-11 RX ADMIN — POTASSIUM CHLORIDE 10 MEQ: 1500 TABLET, EXTENDED RELEASE ORAL at 21:00

## 2023-12-11 RX ADMIN — VANCOMYCIN HYDROCHLORIDE 1 G: 1 INJECTION, SOLUTION INTRAVENOUS at 11:29

## 2023-12-11 RX ADMIN — BUPROPION HYDROCHLORIDE 150 MG: 150 TABLET, EXTENDED RELEASE ORAL at 21:00

## 2023-12-11 RX ADMIN — MAGNESIUM OXIDE TAB 400 MG (241.3 MG ELEMENTAL MG) 400 MG: 400 (241.3 MG) TAB at 18:09

## 2023-12-11 RX ADMIN — RISPERIDONE 0.5 MG: 1 TABLET, FILM COATED ORAL at 21:00

## 2023-12-11 RX ADMIN — PIPERACILLIN SODIUM AND TAZOBACTAM SODIUM 3.38 G: 3; .375 INJECTION, SOLUTION INTRAVENOUS at 16:27

## 2023-12-11 RX ADMIN — ENOXAPARIN SODIUM 60 MG: 100 INJECTION SUBCUTANEOUS at 18:09

## 2023-12-11 RX ADMIN — GABAPENTIN 300 MG: 300 CAPSULE ORAL at 18:29

## 2023-12-11 RX ADMIN — MORPHINE SULFATE 30 MG: 30 TABLET, EXTENDED RELEASE ORAL at 18:09

## 2023-12-11 ASSESSMENT — PAIN SCALES - GENERAL
PAINLEVEL: 0-NO PAIN
PAINLEVEL_OUTOF10: 7
PAINLEVEL_OUTOF10: 4
PAINLEVEL_OUTOF10: 0 - NO PAIN

## 2023-12-11 ASSESSMENT — ENCOUNTER SYMPTOMS
NUMBNESS: 0
MUSCULOSKELETAL NEGATIVE: 1
EYES NEGATIVE: 1
HEMATURIA: 0
NAUSEA: 0
CHEST TIGHTNESS: 0
COUGH: 0
DYSURIA: 0
RESPIRATORY NEGATIVE: 1
TROUBLE SWALLOWING: 0
CARDIOVASCULAR NEGATIVE: 1
FATIGUE: 0
ABDOMINAL PAIN: 0
UNEXPECTED WEIGHT CHANGE: 0
SORE THROAT: 0
GASTROINTESTINAL NEGATIVE: 1
FREQUENCY: 0
DIAPHORESIS: 0
ROS GI COMMENTS: DENIES EARLY SATIETY
HEMATOLOGIC/LYMPHATIC NEGATIVE: 1
SHORTNESS OF BREATH: 0
PSYCHIATRIC NEGATIVE: 1
NEUROLOGICAL NEGATIVE: 1
ADENOPATHY: 0
CHILLS: 0

## 2023-12-11 ASSESSMENT — COLUMBIA-SUICIDE SEVERITY RATING SCALE - C-SSRS
6. HAVE YOU EVER DONE ANYTHING, STARTED TO DO ANYTHING, OR PREPARED TO DO ANYTHING TO END YOUR LIFE?: NO
2. HAVE YOU ACTUALLY HAD ANY THOUGHTS OF KILLING YOURSELF?: NO
1. IN THE PAST MONTH, HAVE YOU WISHED YOU WERE DEAD OR WISHED YOU COULD GO TO SLEEP AND NOT WAKE UP?: NO

## 2023-12-11 ASSESSMENT — PAIN - FUNCTIONAL ASSESSMENT
PAIN_FUNCTIONAL_ASSESSMENT: 0-10

## 2023-12-11 NOTE — H&P
History Of Present Illness  Pamela Lopez is a 48 yo F with a PMH significant for Hodgkin's lymphoma (Dx 6/2023; s/p 4 cycles AVD + brentuximab), pain 2/2 malignancy, anxiety/depression, asthma, HLD, HTN, IV drug abuse who is presenting to ED with fever after recent treatment for UTI and suspected dental infection. IV zosyn initiated on admit. Covid positive. Patient is being admitted for further workup and management.      Patient reports fatigue, pleuritic left sided chest pain x 2-3 days (currently resolved), VERGARA x1 day, productive cough with clear mucus, wheezing, peripheral neuropathy (chronic x6 months), and chronic back pain (pain controlled on current pain regimen). LBM yesterday, formed.     Patient denies recent sick contacts, fever, chills, HA, dizziness, visual disturbances, nasal congestion, epistaxis, dysphagia, sore throat, palpitations, SOB, hemoptysis, N/V/D/C, abdominal pain, melena, hematochezia, urinary s/s, weakness, bleeding, bruising, rash. All other ROS otherwise negative.     OF note, recently presented to ED 12/2/23 with hypotension and periapical abscess. Recommended IV antibiotics, though patient adamant that she did not want to be admitted.     On 12/4/23, patient referred to ED for hypotension and discharged after 1L with blood pressure recovery.     PMH: Hodgkin's lymphoma (Dx 6/2023; s/p 4 cycles AVD + brentuximab), pain 2/2 malignancy, anxiety/depression, asthma, HLD, HTN, IV drug abuse   Allergies: Benadryl, lorazepam   Surg: tonsillectomy   FH: father with throat cancer   Social: 15 pack year smoking history; denies ETOH/drug use     ED course:   -Vitals upon arrival: T 38.9, HR 87, RR 16, /59, Pox 95 RA   -Blood cx pending   -S/p zosyn, vancomycin   -S/p 1L NS bolus      Physical Exam  Constitutional:       General: She is not in acute distress.     Appearance: Normal appearance. She is not ill-appearing or toxic-appearing.   HENT:      Head: Normocephalic and atraumatic.  "     Nose: Nose normal.      Mouth/Throat:      Mouth: Mucous membranes are moist.      Pharynx: No oropharyngeal exudate or posterior oropharyngeal erythema.   Eyes:      General: No scleral icterus.     Extraocular Movements: Extraocular movements intact.      Pupils: Pupils are equal, round, and reactive to light.   Cardiovascular:      Rate and Rhythm: Normal rate and regular rhythm.      Pulses: Normal pulses.      Heart sounds: Normal heart sounds. No murmur heard.     No gallop.   Pulmonary:      Effort: Pulmonary effort is normal. No respiratory distress.      Breath sounds: No stridor. Rales (bibasilar, L>R) present. No wheezing or rhonchi.   Chest:      Chest wall: No tenderness.   Abdominal:      General: Abdomen is flat. There is no distension.      Palpations: Abdomen is soft.      Tenderness: There is no abdominal tenderness. There is no guarding.      Hernia: No hernia is present.   Musculoskeletal:         General: No swelling, tenderness, deformity or signs of injury. Normal range of motion.      Cervical back: Normal range of motion and neck supple.   Skin:     General: Skin is warm and dry.      Coloration: Skin is not jaundiced.      Findings: No bruising, erythema or lesion.   Neurological:      General: No focal deficit present.      Mental Status: She is alert and oriented to person, place, and time.      Cranial Nerves: No cranial nerve deficit.      Sensory: No sensory deficit.      Motor: No weakness.   Psychiatric:         Mood and Affect: Mood normal.         Behavior: Behavior normal.      Comments: Fluent speech, cooperative           Last Recorded Vitals  Blood pressure (!) 96/43, pulse 90, temperature 38.4 °C (101.1 °F), resp. rate 15, height 1.575 m (5' 2\"), weight 56.2 kg (124 lb), SpO2 92 %.    Relevant Results  Scheduled medications  acyclovir, 400 mg, oral, BID  buPROPion SR, 150 mg, oral, BID  escitalopram, 20 mg, oral, Daily  [START ON 12/12/2023] ferrous sulfate (325 mg " ferrous sulfate), 1 tablet, oral, Every other day  gabapentin, 300 mg, oral, TID  magnesium oxide, 400 mg, oral, Daily  morphine CR, 30 mg, oral, BID  pantoprazole, 40 mg, oral, Daily  piperacillin-tazobactam, 3.375 g, intravenous, q6h  potassium chloride CR, 10 mEq, oral, BID  risperiDONE, 0.5 mg, oral, Nightly      Continuous medications     PRN medications  PRN medications: albuterol, alteplase, naloxone, oxyCODONE, polyethylene glycol, prochlorperazine, sennosides-docusate sodium    Results for orders placed or performed during the hospital encounter of 12/11/23 (from the past 24 hour(s))   CBC with Differential   Result Value Ref Range    WBC 5.5 4.4 - 11.3 x10*3/uL    nRBC 0.0 0.0 - 0.0 /100 WBCs    RBC 2.76 (L) 4.00 - 5.20 x10*6/uL    Hemoglobin 9.2 (L) 12.0 - 16.0 g/dL    Hematocrit 28.2 (L) 36.0 - 46.0 %     (H) 80 - 100 fL    MCH 33.3 26.0 - 34.0 pg    MCHC 32.6 32.0 - 36.0 g/dL    RDW 17.2 (H) 11.5 - 14.5 %    Platelets 229 150 - 450 x10*3/uL    Neutrophils % 69.1 40.0 - 80.0 %    Immature Granulocytes %, Automated 0.5 0.0 - 0.9 %    Lymphocytes % 9.7 13.0 - 44.0 %    Monocytes % 19.1 2.0 - 10.0 %    Eosinophils % 0.0 0.0 - 6.0 %    Basophils % 1.6 0.0 - 2.0 %    Neutrophils Absolute 3.82 1.20 - 7.70 x10*3/uL    Immature Granulocytes Absolute, Automated 0.03 0.00 - 0.70 x10*3/uL    Lymphocytes Absolute 0.54 (L) 1.20 - 4.80 x10*3/uL    Monocytes Absolute 1.06 (H) 0.10 - 1.00 x10*3/uL    Eosinophils Absolute 0.00 0.00 - 0.70 x10*3/uL    Basophils Absolute 0.09 0.00 - 0.10 x10*3/uL   Comprehensive Metabolic Panel   Result Value Ref Range    Glucose 79 74 - 99 mg/dL    Sodium 134 (L) 136 - 145 mmol/L    Potassium 3.9 3.5 - 5.3 mmol/L    Chloride 101 98 - 107 mmol/L    Bicarbonate 20 (L) 21 - 32 mmol/L    Anion Gap 17 10 - 20 mmol/L    Urea Nitrogen 10 6 - 23 mg/dL    Creatinine 0.55 0.50 - 1.05 mg/dL    eGFR >90 >60 mL/min/1.73m*2    Calcium 8.6 8.6 - 10.6 mg/dL    Albumin 3.5 3.4 - 5.0 g/dL     Alkaline Phosphatase 70 33 - 110 U/L    Total Protein 6.2 (L) 6.4 - 8.2 g/dL    AST 18 9 - 39 U/L    Bilirubin, Total 0.3 0.0 - 1.2 mg/dL    ALT 6 (L) 7 - 45 U/L   Lactate   Result Value Ref Range    Lactate 0.8 0.4 - 2.0 mmol/L   Blood Culture    Specimen: Peripheral Venipuncture; Blood culture   Result Value Ref Range    Blood Culture Loaded on Instrument - Culture in progress    Blood Culture    Specimen: Peripheral Venipuncture; Blood culture   Result Value Ref Range    Blood Culture Loaded on Instrument - Culture in progress    Urinalysis with Reflex Microscopic and Culture   Result Value Ref Range    Color, Urine Kelly (N) Straw, Yellow    Appearance, Urine Hazy (N) Clear    Specific Gravity, Urine 1.025 1.005 - 1.035    pH, Urine 5.0 5.0, 5.5, 6.0, 6.5, 7.0, 7.5, 8.0    Protein, Urine 30 (1+) (N) NEGATIVE mg/dL    Glucose, Urine NEGATIVE NEGATIVE mg/dL    Blood, Urine SMALL (1+) (A) NEGATIVE    Ketones, Urine NEGATIVE NEGATIVE mg/dL    Bilirubin, Urine NEGATIVE NEGATIVE    Urobilinogen, Urine 2.0 (N) <2.0 mg/dL    Nitrite, Urine NEGATIVE NEGATIVE    Leukocyte Esterase, Urine NEGATIVE NEGATIVE   Extra Urine Gray Tube   Result Value Ref Range    Extra Tube Hold for add-ons.    Urinalysis Microscopic   Result Value Ref Range    WBC, Urine 1-5 1-5, NONE /HPF    RBC, Urine 3-5 NONE, 1-2, 3-5 /HPF    Squamous Epithelial Cells, Urine 1-9 (SPARSE) Reference range not established. /HPF    Mucus, Urine 4+ Reference range not established. /LPF    Hyaline Casts, Urine 1+ (A) NONE /LPF   Sars-CoV-2 and Influenza A/B PCR   Result Value Ref Range    Flu A Result Not Detected Not Detected    Flu B Result Not Detected Not Detected    Coronavirus 2019, PCR Detected (A) Not Detected   RSV PCR   Result Value Ref Range    RSV PCR Not Detected Not Detected   Lavender Top   Result Value Ref Range    Extra Tube Hold for add-ons.    D-dimer, Non VTE   Result Value Ref Range    D-Dimer Non VTE, Quant (ng/mL FEU) 5,370 (H) <=500 ng/mL  FEU   Coagulation Screen   Result Value Ref Range    Protime 13.1 (H) 9.8 - 12.8 seconds    INR 1.2 (H) 0.9 - 1.1    aPTT 32 27 - 38 seconds   Fibrinogen   Result Value Ref Range    Fibrinogen 325 200 - 400 mg/dL              Assessment/Plan   Principal Problem:    Fever, unspecified fever cause    Pamela Lopez is a 48 yo F with a PMH significant for Hodgkin's lymphoma (Dx 6/2023; s/p 4 cycles AVD + brentuximab), pain 2/2 malignancy, anxiety/depression, asthma, HLD, HTN, IV drug abuse who is presenting to ED with fever after recent treatment for UTI and suspected dental infection. IV zosyn initiated on admit. Covid positive. Patient is being admitted for further workup and management.      ONC  #Lymphocyte-rich Hodgkin lymphoma   -Initially presented with enlarged tonsils   -Admitted 6/5/23-6/7/23 for neck pain and found to have multiple spinal lesions, neck LAD, and possible liver lesions   -Biopsy L3 nondiagnostic (6/9/23)   -PET (6/13/23) with avid right palantine tonsil lesion, b/l cervical, portacaval, RP nodes, hepatic mets and osseous lesions in axial skeleton.   -S/p tonsillectomy (6/22/23), path c/w classical Hodgkin Lymphoma    -S/p 4 cycles AVD + brentuximab (initiated 7/12/23), currently in cycle 5 (brentuximab reduced to 0.9 mh/mkg)  -PET (10/9/23) c/w treatment response  -Chemotherapy currently held due to recent dental infection     HEME  #Anemia likely 2/2 disease vs treatment   -No evidence of active bleeding   -Monitor counts daily   -Keep Hgb>7, plt>10  -C/w ferrous sulfate every other day     ID  #Fever  #Covid PNA  -Covid positive (12/11); Influenza and RSV negative   -Blood cx (12/11) pending   -UA neg infection  -CXR (12/11) LLL PNA  -S/p zosyn and vancomycin in ED  -C/w IV zosyn (12/11-present)   -Stable on room air, does not meet criteria for remdesivir or dexamethasone   #Hx recent suspicious dental infection   #Recent E coli UTI (12/2)  -Declined previous hospital admission on 12/2,  d/c'd from ED with Augmentin   -CT maxillofacial bones (12/2) possible periapical abscess involving left mandibular 1st molar tooth, no definite soft tissue abscess   -Referred to outpt maxillary surgery, patient did not go   -Symptoms resolved, no evidence of abscess on exam   #PPX: ACV    FEN/GI   Admit wt: 56.2kg   -Monitor electrolytes, replace as needed  -C/w magnesium, potassium  #PPx: protonix     MSK  #Pain 2/2 malignancy   #Hx IV drug abuse  -C/w prn oxycodone 15mg q4h   -C/w morphine ER 30mg BID   -C/w gabapentin 300mg TID  -Follows with palliative medicine, was referred to aquatherapy   -Utox (12/2) positive opioids (expected)     CARDIAC  #Hx HLD   -Echo (8/22/23) LVEF 55-60%     PULM  #Hx asthma   -C/w prn albuterol     PSYCH  #Anxiety, depression   -c/w 20mg Lexapro daily   -C/w bupropion nightly     MISC  #Insomnia   -C/w risperidone nightly     DISPO  -Full code, confirmed on admit   -Access: mediport   -Primary onc: Dr. Patricia Cox, PA-C

## 2023-12-11 NOTE — ED TRIAGE NOTES
Sent by [facility] onc doctor for fever. Recently had tooth infection. PT also having chills, fatigue, and CP. PT has stage 4 hodgkin's lymphoma

## 2023-12-11 NOTE — PATIENT INSTRUCTIONS
Please call our office to schedule visits once you are discharged from the ED/hospital.       Call in the meantime for questions or concerns at [phone]

## 2023-12-11 NOTE — PROGRESS NOTES
Patient ID: Pamela Lopez is a 48 y.o. female.    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 Pamela Lopez is a 48 y/o F presenting for follow-up for Hodgkin's lymphoma. Here for cycle 5 day 15.    Patient presents for follow up and treatment. Last week our office sent her to the ED for hypotension and she was discharged having having IV fluids. Today she was noted to be febrile on arrival, she notes chills last night but checked her temperature and did not have a fever. She reports feeling well today and that her infected left lower jaw molar has significantly improved including no further pain, edema, and eating without issues. She has a few more days left of Augmentin. She has not been able to meet with a surgeon due to insurance reasons but her son plans to call her oral surgeon at [facility] who she has worked with before being diagnosed with the lymphoma. She is also being treated for UTI with the Augmentin as well. She denies pain.      Review of Systems   Constitutional:  Negative for chills, diaphoresis, fatigue and unexpected weight change.   HENT:  Negative.  Negative for lump/mass, sore throat and trouble swallowing.    Eyes: Negative.    Respiratory: Negative.  Negative for chest tightness, cough and shortness of breath.    Cardiovascular: Negative.    Gastrointestinal: Negative.  Negative for abdominal pain and nausea.        Denies early satiety   Genitourinary: Negative.  Negative for dysuria, frequency and hematuria.    Musculoskeletal: Negative.    Skin: Negative.    Neurological: Negative.  Negative for numbness.   Hematological: Negative.  Negative for adenopathy.   Psychiatric/Behavioral:  Negative.     All other systems reviewed and are negative.      Patient's past medical history, surgical history, family history and social history reviewed.     Objective    Visit Vitals  /59 (BP Location: Right arm, Patient Position: Sitting, BP Cuff Size: Adult)   Pulse 87   Temp 38.9 °C (102 °F) (Oral)   Resp 16   Wt 57.7 kg (127 lb 3.3 oz)   SpO2 95%   BMI 23.27 kg/m²   Smoking Status Former   BSA 1.59 m²        Physical Exam:   Constitutional: Patient appears in no acute distress.   Sitting comfortably in chair.  Eyes: EOMI, clear sclera  ENMT: mucous membranes moist, no apparent injury  Head/Neck: Neck supple, no JVD  Respiratory/Thorax: Patent airways, CTAB, normal  breath sounds, no increased work of breathing  Cardiovascular: Regular, rate and rhythm, no murmurs  Gastrointestinal: Nondistended, soft, non-tender,  no rebound tenderness or guarding, no masses palpable  Extremities: normal extremities, no cyanosis edema,  no swelling  Neurological: alert and oriented x3, nonfocal, normal  speech and hearing  Lymphatic: No palpable lymphadenopathy in cervical,  axillary  lymph nodes.  Spleen appears normal size.  Psychological: Appropriate mood and behavior, normal  affect  Skin: Warm and dry, no lesions, no rashes     Performance Status:  Symptomatic; fully ambulatory    Labs/Imaging/Pathology: personally reviewed reports and images in Epic electronic medical record system. Pertinent results as it related to the plan represented in below in assessment and plan.     Assessment/Plan   1. Classical Hodgkin lymphoma, lymphocyte- rich subtype, stage IVB     - developed tonsil enlargement initially over two years ago and thought to be related to infection  - 6/5/23- 6/723 admitted for new pain and found to have multiple spine mets, neck lymphadenopathy and possible liver lesions  -  biopsy 6/9/23 of her L3 was nondiagnostic  - 6/13/23 PET/CT with FDG avid right palantine tonsil, b/l cervical, portacaval, RP  nodes, hepatic met, osseous lesions in axial skeleton  - 6/19/23 planned for 10 fx to T7  - 6/22/23 tonsillectomy path consistent with classical Hodgkin lymphoma  - Discussed diagnosis of classical Hodgkin's lymphoma with patient and given stage IV disease with B symptom along with history of strong tobacco use, recommend brentuximab plus AVD per ECHELON-1 trial (Lisa, et al. [facility] 2018)  - Reviewed side effects and consent signed  - pt is planning to see dental next week as she has active abscess with pain and still has RT next week  - 7/17/12 AVD+ brentuximab  -hospitalized for fever and discharge 8/27/23 and no infectious cause found.  - delays due to neutropenia, cycle 3 day on 9/25/23.  - 10/9/23 PET CT interim with significant treatment response   11/19/23 C5D1 reduced brentuximab to 0.9 mg/mkg     12/4/23:  - Since last visit, patient had a left-side infection in her mouth related to a molar that is likely infected. She was started on antibiotics in the hospital with Augmentin BID and then took herself out of the hospital to come for treatment today.   - Today her blood pressure is 60s over 40s and recommended ED for further management  - We discussed that we will hold her chemotherapy with a plan to get her in with [facility]illary Surgery as soon as possible.   - At this point, we will delay cycle 5 day 15 by 1 week.  - RTC in 1 week with Lamonte.    12/11/2023:  - Presents to make up cycle 5 day 15 after it was held last week for hypotension, sent to the ED and BP improved with IV fluids  - Has a few days left of oral Augmentin for UTI and tooth infection, has not been able to make a visit with an oral surgeon who takes her insurance   - Says UTI and tooth infection symptoms have resolved and she is feeling well, however, she has a max temp of 39.1 here in clinic via oral temperature in addition to chills last night and feeling cold today, her son had the heat on high on the drive in  - Therefore, I did  recommend patient go to the ED for concern for infection vs sepsis, patient chose to go to main campus ED which I discussed may be good in case they feel her tooth needs extracted but if they cannot do then son will call [facility] oral surgeon   - We will reschedule patient once we are aware of her discharge from ED/hospital.       Lamonte Mckenzie, APRN-CNP

## 2023-12-11 NOTE — HOSPITAL COURSE
Pamela Lpoez is a 48 yo F with a PMH significant for Hodgkin's lymphoma (Dx 6/2023; s/p 4 cycles AVD + brentuximab), pain 2/2 malignancy, anxiety/depression, asthma, HLD, HTN, IV drug abuse who is presenting to ED with fever after recent treatment for UTI and suspected dental infection. IV zosyn initiated on admit. Covid positive. Patient admitted for further workup and management.       Hospital course:  Covid PNA treated with supportive care. Did not qualify for remdesivir or dexamethasone. Will discharge on paxlovid.   Non-neutropenic fever, infectious workup so far positive for covid PNA. 24 hour blood cultures negative. S/p IV zosyn. Will be discharged on Augmentin to complete 10 day course for recent periapical abscess and e coli UTI (end date 12/13). Matthew be discharged on levaquin to complete total of 8 days on abx (3 days IV abx + 5 days levaquin)    Anti-infectives upon discharge: ACV, augmentin (end 12/13), paxlovid, levaquin (x 5 days)  Access: mediport   Dispo: Home   Apts:   - 12/18 @ 11:30 @ [facility] for next chemo dose  - requested count check prior to next dose chemo 12/18  - requested CARTER appointment prior to chemo 12/18, ashutosh Johnson to schedule

## 2023-12-11 NOTE — ED PROVIDER NOTES
History & Physical    CC: Fever  HPI:  48 y.o. female with history of HLD, Asthma, Hodgkin lymphoma on chemotherapy who presents today with complaint of fever.  Patient had a dental infection last week which she was treated with outpatient antibiotics, notes that it has been improving and she no longer has pain in this area.  However since yesterday she has been having chills, generalized fatigue, malaise.  She went to her appointment today where she was noted to have a fever to 102.1 °F.  They are concerned for other infection in the setting of her neutropenia and sent her to the emergency department for evaluation and admission.  Currently the patient does not have any specific pain, but does endorse generalized bodyaches, fatigue.    Additional Limitations to History: None  External Records Reviewed: I reviewed recent and relevant outside records including outpatient notes  History Obtained From: Patient and Family Member    Past Medical History: Per HPI  Medications: Reviewed in EMR and with patient  Allergies:   Allergies   Allergen Reactions    Benadryl [Diphenhydramine Hcl] Confusion and Drowsiness    Lorazepam Confusion and Drowsiness     Past Surgical History: reviewed  Social History: Patient denies tobacco, alcohol, and other illicit drug use.      ----------------------------------------------------------------------------------------------------  Physical Exam:  --Vital signs reviewed: T 37.8 °C (100 °F)  HR 99  BP 99/55  RR 16  O2 94 %    GEN:  A&Ox3, no acute distress, appears comfortable.  Conversational and appropriate.  No confusion or gross mental status changes.  EYES: EOMI, non-injected sclera.  ENT: Moist mucous membranes, no apparent injuries or lesions.  No evidence of infectious process where her prior dental abscess was located.  CARDIO: Normal rate and regular rhythm. No murmurs, rubs, or gallops.  2+ equal pulses of the distal extremities.   PULM: Clear to auscultation bilaterally. No  rales, rhonchi, or wheezes. Good symmetric chest expansion.  GI: Soft, non-tender, non-distended. No rebound tenderness or guarding.  SKIN: Warm and dry, no rashes or lesions.  MSK: ROM intact the extremities without contractures.   EXT: No peripheral edema, contusions, or wounds.   NEURO: Cranial nerves II-XII grossly intact. Sensation to light touch intact and equal bilaterally in upper and lower extremities.  Symmetric 5/5 strength in upper and lower extremities.  PSYCH: Appropriate mood and behavior, converses and responds appropriately during exam.  -----------------------------------------------------------------------------------------------------    Medical Decision Making   Patient seen and evaluated by ED attending. On arrival the patient was in stable condition with vital signs demonstrating heart rate 99, temperature 37.8, blood pressure 99/55.  Patient is awake, alert, well-appearing.  She is resting comfortably in bed, nontoxic.  She had a documented temperature at home of 102.1 °F was sent in by her oncologist for neutropenic fever workup.  Patient's main complaints at this time are generalized malaise, chills for the past 24 hours.  She has no specific pain at this time.  She does note that her urine has been malodorous for the past 1 to 2 days as well, but denies any dysuria, increased frequency.    Patient is worked up for infection.  Differential diagnosis also includes pulmonary embolism but is less likely given patient's lack of chest pain or shortness of breath.  MI is considered, but again less likely, EKG was ordered.  Labs, urine, chest x-ray, viral swabs are sent.    Given the patient has neutropenic fever, blood cultures are obtained on arrival and she is started on broad-spectrum antibiotics with Zosyn and vancomycin.    Differential Diagnoses Considered: Infection  Chronic Medical Conditions Significantly Affecting Care: Hodgkin lymphoma on chemotherapy    - EKG interpreted by myself (ED  attending physician): Ventricular rate of 98 bpm.  Sinus rhythm with immature atrial complexes.  Left axis deviation.  Prolonged QT of 372/QTc 474 ms.  No ST or T wave elevations or inversions consistent with acute ischemia.  - Labs notable for COVID-positive.  CBC with hemoglobin of 9.2.  Urinalysis without evidence of infection.  Influenza/RSV are negative.    Discussed the patient's care with his oncology team, and highlighted concern that the patient's fever etiology is concern to be from a source different from her dental infection which she had recently.  Patient will need to be admitted for neutropenic fever workup in the setting of PNA on chest xray.  She remained in stable condition in the emergency department here.    ED Course as of 12/12/23 1631   Mon Dec 11, 2023   1525 Neutrophils Absolute: 3.82 [JT]      ED Course User Index  [JT] Jeffery Garcia MD MPH         Diagnoses as of 12/12/23 1631   Fever, unspecified fever cause   Pneumonia of left lung due to infectious organism, unspecified part of lung   COVID       Escalation of Care: Appropriate for admission   Social Determinants of Health Significantly Affecting Care: None identified  Discussion of Management with Other Providers:  I discussed the patient/results with: Admitting Team and Consult Physician      Jeffery Garcia MD   Attending Physician      Jeffery Garcia MD MPH  12/12/23 1632

## 2023-12-11 NOTE — ED NOTES
"Pt sent from hem onc doctor for fever. Pt was seen on Saturday for a tooth ache/abscess and was sent home with antibiotics. Seen on Monday for low BP and given fluids. Pt states last week she had a UTI but denies any urinary complaints at this time. Pt was due to have chemo today. States last treatment was three weeks ago. Diagnosed with hodgkin's lymphoma in June. 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 Denies other significant medical hx.   A&Ox4. PERRLA. Lung sound clear bilaterally. S1 and S2 auscultated with no adventitious sounds. Hand  equal bilaterally. Push/pulls equal on upper and lower extremities bilaterally. Bowel sounds normoactive x4 quadrants. Denies urinary concerns. Radial and pedal pulses 2+ bilaterally. No edema noted on upper or lower extremities. Chest rise and fall even and symmetrical. No respiratory distress noted. Denies SOB. Endorses chest pain and describes that it feels like \"when I had pleural effusion.\" Family at bedside. Bed locked and in low position. Fever of 102 noted. EKG obtained     Aziza Delgado RN  12/11/23 1008    "

## 2023-12-12 ENCOUNTER — APPOINTMENT (OUTPATIENT)
Dept: CARDIOLOGY | Facility: HOSPITAL | Age: 48
End: 2023-12-12
Payer: COMMERCIAL

## 2023-12-12 ENCOUNTER — PHARMACY VISIT (OUTPATIENT)
Dept: PHARMACY | Facility: CLINIC | Age: 48
End: 2023-12-12

## 2023-12-12 LAB
ALBUMIN SERPL BCP-MCNC: 3.1 G/DL (ref 3.4–5)
ANION GAP SERPL CALC-SCNC: 11 MMOL/L (ref 10–20)
ATRIAL RATE: 98 BPM
BASOPHILS # BLD AUTO: 0.06 X10*3/UL (ref 0–0.1)
BASOPHILS NFR BLD AUTO: 2 %
BUN SERPL-MCNC: 10 MG/DL (ref 6–23)
CALCIUM SERPL-MCNC: 8 MG/DL (ref 8.6–10.6)
CHLORIDE SERPL-SCNC: 103 MMOL/L (ref 98–107)
CO2 SERPL-SCNC: 25 MMOL/L (ref 21–32)
CREAT SERPL-MCNC: 0.63 MG/DL (ref 0.5–1.05)
EOSINOPHIL # BLD AUTO: 0 X10*3/UL (ref 0–0.7)
EOSINOPHIL NFR BLD AUTO: 0 %
ERYTHROCYTE [DISTWIDTH] IN BLOOD BY AUTOMATED COUNT: 17 % (ref 11.5–14.5)
GFR SERPL CREATININE-BSD FRML MDRD: >90 ML/MIN/1.73M*2
GLUCOSE SERPL-MCNC: 91 MG/DL (ref 74–99)
HCT VFR BLD AUTO: 26.2 % (ref 36–46)
HGB BLD-MCNC: 8.7 G/DL (ref 12–16)
IMM GRANULOCYTES # BLD AUTO: 0.02 X10*3/UL (ref 0–0.7)
IMM GRANULOCYTES NFR BLD AUTO: 0.7 % (ref 0–0.9)
LYMPHOCYTES # BLD AUTO: 0.74 X10*3/UL (ref 1.2–4.8)
LYMPHOCYTES NFR BLD AUTO: 24.9 %
MAGNESIUM SERPL-MCNC: 1.62 MG/DL (ref 1.6–2.4)
MCH RBC QN AUTO: 34 PG (ref 26–34)
MCHC RBC AUTO-ENTMCNC: 33.2 G/DL (ref 32–36)
MCV RBC AUTO: 102 FL (ref 80–100)
MONOCYTES # BLD AUTO: 0.52 X10*3/UL (ref 0.1–1)
MONOCYTES NFR BLD AUTO: 17.5 %
NEUTROPHILS # BLD AUTO: 1.63 X10*3/UL (ref 1.2–7.7)
NEUTROPHILS NFR BLD AUTO: 54.9 %
NRBC BLD-RTO: 0.7 /100 WBCS (ref 0–0)
P AXIS: 26 DEGREES
P OFFSET: 197 MS
P ONSET: 157 MS
PHOSPHATE SERPL-MCNC: 3.8 MG/DL (ref 2.5–4.9)
PLATELET # BLD AUTO: 182 X10*3/UL (ref 150–450)
POTASSIUM SERPL-SCNC: 3.7 MMOL/L (ref 3.5–5.3)
PR INTERVAL: 144 MS
Q ONSET: 229 MS
QRS COUNT: 16 BEATS
QRS DURATION: 80 MS
QT INTERVAL: 372 MS
QTC CALCULATION(BAZETT): 474 MS
QTC FREDERICIA: 438 MS
R AXIS: -43 DEGREES
RBC # BLD AUTO: 2.56 X10*6/UL (ref 4–5.2)
SODIUM SERPL-SCNC: 135 MMOL/L (ref 136–145)
T AXIS: 69 DEGREES
T OFFSET: 415 MS
URATE SERPL-MCNC: 3.3 MG/DL (ref 2.3–6.7)
VENTRICULAR RATE: 98 BPM
WBC # BLD AUTO: 3 X10*3/UL (ref 4.4–11.3)

## 2023-12-12 PROCEDURE — 80069 RENAL FUNCTION PANEL: CPT | Performed by: HOME HEALTH AIDE

## 2023-12-12 PROCEDURE — 1170000001 HC PRIVATE ONCOLOGY ROOM DAILY

## 2023-12-12 PROCEDURE — 84550 ASSAY OF BLOOD/URIC ACID: CPT | Performed by: HOME HEALTH AIDE

## 2023-12-12 PROCEDURE — 93005 ELECTROCARDIOGRAM TRACING: CPT

## 2023-12-12 PROCEDURE — 83735 ASSAY OF MAGNESIUM: CPT | Performed by: HOME HEALTH AIDE

## 2023-12-12 PROCEDURE — 99233 SBSQ HOSP IP/OBS HIGH 50: CPT | Performed by: INTERNAL MEDICINE

## 2023-12-12 PROCEDURE — 96372 THER/PROPH/DIAG INJ SC/IM: CPT | Performed by: HOME HEALTH AIDE

## 2023-12-12 PROCEDURE — 85025 COMPLETE CBC W/AUTO DIFF WBC: CPT | Performed by: HOME HEALTH AIDE

## 2023-12-12 PROCEDURE — 94760 N-INVAS EAR/PLS OXIMETRY 1: CPT

## 2023-12-12 PROCEDURE — 2500000004 HC RX 250 GENERAL PHARMACY W/ HCPCS (ALT 636 FOR OP/ED): Performed by: HOME HEALTH AIDE

## 2023-12-12 PROCEDURE — RXMED WILLOW AMBULATORY MEDICATION CHARGE

## 2023-12-12 PROCEDURE — 2500000001 HC RX 250 WO HCPCS SELF ADMINISTERED DRUGS (ALT 637 FOR MEDICARE OP): Performed by: HOME HEALTH AIDE

## 2023-12-12 RX ORDER — ACETAMINOPHEN 325 MG/1
650 TABLET ORAL ONCE
Status: COMPLETED | OUTPATIENT
Start: 2023-12-12 | End: 2023-12-12

## 2023-12-12 RX ORDER — GABAPENTIN 300 MG/1
300 CAPSULE ORAL 3 TIMES DAILY
Qty: 90 CAPSULE | Refills: 0 | Status: SHIPPED | OUTPATIENT
Start: 2023-12-12 | End: 2024-01-03 | Stop reason: SDUPTHER

## 2023-12-12 RX ADMIN — GABAPENTIN 300 MG: 300 CAPSULE ORAL at 20:14

## 2023-12-12 RX ADMIN — MORPHINE SULFATE 30 MG: 30 TABLET, EXTENDED RELEASE ORAL at 05:21

## 2023-12-12 RX ADMIN — PIPERACILLIN SODIUM AND TAZOBACTAM SODIUM 3.38 G: 3; .375 INJECTION, SOLUTION INTRAVENOUS at 05:19

## 2023-12-12 RX ADMIN — SENNOSIDES AND DOCUSATE SODIUM 2 TABLET: 8.6; 5 TABLET ORAL at 09:22

## 2023-12-12 RX ADMIN — GABAPENTIN 300 MG: 300 CAPSULE ORAL at 09:22

## 2023-12-12 RX ADMIN — PIPERACILLIN SODIUM AND TAZOBACTAM SODIUM 3.38 G: 3; .375 INJECTION, SOLUTION INTRAVENOUS at 11:29

## 2023-12-12 RX ADMIN — ACETAMINOPHEN 650 MG: 325 TABLET ORAL at 21:20

## 2023-12-12 RX ADMIN — ACETAMINOPHEN 650 MG: 325 TABLET ORAL at 02:53

## 2023-12-12 RX ADMIN — OXYCODONE HYDROCHLORIDE 15 MG: 5 TABLET ORAL at 02:25

## 2023-12-12 RX ADMIN — PIPERACILLIN SODIUM AND TAZOBACTAM SODIUM 3.38 G: 3; .375 INJECTION, SOLUTION INTRAVENOUS at 17:25

## 2023-12-12 RX ADMIN — POTASSIUM CHLORIDE 10 MEQ: 1500 TABLET, EXTENDED RELEASE ORAL at 20:16

## 2023-12-12 RX ADMIN — ESCITALOPRAM OXALATE 20 MG: 20 TABLET ORAL at 09:22

## 2023-12-12 RX ADMIN — MAGNESIUM OXIDE TAB 400 MG (241.3 MG ELEMENTAL MG) 400 MG: 400 (241.3 MG) TAB at 09:22

## 2023-12-12 RX ADMIN — GABAPENTIN 300 MG: 300 CAPSULE ORAL at 14:00

## 2023-12-12 RX ADMIN — OXYCODONE HYDROCHLORIDE 15 MG: 5 TABLET ORAL at 14:00

## 2023-12-12 RX ADMIN — FERROUS SULFATE TAB 325 MG (65 MG ELEMENTAL FE) 1 TABLET: 325 (65 FE) TAB at 09:22

## 2023-12-12 RX ADMIN — ENOXAPARIN SODIUM 60 MG: 100 INJECTION SUBCUTANEOUS at 17:26

## 2023-12-12 RX ADMIN — PIPERACILLIN SODIUM AND TAZOBACTAM SODIUM 3.38 G: 3; .375 INJECTION, SOLUTION INTRAVENOUS at 23:49

## 2023-12-12 RX ADMIN — ACYCLOVIR 400 MG: 200 CAPSULE ORAL at 20:15

## 2023-12-12 RX ADMIN — PANTOPRAZOLE SODIUM 40 MG: 40 TABLET, DELAYED RELEASE ORAL at 09:00

## 2023-12-12 RX ADMIN — ACYCLOVIR 400 MG: 200 CAPSULE ORAL at 09:22

## 2023-12-12 RX ADMIN — BUPROPION HYDROCHLORIDE 150 MG: 150 TABLET, EXTENDED RELEASE ORAL at 20:15

## 2023-12-12 RX ADMIN — POTASSIUM CHLORIDE 10 MEQ: 1500 TABLET, EXTENDED RELEASE ORAL at 09:22

## 2023-12-12 RX ADMIN — ENOXAPARIN SODIUM 60 MG: 100 INJECTION SUBCUTANEOUS at 05:18

## 2023-12-12 RX ADMIN — MORPHINE SULFATE 30 MG: 30 TABLET, EXTENDED RELEASE ORAL at 17:25

## 2023-12-12 RX ADMIN — BUPROPION HYDROCHLORIDE 150 MG: 150 TABLET, EXTENDED RELEASE ORAL at 11:29

## 2023-12-12 SDOH — SOCIAL STABILITY: SOCIAL INSECURITY: HAS ANYONE EVER THREATENED TO HURT YOUR FAMILY OR YOUR PETS?: NO

## 2023-12-12 SDOH — SOCIAL STABILITY: SOCIAL INSECURITY: HAVE YOU HAD THOUGHTS OF HARMING ANYONE ELSE?: NO

## 2023-12-12 SDOH — SOCIAL STABILITY: SOCIAL INSECURITY: DO YOU FEEL ANYONE HAS EXPLOITED OR TAKEN ADVANTAGE OF YOU FINANCIALLY OR OF YOUR PERSONAL PROPERTY?: NO

## 2023-12-12 SDOH — SOCIAL STABILITY: SOCIAL INSECURITY: DO YOU FEEL UNSAFE GOING BACK TO THE PLACE WHERE YOU ARE LIVING?: NO

## 2023-12-12 SDOH — SOCIAL STABILITY: SOCIAL INSECURITY: ABUSE: ADULT

## 2023-12-12 SDOH — SOCIAL STABILITY: SOCIAL INSECURITY: ARE YOU OR HAVE YOU BEEN THREATENED OR ABUSED PHYSICALLY, EMOTIONALLY, OR SEXUALLY BY ANYONE?: NO

## 2023-12-12 SDOH — SOCIAL STABILITY: SOCIAL INSECURITY: ARE THERE ANY APPARENT SIGNS OF INJURIES/BEHAVIORS THAT COULD BE RELATED TO ABUSE/NEGLECT?: NO

## 2023-12-12 SDOH — SOCIAL STABILITY: SOCIAL INSECURITY: WERE YOU ABLE TO COMPLETE ALL THE BEHAVIORAL HEALTH SCREENINGS?: YES

## 2023-12-12 SDOH — SOCIAL STABILITY: SOCIAL INSECURITY: DOES ANYONE TRY TO KEEP YOU FROM HAVING/CONTACTING OTHER FRIENDS OR DOING THINGS OUTSIDE YOUR HOME?: NO

## 2023-12-12 ASSESSMENT — PATIENT HEALTH QUESTIONNAIRE - PHQ9
2. FEELING DOWN, DEPRESSED OR HOPELESS: NOT AT ALL
SUM OF ALL RESPONSES TO PHQ9 QUESTIONS 1 & 2: 0
1. LITTLE INTEREST OR PLEASURE IN DOING THINGS: NOT AT ALL

## 2023-12-12 ASSESSMENT — LIFESTYLE VARIABLES
AUDIT-C TOTAL SCORE: 1
AUDIT-C TOTAL SCORE: 1
HOW OFTEN DO YOU HAVE A DRINK CONTAINING ALCOHOL: MONTHLY OR LESS
HOW MANY STANDARD DRINKS CONTAINING ALCOHOL DO YOU HAVE ON A TYPICAL DAY: 1 OR 2
HOW OFTEN DO YOU HAVE 6 OR MORE DRINKS ON ONE OCCASION: NEVER
SKIP TO QUESTIONS 9-10: 1

## 2023-12-12 ASSESSMENT — COGNITIVE AND FUNCTIONAL STATUS - GENERAL
DAILY ACTIVITIY SCORE: 24
MOBILITY SCORE: 24
DAILY ACTIVITIY SCORE: 24
MOBILITY SCORE: 24
DAILY ACTIVITIY SCORE: 24
MOBILITY SCORE: 24
PATIENT BASELINE BEDBOUND: NO

## 2023-12-12 ASSESSMENT — ACTIVITIES OF DAILY LIVING (ADL)
FEEDING YOURSELF: INDEPENDENT
LACK_OF_TRANSPORTATION: NO
TOILETING: INDEPENDENT
ADEQUATE_TO_COMPLETE_ADL: YES
JUDGMENT_ADEQUATE_SAFELY_COMPLETE_DAILY_ACTIVITIES: YES
HEARING - LEFT EAR: FUNCTIONAL
WALKS IN HOME: INDEPENDENT
PATIENT'S MEMORY ADEQUATE TO SAFELY COMPLETE DAILY ACTIVITIES?: YES
DRESSING YOURSELF: INDEPENDENT
GROOMING: INDEPENDENT
BATHING: INDEPENDENT
HEARING - RIGHT EAR: FUNCTIONAL

## 2023-12-12 ASSESSMENT — PAIN SCALES - GENERAL
PAINLEVEL_OUTOF10: 0 - NO PAIN
PAINLEVEL_OUTOF10: 7
PAINLEVEL_OUTOF10: 8
PAINLEVEL_OUTOF10: 0 - NO PAIN

## 2023-12-12 ASSESSMENT — PAIN DESCRIPTION - LOCATION: LOCATION: BACK

## 2023-12-12 ASSESSMENT — PAIN DESCRIPTION - ORIENTATION: ORIENTATION: UPPER

## 2023-12-12 NOTE — PROGRESS NOTES
Pamela Lopez is a 48 y.o. female on day 1 of admission presenting with Fever, unspecified fever cause.    Subjective   Patient reports she feels improved since her fevers resolved. Notes persistent cough and fatigue. Denies HA, dizziness, CP, SOB, N/V/D/C. All other ROS otherwise negative.        Objective     Physical Exam  Constitutional:       General: She is not in acute distress.     Appearance: Normal appearance. She is not ill-appearing or toxic-appearing.   HENT:      Head: Normocephalic and atraumatic.      Nose: Nose normal.      Mouth/Throat:      Mouth: Mucous membranes are moist.      Pharynx: No oropharyngeal exudate or posterior oropharyngeal erythema.   Eyes:      General: No scleral icterus.     Extraocular Movements: Extraocular movements intact.      Pupils: Pupils are equal, round, and reactive to light.   Cardiovascular:      Rate and Rhythm: Normal rate and regular rhythm.      Pulses: Normal pulses.      Heart sounds: Normal heart sounds. No murmur heard.     No gallop.   Pulmonary:      Effort: Pulmonary effort is normal. No respiratory distress.      Breath sounds: No stridor. Rales (bibasilar, L>R) present. No wheezing or rhonchi.   Chest:      Chest wall: No tenderness.   Abdominal:      General: Abdomen is flat. There is no distension.      Palpations: Abdomen is soft.      Tenderness: There is no abdominal tenderness. There is no guarding.      Hernia: No hernia is present.   Musculoskeletal:         General: No swelling, tenderness, deformity or signs of injury. Normal range of motion.      Cervical back: Normal range of motion and neck supple.   Skin:     General: Skin is warm and dry.      Coloration: Skin is not jaundiced.      Findings: No bruising, erythema or lesion.   Neurological:      General: No focal deficit present.      Mental Status: She is alert and oriented to person, place, and time.      Cranial Nerves: No cranial nerve deficit.      Sensory: No sensory deficit.     "  Motor: No weakness.   Psychiatric:         Mood and Affect: Mood normal.         Behavior: Behavior normal.      Comments: Fluent speech, cooperative      Last Recorded Vitals  Blood pressure 93/57, pulse 78, temperature 37.7 °C (99.9 °F), temperature source Temporal, resp. rate 16, height 1.575 m (5' 2\"), weight 56.3 kg (124 lb 1.9 oz), SpO2 93 %.  Intake/Output last 3 Shifts:  I/O last 3 completed shifts:  In: 150 (2.7 mL/kg) [IV Piggyback:150]  Out: - (0 mL/kg)   Weight: 56.3 kg     Relevant Results  Scheduled medications  acyclovir, 400 mg, oral, BID  buPROPion SR, 150 mg, oral, BID  enoxaparin, 1 mg/kg, subcutaneous, q12h  escitalopram, 20 mg, oral, Daily  ferrous sulfate (325 mg ferrous sulfate), 1 tablet, oral, Every other day  gabapentin, 300 mg, oral, TID  magnesium oxide, 400 mg, oral, Daily  morphine CR, 30 mg, oral, BID  pantoprazole, 40 mg, oral, Daily  piperacillin-tazobactam, 3.375 g, intravenous, q6h  potassium chloride CR, 10 mEq, oral, BID  [Held by provider] risperiDONE, 0.5 mg, oral, Nightly      Continuous medications     PRN medications  PRN medications: albuterol, alteplase, naloxone, oxyCODONE, polyethylene glycol, prochlorperazine, sennosides-docusate sodium    Results for orders placed or performed during the hospital encounter of 12/11/23 (from the past 24 hour(s))   Lavender Top   Result Value Ref Range    Extra Tube Hold for add-ons.    D-dimer, Non VTE   Result Value Ref Range    D-Dimer Non VTE, Quant (ng/mL FEU) 5,370 (H) <=500 ng/mL FEU   Coagulation Screen   Result Value Ref Range    Protime 13.1 (H) 9.8 - 12.8 seconds    INR 1.2 (H) 0.9 - 1.1    aPTT 32 27 - 38 seconds   Fibrinogen   Result Value Ref Range    Fibrinogen 325 200 - 400 mg/dL   CBC and Auto Differential   Result Value Ref Range    WBC 3.0 (L) 4.4 - 11.3 x10*3/uL    nRBC 0.7 (H) 0.0 - 0.0 /100 WBCs    RBC 2.56 (L) 4.00 - 5.20 x10*6/uL    Hemoglobin 8.7 (L) 12.0 - 16.0 g/dL    Hematocrit 26.2 (L) 36.0 - 46.0 %    MCV " 102 (H) 80 - 100 fL    MCH 34.0 26.0 - 34.0 pg    MCHC 33.2 32.0 - 36.0 g/dL    RDW 17.0 (H) 11.5 - 14.5 %    Platelets 182 150 - 450 x10*3/uL    Neutrophils % 54.9 40.0 - 80.0 %    Immature Granulocytes %, Automated 0.7 0.0 - 0.9 %    Lymphocytes % 24.9 13.0 - 44.0 %    Monocytes % 17.5 2.0 - 10.0 %    Eosinophils % 0.0 0.0 - 6.0 %    Basophils % 2.0 0.0 - 2.0 %    Neutrophils Absolute 1.63 1.20 - 7.70 x10*3/uL    Immature Granulocytes Absolute, Automated 0.02 0.00 - 0.70 x10*3/uL    Lymphocytes Absolute 0.74 (L) 1.20 - 4.80 x10*3/uL    Monocytes Absolute 0.52 0.10 - 1.00 x10*3/uL    Eosinophils Absolute 0.00 0.00 - 0.70 x10*3/uL    Basophils Absolute 0.06 0.00 - 0.10 x10*3/uL   Magnesium   Result Value Ref Range    Magnesium 1.62 1.60 - 2.40 mg/dL   Renal function panel   Result Value Ref Range    Glucose 91 74 - 99 mg/dL    Sodium 135 (L) 136 - 145 mmol/L    Potassium 3.7 3.5 - 5.3 mmol/L    Chloride 103 98 - 107 mmol/L    Bicarbonate 25 21 - 32 mmol/L    Anion Gap 11 10 - 20 mmol/L    Urea Nitrogen 10 6 - 23 mg/dL    Creatinine 0.63 0.50 - 1.05 mg/dL    eGFR >90 >60 mL/min/1.73m*2    Calcium 8.0 (L) 8.6 - 10.6 mg/dL    Phosphorus 3.8 2.5 - 4.9 mg/dL    Albumin 3.1 (L) 3.4 - 5.0 g/dL   Uric Acid   Result Value Ref Range    Uric Acid 3.3 2.3 - 6.7 mg/dL   ECG 12 lead   Result Value Ref Range    Ventricular Rate 98 BPM    Atrial Rate 98 BPM    NE Interval 144 ms    QRS Duration 80 ms    QT Interval 372 ms    QTC Calculation(Bazett) 474 ms    P Axis 26 degrees    R Axis -43 degrees    T Axis 69 degrees    QRS Count 16 beats    Q Onset 229 ms    P Onset 157 ms    P Offset 197 ms    T Offset 415 ms    QTC Fredericia 438 ms     ECG 12 lead    Result Date: 12/12/2023  See ED provider note for full interpretation and clinical correlation Confirmed by Tony Ramirez ([id number]) on 12/12/2023 5:47:47 AM    XR chest 1 view    Result Date: 12/11/2023  STUDY: Chest Radiograph; 12- 11:30 AM INDICATION: Fever, neutropenic.  COMPARISON: 8- XR CHEST 2V ACCESSION NUMBER(S): [id number] ORDERING CLINICIAN: YENNY HATFIELD TECHNIQUE:  Frontal chest was obtained at 11:29 hours. FINDINGS: CARDIOMEDIASTINAL SILHOUETTE: Cardiomediastinal silhouette is normal in size and configuration. Right internal jugular [id number] demonstrates tip at the cavoatrial junction.  Elevation left hemidiaphragm appears stable.  LUNGS: Lungs demonstrate chronic changes.  There is subtle opacity in the left lower lobe concerning for pneumonia.  There is no pneumothorax or significant pleural effusion.  ABDOMEN: No remarkable upper abdominal findings.  BONES: No acute osseous changes.    Increased airspace opacity left lower lobe concerning for pneumonia. Chronic changes.  Stable elevation left hemidiaphragm. Signed by Kody Deras DO         Assessment/Plan   Principal Problem:    Fever, unspecified fever cause  Pamela Lopez is a 46 yo F with a PMH significant for Hodgkin's lymphoma (Dx 6/2023; s/p 4 cycles AVD + brentuximab), pain 2/2 malignancy, anxiety/depression, asthma, HLD, HTN, IV drug abuse who is presenting to ED with fever after recent treatment for UTI and suspected dental infection. IV zosyn initiated on admit. Covid positive. Patient is being admitted for further workup and management.       ONC  #Lymphocyte-rich Hodgkin lymphoma   -Initially presented with enlarged tonsils   -Admitted 6/5/23-6/7/23 for neck pain and found to have multiple spinal lesions, neck LAD, and possible liver lesions   -Biopsy L3 nondiagnostic (6/9/23)   -PET (6/13/23) with avid right palantine tonsil lesion, b/l cervical, portacaval, RP nodes, hepatic mets and osseous lesions in axial skeleton.   -S/p tonsillectomy (6/22/23), path c/w classical Hodgkin Lymphoma    -S/p 4 cycles AVD + brentuximab (initiated 7/12/23), currently in cycle 5 (brentuximab reduced to 0.9 mh/mkg)  -PET (10/9/23) c/w treatment response  -Chemotherapy currently held due to recent  dental infection      HEME  #Anemia likely 2/2 disease vs treatment   -No evidence of active bleeding   -Monitor counts daily   -Keep Hgb>7, plt>10  -C/w ferrous sulfate every other day      ID  #Fever  #Covid PNA  -Covid positive (12/11); Influenza and RSV negative   -Blood cx (12/11) pending   -UA neg infection  -CXR (12/11) LLL PNA  -S/p zosyn and vancomycin in ED  -C/w IV zosyn (12/11-present)   -Stable on room air, does not meet criteria for remdesivir or dexamethasone   -Will start paxlovid x5 days (12/12-p)   #Hx recent suspicious dental infection   #Recent E coli UTI (12/2)  -Declined previous hospital admission on 12/2, d/c'd from ED with Augmentin   -CT maxillofacial bones (12/2) possible periapical abscess involving left mandibular 1st molar tooth, no definite soft tissue abscess   -Referred to outpt maxillary surgery, patient did not go   -Symptoms resolved, no evidence of abscess on exam   #PPX: ACV     FEN/GI   Admit wt: 56.2kg, current wt 56.3kg (12/12)   -Monitor electrolytes, replace as needed  -C/w magnesium, potassium  #PPx: protonix      MSK  #Pain 2/2 malignancy   #Hx IV drug abuse  -C/w prn oxycodone 15mg q4h   -C/w morphine ER 30mg BID   -C/w gabapentin 300mg TID  -Follows with palliative medicine, was referred to aquatherapy   -Utox (12/2) positive opioids (expected)      CARDIAC  #Hx HLD   -Echo (8/22/23) LVEF 55-60%      PULM  #Hx asthma   -C/w prn albuterol      PSYCH  #Anxiety, depression   -c/w 20mg Lexapro daily   -C/w bupropion nightly      MISC  #Insomnia   -C/w risperidone nightly      DISPO  -Full code, confirmed on admit   -Access: mediport   -Primary onc: Dr. Patricia Mercado    Patient seen, examined, and discussed with Dr. Miko Cox, PA-C

## 2023-12-12 NOTE — NURSING NOTE
Team paged [phone] for temp. Team told rn that as long as pt is asymptomatic there are no new orders at this time. Rn will continue to monitor

## 2023-12-12 NOTE — CARE PLAN
The patient's goals for the shift include      The clinical goals for the shift include to remain afebrile

## 2023-12-13 ENCOUNTER — TELEPHONE (OUTPATIENT)
Dept: HEMATOLOGY/ONCOLOGY | Facility: CLINIC | Age: 48
End: 2023-12-13
Payer: COMMERCIAL

## 2023-12-13 ENCOUNTER — PHARMACY VISIT (OUTPATIENT)
Dept: PHARMACY | Facility: CLINIC | Age: 48
End: 2023-12-13
Payer: MEDICAID

## 2023-12-13 VITALS
OXYGEN SATURATION: 93 % | BODY MASS INDEX: 22.84 KG/M2 | HEIGHT: 62 IN | RESPIRATION RATE: 18 BRPM | SYSTOLIC BLOOD PRESSURE: 100 MMHG | TEMPERATURE: 99.7 F | DIASTOLIC BLOOD PRESSURE: 65 MMHG | WEIGHT: 124.12 LBS | HEART RATE: 61 BPM

## 2023-12-13 PROBLEM — J18.9 HEALTHCARE-ASSOCIATED PNEUMONIA: Status: ACTIVE | Noted: 2023-12-11

## 2023-12-13 PROBLEM — U07.1 COVID-19 VIRUS INFECTION: Status: ACTIVE | Noted: 2023-12-13

## 2023-12-13 LAB
ALBUMIN SERPL BCP-MCNC: 3.2 G/DL (ref 3.4–5)
ALP SERPL-CCNC: 65 U/L (ref 33–110)
ALT SERPL W P-5'-P-CCNC: 9 U/L (ref 7–45)
ANION GAP SERPL CALC-SCNC: 12 MMOL/L (ref 10–20)
AST SERPL W P-5'-P-CCNC: 23 U/L (ref 9–39)
BASOPHILS # BLD MANUAL: 0.02 X10*3/UL (ref 0–0.1)
BASOPHILS NFR BLD MANUAL: 1 %
BILIRUB DIRECT SERPL-MCNC: 0.1 MG/DL (ref 0–0.3)
BILIRUB SERPL-MCNC: 0.2 MG/DL (ref 0–1.2)
BUN SERPL-MCNC: 11 MG/DL (ref 6–23)
BURR CELLS BLD QL SMEAR: ABNORMAL
CALCIUM SERPL-MCNC: 8.5 MG/DL (ref 8.6–10.6)
CHLORIDE SERPL-SCNC: 103 MMOL/L (ref 98–107)
CO2 SERPL-SCNC: 26 MMOL/L (ref 21–32)
CREAT SERPL-MCNC: 0.59 MG/DL (ref 0.5–1.05)
DACRYOCYTES BLD QL SMEAR: ABNORMAL
EOSINOPHIL # BLD MANUAL: 0.04 X10*3/UL (ref 0–0.7)
EOSINOPHIL NFR BLD MANUAL: 2 %
ERYTHROCYTE [DISTWIDTH] IN BLOOD BY AUTOMATED COUNT: 16.5 % (ref 11.5–14.5)
GFR SERPL CREATININE-BSD FRML MDRD: >90 ML/MIN/1.73M*2
GLUCOSE SERPL-MCNC: 77 MG/DL (ref 74–99)
HCT VFR BLD AUTO: 31.5 % (ref 36–46)
HGB BLD-MCNC: 10 G/DL (ref 12–16)
HOLD SPECIMEN: NORMAL
IMM GRANULOCYTES # BLD AUTO: 0.01 X10*3/UL (ref 0–0.7)
IMM GRANULOCYTES NFR BLD AUTO: 0.5 % (ref 0–0.9)
LYMPHOCYTES # BLD MANUAL: 0.89 X10*3/UL (ref 1.2–4.8)
LYMPHOCYTES NFR BLD MANUAL: 47 %
MAGNESIUM SERPL-MCNC: 1.99 MG/DL (ref 1.6–2.4)
MCH RBC QN AUTO: 32.3 PG (ref 26–34)
MCHC RBC AUTO-ENTMCNC: 31.7 G/DL (ref 32–36)
MCV RBC AUTO: 102 FL (ref 80–100)
MONOCYTES # BLD MANUAL: 0.17 X10*3/UL (ref 0.1–1)
MONOCYTES NFR BLD MANUAL: 9 %
NEUTROPHILS # BLD MANUAL: 0.78 X10*3/UL (ref 1.2–7.7)
NEUTS BAND # BLD MANUAL: 0.02 X10*3/UL (ref 0–0.7)
NEUTS BAND NFR BLD MANUAL: 1 %
NEUTS SEG # BLD MANUAL: 0.76 X10*3/UL (ref 1.2–7)
NEUTS SEG NFR BLD MANUAL: 40 %
NRBC BLD-RTO: 0 /100 WBCS (ref 0–0)
OVALOCYTES BLD QL SMEAR: ABNORMAL
PHOSPHATE SERPL-MCNC: 4 MG/DL (ref 2.5–4.9)
PLATELET # BLD AUTO: 176 X10*3/UL (ref 150–450)
POTASSIUM SERPL-SCNC: 3.9 MMOL/L (ref 3.5–5.3)
PROT SERPL-MCNC: 6 G/DL (ref 6.4–8.2)
RBC # BLD AUTO: 3.1 X10*6/UL (ref 4–5.2)
RBC MORPH BLD: ABNORMAL
SODIUM SERPL-SCNC: 137 MMOL/L (ref 136–145)
TOTAL CELLS COUNTED BLD: 100
URATE SERPL-MCNC: 2.8 MG/DL (ref 2.3–6.7)
WBC # BLD AUTO: 1.9 X10*3/UL (ref 4.4–11.3)

## 2023-12-13 PROCEDURE — 84550 ASSAY OF BLOOD/URIC ACID: CPT | Performed by: HOME HEALTH AIDE

## 2023-12-13 PROCEDURE — 96372 THER/PROPH/DIAG INJ SC/IM: CPT | Performed by: HOME HEALTH AIDE

## 2023-12-13 PROCEDURE — 85027 COMPLETE CBC AUTOMATED: CPT | Performed by: HOME HEALTH AIDE

## 2023-12-13 PROCEDURE — 82248 BILIRUBIN DIRECT: CPT | Performed by: HOME HEALTH AIDE

## 2023-12-13 PROCEDURE — 84100 ASSAY OF PHOSPHORUS: CPT | Performed by: HOME HEALTH AIDE

## 2023-12-13 PROCEDURE — 99239 HOSP IP/OBS DSCHRG MGMT >30: CPT | Performed by: INTERNAL MEDICINE

## 2023-12-13 PROCEDURE — 2500000004 HC RX 250 GENERAL PHARMACY W/ HCPCS (ALT 636 FOR OP/ED): Performed by: HOME HEALTH AIDE

## 2023-12-13 PROCEDURE — 85007 BL SMEAR W/DIFF WBC COUNT: CPT | Performed by: HOME HEALTH AIDE

## 2023-12-13 PROCEDURE — 2500000001 HC RX 250 WO HCPCS SELF ADMINISTERED DRUGS (ALT 637 FOR MEDICARE OP): Performed by: HOME HEALTH AIDE

## 2023-12-13 PROCEDURE — 83735 ASSAY OF MAGNESIUM: CPT | Performed by: HOME HEALTH AIDE

## 2023-12-13 PROCEDURE — RXMED WILLOW AMBULATORY MEDICATION CHARGE

## 2023-12-13 PROCEDURE — 80053 COMPREHEN METABOLIC PANEL: CPT | Performed by: HOME HEALTH AIDE

## 2023-12-13 RX ORDER — LEVOFLOXACIN 500 MG/1
500 TABLET, FILM COATED ORAL DAILY
Qty: 5 TABLET | Refills: 0 | Status: SHIPPED | OUTPATIENT
Start: 2023-12-13 | End: 2023-12-18 | Stop reason: ALTCHOICE

## 2023-12-13 RX ADMIN — PIPERACILLIN SODIUM AND TAZOBACTAM SODIUM 3.38 G: 3; .375 INJECTION, SOLUTION INTRAVENOUS at 12:10

## 2023-12-13 RX ADMIN — MAGNESIUM OXIDE TAB 400 MG (241.3 MG ELEMENTAL MG) 400 MG: 400 (241.3 MG) TAB at 09:59

## 2023-12-13 RX ADMIN — MORPHINE SULFATE 30 MG: 30 TABLET, EXTENDED RELEASE ORAL at 06:03

## 2023-12-13 RX ADMIN — GABAPENTIN 300 MG: 300 CAPSULE ORAL at 09:59

## 2023-12-13 RX ADMIN — PANTOPRAZOLE SODIUM 40 MG: 40 TABLET, DELAYED RELEASE ORAL at 10:00

## 2023-12-13 RX ADMIN — OXYCODONE HYDROCHLORIDE 15 MG: 5 TABLET ORAL at 06:35

## 2023-12-13 RX ADMIN — BUPROPION HYDROCHLORIDE 150 MG: 150 TABLET, EXTENDED RELEASE ORAL at 10:00

## 2023-12-13 RX ADMIN — ACYCLOVIR 400 MG: 200 CAPSULE ORAL at 09:59

## 2023-12-13 RX ADMIN — POTASSIUM CHLORIDE 10 MEQ: 1500 TABLET, EXTENDED RELEASE ORAL at 10:00

## 2023-12-13 RX ADMIN — ESCITALOPRAM OXALATE 20 MG: 20 TABLET ORAL at 12:09

## 2023-12-13 RX ADMIN — ENOXAPARIN SODIUM 60 MG: 100 INJECTION SUBCUTANEOUS at 06:03

## 2023-12-13 RX ADMIN — PIPERACILLIN SODIUM AND TAZOBACTAM SODIUM 3.38 G: 3; .375 INJECTION, SOLUTION INTRAVENOUS at 06:03

## 2023-12-13 ASSESSMENT — COGNITIVE AND FUNCTIONAL STATUS - GENERAL
MOBILITY SCORE: 24
DAILY ACTIVITIY SCORE: 24

## 2023-12-13 ASSESSMENT — PAIN - FUNCTIONAL ASSESSMENT: PAIN_FUNCTIONAL_ASSESSMENT: 0-10

## 2023-12-13 ASSESSMENT — PAIN SCALES - GENERAL: PAINLEVEL_OUTOF10: 0 - NO PAIN

## 2023-12-13 NOTE — SIGNIFICANT EVENT
Rapid Response RN Note    Rapid response RN at bedside for RADAR score 6 due to the following VS: T 38.5 °Celsius; HR 45 ; RR 18; /66; SPO2 91% on RA.     Reviewed above VS with bedside RN.  RN to give tylenol for fever and will recheck HR. No interventions by rapid response team indicated at this time.      Staff to page rapid response for any concerns or acute change in condition/VS. Eleuterio Serrano RN.

## 2023-12-13 NOTE — DISCHARGE SUMMARY
Discharge Diagnosis  Healthcare-associated pneumonia    Issues Requiring Follow-Up  COVID pneumonia, Hodgkin's Lymphoma    Test Results Pending At Discharge  Pending Labs       Order Current Status    Blood Culture Preliminary result    Blood Culture Preliminary result            Hospital Course  Pamela Lopez is a 46 yo F with a PMH significant for Hodgkin's lymphoma (Dx 6/2023; s/p 4 cycles AVD + brentuximab), pain 2/2 malignancy, anxiety/depression, asthma, HLD, HTN, IV drug abuse who is presenting to ED with fever after recent treatment for UTI and suspected dental infection. IV zosyn initiated on admit. Covid positive. Patient admitted for further workup and management.       Hospital course:  Covid PNA treated with supportive care. Did not qualify for remdesivir or dexamethasone. Will discharge on paxlovid.   Non-neutropenic fever, infectious workup so far positive for covid PNA. 24 hour blood cultures negative. S/p IV zosyn. Will be discharged on Augmentin to complete 10 day course for recent periapical abscess and e coli UTI (end date 12/13). Matthew be discharged on levaquin to complete total of 8 days on abx (3 days IV abx + 5 days levaquin)    Anti-infectives upon discharge: ACV, augmentin (end 12/13), paxlovid, levaquin (x 5 days)  Access: mediport   Dispo: Home   Apts:   - 12/18 @ 11:30 @ [facility] for next chemo dose  - requested count check prior to next dose chemo 12/18  - requested [facility] appointment prior to chemo 12/18, ashutosh Johnson to schedule    Pertinent Physical Exam At Time of Discharge  Physical Exam  Constitutional:       General: She is not in acute distress.  HENT:      Head: Normocephalic.      Nose: Nose normal.   Eyes:      Extraocular Movements: Extraocular movements intact.      Conjunctiva/sclera: Conjunctivae normal.      Pupils: Pupils are equal, round, and reactive to light.   Cardiovascular:      Rate and Rhythm: Normal rate and regular rhythm.   Pulmonary:      Effort:  Pulmonary effort is normal.      Breath sounds: Normal breath sounds.   Abdominal:      General: Bowel sounds are normal.      Palpations: Abdomen is soft.      Tenderness: There is no abdominal tenderness.   Musculoskeletal:         General: Normal range of motion.      Cervical back: Normal range of motion and neck supple.   Skin:     General: Skin is warm and dry.   Neurological:      General: No focal deficit present.      Mental Status: She is alert and oriented to person, place, and time.   Psychiatric:         Mood and Affect: Mood normal.         Behavior: Behavior normal.     Home Medications     Medication List      START taking these medications     levoFLOXacin 500 mg tablet; Commonly known as: Levaquin; Take 1 tablet   (500 mg) by mouth once daily for 5 days.   Paxlovid 300 mg (150 mg x 2)-100 mg tablet therapy pack; Generic drug:   nirmatrelvir-ritonavir; Take 3 tablets by mouth 2 times a day for 5 days.   Follow the instructions on the package     CHANGE how you take these medications     acyclovir 200 mg capsule; Commonly known as: Zovirax; Take 2 capsules   (400 mg) by mouth 2 times a day.; What changed: how much to take   gabapentin 300 mg capsule; Commonly known as: Neurontin; Take 1 capsule   (300 mg) by mouth 3 times a day.; What changed: when to take this     CONTINUE taking these medications     albuterol 90 mcg/actuation inhaler   amoxicillin-pot clavulanate 875-125 mg tablet; Commonly known as:   Augmentin; Take 1 tablet (875 mg) by mouth every 12 hours for 10 days.   buPROPion  mg 12 hr tablet; Commonly known as: Wellbutrin SR; Take   1 tablet (150 mg) by mouth 2 times a day. Do not crush, chew, or split.   escitalopram 20 mg tablet; Commonly known as: Lexapro; Take 1 tablet (20   mg) by mouth once daily.   FeroSuL tablet; Generic drug: ferrous sulfate (325 mg ferrous sulfate);   Take 1 tablet by mouth every other day.   magnesium oxide 400 mg (241.3 mg magnesium) tablet; Commonly  known as:   Mag-Ox; Take 1 tablet (400 mg) by mouth once daily.   morphine CR 30 mg 12 hr tablet; Commonly known as: MS Contin; Take 1   tablet (30 mg) by mouth 2 times a day.   naloxone 4 mg/0.1 mL nasal spray; Commonly known as: Narcan; INSTILL 1   SPRAY IN ONE NOSTRIL AS NEEDED FOR ACCIDENTAL OPIOID OVERDOSE; REPEAT WITH   SECOND DOSE IN 5 MINUTES IF NO RESPONSE   oxyCODONE 15 mg immediate release tablet; Commonly known as: Roxicodone   pantoprazole 40 mg EC tablet; Commonly known as: ProtoNix   potassium chloride CR 10 mEq ER tablet; Commonly known as: Klor-Con;   Take 1 tablet (10 mEq) by mouth 2 times a day.   prochlorperazine 10 mg tablet; Commonly known as: Compazine; TAKE 1   TABLET BY MOUTH EVERY 8 HOURS, AS NEEDED FOR NAUSEA AND VOMITING, DO NOT   TAKE AT THE SAME TIME AS ZOFRAN WAIT AT LEAST 1 HOUR IN BETWEEN   MEDICATIONS   risperiDONE 0.5 mg tablet; Commonly known as: RisperDAL; Take 1 tablet   (0.5 mg) by mouth once daily at bedtime.   Senexon-S 8.6-50 mg tablet; Generic drug: sennosides-docusate sodium;   TAKE 2 TABLETS BY MOUTH TWO TIMES A DAY AS NEEDED FOR CONSTIPATION       Outpatient Follow-Up  Future Appointments   Date Time Provider Department [facility]   12[facility]18/2023 11:[id number] [place]   3/21/2024  2:30 PM Constantino Wagner MD PhD [id number] [place]       Kellie Meol PA-C

## 2023-12-13 NOTE — CARE PLAN
Problem: Respiratory  Goal: Clear secretions with interventions this shift  Outcome: Progressing  Goal: Minimize anxiety/maximize coping throughout shift  Outcome: Progressing  Goal: Minimal/no exertional discomfort or dyspnea this shift  Outcome: Progressing  Goal: No signs of respiratory distress (eg. Use of accessory muscles. Peds grunting)  Outcome: Progressing  Goal: Patent airway maintained this shift  Outcome: Progressing  Goal: Tolerate mechanical ventilation evidenced by VS/agitation level this shift  Outcome: Progressing  Goal: Tolerate pulmonary toileting this shift  Outcome: Progressing  Goal: Verbalize decreased shortness of breath this shift  Outcome: Progressing  Goal: Wean oxygen to maintain O2 saturation per order/standard this shift  Outcome: Progressing  Goal: Increase self care and/or family involvement in next 24 hours  Outcome: Progressing     Problem: Pain  Goal: My pain/discomfort is manageable  Outcome: Progressing     Problem: Safety  Goal: Patient will be injury free during hospitalization  Outcome: Progressing  Goal: I will remain free of falls  Outcome: Progressing     Problem: Daily Care  Goal: Daily care needs are met  Outcome: Progressing     Problem: Psychosocial Needs  Goal: Demonstrates ability to cope with hospitalization/illness  Outcome: Progressing  Goal: Collaborate with me, my family, and caregiver to identify my specific goals  Outcome: Progressing     Problem: Discharge Barriers  Goal: My discharge needs are met  Outcome: Progressing     Problem: Pain  Goal: Takes deep breaths with improved pain control throughout the shift  Outcome: Progressing  Goal: Turns in bed with improved pain control throughout the shift  Outcome: Progressing  Goal: Walks with improved pain control throughout the shift  Outcome: Progressing  Goal: Performs ADL's with improved pain control throughout shift  Outcome: Progressing  Goal: Participates in PT with improved pain control throughout the  shift  Outcome: Progressing  Goal: Free from opioid side effects throughout the shift  Outcome: Progressing  Goal: Free from acute confusion related to pain meds throughout the shift  Outcome: Progressing

## 2023-12-13 NOTE — TELEPHONE ENCOUNTER
TERE Veronica, inpatient , calling because inpatient asking that patient be seen by a provider prior to infusion on 12/18/23.  Glenys asking for call back: [phone].

## 2023-12-13 NOTE — TELEPHONE ENCOUNTER
This  spoke with Glenys.  Advised of appointment with Dr. Gomez- will have patient arrive at 11am on 12/18/23.  Glenys aware and appreciative.  This  scheduled appointment in [facility].

## 2023-12-13 NOTE — CARE PLAN
Patient afebrile. Vitals stable. No pain. No nausea vomitting or diarrhea. No desats. Discharged home.

## 2023-12-15 LAB
BACTERIA BLD CULT: NORMAL
BACTERIA BLD CULT: NORMAL

## 2023-12-17 NOTE — PROGRESS NOTES
Patient ID: Pamela Lopez is a 48 y.o. female.    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 Pamela Lopez is a 47 y/o F presenting for follow-up for Hodgkin's lymphoma.      Since last visit, patient was admitted from 12/11 to 12/13/2023 after presenting with worsening fever and hypertension. She was found to be COVID positive and discharged after receiving Paxlovid. She also received a 5-day prescription for Levaquin. She presents for consideration of starting cycle 5 day 15 today.    Blood work today shows WBC 4.3, hemoglobin 11.4, and platelets normal at 183,000.     Since last visit, patient reports that she overall is feeling better with no new fevers. No chills. No new rashes. No new GI toxicity with nausea, vomiting or diarrhea. Neuropathy is stable. No complaints today.     Patient's past medical history, surgical history, family history and social history reviewed.     Objective    There were no vitals filed for this visit.      Review of Systems:   Review of Systems:    Positive per HPI, otherwise negative.     Physical Exam:   Constitutional: Patient appears in no acute distress.   Sitting comfortably in chair.  Eyes: EOMI, clear sclera  ENMT: mucous membranes moist, no apparent injury  Head/Neck: Neck supple, no JVD  Respiratory/Thorax: Patent airways, CTAB, normal  breath sounds, no increased work of breathing  Cardiovascular: Regular, rate and rhythm, no murmurs  Gastrointestinal: Nondistended, soft, non-tender,  no rebound tenderness or guarding, no masses palpable  Extremities: normal extremities, no cyanosis edema,  no swelling  Neurological: alert and oriented x3, nonfocal, normal   speech and hearing  Lymphatic: No palpable lymphadenopathy in cervical,  axillary  lymph nodes.  Spleen appears normal size.  Psychological: Appropriate mood and behavior, normal  affect  Skin: Warm and dry, no lesions, no rashes     Performance Status:  Symptomatic; fully ambulatory    Labs/Imaging/Pathology: personally reviewed reports and images in Epic electronic medical record system. Pertinent results as it related to the plan represented in below in assessment and plan.      Assessment/Plan   1. Classical Hodgkin lymphoma, lymphocyte- rich subtype, stage IVB     - developed tonsil enlargement initially over two years ago and thought to be related to infection  - 6/5/23- 6/723 admitted for new pain and found to have multiple spine mets, neck lymphadenopathy and possible liver lesions  -  biopsy 6/9/23 of her L3 was nondiagnostic  - 6/13/23 PET/CT with FDG avid right palantine tonsil, b/l cervical, portacaval, RP nodes, hepatic met, osseous lesions in axial skeleton  - 6/19/23 planned for 10 fx to T7  - 6/22/23 tonsillectomy path consistent with classical Hodgkin lymphoma  - Discussed diagnosis of classical Hodgkin's lymphoma with patient and given stage IV disease with B symptom along with history of strong tobacco use, recommend brentuximab plus AVD per ECHELON-1 trial (Lisa, et al. [place] 2018)  - Reviewed side effects and consent signed  - pt is planning to see dental next week as she has active abscess with pain and still has RT next week  - 7/17/12 AVD+ brentuximab  -hospitalized for fever and discharge 8/27/23 and no infectious cause found.  - delays due to neutropenia, cycle 3 day on 9/25/23.  - 10/9/23 PET CT interim with significant treatment response   11/19/23 C5D1 reduced brentuximab to 0.9 mg/mkg     2/4/23:  - Since last visit, patient had a left-side infection in her mouth related to a molar that is likely infected. She was started on antibiotics in the hospital with Augmentin BID and then took  herself out of the hospital to come for treatment today.   - Today her blood pressure is 60s over 40s and recommended ED for further management  - We discussed that we will hold her chemotherapy with a plan to get her in with [facility] as soon as possible.   - At this point, we will delay cycle 5 day 15 by 1 week.  - RTC in 1 week with Lamonte.     12/11/2023:  - Presents to make up cycle 5 day 15 after it was held last week for hypotension, sent to the ED and BP improved with IV fluids  - Has a few days left of oral Augmentin for UTI and tooth infection, has not been able to make a visit with an oral surgeon who takes her insurance   - Says UTI and tooth infection symptoms have resolved and she is feeling well, however, she has a max temp of 39.1 here in clinic via oral temperature in addition to chills last night and feeling cold today, her son had the heat on high on the drive in  - Therefore, I did recommend patient go to the ED for concern for infection vs sepsis, patient chose to go to [facility] ED which I discussed may be good in case they feel her tooth needs extracted but if they cannot do then son will call [facility] oral surgeon   - We will reschedule patient once we are aware of her discharge from ED/hospital.     12/18/23:  - Hypotensive and bradycardic today, recommend further eval in ED and reconsideration of next cycle of tx in one week  - She reports that she is overall feeling better from COVID. Her son was also recently sick but COVID negative.   - We will plan for COVID precautions with treatment today and then return to clinic in 1 week for fluids and in 2 weeks for cycle 6 day 1.  - RTC with Lamonte in 2 weeks.     RTC with Lamonte in 2 weeks. This note has been transcribed using a medical scribe and there is a possibility of unintentional typing misprints.      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)  -     CBC  and Auto Differential; Future  -     Comprehensive Metabolic Panel; Future  -     Magnesium; Future  -     Clinic Appointment Request Chemo Follow Up; [facility]  -     NM PET CT lymphoma staging; Future  -     ECG 12 lead (Clinic Performed)  -     CBC and Auto Differential; Future  -     Comprehensive Metabolic Panel; Future    Jess Gomez MD  Hematology/Oncology  [facility]    Scribe Attestation  By signing my name below, IVita Scribe attest that this documentation has been prepared under the direction and in the presence of Jess Gomez MD.

## 2023-12-18 ENCOUNTER — INFUSION (OUTPATIENT)
Dept: HEMATOLOGY/ONCOLOGY | Facility: CLINIC | Age: 48
End: 2023-12-18
Payer: COMMERCIAL

## 2023-12-18 ENCOUNTER — OFFICE VISIT (OUTPATIENT)
Dept: HEMATOLOGY/ONCOLOGY | Facility: CLINIC | Age: 48
End: 2023-12-18
Payer: COMMERCIAL

## 2023-12-18 VITALS
RESPIRATION RATE: 16 BRPM | HEART RATE: 42 BPM | OXYGEN SATURATION: 96 % | WEIGHT: 116.18 LBS | SYSTOLIC BLOOD PRESSURE: 88 MMHG | TEMPERATURE: 100.2 F | BODY MASS INDEX: 21.25 KG/M2 | DIASTOLIC BLOOD PRESSURE: 56 MMHG

## 2023-12-18 DIAGNOSIS — C81.48: ICD-10-CM

## 2023-12-18 LAB
ALBUMIN SERPL BCP-MCNC: 3.9 G/DL (ref 3.4–5)
ALP SERPL-CCNC: 57 U/L (ref 33–110)
ALT SERPL W P-5'-P-CCNC: 8 U/L (ref 7–45)
ANION GAP SERPL CALC-SCNC: 14 MMOL/L (ref 10–20)
AST SERPL W P-5'-P-CCNC: 17 U/L (ref 9–39)
BASOPHILS # BLD AUTO: 0.06 X10*3/UL (ref 0–0.1)
BASOPHILS NFR BLD AUTO: 1.4 %
BILIRUB SERPL-MCNC: 0.4 MG/DL (ref 0–1.2)
BUN SERPL-MCNC: 12 MG/DL (ref 6–23)
CALCIUM SERPL-MCNC: 9.2 MG/DL (ref 8.6–10.3)
CHLORIDE SERPL-SCNC: 105 MMOL/L (ref 98–107)
CO2 SERPL-SCNC: 24 MMOL/L (ref 21–32)
CREAT SERPL-MCNC: 0.58 MG/DL (ref 0.5–1.05)
DACRYOCYTES BLD QL SMEAR: NORMAL
EOSINOPHIL # BLD AUTO: 0.38 X10*3/UL (ref 0–0.7)
EOSINOPHIL NFR BLD AUTO: 8.9 %
ERYTHROCYTE [DISTWIDTH] IN BLOOD BY AUTOMATED COUNT: 16.9 % (ref 11.5–14.5)
GFR SERPL CREATININE-BSD FRML MDRD: >90 ML/MIN/1.73M*2
GLUCOSE SERPL-MCNC: 84 MG/DL (ref 74–99)
HCT VFR BLD AUTO: 35.5 % (ref 36–46)
HGB BLD-MCNC: 11.4 G/DL (ref 12–16)
HYPOCHROMIA BLD QL SMEAR: NORMAL
IMM GRANULOCYTES # BLD AUTO: 0.02 X10*3/UL (ref 0–0.7)
IMM GRANULOCYTES NFR BLD AUTO: 0.5 % (ref 0–0.9)
LYMPHOCYTES # BLD AUTO: 0.95 X10*3/UL (ref 1.2–4.8)
LYMPHOCYTES NFR BLD AUTO: 22.2 %
MAGNESIUM SERPL-MCNC: 1.9 MG/DL (ref 1.6–2.4)
MCH RBC QN AUTO: 32.9 PG (ref 26–34)
MCHC RBC AUTO-ENTMCNC: 32.1 G/DL (ref 32–36)
MCV RBC AUTO: 103 FL (ref 80–100)
MONOCYTES # BLD AUTO: 0.7 X10*3/UL (ref 0.1–1)
MONOCYTES NFR BLD AUTO: 16.4 %
NEUTROPHILS # BLD AUTO: 2.17 X10*3/UL (ref 1.2–7.7)
NEUTROPHILS NFR BLD AUTO: 50.6 %
NRBC BLD-RTO: ABNORMAL /100{WBCS}
PHOSPHATE SERPL-MCNC: 2.4 MG/DL (ref 2.5–4.9)
PLATELET # BLD AUTO: 183 X10*3/UL (ref 150–450)
POTASSIUM SERPL-SCNC: 4 MMOL/L (ref 3.5–5.3)
PROT SERPL-MCNC: 6.5 G/DL (ref 6.4–8.2)
RBC # BLD AUTO: 3.46 X10*6/UL (ref 4–5.2)
RBC MORPH BLD: NORMAL
SODIUM SERPL-SCNC: 139 MMOL/L (ref 136–145)
WBC # BLD AUTO: 4.3 X10*3/UL (ref 4.4–11.3)

## 2023-12-18 PROCEDURE — 3008F BODY MASS INDEX DOCD: CPT | Performed by: INTERNAL MEDICINE

## 2023-12-18 PROCEDURE — 83735 ASSAY OF MAGNESIUM: CPT

## 2023-12-18 PROCEDURE — 80053 COMPREHEN METABOLIC PANEL: CPT

## 2023-12-18 PROCEDURE — 93005 ELECTROCARDIOGRAM TRACING: CPT | Performed by: INTERNAL MEDICINE

## 2023-12-18 PROCEDURE — 99214 OFFICE O/P EST MOD 30 MIN: CPT | Performed by: INTERNAL MEDICINE

## 2023-12-18 PROCEDURE — 1036F TOBACCO NON-USER: CPT | Performed by: INTERNAL MEDICINE

## 2023-12-18 PROCEDURE — 85025 COMPLETE CBC W/AUTO DIFF WBC: CPT

## 2023-12-18 PROCEDURE — 84100 ASSAY OF PHOSPHORUS: CPT

## 2023-12-18 PROCEDURE — 93010 ELECTROCARDIOGRAM REPORT: CPT | Performed by: INTERNAL MEDICINE

## 2023-12-18 ASSESSMENT — PAIN SCALES - GENERAL: PAINLEVEL: 0-NO PAIN

## 2023-12-18 NOTE — PROGRESS NOTES
Dr. Gomez notified of pt's low grade fever and low HR (40s) manually.    ECG completed   Orders to HOLD chemo today.   Will delay one more week.   Pt upset but states she understands the reasoning for safety.  Will see her December 27 th at 0800  Will also check in with cardiologist

## 2023-12-20 ENCOUNTER — TELEPHONE (OUTPATIENT)
Dept: PALLIATIVE MEDICINE | Facility: CLINIC | Age: 48
End: 2023-12-20

## 2023-12-20 ENCOUNTER — APPOINTMENT (OUTPATIENT)
Dept: PALLIATIVE MEDICINE | Facility: CLINIC | Age: 48
End: 2023-12-20
Payer: COMMERCIAL

## 2023-12-27 ENCOUNTER — NUTRITION (OUTPATIENT)
Dept: HEMATOLOGY/ONCOLOGY | Facility: CLINIC | Age: 48
End: 2023-12-27

## 2023-12-27 ENCOUNTER — INFUSION (OUTPATIENT)
Dept: HEMATOLOGY/ONCOLOGY | Facility: CLINIC | Age: 48
End: 2023-12-27
Payer: COMMERCIAL

## 2023-12-27 VITALS
OXYGEN SATURATION: 94 % | DIASTOLIC BLOOD PRESSURE: 74 MMHG | TEMPERATURE: 98.1 F | SYSTOLIC BLOOD PRESSURE: 113 MMHG | HEART RATE: 69 BPM | BODY MASS INDEX: 20.97 KG/M2 | WEIGHT: 114.64 LBS | RESPIRATION RATE: 16 BRPM

## 2023-12-27 VITALS — BODY MASS INDEX: 21.1 KG/M2 | WEIGHT: 114.64 LBS | HEIGHT: 62 IN

## 2023-12-27 DIAGNOSIS — C81.48: ICD-10-CM

## 2023-12-27 LAB
ALBUMIN SERPL BCP-MCNC: 3.7 G/DL (ref 3.4–5)
ALP SERPL-CCNC: 64 U/L (ref 33–110)
ALT SERPL W P-5'-P-CCNC: 5 U/L (ref 7–45)
ANION GAP SERPL CALC-SCNC: 12 MMOL/L (ref 10–20)
AST SERPL W P-5'-P-CCNC: 13 U/L (ref 9–39)
BASOPHILS # BLD AUTO: 0.12 X10*3/UL (ref 0–0.1)
BASOPHILS NFR BLD AUTO: 1.8 %
BILIRUB SERPL-MCNC: 0.3 MG/DL (ref 0–1.2)
BUN SERPL-MCNC: 12 MG/DL (ref 6–23)
CALCIUM SERPL-MCNC: 9.5 MG/DL (ref 8.6–10.3)
CHLORIDE SERPL-SCNC: 107 MMOL/L (ref 98–107)
CO2 SERPL-SCNC: 26 MMOL/L (ref 21–32)
CREAT SERPL-MCNC: 0.54 MG/DL (ref 0.5–1.05)
EOSINOPHIL # BLD AUTO: 0.99 X10*3/UL (ref 0–0.7)
EOSINOPHIL NFR BLD AUTO: 14.8 %
ERYTHROCYTE [DISTWIDTH] IN BLOOD BY AUTOMATED COUNT: 15.9 % (ref 11.5–14.5)
GFR SERPL CREATININE-BSD FRML MDRD: >90 ML/MIN/1.73M*2
GLUCOSE SERPL-MCNC: 95 MG/DL (ref 74–99)
HCT VFR BLD AUTO: 36.9 % (ref 36–46)
HGB BLD-MCNC: 11.6 G/DL (ref 12–16)
HOLD SPECIMEN: NORMAL
IMM GRANULOCYTES # BLD AUTO: 0.01 X10*3/UL (ref 0–0.7)
IMM GRANULOCYTES NFR BLD AUTO: 0.1 % (ref 0–0.9)
LYMPHOCYTES # BLD AUTO: 1.52 X10*3/UL (ref 1.2–4.8)
LYMPHOCYTES NFR BLD AUTO: 22.7 %
MCH RBC QN AUTO: 32 PG (ref 26–34)
MCHC RBC AUTO-ENTMCNC: 31.4 G/DL (ref 32–36)
MCV RBC AUTO: 102 FL (ref 80–100)
MONOCYTES # BLD AUTO: 0.49 X10*3/UL (ref 0.1–1)
MONOCYTES NFR BLD AUTO: 7.3 %
NEUTROPHILS # BLD AUTO: 3.58 X10*3/UL (ref 1.2–7.7)
NEUTROPHILS NFR BLD AUTO: 53.3 %
PLATELET # BLD AUTO: 169 X10*3/UL (ref 150–450)
POTASSIUM SERPL-SCNC: 3.7 MMOL/L (ref 3.5–5.3)
PROT SERPL-MCNC: 6.1 G/DL (ref 6.4–8.2)
RBC # BLD AUTO: 3.62 X10*6/UL (ref 4–5.2)
SODIUM SERPL-SCNC: 141 MMOL/L (ref 136–145)
WBC # BLD AUTO: 6.7 X10*3/UL (ref 4.4–11.3)

## 2023-12-27 PROCEDURE — 2500000004 HC RX 250 GENERAL PHARMACY W/ HCPCS (ALT 636 FOR OP/ED): Mod: JZ | Performed by: INTERNAL MEDICINE

## 2023-12-27 PROCEDURE — 96417 CHEMO IV INFUS EACH ADDL SEQ: CPT

## 2023-12-27 PROCEDURE — 96413 CHEMO IV INFUSION 1 HR: CPT

## 2023-12-27 PROCEDURE — 85025 COMPLETE CBC W/AUTO DIFF WBC: CPT

## 2023-12-27 PROCEDURE — 96367 TX/PROPH/DG ADDL SEQ IV INF: CPT

## 2023-12-27 PROCEDURE — 2500000004 HC RX 250 GENERAL PHARMACY W/ HCPCS (ALT 636 FOR OP/ED): Performed by: INTERNAL MEDICINE

## 2023-12-27 PROCEDURE — 96375 TX/PRO/DX INJ NEW DRUG ADDON: CPT | Mod: INF

## 2023-12-27 PROCEDURE — 80053 COMPREHEN METABOLIC PANEL: CPT

## 2023-12-27 PROCEDURE — 96411 CHEMO IV PUSH ADDL DRUG: CPT

## 2023-12-27 PROCEDURE — 96372 THER/PROPH/DIAG INJ SC/IM: CPT

## 2023-12-27 PROCEDURE — 96377 APPLICATON ON-BODY INJECTOR: CPT | Mod: 59

## 2023-12-27 RX ORDER — PALONOSETRON 0.05 MG/ML
0.25 INJECTION, SOLUTION INTRAVENOUS ONCE
Status: COMPLETED | OUTPATIENT
Start: 2023-12-27 | End: 2023-12-27

## 2023-12-27 RX ORDER — DOXORUBICIN HYDROCHLORIDE 2 MG/ML
25 INJECTION, SOLUTION INTRAVENOUS ONCE
Status: CANCELLED | OUTPATIENT
Start: 2023-12-27

## 2023-12-27 RX ORDER — ALBUTEROL SULFATE 0.83 MG/ML
3 SOLUTION RESPIRATORY (INHALATION) AS NEEDED
Status: DISCONTINUED | OUTPATIENT
Start: 2023-12-27 | End: 2023-12-27 | Stop reason: HOSPADM

## 2023-12-27 RX ORDER — HEPARIN 100 UNIT/ML
500 SYRINGE INTRAVENOUS AS NEEDED
Status: CANCELLED | OUTPATIENT
Start: 2023-12-27

## 2023-12-27 RX ORDER — FAMOTIDINE 10 MG/ML
20 INJECTION INTRAVENOUS ONCE AS NEEDED
Status: DISCONTINUED | OUTPATIENT
Start: 2023-12-27 | End: 2023-12-27 | Stop reason: HOSPADM

## 2023-12-27 RX ORDER — HEPARIN SODIUM,PORCINE/PF 10 UNIT/ML
50 SYRINGE (ML) INTRAVENOUS AS NEEDED
Status: CANCELLED | OUTPATIENT
Start: 2023-12-27

## 2023-12-27 RX ORDER — DOXORUBICIN HYDROCHLORIDE 2 MG/ML
25 INJECTION, SOLUTION INTRAVENOUS ONCE
Status: COMPLETED | OUTPATIENT
Start: 2023-12-27 | End: 2023-12-27

## 2023-12-27 RX ORDER — PROCHLORPERAZINE MALEATE 10 MG
10 TABLET ORAL EVERY 6 HOURS PRN
Status: DISCONTINUED | OUTPATIENT
Start: 2023-12-27 | End: 2023-12-27 | Stop reason: HOSPADM

## 2023-12-27 RX ORDER — EPINEPHRINE 0.3 MG/.3ML
0.3 INJECTION SUBCUTANEOUS EVERY 5 MIN PRN
Status: DISCONTINUED | OUTPATIENT
Start: 2023-12-27 | End: 2023-12-27 | Stop reason: HOSPADM

## 2023-12-27 RX ORDER — DIPHENHYDRAMINE HYDROCHLORIDE 50 MG/ML
50 INJECTION INTRAMUSCULAR; INTRAVENOUS AS NEEDED
Status: DISCONTINUED | OUTPATIENT
Start: 2023-12-27 | End: 2023-12-27 | Stop reason: HOSPADM

## 2023-12-27 RX ORDER — PROCHLORPERAZINE EDISYLATE 5 MG/ML
10 INJECTION INTRAMUSCULAR; INTRAVENOUS EVERY 6 HOURS PRN
Status: DISCONTINUED | OUTPATIENT
Start: 2023-12-27 | End: 2023-12-27 | Stop reason: HOSPADM

## 2023-12-27 RX ADMIN — FOSAPREPITANT DIMEGLUMINE 150 MG: 150 INJECTION, POWDER, LYOPHILIZED, FOR SOLUTION INTRAVENOUS at 10:10

## 2023-12-27 RX ADMIN — PEGFILGRASTIM 6 MG: KIT SUBCUTANEOUS at 12:00

## 2023-12-27 RX ADMIN — DEXAMETHASONE SODIUM PHOSPHATE 12 MG: 10 INJECTION, SOLUTION INTRAMUSCULAR; INTRAVENOUS at 09:53

## 2023-12-27 RX ADMIN — PALONOSETRON HYDROCHLORIDE 250 MCG: 0.25 INJECTION INTRAVENOUS at 09:53

## 2023-12-27 RX ADMIN — BRENTUXIMAB VEDOTIN 47 MG: 50 INJECTION, POWDER, LYOPHILIZED, FOR SOLUTION INTRAVENOUS at 11:53

## 2023-12-27 RX ADMIN — DOXORUBICIN HYDROCHLORIDE 37.75 MG: 2 INJECTION, SOLUTION INTRAVENOUS at 10:48

## 2023-12-27 RX ADMIN — VINBLASTINE SULFATE 9.1 MG: 1 INJECTION INTRAVENOUS at 11:06

## 2023-12-27 RX ADMIN — DACARBAZINE 566.25 MG: 10 INJECTION, POWDER, FOR SOLUTION INTRAVENOUS at 11:16

## 2023-12-27 ASSESSMENT — PAIN SCALES - GENERAL: PAINLEVEL: 0-NO PAIN

## 2023-12-27 NOTE — PROGRESS NOTES
"NUTRITION Follow-up NOTE    Nutrition Assessment     Reason for Visit:  Pamela Lopez is a 48 y.o. female who presents for A+AVD for Hodgkin's Lymphoma.     Attempted to meet with patient, but missed her today during treatment. Will try to call patient and/or children to discuss significant weight loss.     This service will continue to follow.     Lab Results   Component Value Date/Time    GLUCOSE 95 12/27/2023 0817     12/27/2023 0817    K 3.7 12/27/2023 0817     12/27/2023 0817    CO2 26 12/27/2023 0817    ANIONGAP 12 12/27/2023 0817    BUN 12 12/27/2023 0817    CREATININE 0.54 12/27/2023 0817    EGFR >90 12/27/2023 0817    CALCIUM 9.5 12/27/2023 0817    ALBUMIN 3.7 12/27/2023 0817    ALKPHOS 64 12/27/2023 0817    PROT 6.1 (L) 12/27/2023 0817    AST 13 12/27/2023 0817    BILITOT 0.3 12/27/2023 0817    ALT 5 (L) 12/27/2023 0817     Anthropometrics:  Anthropometrics  Height: 156.9 cm (5' 1.77\")  Weight: 52 kg (114 lb 10.2 oz)  BMI (Calculated): 21.12  IBW/kg (Dietitian Calculated): 49.32 kg  Percent of IBW: 105.43 %  Weight Change  Weight History / % Weight Change: 17.8 kg (26%) weight loss x 6 months  Significant Weight Loss: Yes  Interpretation of Weight Loss: >10% in 6 months    Wt Readings from Last 10 Encounters:   12/27/23 52 kg (114 lb 10.2 oz)   12/27/23 52 kg (114 lb 10.2 oz)   12/18/23 52.7 kg (116 lb 2.9 oz)   12/12/23 56.3 kg (124 lb 1.9 oz)   12/11/23 57.7 kg (127 lb 3.3 oz)   12/04/23 56.5 kg (124 lb 9.6 oz)   12/04/23 56.5 kg (124 lb 9 oz)   12/02/23 54.4 kg (120 lb)   11/27/23 55.3 kg (121 lb 14.6 oz)   11/20/23 58.6 kg (129 lb 1.3 oz)   06/30/23         69.8 kg (153 lb 14.1 oz)     Food And Nutrient Intake:                                                                 Nutrition Focused Physical Exam Findings:                          Energy Needs  Calculated Energy Needs Using Equations  Height: 156.9 cm (5' 1.77\")  Estimated Energy Needs  Total Energy Estimated Needs (kCal): 1820 " kCal  Method for Estimating Needs: 35 kcal/kg CBW  Estimated Fluid Needs  Total Fluid Estimated Needs (mL): 1820 mL  Method for Estimating Needs: 1 mL/kcal  Estimated Protein Needs  Total Protein Estimated Needs (g): 78 g  Method for Estimating Needs: 1.5g/kg CBW    Nutrition Diagnosis             Nutrition Interventions/Recommendations   Nutrition Prescription       Food and Nutrition Delivery       Nutrition Education       Coordination of Care       There are no Patient Instructions on file for this visit.    Nutrition Monitoring and Evaluation

## 2024-01-02 ENCOUNTER — TELEPHONE (OUTPATIENT)
Dept: HEMATOLOGY/ONCOLOGY | Facility: CLINIC | Age: 49
End: 2024-01-02
Payer: COMMERCIAL

## 2024-01-02 DIAGNOSIS — C81.48: ICD-10-CM

## 2024-01-02 NOTE — TELEPHONE ENCOUNTER
I spoke with Pradeep and notified him that the clinic has availability tomorrow 1/3/24 at 1215 PM for a supportive visit. Per Pradeep they would like to know if Pamela can skip this week of IVF's due to transportation reasons, he states that she is doing/feeling fine. I notified him that that would be okay. He also would like to know if [facility] appts can be moved from Wednesday's to Monday's. I notified him that the clinic will work on this. He gave verbal understanding, was appreciative and had no other questions at this time.

## 2024-01-02 NOTE — TELEPHONE ENCOUNTER
I spoke with Pradeep and notified him that Pamela's appts have been moved to Mondays and that her next appt is 1/8/24 at 8:10 AM with Lamonte and with treatment to follow after at 9:00 AM. I also notified him that Pamela's appts will be mailed to their preferred address that is listed in her chart. He gave verbal understanding, was appreciative and had no other questions at this time.

## 2024-01-03 ENCOUNTER — SPECIALTY PHARMACY (OUTPATIENT)
Dept: PHARMACY | Facility: CLINIC | Age: 49
End: 2024-01-03

## 2024-01-03 ENCOUNTER — TELEPHONE (OUTPATIENT)
Dept: PALLIATIVE MEDICINE | Facility: HOSPITAL | Age: 49
End: 2024-01-03

## 2024-01-03 ENCOUNTER — OFFICE VISIT (OUTPATIENT)
Dept: PALLIATIVE MEDICINE | Facility: CLINIC | Age: 49
End: 2024-01-03
Payer: COMMERCIAL

## 2024-01-03 VITALS
DIASTOLIC BLOOD PRESSURE: 69 MMHG | TEMPERATURE: 98.1 F | SYSTOLIC BLOOD PRESSURE: 104 MMHG | OXYGEN SATURATION: 95 % | WEIGHT: 114.64 LBS | HEART RATE: 99 BPM | RESPIRATION RATE: 15 BRPM | BODY MASS INDEX: 21.12 KG/M2

## 2024-01-03 DIAGNOSIS — T45.1X5A CHEMOTHERAPY-INDUCED PERIPHERAL NEUROPATHY (MULTI): ICD-10-CM

## 2024-01-03 DIAGNOSIS — G89.3 CANCER RELATED PAIN: ICD-10-CM

## 2024-01-03 DIAGNOSIS — C81.48: ICD-10-CM

## 2024-01-03 DIAGNOSIS — G62.0 CHEMOTHERAPY-INDUCED PERIPHERAL NEUROPATHY (MULTI): ICD-10-CM

## 2024-01-03 PROCEDURE — 99214 OFFICE O/P EST MOD 30 MIN: CPT

## 2024-01-03 PROCEDURE — 1036F TOBACCO NON-USER: CPT

## 2024-01-03 PROCEDURE — 3008F BODY MASS INDEX DOCD: CPT

## 2024-01-03 RX ORDER — MORPHINE SULFATE 30 MG/1
30 TABLET, FILM COATED, EXTENDED RELEASE ORAL 3 TIMES DAILY
Qty: 90 TABLET | Refills: 0 | Status: SHIPPED | OUTPATIENT
Start: 2024-01-03 | End: 2024-01-03 | Stop reason: SDUPTHER

## 2024-01-03 RX ORDER — MORPHINE SULFATE 30 MG/1
30 TABLET, FILM COATED, EXTENDED RELEASE ORAL 3 TIMES DAILY
Qty: 90 TABLET | Refills: 0 | Status: SHIPPED | OUTPATIENT
Start: 2024-01-03 | End: 2024-03-11 | Stop reason: SDUPTHER

## 2024-01-03 RX ORDER — GABAPENTIN 300 MG/1
300 CAPSULE ORAL 3 TIMES DAILY
Qty: 90 CAPSULE | Refills: 3 | Status: SHIPPED | OUTPATIENT
Start: 2024-01-03 | End: 2024-04-29 | Stop reason: SDUPTHER

## 2024-01-03 RX ORDER — OXYCODONE HYDROCHLORIDE 5 MG/1
5-10 TABLET ORAL EVERY 4 HOURS PRN
Qty: 180 TABLET | Refills: 0 | Status: SHIPPED | OUTPATIENT
Start: 2024-01-03 | End: 2024-01-09 | Stop reason: SDUPTHER

## 2024-01-03 ASSESSMENT — PAIN SCALES - GENERAL: PAINLEVEL: 3

## 2024-01-03 NOTE — PROGRESS NOTES
SUPPORTIVE AND PALLIATIVE ONCOLOGY OUTPATIENT FOLLOW-UP      SERVICE DATE: 1/3/2024    Subjective   HISTORY OF PRESENT ILLNESS: Pamela Lopez is a 48 y.o. female who presents with newly diagnosed Hodgkin Lymphoma. She was admitted d/t back pain and a CT c/a/p revealed multiple hepatic masses and several osteoblastic  lesions. MRI brain was negative. She underwent biopsy of L3 on 6/9/23 which was nondiagnostic. PET scan showed FDG-avid lymph nodes in the neck, tonsil, bones, and liver. Biopsy of right tonsil 6/22 revealed Hodgkin Lymphoma. She underwent RT to T7 x10  fractions. She started treatment with AVD + Brentuximab 7/17/23. Recent PET scan with excellent response to treatment.     Pain Assessment:  Pain Score: 7/10  Location: mid-back  Description: The pain is sharp/shooting and constant. She said the pain overall has increased recently. She has been taking Morphine ER 30 mg BID and oxycodone 15 mg 3 times per day. She states that when she takes the oxycodone it brings her pain from a 7/10 to a 3/10.     Symptom Assessment:  Pain:somewhat  Headache: none  Dizziness:none  Lack of energy: a little due to pain  Difficulty sleeping: a little. Due to pain  Worrying: none  Anxiety: a little  Depression: a little. Improving since back on Lexapro 20 mg daily.   Pain in mouth/swallowing: none  Dry mouth: none  Taste changes: none  Shortness of breath: none  Lack of appetite: none   Nausea: none  Vomiting: none  Constipation: none  Diarrhea: none  Sore muscles: none  Numbness or tingling in hands/feet/other: somewhat  Weight loss: none  Other: none      Information obtained from: interview of patient  ______________________________________________________________________        Objective     No visits with results within 1 Week(s) from this visit.   Latest known visit with results is:   Infusion on 12/27/2023   Component Date Value Ref Range Status    WBC 12/27/2023 6.7  4.4 - 11.3 x10*3/uL Final    RBC 12/27/2023  3.62 (L)  4.00 - 5.20 x10*6/uL Final    Hemoglobin 12/27/2023 11.6 (L)  12.0 - 16.0 g/dL Final    Hematocrit 12/27/2023 36.9  36.0 - 46.0 % Final    MCV 12/27/2023 102 (H)  80 - 100 fL Final    MCH 12/27/2023 32.0  26.0 - 34.0 pg Final    MCHC 12/27/2023 31.4 (L)  32.0 - 36.0 g/dL Final    RDW 12/27/2023 15.9 (H)  11.5 - 14.5 % Final    Platelets 12/27/2023 169  150 - 450 x10*3/uL Final    Neutrophils % 12/27/2023 53.3  40.0 - 80.0 % Final    Immature Granulocytes %, Automated 12/27/2023 0.1  0.0 - 0.9 % Final    Immature Granulocyte Count (IG) includes promyelocytes, myelocytes and metamyelocytes but does not include bands. Percent differential counts (%) should be interpreted in the context of the absolute cell counts (cells/UL).    Lymphocytes % 12/27/2023 22.7  13.0 - 44.0 % Final    Monocytes % 12/27/2023 7.3  2.0 - 10.0 % Final    Eosinophils % 12/27/2023 14.8  0.0 - 6.0 % Final    Basophils % 12/27/2023 1.8  0.0 - 2.0 % Final    Neutrophils Absolute 12/27/2023 3.58  1.20 - 7.70 x10*3/uL Final    Percent differential counts (%) should be interpreted in the context of the absolute cell counts (cells/uL).    Immature Granulocytes Absolute, Au* 12/27/2023 0.01  0.00 - 0.70 x10*3/uL Final    Lymphocytes Absolute 12/27/2023 1.52  1.20 - 4.80 x10*3/uL Final    Monocytes Absolute 12/27/2023 0.49  0.10 - 1.00 x10*3/uL Final    Eosinophils Absolute 12/27/2023 0.99 (H)  0.00 - 0.70 x10*3/uL Final    Basophils Absolute 12/27/2023 0.12 (H)  0.00 - 0.10 x10*3/uL Final    Glucose 12/27/2023 95  74 - 99 mg/dL Final    Sodium 12/27/2023 141  136 - 145 mmol/L Final    Potassium 12/27/2023 3.7  3.5 - 5.3 mmol/L Final    Chloride 12/27/2023 107  98 - 107 mmol/L Final    Bicarbonate 12/27/2023 26  21 - 32 mmol/L Final    Anion Gap 12/27/2023 12  10 - 20 mmol/L Final    Urea Nitrogen 12/27/2023 12  6 - 23 mg/dL Final    Creatinine 12/27/2023 0.54  0.50 - 1.05 mg/dL Final    eGFR 12/27/2023 >90  >60 mL/min/1.73m*2 Final     Calculations of estimated GFR are performed using the 2021 CKD-EPI Study Refit equation without the race variable for the IDMS-Traceable creatinine methods.  https://jasn.asnjournals.org/content/early/2021/09/22/ASN.2561623078    Calcium 12/27/2023 9.5  8.6 - 10.3 mg/dL Final    Albumin 12/27/2023 3.7  3.4 - 5.0 g/dL Final    Alkaline Phosphatase 12/27/2023 64  33 - 110 U/L Final    Total Protein 12/27/2023 6.1 (L)  6.4 - 8.2 g/dL Final    AST 12/27/2023 13  9 - 39 U/L Final    Bilirubin, Total 12/27/2023 0.3  0.0 - 1.2 mg/dL Final    ALT 12/27/2023 5 (L)  7 - 45 U/L Final    Patients treated with Sulfasalazine may generate falsely decreased results for ALT.    Extra Tube 12/27/2023 Hold for add-ons.   Final    Auto resulted.         PHYSICAL EXAMINATION   Vital Signs:  Vitals:    01/03/24 1318   BP: 104/69   Pulse: 99   Resp: 15   Temp: 36.7 °C (98.1 °F)   SpO2: 95%          Physical Exam  Pulmonary:      Effort: Pulmonary effort is normal.   Musculoskeletal:         General: Normal range of motion.   Skin:     General: Skin is warm.   Neurological:      Mental Status: She is alert and oriented to person, place, and time.   Psychiatric:         Mood and Affect: Mood normal.         Behavior: Behavior normal.          Social  Not . Lives with her 3 grown children.  History of drug use with Meth x8 years. Quit 1 year ago.  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     ASSESSMENT/PLAN    Pain  Pain is: cancer related pain  Type: somatic and neuropathic  Pain control: well-controlled  Home regimen:   - Continue oxycodone 15 mg every 4 hours as needed  - Increase Morphine ER to 30 mg TID.   - Continue Tylenol 500 mg every 4 hours as needed  - Continue Gabapentin 300 mg TID  - referred to aquatherapy for pain and weakness    Opioid Use  Medication Management:   - OARRS report reviewed with no aberrant behavior; consistent with  prescriptions/records and patient  history  - MED 45.  Overdose Risk Score 290.   This has been discussed with patient.   - We will continue to closely monitor the patient for signs of prescription misuse including UDS, OARRS review and subjective reports at each visit.  - No concurrent benzodiazepine use   - I am a provider who either is or has consulted and collaborated with a provider certified in Hospice and Palliative Medicine and have conducted a face-face visit and examination for this patient.  - Routine Urine Drug Screen completed 6/6/23 appropriately positive for opioids and negative for illicit substances. repeat yearly   - Controlled Substance Agreement: completed 9/12/23. repeat yearly   - Specifically discussed that controlled substance prescriptions will only be provided by our group as outlined in the completed agreement  - Prescribed naloxone: Has prescription at home  - Red Flags: History of drug use with Meth. Quit 1 year ago     Nausea   Intermittent nausea without vomiting related to chemotherapy   - Continue Prochlorperazine 10 mg every 6 hours as needed  - Continue Ondansetron 8 mg every 8 hours as needed    Constipation  At risk for constipation related to opioids,  currently not constipated  Usual bowel pattern: daily  Home regimen:   - Continue Senna-S 2 tabs BID- no longer taking due to diarrhea  - Start Magnesium Citrate - may start with 1/2 bottle to 1 bottle if no BM within 48-72 hours     Altered Mood  Chronic anxiety and depression related to health concerns   controlled with home regimen  Home regimen:   - Continue Lexapro back to 20 mg- she was decreased to 10 mg inpatient and feels that she has been more depressed and crying   - Discussed meeting with a community therapist for therapy as well    Sleeping Difficulty:  Home regimen:    - Trazodone stopped inpatient r/t QTc concerns  - Continue Risperidone 0.5 mg every night   - Discussed taking 5-10 mg of Melatonin     Decreased appetite  Related to malignancy,  chemotherapy, and disease process  Nutrition following  Home regimen:    - Olanzapine 2.5 mg every day at bedtime- this was stopped inpatient d/t QTc  - Following with Martha Khan      Advance Directives  Existence of Advance Directives:Yes, documentation or copy in medical record  Decision maker: AARON is Cynthia Xie  Code Status: Full code    Next Follow-Up Visit:  Return to clinic in 2 weeks virtually     Signature and billing  Medical complexity was low level due to due to complexity of problems, extensive data review, and high risk of management/treatment.  Time was spent on the following: Prep Time, Time Directly with Patient/Family/Caregiver, Documentation Time. Total time spent: 30      Data  Diagnostic tests and information reviewed for today's visit:  Most recent labs, Most recent imaging, Medications         Some elements copied from Cynthia Johnson note on 11/7/23, the elements have been updated and all reflect current decision making from today, 1/3/2024.      Plan of Care discussed with: Patient    SIGNATURE: MAGDA Avilez-CNP    Contact information:  Supportive and Palliative Oncology  Monday-Friday 8 AM-5 PM  Phone:  [phone], press option #5, then option #1.   Or Epic Secure Chat

## 2024-01-03 NOTE — TELEPHONE ENCOUNTER
[facility] states that MS Contin cannot be filled as MMEs exceed what is allowed by THEIR system. (Ms. Lopez also has her Oxycodone filled at this pharmacy. ) Ms Contin script sent to [facility]. Family updated.

## 2024-01-04 PROCEDURE — RXMED WILLOW AMBULATORY MEDICATION CHARGE

## 2024-01-05 ENCOUNTER — PHARMACY VISIT (OUTPATIENT)
Dept: PHARMACY | Facility: CLINIC | Age: 49
End: 2024-01-05
Payer: MEDICAID

## 2024-01-05 LAB
ATRIAL RATE: 77 BPM
P AXIS: 53 DEGREES
P OFFSET: 190 MS
P ONSET: 149 MS
PR INTERVAL: 140 MS
Q ONSET: 219 MS
QRS COUNT: 13 BEATS
QRS DURATION: 76 MS
QT INTERVAL: 434 MS
QTC CALCULATION(BAZETT): 491 MS
QTC FREDERICIA: 471 MS
R AXIS: -24 DEGREES
T AXIS: 18 DEGREES
T OFFSET: 436 MS
VENTRICULAR RATE: 77 BPM

## 2024-01-05 NOTE — PROGRESS NOTES
Patient ID: Pamela Lopez is a 48 y.o. female.    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 Pamela Lopez is a 47 y/o F presenting for follow-up for Hodgkin's lymphoma.      Patient presents for cycle 6 day 1 of treatment. Since last visit she did have some worsening in her constipation that has improved, she needs a refill on her senna. She reports eating ok, weight is stable form last visit. She mentions yellow urine and would like to give a sample to rule out UTI but denies dysuria and hematuria. She states her neuropathy is stable and that she needs a refill on her acyclovir and iron.    Patient denies fevers, chills, rashes or sores, nausea, vomiting, diarrhea, edema, chest pain, SOB or cough.     Patient's past medical history, surgical history, family history and social history reviewed.       Objective    Visit Vitals  BP 99/61 (BP Location: Left arm, Patient Position: Sitting)   Pulse 82   Temp 36.7 °C (98.1 °F) (Temporal)   Resp 16   Wt 52.3 kg (115 lb 4.8 oz)   SpO2 95%   BMI 21.24 kg/m²   Smoking Status Former   BSA 1.51 m²         Review of Systems:   Review of Systems:    Positive per HPI, otherwise negative.     Physical Exam:   Constitutional: Patient appears in no acute distress.   Sitting comfortably in chair.  Eyes: EOMI, clear sclera  ENMT: mucous membranes moist, no apparent injury  Head/Neck: Neck supple, no JVD  Respiratory/Thorax: Patent airways, CTAB, normal  breath sounds, no increased work of breathing  Cardiovascular: Regular, rate and rhythm, no murmurs  Gastrointestinal: Nondistended, soft, non-tender,  no rebound tenderness or guarding, no masses palpable  Extremities:  normal extremities, no cyanosis edema,  no swelling  Neurological: alert and oriented x3, nonfocal, normal  speech and hearing  Lymphatic: No palpable lymphadenopathy in cervical,  axillary  lymph nodes.  Spleen appears normal size.  Psychological: Appropriate mood and behavior, normal  affect  Skin: Warm and dry, no lesions, no rashes     Performance Status:  Symptomatic; fully ambulatory    Labs/Imaging/Pathology: personally reviewed reports and images in Epic electronic medical record system. Pertinent results as it related to the plan represented in below in assessment and plan.      Assessment/Plan   1. Classical Hodgkin lymphoma, lymphocyte- rich subtype, stage IVB     - developed tonsil enlargement initially over two years ago and thought to be related to infection  - 6/5/23- 6/723 admitted for new pain and found to have multiple spine mets, neck lymphadenopathy and possible liver lesions  -  biopsy 6/9/23 of her L3 was nondiagnostic  - 6/13/23 PET/CT with FDG avid right palantine tonsil, b/l cervical, portacaval, RP nodes, hepatic met, osseous lesions in axial skeleton  - 6/19/23 planned for 10 fx to T7  - 6/22/23 tonsillectomy path consistent with classical Hodgkin lymphoma  - Discussed diagnosis of classical Hodgkin's lymphoma with patient and given stage IV disease with B symptom along with history of strong tobacco use, recommend brentuximab plus AVD per ECHELON-1 trial (Lisa, et al. [facility] 2018)  - Reviewed side effects and consent signed  - pt is planning to see dental next week as she has active abscess with pain and still has RT next week  - 7/17/12 AVD+ brentuximab  -hospitalized for fever and discharge 8/27/23 and no infectious cause found.  - delays due to neutropenia, cycle 3 day on 9/25/23.  - 10/9/23 PET CT interim with significant treatment response   11/19/23 C5D1 reduced brentuximab to 0.9 mg/mkg     2/4/23:  - Since last visit, patient had a left-side infection in her mouth related to a  molar that is likely infected. She was started on antibiotics in the hospital with Augmentin BID and then took herself out of the hospital to come for treatment today.   - Today her blood pressure is 60s over 40s and recommended ED for further management  - We discussed that we will hold her chemotherapy with a plan to get her in with [facility] as soon as possible.   - At this point, we will delay cycle 5 day 15 by 1 week.  - RTC in 1 week with Lamonte.     12/11/2023:  - Presents to make up cycle 5 day 15 after it was held last week for hypotension, sent to the ED and BP improved with IV fluids  - Has a few days left of oral Augmentin for UTI and tooth infection, has not been able to make a visit with an oral surgeon who takes her insurance   - Says UTI and tooth infection symptoms have resolved and she is feeling well, however, she has a max temp of 39.1 here in clinic via oral temperature in addition to chills last night and feeling cold today, her son had the heat on high on the drive in  - Therefore, I did recommend patient go to the ED for concern for infection vs sepsis, patient chose to go to [facility] ED which I discussed may be good in case they feel her tooth needs extracted but if they cannot do then son will call [facility] surgeon   - We will reschedule patient once we are aware of her discharge from ED/hospital.     12/18/23:  - Hypotensive and bradycardic today, recommend further eval in ED and reconsideration of next cycle of tx in one week  - She reports that she is overall feeling better from COVID. Her son was also recently sick but COVID negative.   - We will plan for COVID precautions with treatment today and then return to clinic in 1 week for fluids and in 2 weeks for cycle 6 day 1.  - RTC with Lamonte in 2 weeks.     1/8/2024:  - Presents for cycle 6 day 1 of A+AVD  - Her weight has steadily decreased so her dosage was adjusted by Dr. Gomez to reflect this  - Refills requested and  sent for senna, iron, and acyclovir   - She is feeling well overall, her weight is stable and no signs/symptoms of infection   - She and her daughter would like past PET scan reports, we will print them off for them  - Labs: WBC 4.9, hgb 10.0, plt 130,000, creatinine 0.49, AST 10, ALT 7, magnesium 1.9, , uric acid 3.9  - She will return in 1 week for IV fluids only   - RTC 2 weeks for cycle 6 day 15 treatment and a FUV with Dr. Gomez       - RTC 2 weeks for cycle 6 day 15 treatment and a FUV with Dr. 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  -     sennosides-docusate sodium (Corinne-Colace) 8.6-50 mg tablet; TAKE 2 TABLETS BY MOUTH TWO TIMES A DAY AS NEEDED FOR CONSTIPATION  -     SST TOP; Future  Fever, unspecified fever cause  -     acyclovir (Zovirax) 200 mg capsule; Take 2 capsules (400 mg) by mouth 2 times a day.        MAGDA Arguelles-CNP

## 2024-01-08 ENCOUNTER — INFUSION (OUTPATIENT)
Dept: HEMATOLOGY/ONCOLOGY | Facility: CLINIC | Age: 49
End: 2024-01-08
Payer: COMMERCIAL

## 2024-01-08 ENCOUNTER — NUTRITION (OUTPATIENT)
Dept: HEMATOLOGY/ONCOLOGY | Facility: CLINIC | Age: 49
End: 2024-01-08
Payer: COMMERCIAL

## 2024-01-08 ENCOUNTER — OFFICE VISIT (OUTPATIENT)
Dept: HEMATOLOGY/ONCOLOGY | Facility: CLINIC | Age: 49
End: 2024-01-08
Payer: COMMERCIAL

## 2024-01-08 ENCOUNTER — APPOINTMENT (OUTPATIENT)
Dept: HEMATOLOGY/ONCOLOGY | Facility: CLINIC | Age: 49
End: 2024-01-08
Payer: COMMERCIAL

## 2024-01-08 VITALS
OXYGEN SATURATION: 95 % | RESPIRATION RATE: 16 BRPM | TEMPERATURE: 98.1 F | HEART RATE: 82 BPM | SYSTOLIC BLOOD PRESSURE: 99 MMHG | DIASTOLIC BLOOD PRESSURE: 61 MMHG | BODY MASS INDEX: 21.24 KG/M2 | WEIGHT: 115.3 LBS

## 2024-01-08 VITALS — HEIGHT: 62 IN | WEIGHT: 115.3 LBS | BODY MASS INDEX: 21.22 KG/M2

## 2024-01-08 DIAGNOSIS — C81.48: Primary | ICD-10-CM

## 2024-01-08 DIAGNOSIS — C81.48: ICD-10-CM

## 2024-01-08 DIAGNOSIS — R50.9 FEVER, UNSPECIFIED FEVER CAUSE: ICD-10-CM

## 2024-01-08 LAB
ALBUMIN SERPL BCP-MCNC: 3.6 G/DL (ref 3.4–5)
ALP SERPL-CCNC: 79 U/L (ref 33–110)
ALT SERPL W P-5'-P-CCNC: 7 U/L (ref 7–45)
ANION GAP SERPL CALC-SCNC: 12 MMOL/L (ref 10–20)
APPEARANCE UR: ABNORMAL
AST SERPL W P-5'-P-CCNC: 10 U/L (ref 9–39)
BASOPHILS # BLD AUTO: 0.04 X10*3/UL (ref 0–0.1)
BASOPHILS NFR BLD AUTO: 0.8 %
BILIRUB SERPL-MCNC: 0.2 MG/DL (ref 0–1.2)
BILIRUB UR STRIP.AUTO-MCNC: NEGATIVE MG/DL
BUN SERPL-MCNC: 11 MG/DL (ref 6–23)
CALCIUM SERPL-MCNC: 9.4 MG/DL (ref 8.6–10.3)
CHLORIDE SERPL-SCNC: 106 MMOL/L (ref 98–107)
CO2 SERPL-SCNC: 26 MMOL/L (ref 21–32)
COLOR UR: YELLOW
CREAT SERPL-MCNC: 0.49 MG/DL (ref 0.5–1.05)
EGFRCR SERPLBLD CKD-EPI 2021: >90 ML/MIN/1.73M*2
EOSINOPHIL # BLD AUTO: 0.31 X10*3/UL (ref 0–0.7)
EOSINOPHIL NFR BLD AUTO: 6.4 %
ERYTHROCYTE [DISTWIDTH] IN BLOOD BY AUTOMATED COUNT: 15.3 % (ref 11.5–14.5)
GLUCOSE SERPL-MCNC: 104 MG/DL (ref 74–99)
GLUCOSE UR STRIP.AUTO-MCNC: NEGATIVE MG/DL
HCT VFR BLD AUTO: 31 % (ref 36–46)
HGB BLD-MCNC: 10 G/DL (ref 12–16)
HOLD SPECIMEN: NORMAL
IMM GRANULOCYTES # BLD AUTO: 0.04 X10*3/UL (ref 0–0.7)
IMM GRANULOCYTES NFR BLD AUTO: 0.8 % (ref 0–0.9)
KETONES UR STRIP.AUTO-MCNC: NEGATIVE MG/DL
LDH SERPL L TO P-CCNC: 150 U/L (ref 84–246)
LEUKOCYTE ESTERASE UR QL STRIP.AUTO: NEGATIVE
LYMPHOCYTES # BLD AUTO: 1.35 X10*3/UL (ref 1.2–4.8)
LYMPHOCYTES NFR BLD AUTO: 27.8 %
MAGNESIUM SERPL-MCNC: 1.9 MG/DL (ref 1.6–2.4)
MCH RBC QN AUTO: 32.5 PG (ref 26–34)
MCHC RBC AUTO-ENTMCNC: 32.3 G/DL (ref 32–36)
MCV RBC AUTO: 101 FL (ref 80–100)
MONOCYTES # BLD AUTO: 0.66 X10*3/UL (ref 0.1–1)
MONOCYTES NFR BLD AUTO: 13.6 %
NEUTROPHILS # BLD AUTO: 2.46 X10*3/UL (ref 1.2–7.7)
NEUTROPHILS NFR BLD AUTO: 50.6 %
NITRITE UR QL STRIP.AUTO: NEGATIVE
PH UR STRIP.AUTO: 5 [PH]
PLATELET # BLD AUTO: 130 X10*3/UL (ref 150–450)
POTASSIUM SERPL-SCNC: 3.5 MMOL/L (ref 3.5–5.3)
PROT SERPL-MCNC: 6.1 G/DL (ref 6.4–8.2)
PROT UR STRIP.AUTO-MCNC: NEGATIVE MG/DL
RBC # BLD AUTO: 3.08 X10*6/UL (ref 4–5.2)
RBC # UR STRIP.AUTO: NEGATIVE /UL
SODIUM SERPL-SCNC: 140 MMOL/L (ref 136–145)
SP GR UR STRIP.AUTO: 1.02
URATE SERPL-MCNC: 3.9 MG/DL (ref 2.3–6.7)
UROBILINOGEN UR STRIP.AUTO-MCNC: <2 MG/DL
WBC # BLD AUTO: 4.9 X10*3/UL (ref 4.4–11.3)

## 2024-01-08 PROCEDURE — 96375 TX/PRO/DX INJ NEW DRUG ADDON: CPT | Mod: INF

## 2024-01-08 PROCEDURE — 84550 ASSAY OF BLOOD/URIC ACID: CPT

## 2024-01-08 PROCEDURE — 81003 URINALYSIS AUTO W/O SCOPE: CPT

## 2024-01-08 PROCEDURE — 80053 COMPREHEN METABOLIC PANEL: CPT

## 2024-01-08 PROCEDURE — 2500000004 HC RX 250 GENERAL PHARMACY W/ HCPCS (ALT 636 FOR OP/ED)

## 2024-01-08 PROCEDURE — 96365 THER/PROPH/DIAG IV INF INIT: CPT | Mod: INF

## 2024-01-08 PROCEDURE — 96411 CHEMO IV PUSH ADDL DRUG: CPT

## 2024-01-08 PROCEDURE — 96372 THER/PROPH/DIAG INJ SC/IM: CPT | Mod: JZ | Performed by: INTERNAL MEDICINE

## 2024-01-08 PROCEDURE — 2500000004 HC RX 250 GENERAL PHARMACY W/ HCPCS (ALT 636 FOR OP/ED): Mod: JZ | Performed by: INTERNAL MEDICINE

## 2024-01-08 PROCEDURE — 96413 CHEMO IV INFUSION 1 HR: CPT

## 2024-01-08 PROCEDURE — 96372 THER/PROPH/DIAG INJ SC/IM: CPT | Performed by: INTERNAL MEDICINE

## 2024-01-08 PROCEDURE — 99214 OFFICE O/P EST MOD 30 MIN: CPT

## 2024-01-08 PROCEDURE — 83615 LACTATE (LD) (LDH) ENZYME: CPT

## 2024-01-08 PROCEDURE — 96367 TX/PROPH/DG ADDL SEQ IV INF: CPT

## 2024-01-08 PROCEDURE — 96361 HYDRATE IV INFUSION ADD-ON: CPT | Mod: INF

## 2024-01-08 PROCEDURE — 83735 ASSAY OF MAGNESIUM: CPT

## 2024-01-08 PROCEDURE — 3008F BODY MASS INDEX DOCD: CPT

## 2024-01-08 PROCEDURE — 85025 COMPLETE CBC W/AUTO DIFF WBC: CPT

## 2024-01-08 PROCEDURE — 96360 HYDRATION IV INFUSION INIT: CPT | Mod: INF

## 2024-01-08 PROCEDURE — 96417 CHEMO IV INFUS EACH ADDL SEQ: CPT

## 2024-01-08 PROCEDURE — 96409 CHEMO IV PUSH SNGL DRUG: CPT

## 2024-01-08 PROCEDURE — 1036F TOBACCO NON-USER: CPT

## 2024-01-08 PROCEDURE — 96366 THER/PROPH/DIAG IV INF ADDON: CPT | Mod: INF

## 2024-01-08 PROCEDURE — 2500000004 HC RX 250 GENERAL PHARMACY W/ HCPCS (ALT 636 FOR OP/ED): Performed by: INTERNAL MEDICINE

## 2024-01-08 PROCEDURE — 96377 APPLICATON ON-BODY INJECTOR: CPT

## 2024-01-08 RX ORDER — PALONOSETRON 0.05 MG/ML
0.25 INJECTION, SOLUTION INTRAVENOUS ONCE
Status: COMPLETED | OUTPATIENT
Start: 2024-01-08 | End: 2024-01-08

## 2024-01-08 RX ORDER — ACYCLOVIR 200 MG/1
400 CAPSULE ORAL 2 TIMES DAILY
Qty: 120 CAPSULE | Refills: 0 | Status: SHIPPED | OUTPATIENT
Start: 2024-01-08 | End: 2024-02-07 | Stop reason: SDUPTHER

## 2024-01-08 RX ORDER — EPINEPHRINE 0.3 MG/.3ML
0.3 INJECTION SUBCUTANEOUS EVERY 5 MIN PRN
Status: DISCONTINUED | OUTPATIENT
Start: 2024-01-08 | End: 2024-01-08 | Stop reason: HOSPADM

## 2024-01-08 RX ORDER — DIPHENHYDRAMINE HYDROCHLORIDE 50 MG/ML
50 INJECTION INTRAMUSCULAR; INTRAVENOUS AS NEEDED
Status: DISCONTINUED | OUTPATIENT
Start: 2024-01-08 | End: 2024-01-08 | Stop reason: HOSPADM

## 2024-01-08 RX ORDER — DOXORUBICIN HYDROCHLORIDE 2 MG/ML
25 INJECTION, SOLUTION INTRAVENOUS ONCE
Status: CANCELLED | OUTPATIENT
Start: 2024-01-22

## 2024-01-08 RX ORDER — DOXORUBICIN HYDROCHLORIDE 2 MG/ML
25 INJECTION, SOLUTION INTRAVENOUS ONCE
Status: COMPLETED | OUTPATIENT
Start: 2024-01-08 | End: 2024-01-08

## 2024-01-08 RX ORDER — AMOXICILLIN 250 MG
2 CAPSULE ORAL 2 TIMES DAILY PRN
Qty: 120 TABLET | Refills: 0 | Status: SHIPPED | OUTPATIENT
Start: 2024-01-08 | End: 2024-04-01 | Stop reason: SDUPTHER

## 2024-01-08 RX ORDER — FERROUS SULFATE TAB 325 MG (65 MG ELEMENTAL FE) 325 (65 FE) MG
1 TAB ORAL EVERY OTHER DAY
Qty: 15 TABLET | Refills: 3 | Status: SHIPPED | OUTPATIENT
Start: 2024-01-08

## 2024-01-08 RX ORDER — DOXORUBICIN HYDROCHLORIDE 2 MG/ML
25 INJECTION, SOLUTION INTRAVENOUS ONCE
Status: CANCELLED | OUTPATIENT
Start: 2024-01-08

## 2024-01-08 RX ORDER — PROCHLORPERAZINE EDISYLATE 5 MG/ML
10 INJECTION INTRAMUSCULAR; INTRAVENOUS EVERY 6 HOURS PRN
Status: DISCONTINUED | OUTPATIENT
Start: 2024-01-08 | End: 2024-01-08 | Stop reason: HOSPADM

## 2024-01-08 RX ORDER — FAMOTIDINE 10 MG/ML
20 INJECTION INTRAVENOUS ONCE AS NEEDED
Status: DISCONTINUED | OUTPATIENT
Start: 2024-01-08 | End: 2024-01-08 | Stop reason: HOSPADM

## 2024-01-08 RX ORDER — PROCHLORPERAZINE MALEATE 10 MG
10 TABLET ORAL EVERY 6 HOURS PRN
Status: DISCONTINUED | OUTPATIENT
Start: 2024-01-08 | End: 2024-01-08 | Stop reason: HOSPADM

## 2024-01-08 RX ORDER — ALBUTEROL SULFATE 0.83 MG/ML
3 SOLUTION RESPIRATORY (INHALATION) AS NEEDED
Status: DISCONTINUED | OUTPATIENT
Start: 2024-01-08 | End: 2024-01-08 | Stop reason: HOSPADM

## 2024-01-08 RX ADMIN — DACARBAZINE 566.25 MG: 10 INJECTION, POWDER, FOR SOLUTION INTRAVENOUS at 12:29

## 2024-01-08 RX ADMIN — PEGFILGRASTIM 6 MG: KIT SUBCUTANEOUS at 13:09

## 2024-01-08 RX ADMIN — DOXORUBICIN HYDROCHLORIDE 37.75 MG: 2 INJECTION, SOLUTION INTRAVENOUS at 11:51

## 2024-01-08 RX ADMIN — FOSAPREPITANT 150 MG: 150 INJECTION, POWDER, LYOPHILIZED, FOR SOLUTION INTRAVENOUS at 11:11

## 2024-01-08 RX ADMIN — PALONOSETRON 250 MCG: 0.05 INJECTION, SOLUTION INTRAVENOUS at 10:42

## 2024-01-08 RX ADMIN — VINBLASTINE SULFATE 9.1 MG: 1 INJECTION INTRAVENOUS at 12:04

## 2024-01-08 RX ADMIN — DEXAMETHASONE SODIUM PHOSPHATE 12 MG: 10 INJECTION, SOLUTION INTRAMUSCULAR; INTRAVENOUS at 10:50

## 2024-01-08 RX ADMIN — SODIUM CHLORIDE 500 ML: 9 INJECTION, SOLUTION INTRAVENOUS at 11:46

## 2024-01-08 RX ADMIN — BRENTUXIMAB VEDOTIN 47 MG: 50 INJECTION, POWDER, LYOPHILIZED, FOR SOLUTION INTRAVENOUS at 13:09

## 2024-01-08 ASSESSMENT — PAIN SCALES - GENERAL: PAINLEVEL: 0-NO PAIN

## 2024-01-08 ASSESSMENT — ENCOUNTER SYMPTOMS
OCCASIONAL FEELINGS OF UNSTEADINESS: 0
LOSS OF SENSATION IN FEET: 0
DEPRESSION: 0

## 2024-01-08 NOTE — PROGRESS NOTES
"NUTRITION Follow-up NOTE    Nutrition Assessment     Reason for Visit:  Pamela Lopez is a 48 y.o. female who presents for A+AVD for Hodgkins Lymphoma.     Lab Results   Component Value Date/Time    GLUCOSE 104 (H) 01/08/2024 0923     01/08/2024 0923    K 3.5 01/08/2024 0923     01/08/2024 0923    CO2 26 01/08/2024 0923    ANIONGAP 12 01/08/2024 0923    BUN 11 01/08/2024 0923    CREATININE 0.49 (L) 01/08/2024 0923    EGFR >90 01/08/2024 0923    CALCIUM 9.4 01/08/2024 0923    ALBUMIN 3.6 01/08/2024 0923    ALKPHOS 79 01/08/2024 0923    PROT 6.1 (L) 01/08/2024 0923    AST 10 01/08/2024 0923    BILITOT 0.2 01/08/2024 0923    ALT 7 01/08/2024 0923     Anthropometrics:  Anthropometrics  Height: 156.9 cm (5' 1.77\")  Weight: 52.3 kg (115 lb 4.8 oz)  BMI (Calculated): 21.24  IBW/kg (Dietitian Calculated): 49.32 kg  Percent of IBW: 106.04 %  Weight Change  Weight History / % Weight Change: 17.8 kg (26%) weight loss x 6 months  Significant Weight Loss: Yes  Interpretation of Weight Loss: >10% in 6 months    Wt Readings from Last 10 Encounters:   01/08/24 52.3 kg (115 lb 4.8 oz)   01/08/24 52.3 kg (115 lb 4.8 oz)   01/03/24 52 kg (114 lb 10.2 oz)   12/27/23 52 kg (114 lb 10.2 oz)   12/27/23 52 kg (114 lb 10.2 oz)   12/18/23 52.7 kg (116 lb 2.9 oz)   12/12/23 56.3 kg (124 lb 1.9 oz)   12/11/23 57.7 kg (127 lb 3.3 oz)   12/04/23 56.5 kg (124 lb 9.6 oz)   12/04/23 56.5 kg (124 lb 9 oz)        Food And Nutrient Intake:    B: 2 slices of toast with strawberry jam  Lunch:   Dinner: steak, FF, cheesecake, ice cream, pizza cake,      1-2 days after chemo doesn't feel like eating. Tries to catch up when she is feeling well. Smoothies from [employer].     No N/V. No diarrhea/constipation. Difficulty swallowing x few days after chemo. No sores.      States that she eats all the time when she can eat. Is interested in clear protein drinks like Premier Protein Clear. Unable to tolerate Ensure/Boost or other creamy drinks.     No " "taste.      Nutrition Focused Physical Exam Findings:  defer: visibly thinner; not able to perform NFPE today    Energy Needs  Calculated Energy Needs Using Equations  Height: 156.9 cm (5' 1.77\")  Estimated Energy Needs  Total Energy Estimated Needs (kCal): 1820 kCal  Method for Estimating Needs: 35 kcal/kg CBW  Estimated Fluid Needs  Total Fluid Estimated Needs (mL): 1820 mL  Method for Estimating Needs: 1 mL/kcal  Estimated Protein Needs  Total Protein Estimated Needs (g): 78 g  Method for Estimating Needs: 1.5g/kg CBW    Nutrition Diagnosis   Malnutrition Diagnosis  Patient has Malnutrition Diagnosis: Yes  Malnutrition Diagnosis: Severe malnutrition related to chronic disease or condition  As Evidenced by: 25% weight loss x 6 months; subjective reports of ongoing inadequate oral intake; muscle wasting and fat loss per NFPE    Nutrition Interventions/Recommendations   Nutrition Prescription   High protein/high calorie diet for weight and LBM maintenance     Nutrition Education   Discussed the importance of preventing further weight loss   Reviewed ways to increase oral intake on days when PO intake is poor via small, frequent meals/snacks and soft/pureed/full liquid high protein/high calorie foods and beverages   Suggested trialing Premier Protein Clear as this may be better tolerated than creamy supplements. Provided information on where to purchase.   Reviewed recommendations for managing taste changes.       Nutrition Monitoring and Evaluation   Food/Nutrient Related History Monitoring  Monitoring and Evaluation Plan: Energy intake, Fluid intake, Amount of food, Meal/snack pattern, Protein intake  Criteria: Trial premier protein clear; Increase kcal and PRO intake via small, frequent feedings q2-3h on days when PO intake is poor  Nutrition Focused Physical Findings  Monitoring and Evaluation Plan: Digestive System  Digestive System: Other (Comment)  Criteria: Implent strategies for dysgeusia " management      Time Spent  Prep time on day of patient encounter: 10 minutes  Time spent directly with patient, family or caregiver: 15 minutes  Additional Time Spent on Patient Care Activities: 5 minutes  Documentation Time: 10 minutes  Other Time Spent: 0 minutes  Total: 40 minutes    RD contact information provided. This service will continue to follow.  Patient has one more treatment.

## 2024-01-08 NOTE — PROGRESS NOTES
Dr. Gomez notified of pt's weight loss since start of treatment.  Orders changed to reflect today's weight.  Martha Khan notified as well.

## 2024-01-09 DIAGNOSIS — G89.3 CANCER RELATED PAIN: ICD-10-CM

## 2024-01-09 LAB — HOLD SPECIMEN: NORMAL

## 2024-01-09 RX ORDER — OXYCODONE HYDROCHLORIDE 5 MG/1
5-10 TABLET ORAL EVERY 4 HOURS PRN
Qty: 180 TABLET | Refills: 0 | Status: SHIPPED | OUTPATIENT
Start: 2024-01-09 | End: 2024-02-13 | Stop reason: SDUPTHER

## 2024-01-09 NOTE — PROGRESS NOTES
Patient requested refill of oxycodone. RX submitted last Wednesday 1/3/24 to [facility]. [facility] [address] contacted and states that the MME for the day is too high on her oxycodone prescription and they will not fill it. Patient contact and prefers to fill at [facility] in [place]. RX submitted.

## 2024-01-10 ENCOUNTER — TELEPHONE (OUTPATIENT)
Dept: HEMATOLOGY/ONCOLOGY | Facility: CLINIC | Age: 49
End: 2024-01-10
Payer: COMMERCIAL

## 2024-01-10 ENCOUNTER — APPOINTMENT (OUTPATIENT)
Dept: HEMATOLOGY/ONCOLOGY | Facility: CLINIC | Age: 49
End: 2024-01-10
Payer: COMMERCIAL

## 2024-01-10 NOTE — TELEPHONE ENCOUNTER
I spoke with Pradeep and notified him that [facility] wanted me to notify them that Crystal's urine sample from 1/8/24 came back negative. He was appreciative and had no questions at this time.

## 2024-01-12 ENCOUNTER — SOCIAL WORK (OUTPATIENT)
Dept: CASE MANAGEMENT | Facility: HOSPITAL | Age: 49
End: 2024-01-12
Payer: COMMERCIAL

## 2024-01-12 NOTE — PROGRESS NOTES
Social Work Note  1/12/2024  SW received a call from patient today regarding a pathology report that social security was requesting in a letter she received.  Patient was able to call back and find out they needed a neurology note.  SW printed this with patient's permission and faxed it to social security on this date.  SW will remain available to assist patient.  Treva Glaser, MSW, LSW

## 2024-01-15 ENCOUNTER — INFUSION (OUTPATIENT)
Dept: HEMATOLOGY/ONCOLOGY | Facility: CLINIC | Age: 49
End: 2024-01-15
Payer: COMMERCIAL

## 2024-01-15 ENCOUNTER — APPOINTMENT (OUTPATIENT)
Dept: HEMATOLOGY/ONCOLOGY | Facility: CLINIC | Age: 49
End: 2024-01-15
Payer: COMMERCIAL

## 2024-01-15 ENCOUNTER — APPOINTMENT (OUTPATIENT)
Dept: CARDIOLOGY | Facility: CLINIC | Age: 49
End: 2024-01-15
Payer: COMMERCIAL

## 2024-01-15 ENCOUNTER — NUTRITION (OUTPATIENT)
Dept: HEMATOLOGY/ONCOLOGY | Facility: CLINIC | Age: 49
End: 2024-01-15

## 2024-01-15 VITALS
DIASTOLIC BLOOD PRESSURE: 69 MMHG | WEIGHT: 115.08 LBS | BODY MASS INDEX: 21.2 KG/M2 | SYSTOLIC BLOOD PRESSURE: 106 MMHG | HEART RATE: 87 BPM | TEMPERATURE: 97.9 F | OXYGEN SATURATION: 95 % | RESPIRATION RATE: 18 BRPM

## 2024-01-15 VITALS — BODY MASS INDEX: 21.18 KG/M2 | WEIGHT: 115.08 LBS | HEIGHT: 62 IN

## 2024-01-15 DIAGNOSIS — C81.48: ICD-10-CM

## 2024-01-15 LAB
ANION GAP SERPL CALC-SCNC: 15 MMOL/L (ref 10–20)
BUN SERPL-MCNC: 16 MG/DL (ref 6–23)
CALCIUM SERPL-MCNC: 9.3 MG/DL (ref 8.6–10.3)
CHLORIDE SERPL-SCNC: 100 MMOL/L (ref 98–107)
CO2 SERPL-SCNC: 24 MMOL/L (ref 21–32)
CREAT SERPL-MCNC: 0.55 MG/DL (ref 0.5–1.05)
EGFRCR SERPLBLD CKD-EPI 2021: >90 ML/MIN/1.73M*2
GLUCOSE SERPL-MCNC: 171 MG/DL (ref 74–99)
MAGNESIUM SERPL-MCNC: 1.7 MG/DL (ref 1.6–2.4)
POTASSIUM SERPL-SCNC: 3.5 MMOL/L (ref 3.5–5.3)
SODIUM SERPL-SCNC: 135 MMOL/L (ref 136–145)

## 2024-01-15 PROCEDURE — 96360 HYDRATION IV INFUSION INIT: CPT | Mod: INF

## 2024-01-15 PROCEDURE — 80048 BASIC METABOLIC PNL TOTAL CA: CPT

## 2024-01-15 PROCEDURE — 2500000004 HC RX 250 GENERAL PHARMACY W/ HCPCS (ALT 636 FOR OP/ED): Performed by: INTERNAL MEDICINE

## 2024-01-15 PROCEDURE — 83735 ASSAY OF MAGNESIUM: CPT

## 2024-01-15 RX ORDER — PANTOPRAZOLE SODIUM 40 MG/1
40 TABLET, DELAYED RELEASE ORAL DAILY
Qty: 30 TABLET | Refills: 3 | Status: SHIPPED | OUTPATIENT
Start: 2024-01-15 | End: 2024-04-01 | Stop reason: SDUPTHER

## 2024-01-15 RX ORDER — HEPARIN SODIUM,PORCINE/PF 10 UNIT/ML
50 SYRINGE (ML) INTRAVENOUS AS NEEDED
Status: CANCELLED | OUTPATIENT
Start: 2024-01-15

## 2024-01-15 RX ORDER — HEPARIN 100 UNIT/ML
500 SYRINGE INTRAVENOUS AS NEEDED
Status: CANCELLED | OUTPATIENT
Start: 2024-01-15

## 2024-01-15 RX ADMIN — SODIUM CHLORIDE 1000 ML: 9 INJECTION, SOLUTION INTRAVENOUS at 09:10

## 2024-01-15 NOTE — PROGRESS NOTES
"NUTRITION Follow-up NOTE    Nutrition Assessment     Reason for Visit:  Pamela Lopez is a 48 y.o. female who is receiving A+AVD for Hodgkin's Lymphoma.     Attempted to follow up with patient, but unfortunately missed her today. Will attempt to meet with her at her next appointment.     This service will continue to follow.     Lab Results   Component Value Date/Time    GLUCOSE 171 (H) 01/15/2024 0904     (L) 01/15/2024 0904    K 3.5 01/15/2024 0904     01/15/2024 0904    CO2 24 01/15/2024 0904    ANIONGAP 15 01/15/2024 0904    BUN 16 01/15/2024 0904    CREATININE 0.55 01/15/2024 0904    EGFR >90 01/15/2024 0904    CALCIUM 9.3 01/15/2024 0904    ALBUMIN 3.6 01/08/2024 0923    ALKPHOS 79 01/08/2024 0923    PROT 6.1 (L) 01/08/2024 0923    AST 10 01/08/2024 0923    BILITOT 0.2 01/08/2024 0923    ALT 7 01/08/2024 0923       Anthropometrics:  Anthropometrics  Height: 156.9 cm (5' 1.77\")  Weight: 52.2 kg (115 lb 1.3 oz)  BMI (Calculated): 21.2  IBW/kg (Dietitian Calculated): 49.32 kg  Percent of IBW: 105.84 %  Weight Change  Weight History / % Weight Change: 17.8 kg (26%) weight loss x 6 months  Significant Weight Loss: Yes    Wt Readings from Last 10 Encounters:   01/15/24 52.2 kg (115 lb 1.3 oz)   01/15/24 52.2 kg (115 lb 1.3 oz)   01/08/24 52.3 kg (115 lb 4.8 oz)   01/08/24 52.3 kg (115 lb 4.8 oz)   01/03/24 52 kg (114 lb 10.2 oz)   12/27/23 52 kg (114 lb 10.2 oz)   12/27/23 52 kg (114 lb 10.2 oz)   12/18/23 52.7 kg (116 lb 2.9 oz)   12/12/23 56.3 kg (124 lb 1.9 oz)   12/11/23 57.7 kg (127 lb 3.3 oz)        Food And Nutrient Intake:                                                                 Nutrition Focused Physical Exam Findings:                          Energy Needs  Calculated Energy Needs Using Equations  Height: 156.9 cm (5' 1.77\")  Estimated Energy Needs  Total Energy Estimated Needs (kCal): 1820 kCal  Method for Estimating Needs: 35 kcal/kg CBW  Estimated Fluid Needs  Total Fluid Estimated " Needs (mL): 1820 mL  Method for Estimating Needs: 1 mL/kcal  Estimated Protein Needs  Total Protein Estimated Needs (g): 78 g  Method for Estimating Needs: 1.5g/kg CBW        Nutrition Diagnosis             Nutrition Interventions/Recommendations   Nutrition Prescription       Food and Nutrition Delivery       Nutrition Education       Coordination of Care       There are no Patient Instructions on file for this visit.    Nutrition Monitoring and Evaluation

## 2024-01-17 ENCOUNTER — TELEMEDICINE (OUTPATIENT)
Dept: PALLIATIVE MEDICINE | Facility: CLINIC | Age: 49
End: 2024-01-17
Payer: COMMERCIAL

## 2024-01-17 ENCOUNTER — APPOINTMENT (OUTPATIENT)
Dept: HEMATOLOGY/ONCOLOGY | Facility: CLINIC | Age: 49
End: 2024-01-17
Payer: COMMERCIAL

## 2024-01-17 DIAGNOSIS — G89.3 CANCER RELATED PAIN: Primary | ICD-10-CM

## 2024-01-17 PROCEDURE — 99213 OFFICE O/P EST LOW 20 MIN: CPT

## 2024-01-17 NOTE — PROGRESS NOTES
SUPPORTIVE AND PALLIATIVE ONCOLOGY OUTPATIENT FOLLOW-UP      SERVICE DATE: 1/17/2024    Virtual or Telephone Consent    An interactive audio and video telecommunication system which permits real time communications between the patient (at the originating site) and provider (at the distant site) was utilized to provide this telehealth service.   Verbal consent was requested and obtained from Pamela Lopez on this date, 01/17/24 for a telehealth visit.     Subjective   HISTORY OF PRESENT ILLNESS: Pamela Lopez is a 48 y.o. female who presents with newly diagnosed Hodgkin Lymphoma. She was admitted d/t back pain and a CT c/a/p revealed multiple hepatic masses and several osteoblastic  lesions. MRI brain was negative. She underwent biopsy of L3 on 6/9/23 which was nondiagnostic. PET scan showed FDG-avid lymph nodes in the neck, tonsil, bones, and liver. Biopsy of right tonsil 6/22 revealed Hodgkin Lymphoma. She underwent RT to T7 x10  fractions. She started treatment with AVD + Brentuximab 7/17/23. Recent PET scan with excellent response to treatment.     Pain Assessment:  Pain Score: 7/10  Location: mid-back  Description: The pain is sharp/shooting. She also has constant aching pain. The pain is a 7/10 at its worst and improves to a 2/10 with oxycodone. Since increasing her Morphine to 30 mg TID she has only needed oxycodone 2 times this week. She is taking 15 mg when needed. Her pain increases when she tries to stand up straight.     Symptom Assessment:  Pain:somewhat  Headache: none  Dizziness:none  Lack of energy: a little due to pain  Difficulty sleeping: a little. Due to pain  Worrying: very much  Anxiety: somewhat  Depression: a little. Improving since back on Lexapro 20 mg daily.   Pain in mouth/swallowing: none  Dry mouth: none  Taste changes: none  Shortness of breath: none  Lack of appetite: none   Nausea: none  Vomiting: none  Constipation: none  Diarrhea: none  Sore muscles: none  Numbness or  tingling in hands/feet/other: somewhat  Weight loss: none  Other: none      Information obtained from: interview of patient  ______________________________________________________________________        Objective     Infusion on 01/15/2024   Component Date Value Ref Range Status    Glucose 01/15/2024 171 (H)  74 - 99 mg/dL Final    Sodium 01/15/2024 135 (L)  136 - 145 mmol/L Final    Potassium 01/15/2024 3.5  3.5 - 5.3 mmol/L Final    Chloride 01/15/2024 100  98 - 107 mmol/L Final    Bicarbonate 01/15/2024 24  21 - 32 mmol/L Final    Anion Gap 01/15/2024 15  10 - 20 mmol/L Final    Urea Nitrogen 01/15/2024 16  6 - 23 mg/dL Final    Creatinine 01/15/2024 0.55  0.50 - 1.05 mg/dL Final    eGFR 01/15/2024 >90  >60 mL/min/1.73m*2 Final    Calculations of estimated GFR are performed using the 2021 CKD-EPI Study Refit equation without the race variable for the IDMS-Traceable creatinine methods.  https://jasn.asnjournals.org/content/early/2021/09/22/ASN.7343192211    Calcium 01/15/2024 9.3  8.6 - 10.3 mg/dL Final    Magnesium 01/15/2024 1.70  1.60 - 2.40 mg/dL Final         PHYSICAL EXAMINATION   Vital Signs: not completed due to virtual visit        Physical Exam not completed due to virtual visit     Social  Not . Lives with her 3 grown children.  History of drug use with Meth x8 years. Quit 1 year ago.  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     ASSESSMENT/PLAN    Pain  Pain is: cancer related pain  Type: somatic and neuropathic  Pain control: well-controlled  Home regimen:   - Continue oxycodone 15 mg every 4 hours as needed  - Increase Morphine ER to 30 mg TID.   - Continue Tylenol 500 mg every 4 hours as needed  - Continue Gabapentin 300 mg TID  - referred to aquatherapy for pain and weakness    Opioid Use  Medication Management:   - OARRS report reviewed with no aberrant behavior; consistent with  prescriptions/records and patient history  - MED 45.  Overdose  Risk Score 290.   This has been discussed with patient.   - We will continue to closely monitor the patient for signs of prescription misuse including UDS, OARRS review and subjective reports at each visit.  - No concurrent benzodiazepine use   - I am a provider who either is or has consulted and collaborated with a provider certified in Hospice and Palliative Medicine and have conducted a face-face visit and examination for this patient.  - Routine Urine Drug Screen completed 6/6/23 appropriately positive for opioids and negative for illicit substances. repeat yearly   - Controlled Substance Agreement: completed 9/12/23. repeat yearly   - Specifically discussed that controlled substance prescriptions will only be provided by our group as outlined in the completed agreement  - Prescribed naloxone: Has prescription at home  - Red Flags: History of drug use with Meth. Quit 1 year ago     Nausea   Intermittent nausea without vomiting related to chemotherapy   - Continue Prochlorperazine 10 mg every 6 hours as needed  - Continue Ondansetron 8 mg every 8 hours as needed    Constipation  At risk for constipation related to opioids,  currently not constipated  Usual bowel pattern: daily  Home regimen:   - Continue Senna-S 2 tabs BID- no longer taking due to diarrhea  - Start Magnesium Citrate - may start with 1/2 bottle to 1 bottle if no BM within 48-72 hours     Altered Mood  Chronic anxiety and depression related to health concerns   controlled with home regimen  Home regimen:   - Continue Lexapro back to 20 mg- she was decreased to 10 mg inpatient and feels that she has been more depressed and crying   - Discussed meeting with a community therapist for therapy as well    Sleeping Difficulty:  Home regimen:    - Trazodone stopped inpatient r/t QTc concerns  - Continue Risperidone 0.5 mg every night   - Discussed taking 5-10 mg of Melatonin. Encouraged to restart this.    Decreased appetite  Related to malignancy,  chemotherapy, and disease process  Nutrition following  Home regimen:    - Olanzapine 2.5 mg every day at bedtime- this was stopped inpatient d/t QTc  - Following with Martha Khan      Advance Directives  Existence of Advance Directives:Yes, documentation or copy in medical record  Decision maker: AARON is Cynthia Xie  Code Status: Full code    Next Follow-Up Visit:  Return to clinic in 1 month virtually     Signature and billing  Medical complexity was low level due to due to complexity of problems, extensive data review, and high risk of management/treatment.  Time was spent on the following: Prep Time, Time Directly with Patient/Family/Caregiver, Documentation Time. Total time spent: 22      Data  Diagnostic tests and information reviewed for today's visit:  Most recent labs, Most recent imaging, Medications         Some elements copied from Cynthia Johnson note on 1/3/24, the elements have been updated and all reflect current decision making from today, 1/17/2024.      Plan of Care discussed with: Patient    SIGNATURE: MAGDA Avilez-CNP    Contact information:  Supportive and Palliative Oncology  Monday-Friday 8 AM-5 PM  Phone:  [phone], press option #5, then option #1.   Or Epic Secure Chat

## 2024-01-18 ENCOUNTER — APPOINTMENT (OUTPATIENT)
Dept: HEMATOLOGY/ONCOLOGY | Facility: CLINIC | Age: 49
End: 2024-01-18
Payer: COMMERCIAL

## 2024-01-18 NOTE — PROGRESS NOTES
Patient ID: Pamela Lopez is a 48 y.o. female.    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 Pamela Lopez is a 47 y/o F presenting for follow-up for Hodgkin's lymphoma. Here for consideration of cycle 6 day 15.    Since last visit, patient reports that she had one episode of vomiting on a day that she forgot to take her Protonix, however she states that she continues to have a good appetite and good energy. Denies any new pain. Denies any new chest pain, cough or shortness of breath. No new swelling or rashes. No other complaints today.     Patient's past medical history, surgical history, family history and social history reviewed.     Objective    Vitals:    01/22/24 1033   BP: 84/53   Pulse: 107   Resp: 16   Temp: 36.8 °C (98.2 °F)   SpO2: 94%      Review of Systems:   Review of Systems:    Positive per HPI, otherwise negative.     Physical Exam:   Constitutional: Patient appears in no acute distress.   Sitting comfortably in chair.  Eyes: EOMI, clear sclera  ENMT: mucous membranes moist, no apparent injury  Head/Neck: Neck supple, no JVD  Respiratory/Thorax: Patent airways, CTAB, normal  breath sounds, no increased work of breathing  Cardiovascular: Regular, rate and rhythm, no murmurs  Gastrointestinal: Nondistended, soft, non-tender,  no rebound tenderness or guarding, no masses palpable  Extremities: normal extremities, no cyanosis edema,  no swelling  Neurological: alert and oriented x3, nonfocal, normal  speech and hearing  Lymphatic: No palpable lymphadenopathy in cervical,  axillary  lymph nodes.  Spleen appears normal size.  Psychological: Appropriate mood and behavior, normal  affect  Skin:  Warm and dry, no lesions, no rashes     Performance Status:  Symptomatic; fully ambulatory    Labs/Imaging/Pathology: personally reviewed reports and images in Epic electronic medical record system. Pertinent results as it related to the plan represented in below in assessment and plan.      Assessment/Plan   1. Classical Hodgkin lymphoma, lymphocyte- rich subtype, stage IVB     - developed tonsil enlargement initially over two years ago and thought to be related to infection  - 6/5/23- 6/723 admitted for new pain and found to have multiple spine mets, neck lymphadenopathy and possible liver lesions  -  biopsy 6/9/23 of her L3 was nondiagnostic  - 6/13/23 PET/CT with FDG avid right palantine tonsil, b/l cervical, portacaval, RP nodes, hepatic met, osseous lesions in axial skeleton  - 6/19/23 planned for 10 fx to T7  - 6/22/23 tonsillectomy path consistent with classical Hodgkin lymphoma  - Discussed diagnosis of classical Hodgkin's lymphoma with patient and given stage IV disease with B symptom along with history of strong tobacco use, recommend brentuximab plus AVD per ECHELON-1 trial (Lisa, et al. [facility] 2018)  - Reviewed side effects and consent signed  - pt is planning to see dental next week as she has active abscess with pain and still has RT next week  - 7/17/12 AVD+ brentuximab  -hospitalized for fever and discharge 8/27/23 and no infectious cause found.  - delays due to neutropenia, cycle 3 day on 9/25/23.  - 10/9/23 PET CT interim with significant treatment response   11/19/23 C5D1 reduced brentuximab to 0.9 mg/mkg    2/4/23:  - Since last visit, patient had a left-side infection in her mouth related to a molar that is likely infected. She was started on antibiotics in the hospital with Augmentin BID and then took herself out of the hospital to come for treatment today.   - Today her blood pressure is 60s over 40s and recommended ED for further management  - We discussed that we will hold her chemotherapy  with a plan to get her in with [facility] as soon as possible.   - At this point, we will delay cycle 5 day 15 by 1 week.  - RTC in 1 week with Lamonte.     12/11/2023:  - Presents to make up cycle 5 day 15 after it was held last week for hypotension, sent to the ED and BP improved with IV fluids  - Has a few days left of oral Augmentin for UTI and tooth infection, has not been able to make a visit with an oral surgeon who takes her insurance   - Says UTI and tooth infection symptoms have resolved and she is feeling well, however, she has a max temp of 39.1 here in clinic via oral temperature in addition to chills last night and feeling cold today, her son had the heat on high on the drive in  - Therefore, I did recommend patient go to the ED for concern for infection vs sepsis, patient chose to go to [facility] ED which I discussed may be good in case they feel her tooth needs extracted but if they cannot do then son will call [facility] surgeon   - We will reschedule patient once we are aware of her discharge from ED/hospital.      12/18/23:  - Hypotensive and bradycardic today, recommend further eval in ED and reconsideration of next cycle of tx in one week  - She reports that she is overall feeling better from COVID. Her son was also recently sick but COVID negative.   - We will plan for COVID precautions with treatment today and then return to clinic in 1 week for fluids and in 2 weeks for cycle 6 day 1.  - RTC with Lamonte in 2 weeks.      1/8/2024:  - Presents for cycle 6 day 1 of A+AVD  - Her weight has steadily decreased so her dosage was adjusted by Dr. Gomez to reflect this  - Refills requested and sent for senna, iron, and acyclovir   - She is feeling well overall, her weight is stable and no signs/symptoms of infection   - She and her daughter would like past PET scan reports, we will print them off for them  - Labs: WBC 4.9, hgb 10.0, plt 130,000, creatinine 0.49, AST 10, ALT 7, magnesium 1.9,  , uric acid 3.9  - She will return in 1 week for IV fluids only   - RTC 2 weeks for cycle 6 day 15 treatment and a FUV with Dr. Gomez     1/22/24:  - No contraindications to proceed with cycle 6 day 15 today.  - She had one episode of vomiting that improved with PPI. She does know to take Zofran and take her Protonix.  - She is already scheduled for her PET CT. She would like to have it moved to [facility].   - I will see her 6.5 weeks after PET CT to discuss results and next steps.   - RTC with me 6.5 weeks after PET CT.     RTC with me 6.5 weeks after PET CT. This note has been transcribed using a medical scribe and there is a possibility of unintentional typing misprints.      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

## 2024-01-22 ENCOUNTER — INFUSION (OUTPATIENT)
Dept: HEMATOLOGY/ONCOLOGY | Facility: CLINIC | Age: 49
End: 2024-01-22
Payer: COMMERCIAL

## 2024-01-22 ENCOUNTER — NUTRITION (OUTPATIENT)
Dept: HEMATOLOGY/ONCOLOGY | Facility: CLINIC | Age: 49
End: 2024-01-22
Payer: COMMERCIAL

## 2024-01-22 ENCOUNTER — OFFICE VISIT (OUTPATIENT)
Dept: HEMATOLOGY/ONCOLOGY | Facility: CLINIC | Age: 49
End: 2024-01-22
Payer: COMMERCIAL

## 2024-01-22 VITALS
RESPIRATION RATE: 16 BRPM | TEMPERATURE: 98.2 F | OXYGEN SATURATION: 94 % | DIASTOLIC BLOOD PRESSURE: 53 MMHG | HEART RATE: 107 BPM | WEIGHT: 113.76 LBS | BODY MASS INDEX: 20.96 KG/M2 | SYSTOLIC BLOOD PRESSURE: 84 MMHG

## 2024-01-22 VITALS — WEIGHT: 113.76 LBS | HEIGHT: 62 IN | BODY MASS INDEX: 20.93 KG/M2

## 2024-01-22 VITALS — BODY MASS INDEX: 21.12 KG/M2 | HEIGHT: 62 IN

## 2024-01-22 DIAGNOSIS — C81.48: ICD-10-CM

## 2024-01-22 LAB
ALBUMIN SERPL BCP-MCNC: 3.6 G/DL (ref 3.4–5)
ALP SERPL-CCNC: 74 U/L (ref 33–110)
ALT SERPL W P-5'-P-CCNC: 7 U/L (ref 7–45)
ANION GAP SERPL CALC-SCNC: 13 MMOL/L (ref 10–20)
AST SERPL W P-5'-P-CCNC: 12 U/L (ref 9–39)
BASOPHILS # BLD AUTO: 0.07 X10*3/UL (ref 0–0.1)
BASOPHILS NFR BLD AUTO: 1.1 %
BILIRUB SERPL-MCNC: 0.2 MG/DL (ref 0–1.2)
BUN SERPL-MCNC: 10 MG/DL (ref 6–23)
CALCIUM SERPL-MCNC: 9.1 MG/DL (ref 8.6–10.3)
CHLORIDE SERPL-SCNC: 105 MMOL/L (ref 98–107)
CO2 SERPL-SCNC: 24 MMOL/L (ref 21–32)
CREAT SERPL-MCNC: 0.65 MG/DL (ref 0.5–1.05)
EGFRCR SERPLBLD CKD-EPI 2021: >90 ML/MIN/1.73M*2
EOSINOPHIL # BLD AUTO: 0.02 X10*3/UL (ref 0–0.7)
EOSINOPHIL NFR BLD AUTO: 0.3 %
ERYTHROCYTE [DISTWIDTH] IN BLOOD BY AUTOMATED COUNT: 16.5 % (ref 11.5–14.5)
GLUCOSE SERPL-MCNC: 95 MG/DL (ref 74–99)
HCT VFR BLD AUTO: 31.6 % (ref 36–46)
HGB BLD-MCNC: 10 G/DL (ref 12–16)
IMM GRANULOCYTES # BLD AUTO: 0.19 X10*3/UL (ref 0–0.7)
IMM GRANULOCYTES NFR BLD AUTO: 2.9 % (ref 0–0.9)
LYMPHOCYTES # BLD AUTO: 0.82 X10*3/UL (ref 1.2–4.8)
LYMPHOCYTES NFR BLD AUTO: 12.5 %
MAGNESIUM SERPL-MCNC: 1.8 MG/DL (ref 1.6–2.4)
MCH RBC QN AUTO: 32.3 PG (ref 26–34)
MCHC RBC AUTO-ENTMCNC: 31.6 G/DL (ref 32–36)
MCV RBC AUTO: 102 FL (ref 80–100)
MONOCYTES # BLD AUTO: 1.25 X10*3/UL (ref 0.1–1)
MONOCYTES NFR BLD AUTO: 19.1 %
NEUTROPHILS # BLD AUTO: 4.21 X10*3/UL (ref 1.2–7.7)
NEUTROPHILS NFR BLD AUTO: 64.1 %
PLATELET # BLD AUTO: 190 X10*3/UL (ref 150–450)
POTASSIUM SERPL-SCNC: 3.5 MMOL/L (ref 3.5–5.3)
PROT SERPL-MCNC: 6.1 G/DL (ref 6.4–8.2)
RBC # BLD AUTO: 3.1 X10*6/UL (ref 4–5.2)
SODIUM SERPL-SCNC: 138 MMOL/L (ref 136–145)
WBC # BLD AUTO: 6.6 X10*3/UL (ref 4.4–11.3)

## 2024-01-22 PROCEDURE — 96375 TX/PRO/DX INJ NEW DRUG ADDON: CPT | Mod: INF

## 2024-01-22 PROCEDURE — 1036F TOBACCO NON-USER: CPT | Performed by: INTERNAL MEDICINE

## 2024-01-22 PROCEDURE — 99215 OFFICE O/P EST HI 40 MIN: CPT | Performed by: INTERNAL MEDICINE

## 2024-01-22 PROCEDURE — 96372 THER/PROPH/DIAG INJ SC/IM: CPT | Performed by: INTERNAL MEDICINE

## 2024-01-22 PROCEDURE — 2500000004 HC RX 250 GENERAL PHARMACY W/ HCPCS (ALT 636 FOR OP/ED): Mod: JZ | Performed by: INTERNAL MEDICINE

## 2024-01-22 PROCEDURE — 96377 APPLICATON ON-BODY INJECTOR: CPT

## 2024-01-22 PROCEDURE — 83735 ASSAY OF MAGNESIUM: CPT

## 2024-01-22 PROCEDURE — 96417 CHEMO IV INFUS EACH ADDL SEQ: CPT

## 2024-01-22 PROCEDURE — 96372 THER/PROPH/DIAG INJ SC/IM: CPT

## 2024-01-22 PROCEDURE — 80053 COMPREHEN METABOLIC PANEL: CPT

## 2024-01-22 PROCEDURE — 96411 CHEMO IV PUSH ADDL DRUG: CPT

## 2024-01-22 PROCEDURE — 3008F BODY MASS INDEX DOCD: CPT | Performed by: INTERNAL MEDICINE

## 2024-01-22 PROCEDURE — 96367 TX/PROPH/DG ADDL SEQ IV INF: CPT

## 2024-01-22 PROCEDURE — 85025 COMPLETE CBC W/AUTO DIFF WBC: CPT

## 2024-01-22 PROCEDURE — 96413 CHEMO IV INFUSION 1 HR: CPT

## 2024-01-22 RX ORDER — DIPHENHYDRAMINE HYDROCHLORIDE 50 MG/ML
50 INJECTION INTRAMUSCULAR; INTRAVENOUS AS NEEDED
Status: DISCONTINUED | OUTPATIENT
Start: 2024-01-22 | End: 2024-01-22 | Stop reason: HOSPADM

## 2024-01-22 RX ORDER — HEPARIN SODIUM,PORCINE/PF 10 UNIT/ML
50 SYRINGE (ML) INTRAVENOUS AS NEEDED
Status: CANCELLED | OUTPATIENT
Start: 2024-01-22

## 2024-01-22 RX ORDER — FAMOTIDINE 10 MG/ML
20 INJECTION INTRAVENOUS ONCE AS NEEDED
Status: DISCONTINUED | OUTPATIENT
Start: 2024-01-22 | End: 2024-01-22 | Stop reason: HOSPADM

## 2024-01-22 RX ORDER — DOXORUBICIN HYDROCHLORIDE 2 MG/ML
25 INJECTION, SOLUTION INTRAVENOUS ONCE
Status: COMPLETED | OUTPATIENT
Start: 2024-01-22 | End: 2024-01-22

## 2024-01-22 RX ORDER — PALONOSETRON 0.05 MG/ML
0.25 INJECTION, SOLUTION INTRAVENOUS ONCE
Status: COMPLETED | OUTPATIENT
Start: 2024-01-22 | End: 2024-01-22

## 2024-01-22 RX ORDER — HEPARIN 100 UNIT/ML
500 SYRINGE INTRAVENOUS AS NEEDED
Status: CANCELLED | OUTPATIENT
Start: 2024-01-22

## 2024-01-22 RX ORDER — ALBUTEROL SULFATE 0.83 MG/ML
3 SOLUTION RESPIRATORY (INHALATION) AS NEEDED
Status: DISCONTINUED | OUTPATIENT
Start: 2024-01-22 | End: 2024-01-22 | Stop reason: HOSPADM

## 2024-01-22 RX ORDER — EPINEPHRINE 0.3 MG/.3ML
0.3 INJECTION SUBCUTANEOUS EVERY 5 MIN PRN
Status: DISCONTINUED | OUTPATIENT
Start: 2024-01-22 | End: 2024-01-22 | Stop reason: HOSPADM

## 2024-01-22 RX ORDER — HEPARIN 100 UNIT/ML
500 SYRINGE INTRAVENOUS AS NEEDED
Status: DISCONTINUED | OUTPATIENT
Start: 2024-01-22 | End: 2024-01-22 | Stop reason: HOSPADM

## 2024-01-22 RX ADMIN — PALONOSETRON 250 MCG: 0.05 INJECTION, SOLUTION INTRAVENOUS at 13:54

## 2024-01-22 RX ADMIN — PEGFILGRASTIM 6 MG: KIT SUBCUTANEOUS at 16:30

## 2024-01-22 RX ADMIN — DOXORUBICIN HYDROCHLORIDE 38 MG: 2 INJECTION, SOLUTION INTRAVENOUS at 15:13

## 2024-01-22 RX ADMIN — ALTEPLASE 2 MG: 2.2 INJECTION, POWDER, LYOPHILIZED, FOR SOLUTION INTRAVENOUS at 12:22

## 2024-01-22 RX ADMIN — BRENTUXIMAB VEDOTIN 47 MG: 50 INJECTION, POWDER, LYOPHILIZED, FOR SOLUTION INTRAVENOUS at 16:23

## 2024-01-22 RX ADMIN — FOSAPREPITANT 150 MG: 150 INJECTION, POWDER, LYOPHILIZED, FOR SOLUTION INTRAVENOUS at 14:17

## 2024-01-22 RX ADMIN — VINBLASTINE SULFATE 9.1 MG: 1 INJECTION INTRAVENOUS at 15:30

## 2024-01-22 RX ADMIN — DEXAMETHASONE SODIUM PHOSPHATE 12 MG: 10 INJECTION, SOLUTION INTRAMUSCULAR; INTRAVENOUS at 13:56

## 2024-01-22 RX ADMIN — SODIUM CHLORIDE 566.25 MG: 9 INJECTION, SOLUTION INTRAVENOUS at 15:39

## 2024-01-22 ASSESSMENT — PAIN SCALES - GENERAL: PAINLEVEL: 0-NO PAIN

## 2024-01-22 NOTE — PROGRESS NOTES
"NUTRITION Follow-up NOTE    Nutrition Assessment     Reason for Visit:  Pamela Lopez is a 48 y.o. female who presents for A+AVD.     Lab Results   Component Value Date/Time    GLUCOSE 95 01/22/2024 1206     01/22/2024 1206    K 3.5 01/22/2024 1206     01/22/2024 1206    CO2 24 01/22/2024 1206    ANIONGAP 13 01/22/2024 1206    BUN 10 01/22/2024 1206    CREATININE 0.65 01/22/2024 1206    EGFR >90 01/22/2024 1206    CALCIUM 9.1 01/22/2024 1206    ALBUMIN 3.6 01/22/2024 1206    ALKPHOS 74 01/22/2024 1206    PROT 6.1 (L) 01/22/2024 1206    AST 12 01/22/2024 1206    BILITOT 0.2 01/22/2024 1206    ALT 7 01/22/2024 1206     Anthropometrics:  Anthropometrics  Height: 156.3 cm (5' 1.54\")  Weight: 51.6 kg (113 lb 12.1 oz)  BMI (Calculated): 21.12  IBW/kg (Dietitian Calculated): 49.32 kg  Percent of IBW: 104.62 %  Weight Change  Weight History / % Weight Change: 17.8 kg (26%) weight loss x 6 months  Significant Weight Loss: Yes    Wt Readings from Last 10 Encounters:   01/22/24 51.6 kg (113 lb 12.1 oz)   01/22/24 51.6 kg (113 lb 12.1 oz)   01/15/24 52.2 kg (115 lb 1.3 oz)   01/15/24 52.2 kg (115 lb 1.3 oz)   01/08/24 52.3 kg (115 lb 4.8 oz)   01/08/24 52.3 kg (115 lb 4.8 oz)   01/03/24 52 kg (114 lb 10.2 oz)   12/27/23 52 kg (114 lb 10.2 oz)   12/27/23 52 kg (114 lb 10.2 oz)   12/18/23 52.7 kg (116 lb 2.9 oz)        Food And Nutrient Intake:    Met with patient and her family during treatment. Patient reports that she is eating well at this time, denying any questions/concerns with her diet. Weight is down 2 lbs since last visit. Today is her last treatment. Denies need for intervention at this time.       Nutrition Focused Physical Exam Findings:  defer: visible fat and muscle loss noted    Energy Needs  Calculated Energy Needs Using Equations  Height: 156.3 cm (5' 1.54\")  Estimated Energy Needs  Total Energy Estimated Needs (kCal): 1820 kCal  Method for Estimating Needs: 35 kcal/kg CBW  Estimated Fluid " Needs  Total Fluid Estimated Needs (mL): 1820 mL  Method for Estimating Needs: 1 mL/kcal  Estimated Protein Needs  Total Protein Estimated Needs (g): 78 g  Method for Estimating Needs: 1.5g/kg CBW    Nutrition Diagnosis    Malnutrition Diagnosis  Patient has Malnutrition Diagnosis: Yes  Malnutrition Diagnosis: Severe malnutrition related to chronic disease or condition  As Evidenced by: 25% weight loss x 6 months; subjective reports of ongoing inadequate oral intake; muscle wasting and fat loss per NFPE    Nutrition Interventions/Recommendations   Nutrition Prescription   High protein/high calorie diet for weight and LBM maintenance     Nutrition Education   Encouraged patient to continue with high protein/high calorie diet after treatment  Strongly encouraged her to call with questions/concerns    Nutrition Monitoring and Evaluation   Food/Nutrient Related History Monitoring  Monitoring and Evaluation Plan: Energy intake, Fluid intake, Amount of food, Meal/snack pattern, Protein intake  Criteria: Trial premier protein clear; Increase kcal and PRO intake via small, frequent feedings q2-3h on days when PO intake is poor  Body Composition/Growth/Weight History  Monitoring and Evaluation Plan: Weight  Criteria: weight maintenance; controlled weight gain  Nutrition Focused Physical Findings  Monitoring and Evaluation Plan: Digestive System  Digestive System: Other (Comment)    Time Spent  Prep time on day of patient encounter: 5 minutes  Time spent directly with patient, family or caregiver: 5 minutes  Additional Time Spent on Patient Care Activities: 0 minutes  Documentation Time: 5 minutes  Other Time Spent: 0 minutes  Total: 15 minutes      RD contact information provided. This service will continue to follow.

## 2024-01-22 NOTE — SIGNIFICANT EVENT
01/22/24 1256   Prechemo Checklist   Has the patient been in the hospital, ED, or urgent care since last date of service No   Chemo/Immuno Consent Signed Yes   Protocol/Indications Verified Yes   Confirmed to previous date/time of medication Yes   Compared to previous dose Yes   All medications are dated accurately Yes   Pregnancy Test Negative Not applicable  (per our current policy: pt has not begun a new tx regimen that would warrant the need to perform a new pregnancy test)   Parameters Met Yes   Provider Name Dr. Gomez had an MD visit with pt today prior to pt's infusion appt   Is Patient Proceeding With Treatment? Yes   BSA/Weight-Height Verified Yes   Dose Calculations Verified Yes   Cumulative 278.12  (Adriamycin lifetime dose)

## 2024-01-25 ENCOUNTER — APPOINTMENT (OUTPATIENT)
Dept: HEMATOLOGY/ONCOLOGY | Facility: CLINIC | Age: 49
End: 2024-01-25
Payer: COMMERCIAL

## 2024-01-29 ENCOUNTER — INFUSION (OUTPATIENT)
Dept: HEMATOLOGY/ONCOLOGY | Facility: CLINIC | Age: 49
End: 2024-01-29
Payer: COMMERCIAL

## 2024-01-29 VITALS
HEART RATE: 93 BPM | BODY MASS INDEX: 21.32 KG/M2 | RESPIRATION RATE: 18 BRPM | SYSTOLIC BLOOD PRESSURE: 83 MMHG | TEMPERATURE: 97.9 F | WEIGHT: 114.8 LBS | OXYGEN SATURATION: 97 % | DIASTOLIC BLOOD PRESSURE: 45 MMHG

## 2024-01-29 DIAGNOSIS — C81.48: ICD-10-CM

## 2024-01-29 LAB
ANION GAP SERPL CALC-SCNC: 11 MMOL/L (ref 10–20)
BUN SERPL-MCNC: 13 MG/DL (ref 6–23)
CALCIUM SERPL-MCNC: 8.9 MG/DL (ref 8.6–10.3)
CHLORIDE SERPL-SCNC: 104 MMOL/L (ref 98–107)
CO2 SERPL-SCNC: 26 MMOL/L (ref 21–32)
CREAT SERPL-MCNC: 0.42 MG/DL (ref 0.5–1.05)
EGFRCR SERPLBLD CKD-EPI 2021: >90 ML/MIN/1.73M*2
GLUCOSE SERPL-MCNC: 138 MG/DL (ref 74–99)
MAGNESIUM SERPL-MCNC: 1.8 MG/DL (ref 1.6–2.4)
POTASSIUM SERPL-SCNC: 3.6 MMOL/L (ref 3.5–5.3)
SODIUM SERPL-SCNC: 137 MMOL/L (ref 136–145)

## 2024-01-29 PROCEDURE — 96360 HYDRATION IV INFUSION INIT: CPT | Mod: INF

## 2024-01-29 PROCEDURE — 2500000004 HC RX 250 GENERAL PHARMACY W/ HCPCS (ALT 636 FOR OP/ED): Performed by: INTERNAL MEDICINE

## 2024-01-29 PROCEDURE — 83735 ASSAY OF MAGNESIUM: CPT

## 2024-01-29 PROCEDURE — 96361 HYDRATE IV INFUSION ADD-ON: CPT | Mod: INF

## 2024-01-29 PROCEDURE — 80048 BASIC METABOLIC PNL TOTAL CA: CPT

## 2024-01-29 RX ADMIN — SODIUM CHLORIDE 1000 ML: 9 INJECTION, SOLUTION INTRAVENOUS at 13:41

## 2024-01-29 ASSESSMENT — PAIN SCALES - GENERAL: PAINLEVEL: 6

## 2024-01-29 NOTE — PROGRESS NOTES
Pamela was made aware of Appt. With Aden Monday at 1520.  Per Dr. Gomez, add an appointment on for fluids next week prior to that if needed.

## 2024-01-31 ENCOUNTER — APPOINTMENT (OUTPATIENT)
Dept: HEMATOLOGY/ONCOLOGY | Facility: CLINIC | Age: 49
End: 2024-01-31
Payer: COMMERCIAL

## 2024-02-05 ENCOUNTER — OFFICE VISIT (OUTPATIENT)
Dept: CARDIOLOGY | Facility: CLINIC | Age: 49
End: 2024-02-05
Payer: COMMERCIAL

## 2024-02-05 ENCOUNTER — INFUSION (OUTPATIENT)
Dept: HEMATOLOGY/ONCOLOGY | Facility: CLINIC | Age: 49
End: 2024-02-05
Payer: COMMERCIAL

## 2024-02-05 VITALS
OXYGEN SATURATION: 96 % | RESPIRATION RATE: 15 BRPM | SYSTOLIC BLOOD PRESSURE: 95 MMHG | HEART RATE: 89 BPM | WEIGHT: 114.86 LBS | BODY MASS INDEX: 21.33 KG/M2 | TEMPERATURE: 98.4 F | DIASTOLIC BLOOD PRESSURE: 59 MMHG

## 2024-02-05 DIAGNOSIS — R94.31 ABNORMAL EKG: ICD-10-CM

## 2024-02-05 DIAGNOSIS — Z79.899 ENCOUNTER FOR MONITORING CARDIOTOXIC DRUG THERAPY: ICD-10-CM

## 2024-02-05 DIAGNOSIS — I49.1 PAC (PREMATURE ATRIAL CONTRACTION): ICD-10-CM

## 2024-02-05 DIAGNOSIS — C81.48: ICD-10-CM

## 2024-02-05 DIAGNOSIS — Z87.891 FORMER SMOKER: ICD-10-CM

## 2024-02-05 DIAGNOSIS — C81.48: Primary | ICD-10-CM

## 2024-02-05 DIAGNOSIS — Z51.81 ENCOUNTER FOR MONITORING CARDIOTOXIC DRUG THERAPY: ICD-10-CM

## 2024-02-05 DIAGNOSIS — R42 ORTHOSTATIC LIGHTHEADEDNESS: ICD-10-CM

## 2024-02-05 PROBLEM — R00.1 BRADYCARDIA BY ELECTROCARDIOGRAM: Status: RESOLVED | Noted: 2023-09-27 | Resolved: 2024-02-05

## 2024-02-05 LAB
LDH SERPL L TO P-CCNC: 200 U/L (ref 84–246)
MAGNESIUM SERPL-MCNC: 1.6 MG/DL (ref 1.6–2.4)
URATE SERPL-MCNC: 3.9 MG/DL (ref 2.3–6.7)

## 2024-02-05 PROCEDURE — 96360 HYDRATION IV INFUSION INIT: CPT | Mod: INF

## 2024-02-05 PROCEDURE — 83615 LACTATE (LD) (LDH) ENZYME: CPT

## 2024-02-05 PROCEDURE — 83735 ASSAY OF MAGNESIUM: CPT

## 2024-02-05 PROCEDURE — 99214 OFFICE O/P EST MOD 30 MIN: CPT | Performed by: INTERNAL MEDICINE

## 2024-02-05 PROCEDURE — 1036F TOBACCO NON-USER: CPT | Performed by: INTERNAL MEDICINE

## 2024-02-05 PROCEDURE — 84550 ASSAY OF BLOOD/URIC ACID: CPT

## 2024-02-05 PROCEDURE — 3008F BODY MASS INDEX DOCD: CPT | Performed by: INTERNAL MEDICINE

## 2024-02-05 PROCEDURE — 2500000004 HC RX 250 GENERAL PHARMACY W/ HCPCS (ALT 636 FOR OP/ED): Performed by: INTERNAL MEDICINE

## 2024-02-05 RX ADMIN — SODIUM CHLORIDE 1000 ML: 9 INJECTION, SOLUTION INTRAVENOUS at 15:14

## 2024-02-05 ASSESSMENT — ENCOUNTER SYMPTOMS
NUMBNESS: 1
CARDIOVASCULAR NEGATIVE: 1
BLOOD IN STOOL: 0
APPETITE CHANGE: 1
ARTHRALGIAS: 1
ABDOMINAL DISTENTION: 1
BACK PAIN: 1
DIARRHEA: 0
EYES NEGATIVE: 1
RESPIRATORY NEGATIVE: 1
FATIGUE: 1
LIGHT-HEADEDNESS: 1

## 2024-02-05 ASSESSMENT — PAIN SCALES - GENERAL: PAINLEVEL: 0-NO PAIN

## 2024-02-05 NOTE — PROGRESS NOTES
Patient:  Pamela Lopez  YOB: 1975  MRN: [id number]       Chief Complaint/Active Symptoms:       Pamela Lopez is a 48 y.o. female who returns today for cardiac follow-up.  Here for follow-up. Has just completed her last course of chemotherapy. This past week. Has had problems with hydration and hypotension. Got IV fluids today. Feels great. Has been drinking about 64 oz of fluid throughout this recent course. No n/V/or diarrhea. Feels if she drinks too much fluids she can't eat her meals.     Has chronic back pain and has numbness in her fingers presumably from the chemotherapy. No chest pain or shortness of breath. No edema. Not lightheaded but if she stands quickly and its in the 80's she will be dizzy or lightheaded. No palpitations.     Cancer Diagnosis: Hodgkins lymphoma - initial diagnosis of metastatic carcinoma to vertebra and ribs of unknown primary dates back to 4/2023  Treatment team: Lamonte Mckenzie, Jess Gomez, Sherif Low - radiation oncology  Treatment: AVD + brentuximab per ECHELON-1 trial  Radiation: palliative radiotherapy to C5 planned, received T7 vertebral body radiation for palliative care  Total dose: ? 150 mg/m2    Review of Systems   Constitutional:  Positive for appetite change and fatigue.   HENT:  Positive for congestion and dental problem.    Eyes: Negative.    Respiratory: Negative.     Cardiovascular: Negative.    Gastrointestinal:  Positive for abdominal distention (Early satiety). Negative for blood in stool and diarrhea.   Endocrine: Positive for cold intolerance.   Genitourinary: Negative.    Musculoskeletal:  Positive for arthralgias and back pain.   Skin: Negative.    Neurological:  Positive for light-headedness and numbness.   All other systems reviewed and are negative.      Objective:     Vitals:    02/05/24 1419   BP: 95/59   Pulse: 89   Resp: 15   Temp: 36.9 °C (98.4 °F)   SpO2: 96%         Allergies:     Allergies   Allergen Reactions    Benadryl  [Diphenhydramine Hcl] Confusion and Drowsiness    Lorazepam Confusion and Drowsiness          Medications:     Current Outpatient Medications   Medication Instructions    acyclovir (ZOVIRAX) 400 mg, oral, 2 times daily    albuterol 90 mcg/actuation inhaler 2 puffs, inhalation, Every 6 hours PRN    buPROPion SR (WELLBUTRIN SR) 150 mg, oral, 2 times daily, Do not crush, chew, or split.    escitalopram (LEXAPRO) 20 mg, oral, Daily    FeroSuL tablet 1 tablet, oral, Every other day    gabapentin (NEURONTIN) 300 mg, oral, 3 times daily    magnesium oxide (MAG-OX) 400 mg, oral, Daily    naloxone (Narcan) 4 mg/0.1 mL nasal spray INSTILL 1 SPRAY IN ONE NOSTRIL AS NEEDED FOR ACCIDENTAL OPIOID OVERDOSE; REPEAT WITH SECOND DOSE IN 5 MINUTES IF NO RESPONSE    oxyCODONE (Roxicodone) 15 mg immediate release tablet 1 tablet, oral, Every 4 hours PRN    oxyCODONE (ROXICODONE) 5-10 mg, oral, Every 4 hours PRN    pantoprazole (PROTONIX) 40 mg, oral, Daily    potassium chloride CR 10 mEq ER tablet 10 mEq, oral, 2 times daily    prochlorperazine (Compazine) 10 mg tablet TAKE 1 TABLET BY MOUTH EVERY 8 HOURS, AS NEEDED FOR NAUSEA AND VOMITING, DO NOT TAKE AT THE SAME TIME AS ZOFRAN WAIT AT LEAST 1 HOUR IN BETWEEN MEDICATIONS    risperiDONE (RISPERDAL) 0.5 mg, oral, Nightly    sennosides-docusate sodium (Corinne-Colace) 8.6-50 mg tablet TAKE 2 TABLETS BY MOUTH TWO TIMES A DAY AS NEEDED FOR CONSTIPATION       Physical Examination:   GENERAL:  Well developed, well nourished, in no acute distress.  HEENT: NC AT, EOMI with anicteric sclera  NECK:  Supple, no JVD, no bruit.  CHEST:  Symmetric and nontender.  LUNGS:  Clear to auscultation bilaterally, normal respiratory effort.  HEART:  PMI is nondisplaced. RRR with normal S1 and S2, no S3, no mumur or rub. No carotid or abdominal bruits  ABDOMEN: Soft, NT, ND without palpable organomegaly or bruits  EXTREMITIES:  Warm with good color, no clubbing or cyanosis.  There is no edema noted.  PERIPHERAL  "VASCULAR:  Pulses present and equally palpable; 2+ throughout.  MUSCULOSKELETAL:   NEURO/PSYCH:  Alert and oriented times three with approppriate behavior and responses. Nonfocal motor examination with normal gait and ambulation  Lymph: No significant palpable lymphadenopathy  Skin: no rash or lesions on exposed skin or reported.    Lab:     CBC:   Lab Results   Component Value Date    WBC 6.6 01/22/2024    RBC 3.10 (L) 01/22/2024    HGB 10.0 (L) 01/22/2024    HCT 31.6 (L) 01/22/2024     01/22/2024        CMP:    Lab Results   Component Value Date     01/29/2024    K 3.6 01/29/2024     01/29/2024    CO2 26 01/29/2024    BUN 13 01/29/2024    CREATININE 0.42 (L) 01/29/2024    GLUCOSE 138 (H) 01/29/2024    CALCIUM 8.9 01/29/2024       Magnesium:    Lab Results   Component Value Date    MG 1.80 01/29/2024       Lipid Profile:    No results found for: \"CHLPL\", \"TRIG\", \"HDL\", \"LDLCALC\", \"LDLDIRECT\"    TSH:    Lab Results   Component Value Date    TSH 1.02 07/10/2023       BNP:   Lab Results   Component Value Date     (H) 07/10/2023        PT/INR:    Lab Results   Component Value Date    PROTIME 13.1 (H) 12/11/2023    INR 1.2 (H) 12/11/2023       HgBA1c:    Lab Results   Component Value Date    HGBA1C 6.3 (A) 06/08/2023       BMP:  Lab Results   Component Value Date     01/29/2024     01/22/2024     (L) 01/15/2024    K 3.6 01/29/2024    K 3.5 01/22/2024    K 3.5 01/15/2024     01/29/2024     01/22/2024     01/15/2024    CO2 26 01/29/2024    CO2 24 01/22/2024    CO2 24 01/15/2024    BUN 13 01/29/2024    BUN 10 01/22/2024    BUN 16 01/15/2024    CREATININE 0.42 (L) 01/29/2024    CREATININE 0.65 01/22/2024    CREATININE 0.55 01/15/2024       Cardiac Enzymes:    Lab Results   Component Value Date    TROPHS 11 07/10/2023    TROPHS 10 07/04/2023    TROPHS CANCELED 07/03/2023       Hepatic Function Panel:    Lab Results   Component Value Date    ALKPHOS 74 01/22/2024 " "   ALT 7 01/22/2024    AST 12 01/22/2024    PROT 6.1 (L) 01/22/2024    BILITOT 0.2 01/22/2024    BILIDIR 0.1 12/13/2023         Diagnostic Studies:     No echocardiogram results found for the past 12 months  Echo August 2023 55 to 60%.  No nuclear medicine results found for the past 12 months    No valid procedures specified.    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  Hodgkin's lymphoma with cardiotoxic drug therapy.  Patient has just completed her course of therapy.  She is doing well without signs or symptoms of heart failure.  We have asked for an echocardiogram over the next 2 months for washout with strain and we will see her in follow-up afterwards.  If she is doing well at that time and there is no signs of any problems with her EF we will see her again in another year.  2.  Abnormal EKG.  PACs.  Patient has asymptomatic PACs with some nonspecific T wave changes on her EKG.  If echo shows normal LV function we will follow her clinically.  3.  Orthostatic lightheadedness and periods of hypotension.  Symptoms this relates to dehydration.  She has responded well to additional IV fluids given with her chemotherapy.  What she describes as her oral fluid intake should be sufficient we have encouraged her to maintain her oral hydration.  4. "  Tobacco and weight status.  The patient is a former smoker is now tobacco free BMI is adequate at 21.3 which is some weight loss for but still within normal range.  Would recommend dietary intake as prescribed by her oncologist.    Will see her back after her echocardiogram and reassess her symptoms and blood pressure readings at that time

## 2024-02-05 NOTE — PROGRESS NOTES
Cynthia Johnson notified that pt doesn't want any home visits.    Requested that She see Dr. Gaitan elsewhere.

## 2024-02-07 ENCOUNTER — TELEPHONE (OUTPATIENT)
Dept: HEMATOLOGY/ONCOLOGY | Facility: CLINIC | Age: 49
End: 2024-02-07
Payer: COMMERCIAL

## 2024-02-07 DIAGNOSIS — R50.9 FEVER, UNSPECIFIED FEVER CAUSE: ICD-10-CM

## 2024-02-07 DIAGNOSIS — C81.48: Primary | ICD-10-CM

## 2024-02-07 RX ORDER — POTASSIUM CHLORIDE 750 MG/1
10 TABLET, EXTENDED RELEASE ORAL 2 TIMES DAILY
Qty: 60 TABLET | Refills: 0 | Status: SHIPPED | OUTPATIENT
Start: 2024-02-07 | End: 2024-04-01 | Stop reason: SDUPTHER

## 2024-02-07 RX ORDER — ACYCLOVIR 200 MG/1
400 CAPSULE ORAL 2 TIMES DAILY
Qty: 120 CAPSULE | Refills: 0 | Status: SHIPPED | OUTPATIENT
Start: 2024-02-07 | End: 2024-03-08

## 2024-02-13 DIAGNOSIS — G89.3 CANCER RELATED PAIN: ICD-10-CM

## 2024-02-13 RX ORDER — OXYCODONE HYDROCHLORIDE 5 MG/1
5-10 TABLET ORAL EVERY 4 HOURS PRN
Qty: 180 TABLET | Refills: 0 | Status: SHIPPED | OUTPATIENT
Start: 2024-02-13 | End: 2024-03-11 | Stop reason: SDUPTHER

## 2024-02-14 ENCOUNTER — TELEMEDICINE (OUTPATIENT)
Dept: PALLIATIVE MEDICINE | Facility: CLINIC | Age: 49
End: 2024-02-14
Payer: COMMERCIAL

## 2024-02-14 DIAGNOSIS — G89.3 CANCER RELATED PAIN: Primary | ICD-10-CM

## 2024-02-14 PROCEDURE — 99213 OFFICE O/P EST LOW 20 MIN: CPT

## 2024-02-14 PROCEDURE — 3008F BODY MASS INDEX DOCD: CPT

## 2024-02-14 PROCEDURE — 1036F TOBACCO NON-USER: CPT

## 2024-02-14 NOTE — PROGRESS NOTES
SUPPORTIVE AND PALLIATIVE ONCOLOGY OUTPATIENT FOLLOW-UP      SERVICE DATE: 2/14/2024    Virtual or Telephone Consent    An interactive audio and video telecommunication system which permits real time communications between the patient (at the originating site) and provider (at the distant site) was utilized to provide this telehealth service.   Verbal consent was requested and obtained from Pamela Lopez on this date, 02/14/24 for a telehealth visit.     Subjective   HISTORY OF PRESENT ILLNESS: Pamela Lopez is a 48 y.o. female who presents with newly diagnosed Hodgkin Lymphoma. She was admitted d/t back pain and a CT c/a/p revealed multiple hepatic masses and several osteoblastic  lesions. MRI brain was negative. She underwent biopsy of L3 on 6/9/23 which was nondiagnostic. PET scan showed FDG-avid lymph nodes in the neck, tonsil, bones, and liver. Biopsy of right tonsil 6/22 revealed Hodgkin Lymphoma. She underwent RT to T7 x10  fractions. She started treatment with AVD + Brentuximab 7/17/23. Recent PET scan with excellent response to treatment. PET scan scheduled for 3/4.     Pain Assessment:  Pain Score: 7/10  Location: mid-back  Description: shooting, throbbing, sharp pain. Said she overdid it cleaning the house and had 11/10 pain. She was taking oxycodone 15 mg every 4 hours. She is back to taking oxycodone ~2 tabs per day with good relief.     Symptom Assessment:  Pain:somewhat  Headache: none  Dizziness:none  Lack of energy: a little. improving  Difficulty sleeping: a little. Due to night sweats   Worrying: very much  Anxiety: somewhat related to scans   Depression: a little.   Pain in mouth/swallowing: none  Dry mouth: none  Taste changes: none  Shortness of breath: none  Lack of appetite: none   Nausea: none  Vomiting: none  Constipation: none  Diarrhea: none  Sore muscles: none  Numbness or tingling in hands/feet/other: somewhat  Weight loss: none  Other: none      Information obtained from:  interview of patient  ______________________________________________________________________        Objective     Infusion on 02/05/2024   Component Date Value Ref Range Status    Magnesium 02/05/2024 1.60  1.60 - 2.40 mg/dL Final    Uric Acid 02/05/2024 3.9  2.3 - 6.7 mg/dL Final    Venipuncture immediately after or during the administration of Metamizole may lead to falsely low results. Testing should be performed immediately  prior to Metamizole dosing.    LDH 02/05/2024 200  84 - 246 U/L Final   Infusion on 01/29/2024   Component Date Value Ref Range Status    Glucose 01/29/2024 138 (H)  74 - 99 mg/dL Final    Sodium 01/29/2024 137  136 - 145 mmol/L Final    Potassium 01/29/2024 3.6  3.5 - 5.3 mmol/L Final    Chloride 01/29/2024 104  98 - 107 mmol/L Final    Bicarbonate 01/29/2024 26  21 - 32 mmol/L Final    Anion Gap 01/29/2024 11  10 - 20 mmol/L Final    Urea Nitrogen 01/29/2024 13  6 - 23 mg/dL Final    Creatinine 01/29/2024 0.42 (L)  0.50 - 1.05 mg/dL Final    eGFR 01/29/2024 >90  >60 mL/min/1.73m*2 Final    Calculations of estimated GFR are performed using the 2021 CKD-EPI Study Refit equation without the race variable for the IDMS-Traceable creatinine methods.  https://jasn.asnjournals.org/content/early/2021/09/22/ASN.0041735619    Calcium 01/29/2024 8.9  8.6 - 10.3 mg/dL Final    Magnesium 01/29/2024 1.80  1.60 - 2.40 mg/dL Final   Infusion on 01/22/2024   Component Date Value Ref Range Status    WBC 01/22/2024 6.6  4.4 - 11.3 x10*3/uL Final    RBC 01/22/2024 3.10 (L)  4.00 - 5.20 x10*6/uL Final    Hemoglobin 01/22/2024 10.0 (L)  12.0 - 16.0 g/dL Final    Hematocrit 01/22/2024 31.6 (L)  36.0 - 46.0 % Final    MCV 01/22/2024 102 (H)  80 - 100 fL Final    MCH 01/22/2024 32.3  26.0 - 34.0 pg Final    MCHC 01/22/2024 31.6 (L)  32.0 - 36.0 g/dL Final    RDW 01/22/2024 16.5 (H)  11.5 - 14.5 % Final    Platelets 01/22/2024 190  150 - 450 x10*3/uL Final    Neutrophils % 01/22/2024 64.1  40.0 - 80.0 % Final     Immature Granulocytes %, Automated 01/22/2024 2.9 (H)  0.0 - 0.9 % Final    Immature Granulocyte Count (IG) includes promyelocytes, myelocytes and metamyelocytes but does not include bands. Percent differential counts (%) should be interpreted in the context of the absolute cell counts (cells/UL).    Lymphocytes % 01/22/2024 12.5  13.0 - 44.0 % Final    Monocytes % 01/22/2024 19.1  2.0 - 10.0 % Final    Eosinophils % 01/22/2024 0.3  0.0 - 6.0 % Final    Basophils % 01/22/2024 1.1  0.0 - 2.0 % Final    Neutrophils Absolute 01/22/2024 4.21  1.20 - 7.70 x10*3/uL Final    Percent differential counts (%) should be interpreted in the context of the absolute cell counts (cells/uL).    Immature Granulocytes Absolute, Au* 01/22/2024 0.19  0.00 - 0.70 x10*3/uL Final    Lymphocytes Absolute 01/22/2024 0.82 (L)  1.20 - 4.80 x10*3/uL Final    Monocytes Absolute 01/22/2024 1.25 (H)  0.10 - 1.00 x10*3/uL Final    Eosinophils Absolute 01/22/2024 0.02  0.00 - 0.70 x10*3/uL Final    Basophils Absolute 01/22/2024 0.07  0.00 - 0.10 x10*3/uL Final    Glucose 01/22/2024 95  74 - 99 mg/dL Final    Sodium 01/22/2024 138  136 - 145 mmol/L Final    Potassium 01/22/2024 3.5  3.5 - 5.3 mmol/L Final    Chloride 01/22/2024 105  98 - 107 mmol/L Final    Bicarbonate 01/22/2024 24  21 - 32 mmol/L Final    Anion Gap 01/22/2024 13  10 - 20 mmol/L Final    Urea Nitrogen 01/22/2024 10  6 - 23 mg/dL Final    Creatinine 01/22/2024 0.65  0.50 - 1.05 mg/dL Final    eGFR 01/22/2024 >90  >60 mL/min/1.73m*2 Final    Calculations of estimated GFR are performed using the 2021 CKD-EPI Study Refit equation without the race variable for the IDMS-Traceable creatinine methods.  https://jasn.asnjournals.org/content/early/2021/09/22/ASN.8176357513    Calcium 01/22/2024 9.1  8.6 - 10.3 mg/dL Final    Albumin 01/22/2024 3.6  3.4 - 5.0 g/dL Final    Alkaline Phosphatase 01/22/2024 74  33 - 110 U/L Final    Total Protein 01/22/2024 6.1 (L)  6.4 - 8.2 g/dL Final    AST  01/22/2024 12  9 - 39 U/L Final    Bilirubin, Total 01/22/2024 0.2  0.0 - 1.2 mg/dL Final    ALT 01/22/2024 7  7 - 45 U/L Final    Patients treated with Sulfasalazine may generate falsely decreased results for ALT.    Magnesium 01/22/2024 1.80  1.60 - 2.40 mg/dL Final   Infusion on 01/15/2024   Component Date Value Ref Range Status    Glucose 01/15/2024 171 (H)  74 - 99 mg/dL Final    Sodium 01/15/2024 135 (L)  136 - 145 mmol/L Final    Potassium 01/15/2024 3.5  3.5 - 5.3 mmol/L Final    Chloride 01/15/2024 100  98 - 107 mmol/L Final    Bicarbonate 01/15/2024 24  21 - 32 mmol/L Final    Anion Gap 01/15/2024 15  10 - 20 mmol/L Final    Urea Nitrogen 01/15/2024 16  6 - 23 mg/dL Final    Creatinine 01/15/2024 0.55  0.50 - 1.05 mg/dL Final    eGFR 01/15/2024 >90  >60 mL/min/1.73m*2 Final    Calculations of estimated GFR are performed using the 2021 CKD-EPI Study Refit equation without the race variable for the IDMS-Traceable creatinine methods.  https://jasn.asnjournals.org/content/early/2021/09/22/ASN.2631776860    Calcium 01/15/2024 9.3  8.6 - 10.3 mg/dL Final    Magnesium 01/15/2024 1.70  1.60 - 2.40 mg/dL Final         PHYSICAL EXAMINATION   Vital Signs: not completed due to virtual visit        Physical Exam not completed due to virtual visit     Social  Not . Lives with her 3 grown children.  History of drug use with Meth x8 years. Quit 1 year ago.  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     ASSESSMENT/PLAN    Pain  Pain is: cancer related pain  Type: somatic and neuropathic  Pain control: well-controlled  Home regimen:   - Continue oxycodone 15 mg every 4 hours as needed  - Continue Morphine ER to 30 mg TID. She has extra pills because she forgets to take doses.   - Continue Tylenol 500 mg every 4 hours as needed  - Continue Gabapentin 300 mg TID  - referred to aquatherapy for pain and weakness    Opioid Use  Medication Management:   - OARRS report reviewed  with no aberrant behavior; consistent with  prescriptions/records and patient history  - MED 45.  Overdose Risk Score 290.   This has been discussed with patient.   - We will continue to closely monitor the patient for signs of prescription misuse including UDS, OARRS review and subjective reports at each visit.  - No concurrent benzodiazepine use   - I am a provider who either is or has consulted and collaborated with a provider certified in Hospice and Palliative Medicine and have conducted a face-face visit and examination for this patient.  - Routine Urine Drug Screen completed 6/6/23 appropriately positive for opioids and negative for illicit substances. repeat yearly   - Controlled Substance Agreement: completed 9/12/23. repeat yearly   - Specifically discussed that controlled substance prescriptions will only be provided by our group as outlined in the completed agreement  - Prescribed naloxone: Has prescription at home  - Red Flags: History of drug use with Meth. Quit 1 year ago     Nausea   Intermittent nausea without vomiting related to chemotherapy   - Continue Prochlorperazine 10 mg every 6 hours as needed  - Continue Ondansetron 8 mg every 8 hours as needed    Constipation  At risk for constipation related to opioids,  currently not constipated  Usual bowel pattern: daily  Home regimen:   - Continue Senna-S 1 tab BID- no longer taking due to diarrhea  - Start Magnesium Citrate - may start with 1/2 bottle to 1 bottle if no BM within 48-72 hours     Altered Mood  Chronic anxiety and depression related to health concerns   controlled with home regimen  Home regimen:   - Continue Lexapro back to 20 mg  - Discussed meeting with a community therapist for therapy as well    Sleeping Difficulty:  Home regimen:    - Trazodone stopped inpatient r/t QTc concerns  - Continue Risperidone 0.5 mg every night   - Discussed taking 5-10 mg of Melatonin. Encouraged to restart this.    Decreased appetite  Related to  malignancy, chemotherapy, and disease process  Nutrition following  Home regimen:    - Olanzapine 2.5 mg every day at bedtime- this was stopped inpatient d/t QTc  - Following with Martha Khan      Advance Directives  Existence of Advance Directives:Yes, documentation or copy in medical record  Decision maker: AARON is Cynthia Xie  Code Status: Full code    Next Follow-Up Visit:  Return to clinic in 6 weeks at [place]    Signature and billing  Medical complexity was low level due to due to complexity of problems, extensive data review, and high risk of management/treatment.  Time was spent on the following: Prep Time, Time Directly with Patient/Family/Caregiver, Documentation Time. Total time spent: 28      Data  Diagnostic tests and information reviewed for today's visit:  Most recent labs, Most recent imaging, Medications         Some elements copied from Cynthia Johnson note on 1/17/24, the elements have been updated and all reflect current decision making from today, 2/14/2024.      Plan of Care discussed with: Patient    SIGNATURE: MAGDA Downing-CNP    Contact information:  Supportive and Palliative Oncology  Monday-Friday 8 AM-5 PM  Phone:  [phone], press option #5, then option #1.   Or Epic Secure Chat

## 2024-03-02 NOTE — PROGRESS NOTES
Patient ID: Pamela Lopez is a 48 y.o. female.    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 Pamela Lopez is a 47 y/o F presenting for follow-up for Hodgkin's lymphoma.      Since last visit, patient had a PET CT on 3/4/2024, which shows no hypermetabolic lymphadenopathy above or below the diaphragm. No   evidence of disease at this point.    Patient reports that overall she has been feeling well with good energy and a fluctuating appetite. States that she mostly craves carbohydrates. Taking daily vitamins. She reports that she has had worsening back pain but denies any other pain. She notes some episodes of hypotension with dizziness as well as wheezing but denies any new chest pain, cough or shortness of breath. She continues to have stable neuropathy in her fingers and toes. States she is taking potassium which helps her leg pain and cramping. No new lumps or bumps or rashes. No other complaints today.     Patient's past medical history, surgical history, family history and social history reviewed.     Objective    Vitals:    03/05/24 1016   BP: (!) 108/47   Pulse: 75   Resp: 16   Temp: 36.7 °C (98.1 °F)   SpO2: 94%       Review of Systems:   Review of Systems:    Positive per HPI, otherwise negative.     Physical Exam:   Constitutional: Patient appears in no acute distress.   Sitting comfortably in chair.  Eyes: EOMI, clear sclera  ENMT: mucous membranes moist, no apparent injury  Head/Neck: Neck supple, no JVD  Respiratory/Thorax: Patent airways, CTAB, normal  breath sounds, no increased work of breathing  Cardiovascular: Regular, rate and rhythm, no murmurs  Gastrointestinal: Nondistended, soft,  non-tender,  no rebound tenderness or guarding, no masses palpable  Extremities: normal extremities, no cyanosis edema,  no swelling  Neurological: alert and oriented x3, nonfocal, normal  speech and hearing  Lymphatic: No palpable lymphadenopathy in cervical,  axillary  lymph nodes.  Spleen appears normal size.  Psychological: Appropriate mood and behavior, normal  affect  Skin: Warm and dry, no lesions, no rashes     Performance Status:  Symptomatic; fully ambulatory    Labs/Imaging/Pathology: personally reviewed reports and images in Epic electronic medical record system. Pertinent results as it related to the plan represented in below in assessment and plan.      Assessment/Plan   1. Classical Hodgkin lymphoma, lymphocyte- rich subtype, stage IVB     - developed tonsil enlargement initially over two years ago and thought to be related to infection  - 6/5/23- 6/723 admitted for new pain and found to have multiple spine mets, neck lymphadenopathy and possible liver lesions  -  biopsy 6/9/23 of her L3 was nondiagnostic  - 6/13/23 PET/CT with FDG avid right palantine tonsil, b/l cervical, portacaval, RP nodes, hepatic met, osseous lesions in axial skeleton  - 6/19/23 planned for 10 fx to T7  - 6/22/23 tonsillectomy path consistent with classical Hodgkin lymphoma  - Discussed diagnosis of classical Hodgkin's lymphoma with patient and given stage IV disease with B symptom along with history of strong tobacco use, recommend brentuximab plus AVD per ECHELON-1 trial (Lisa, et al. [facility] 2018)  - Reviewed side effects and consent signed  - pt is planning to see dental next week as she has active abscess with pain and still has RT next week  - 7/17/12 AVD+ brentuximab  -hospitalized for fever and discharge 8/27/23 and no infectious cause found.  - delays due to neutropenia, cycle 3 day on 9/25/23.  - 10/9/23 PET CT interim with significant treatment response   11/19/23 C5D1 reduced brentuximab to 0.9 mg/mkg   12/11/2023  C5 D15 delayed due to hypotension and tooth infection  - 1/22/24 C6 D15      3/5/24:  - PET CT on 3/4/24 shows a complete response.   - Overall, she continues to have some neuropathy. We discussed that this will take some time to improve.  - She is still having trouble with weight gain. She is going to work on meals.  - She will continue her electrolytes.  - We will plan for a port flush in 6 weeks and follow-up with me in 3 months with repeat labs.  - RTC with me in 3 months.     RTC with me in 3 months. This note has been transcribed using a medical scribe and there is a possibility of unintentional typing misprints.          Jess Gomez MD  Hematology/Oncology  [facility]    Scribe Attestation  By signing my name below, Vita AZEVEDO Scribe attest that this documentation has been prepared under the direction and in the presence of Jess Gomez MD.

## 2024-03-04 ENCOUNTER — APPOINTMENT (OUTPATIENT)
Dept: RADIOLOGY | Facility: CLINIC | Age: 49
End: 2024-03-04
Payer: COMMERCIAL

## 2024-03-04 ENCOUNTER — HOSPITAL ENCOUNTER (OUTPATIENT)
Dept: RADIOLOGY | Facility: CLINIC | Age: 49
Discharge: HOME | End: 2024-03-04
Payer: COMMERCIAL

## 2024-03-04 ENCOUNTER — INFUSION (OUTPATIENT)
Dept: HEMATOLOGY/ONCOLOGY | Facility: CLINIC | Age: 49
End: 2024-03-04
Payer: COMMERCIAL

## 2024-03-04 DIAGNOSIS — C81.48: ICD-10-CM

## 2024-03-04 PROCEDURE — A9552 F18 FDG: HCPCS | Performed by: INTERNAL MEDICINE

## 2024-03-04 PROCEDURE — 78815 PET IMAGE W/CT SKULL-THIGH: CPT | Mod: PET TUMOR SUBSQ TX STRATEGY | Performed by: STUDENT IN AN ORGANIZED HEALTH CARE EDUCATION/TRAINING PROGRAM

## 2024-03-04 PROCEDURE — 96523 IRRIG DRUG DELIVERY DEVICE: CPT

## 2024-03-04 PROCEDURE — 3430000001 HC RX 343 DIAGNOSTIC RADIOPHARMACEUTICALS: Performed by: INTERNAL MEDICINE

## 2024-03-04 PROCEDURE — 78815 PET IMAGE W/CT SKULL-THIGH: CPT | Mod: PS

## 2024-03-04 RX ORDER — FLUDEOXYGLUCOSE F 18 200 MCI/ML
11.1 INJECTION, SOLUTION INTRAVENOUS
Status: COMPLETED | OUTPATIENT
Start: 2024-03-04 | End: 2024-03-04

## 2024-03-04 RX ADMIN — FLUDEOXYGLUCOSE F 18 11.1 MILLICURIE: 200 INJECTION, SOLUTION INTRAVENOUS at 11:18

## 2024-03-05 ENCOUNTER — OFFICE VISIT (OUTPATIENT)
Dept: HEMATOLOGY/ONCOLOGY | Facility: CLINIC | Age: 49
End: 2024-03-05
Payer: COMMERCIAL

## 2024-03-05 VITALS
OXYGEN SATURATION: 94 % | TEMPERATURE: 98.1 F | DIASTOLIC BLOOD PRESSURE: 47 MMHG | WEIGHT: 111.99 LBS | HEART RATE: 75 BPM | RESPIRATION RATE: 16 BRPM | BODY MASS INDEX: 20.79 KG/M2 | SYSTOLIC BLOOD PRESSURE: 108 MMHG

## 2024-03-05 DIAGNOSIS — C81.48: ICD-10-CM

## 2024-03-05 DIAGNOSIS — R06.2 WHEEZING: ICD-10-CM

## 2024-03-05 DIAGNOSIS — C81.48: Primary | ICD-10-CM

## 2024-03-05 PROCEDURE — 99215 OFFICE O/P EST HI 40 MIN: CPT | Performed by: INTERNAL MEDICINE

## 2024-03-05 PROCEDURE — 1036F TOBACCO NON-USER: CPT | Performed by: INTERNAL MEDICINE

## 2024-03-05 PROCEDURE — 3008F BODY MASS INDEX DOCD: CPT | Performed by: INTERNAL MEDICINE

## 2024-03-05 RX ORDER — ALBUTEROL SULFATE 90 UG/1
2 AEROSOL, METERED RESPIRATORY (INHALATION) EVERY 6 HOURS PRN
Qty: 18 G | Refills: 11 | Status: SHIPPED | OUTPATIENT
Start: 2024-03-05 | End: 2024-05-13 | Stop reason: WASHOUT

## 2024-03-05 ASSESSMENT — PAIN SCALES - GENERAL: PAINLEVEL: 0-NO PAIN

## 2024-03-11 ENCOUNTER — TELEPHONE (OUTPATIENT)
Dept: PALLIATIVE MEDICINE | Facility: CLINIC | Age: 49
End: 2024-03-11
Payer: COMMERCIAL

## 2024-03-11 ENCOUNTER — TELEPHONE (OUTPATIENT)
Dept: ADMISSION | Facility: HOSPITAL | Age: 49
End: 2024-03-11
Payer: COMMERCIAL

## 2024-03-11 DIAGNOSIS — G89.3 CANCER RELATED PAIN: ICD-10-CM

## 2024-03-11 RX ORDER — OXYCODONE HYDROCHLORIDE 5 MG/1
5-10 TABLET ORAL EVERY 4 HOURS PRN
Qty: 180 TABLET | Refills: 0 | Status: SHIPPED | OUTPATIENT
Start: 2024-03-11 | End: 2024-03-11 | Stop reason: SDUPTHER

## 2024-03-11 RX ORDER — MORPHINE SULFATE 30 MG/1
30 TABLET, FILM COATED, EXTENDED RELEASE ORAL 3 TIMES DAILY
Qty: 90 TABLET | Refills: 0 | Status: SHIPPED | OUTPATIENT
Start: 2024-03-11 | End: 2024-04-10

## 2024-03-11 RX ORDER — MORPHINE SULFATE 30 MG/1
30 TABLET, FILM COATED, EXTENDED RELEASE ORAL 3 TIMES DAILY
Qty: 90 TABLET | Refills: 0 | Status: SHIPPED | OUTPATIENT
Start: 2024-03-11 | End: 2024-03-11 | Stop reason: SDUPTHER

## 2024-03-11 RX ORDER — OXYCODONE HYDROCHLORIDE 5 MG/1
5-10 TABLET ORAL EVERY 4 HOURS PRN
Qty: 180 TABLET | Refills: 0 | Status: SHIPPED | OUTPATIENT
Start: 2024-03-11 | End: 2024-04-10

## 2024-03-11 NOTE — TELEPHONE ENCOUNTER
Patient last seen by MAGDA Monroe on 2/14 with plan to continue oxycodone 15mg q4h PRN and MSER 30mg TID. Follow up visit is scheduled for 3/25. OARRS reviewed and no aberrancy noted. Patient last filled oxycodone 5mg #180/30 days on 2/13 and MSER 30mg #90/30 days on 1/4.  Prescriptions pended to provider to approve.

## 2024-03-11 NOTE — TELEPHONE ENCOUNTER
Medication Refill-  Oxycodone 5mg  Morphine 30mg    Pharmacy-  [id number]

## 2024-03-11 NOTE — TELEPHONE ENCOUNTER
I spoke with [facility] and they are not comfortable filling this dose of prescriptions. I have canceled the prescriptions. Prescriptions will go to [facility]. Patients family updated.

## 2024-03-12 ENCOUNTER — DOCUMENTATION (OUTPATIENT)
Dept: SPEECH THERAPY | Facility: CLINIC | Age: 49
End: 2024-03-12
Payer: COMMERCIAL

## 2024-03-12 NOTE — PROGRESS NOTES
Speech-Language Pathology                 Therapy Communication Note    Patient Name: Pamela Lopez  MRN: [id number]  Today's Date: 3/12/2024     Discipline: Speech Language Pathology    Missed Visit Reason:  Patient did not arrive to her scheduled speech therapy evaluation this date. The office to reach out to the patient to see if speech therapy services are still desired at this time.     Missed Time: No Show    Comment:

## 2024-04-01 ENCOUNTER — OFFICE VISIT (OUTPATIENT)
Dept: PALLIATIVE MEDICINE | Facility: CLINIC | Age: 49
End: 2024-04-01
Payer: COMMERCIAL

## 2024-04-01 ENCOUNTER — APPOINTMENT (OUTPATIENT)
Dept: CARDIOLOGY | Facility: CLINIC | Age: 49
End: 2024-04-01
Payer: COMMERCIAL

## 2024-04-01 VITALS
TEMPERATURE: 96.8 F | SYSTOLIC BLOOD PRESSURE: 103 MMHG | HEART RATE: 39 BPM | OXYGEN SATURATION: 98 % | WEIGHT: 115.74 LBS | BODY MASS INDEX: 21.49 KG/M2 | RESPIRATION RATE: 12 BRPM | DIASTOLIC BLOOD PRESSURE: 54 MMHG

## 2024-04-01 DIAGNOSIS — Z71.6 ENCOUNTER FOR TOBACCO USE CESSATION COUNSELING: ICD-10-CM

## 2024-04-01 DIAGNOSIS — G89.3 CANCER RELATED PAIN: Primary | ICD-10-CM

## 2024-04-01 DIAGNOSIS — C81.48: ICD-10-CM

## 2024-04-01 DIAGNOSIS — G47.00 INSOMNIA, UNSPECIFIED TYPE: ICD-10-CM

## 2024-04-01 DIAGNOSIS — F32.89 OTHER DEPRESSION: ICD-10-CM

## 2024-04-01 PROCEDURE — 1036F TOBACCO NON-USER: CPT

## 2024-04-01 PROCEDURE — 99214 OFFICE O/P EST MOD 30 MIN: CPT

## 2024-04-01 PROCEDURE — 3008F BODY MASS INDEX DOCD: CPT

## 2024-04-01 RX ORDER — OXYCODONE HYDROCHLORIDE 15 MG/1
15 TABLET ORAL EVERY 4 HOURS PRN
Qty: 180 TABLET | Refills: 0 | Status: SHIPPED | OUTPATIENT
Start: 2024-04-01 | End: 2024-04-03 | Stop reason: SDUPTHER

## 2024-04-01 RX ORDER — AMOXICILLIN 250 MG
2 CAPSULE ORAL 2 TIMES DAILY PRN
Qty: 120 TABLET | Refills: 3 | Status: SHIPPED | OUTPATIENT
Start: 2024-04-01 | End: 2025-04-01

## 2024-04-01 RX ORDER — POTASSIUM CHLORIDE 750 MG/1
10 TABLET, EXTENDED RELEASE ORAL 2 TIMES DAILY
Qty: 60 TABLET | Refills: 2 | Status: SHIPPED | OUTPATIENT
Start: 2024-04-01

## 2024-04-01 RX ORDER — BUPROPION HYDROCHLORIDE 150 MG/1
150 TABLET, EXTENDED RELEASE ORAL 2 TIMES DAILY
Qty: 60 TABLET | Refills: 11 | Status: SHIPPED | OUTPATIENT
Start: 2024-04-01 | End: 2025-04-01

## 2024-04-01 RX ORDER — PANTOPRAZOLE SODIUM 40 MG/1
40 TABLET, DELAYED RELEASE ORAL DAILY
Qty: 30 TABLET | Refills: 3 | Status: SHIPPED | OUTPATIENT
Start: 2024-04-01 | End: 2024-04-29 | Stop reason: SDUPTHER

## 2024-04-01 RX ORDER — LANOLIN ALCOHOL/MO/W.PET/CERES
400 CREAM (GRAM) TOPICAL DAILY
Qty: 30 TABLET | Refills: 3 | Status: SHIPPED | OUTPATIENT
Start: 2024-04-01

## 2024-04-01 RX ORDER — RISPERIDONE 0.5 MG/1
0.5 TABLET ORAL NIGHTLY
Qty: 30 TABLET | Refills: 3 | Status: SHIPPED | OUTPATIENT
Start: 2024-04-01

## 2024-04-01 RX ORDER — ESCITALOPRAM OXALATE 20 MG/1
20 TABLET ORAL DAILY
Qty: 30 TABLET | Refills: 3 | Status: SHIPPED | OUTPATIENT
Start: 2024-04-01 | End: 2024-04-29 | Stop reason: SDUPTHER

## 2024-04-01 ASSESSMENT — PAIN SCALES - GENERAL: PAINLEVEL: 6

## 2024-04-01 NOTE — PROGRESS NOTES
SUPPORTIVE AND PALLIATIVE ONCOLOGY OUTPATIENT FOLLOW-UP      SERVICE DATE: 4/1/2024    Subjective   HISTORY OF PRESENT ILLNESS: Pamela Lopez is a 48 y.o. female who presents with newly diagnosed Hodgkin Lymphoma. She was admitted d/t back pain and a CT c/a/p revealed multiple hepatic masses and several osteoblastic  lesions. MRI brain was negative. She underwent biopsy of L3 on 6/9/23 which was nondiagnostic. PET scan showed FDG-avid lymph nodes in the neck, tonsil, bones, and liver. Biopsy of right tonsil 6/22 revealed Hodgkin Lymphoma. She underwent RT to T7 x10  fractions. She started treatment with AVD + Brentuximab 7/17/23. Recent PET scan with excellent response to treatment. PET scan 3/4 showed complete response.     Pain Assessment:  Pain Score: 7/10  Location: mid-back  Description: shooting, burning pain. Worse with increased movement. She weaned herself off of morphine, but she is having withdrawals. She is taking oxycodone 15 mg 2-3 times per day. She has days where the pain is not bothersome.     Symptom Assessment:  Pain:somewhat  Headache: none  Dizziness:none  Lack of energy: a little. improving  Difficulty sleeping: a little. States she is up every hour  Worrying: very much  Anxiety: somewhat   Depression: a little.   Pain in mouth/swallowing: none  Dry mouth: none  Taste changes: none  Shortness of breath: none  Lack of appetite: none   Nausea: none  Vomiting: none  Constipation: none  Diarrhea: none  Sore muscles: none  Numbness or tingling in hands/feet/other: somewhat  Weight loss: none  Other: none      Information obtained from: interview of patient  ______________________________________________________________________        Objective     No visits with results within 1 Month(s) from this visit.   Latest known visit with results is:   Infusion on 02/05/2024   Component Date Value Ref Range Status    Magnesium 02/05/2024 1.60  1.60 - 2.40 mg/dL Final    Uric Acid 02/05/2024 3.9  2.3 -  6.7 mg/dL Final    Venipuncture immediately after or during the administration of Metamizole may lead to falsely low results. Testing should be performed immediately  prior to Metamizole dosing.    LDH 02/05/2024 200  84 - 246 U/L Final       NM PET CT lymphoma staging    Result Date: 3/5/2024  Interpreted By:  Russell Elise and Weaver Jakob STUDY: NM PET CT LYMPHOMA STAGING;  3/4/2024 12:12 pm   INDICATION: Signs/Symptoms:follow up PET/CT after hodgkins lymphoma tx. Per EMR: Patient is a 48-year-old female with history of lymphoma status post 6 cycles of chemotherapy.   COMPARISON: PET-CT 10/09/2023, 06/13/2023   ACCESSION NUMBER(S): [id number]   ORDERING CLINICIAN: DANILO GERBER   TECHNIQUE: DIVISION OF NUCLEAR MEDICINE POSITRON EMISSION TOMOGRAPHY (PET-CT)   The patient received an intravenous dose of 11.1 mCi of Fluorine-18 fluorodeoxyglucose (FDG). Positron emission tomographic (PET) images from skull base to mid-thighs were then acquired after a one hour delay. Also acquired was a contemporaneous low dose non-contrast CT scan performed for attenuation correction of PET images and anatomic localization.  The PET and CT images were digitally fused for display.  All images were acquired on a combined PET-CT scanner unit. Some areas of FDG accumulation may be described in standardized uptake value (SUV) units.   CODING: Subsequent Treatment Strategy (PS)   CALIBRATION: Dose Injection-to-Scan Interval (mins): 50 min Mediastinal bloodpool SUV (normal 1.5-2.5): 2.0 Blood glucose: 97 mg/dL   FINDINGS: HEAD AND NECK: No evidence of focal hypermetabolic lesion in the brain parenchyma, noting that evaluation is limited because of the expected physiologic diffuse FDG uptake in the brain. No focal hypermetabolic soft tissue lesion is seen in the neck. No hypermetabolic cervical lymphadenopathy is present.   CHEST: Focal area of increased metabolic activity subjacent to the right chest port compatible with injection site. No  focal hypermetabolic lesion is seen in the lung parenchyma. No evidence of hypermetabolic mediastinal, hilar or axillary lymphadenopathy.   ABDOMEN AND PELVIS: No hypermetabolic soft tissue lesion is present in the abdomen and pelvis. No evidence of hypermetabolic lymphadenopathy. Physiologic radiotracer uptake is present in the liver and spleen with excretion into the bowel loops and the genitourinary tract.   MUSCULOSKELETAL/EXTREMITIES: No focal hypermetabolic lesion is seen in the axial or appendicular to suggest osseous metastasis.       1. No PET evidence of FDG avid kylah or extranodal disease (Deauville 1).   I personally reviewed the images/study and I agree with the findings as stated by Brandt Salinas MD. This study was interpreted at [facility], [place], [place].   MACRO: None   Signed by: Russell Elise 3/5/2024 9:39 PM Dictation workstation:   [id number]     PHYSICAL EXAMINATION   Vital Signs:   Vitals:    04/01/24 1339   BP: 103/54   Pulse: (!) 39   Resp: 12   Temp: 36 °C (96.8 °F)   SpO2: 98%       Physical Exam  HENT:      Head: Normocephalic.   Pulmonary:      Effort: Pulmonary effort is normal.   Musculoskeletal:         General: Normal range of motion.   Neurological:      Mental Status: She is alert and oriented to person, place, and time.   Psychiatric:         Mood and Affect: Mood normal.         Behavior: Behavior normal.        Social  Not . Lives with her 3 grown children.  History of drug use with Meth x8 years. Quit 1 year ago.  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     ASSESSMENT/PLAN    Pain  Pain is: cancer related pain  Type: somatic and neuropathic  Pain control: well-controlled  Home regimen:   - Continue oxycodone 15 mg every 4 hours as needed  - Wean Morphine to 30 mg BID x2 weeks then 30 mg once daily at bedtime x2 weeks   - Encouraged Tylenol 500-1000 mg every 8 hours. Do not exceed more than  3000 mg per day.  - Start Ibuprofen 600 mg every 6-8 hours as needed  - Discussed getting back in to see Dr. Wagner with spine surgery  - Discussed a chronic pain referral in the future   - Continue Gabapentin 300 mg TID  - referred to aquatherapy for pain and weakness    Opioid Use  Medication Management:   - OARRS report reviewed with no aberrant behavior; consistent with  prescriptions/records and patient history  - MED 45.  Overdose Risk Score 290.   This has been discussed with patient.   - We will continue to closely monitor the patient for signs of prescription misuse including UDS, OARRS review and subjective reports at each visit.  - No concurrent benzodiazepine use   - I am a provider who either is or has consulted and collaborated with a provider certified in Hospice and Palliative Medicine and have conducted a face-face visit and examination for this patient.  - Routine Urine Drug Screen completed 6/6/23 appropriately positive for opioids and negative for illicit substances. repeat yearly   - Controlled Substance Agreement: completed 9/12/23. repeat yearly   - Specifically discussed that controlled substance prescriptions will only be provided by our group as outlined in the completed agreement  - Prescribed naloxone: Has prescription at home  - Red Flags: History of drug use with Meth. Quit 1 year ago     Nausea   Intermittent nausea without vomiting related to chemotherapy   - Continue Prochlorperazine 10 mg every 6 hours as needed  - Continue Ondansetron 8 mg every 8 hours as needed    Constipation  At risk for constipation related to opioids,  currently not constipated  Usual bowel pattern: daily  Home regimen:   - Continue Senna-S 1 tab BID- no longer taking due to diarrhea  - Start Magnesium Citrate - may start with 1/2 bottle to 1 bottle if no BM within 48-72 hours     Altered Mood  Chronic anxiety and depression related to health concerns   controlled with home regimen  Home regimen:   -  Continue Lexapro back to 20 mg  - Continue Wellbutrin 150 mg BID  - Discussed meeting with a community therapist for therapy as well    Sleeping Difficulty:  Home regimen:    - Trazodone stopped inpatient r/t QTc concerns  - Continue Risperidone 0.5 mg every night   - Discussed taking 5-10 mg of Melatonin. Encouraged to restart this.    Decreased appetite  Related to malignancy, chemotherapy, and disease process  Nutrition following  Home regimen:    - Olanzapine 2.5 mg every day at bedtime- this was stopped inpatient d/t QTc  - Following with Martha Khan      Advance Directives  Existence of Advance Directives:Yes, documentation or copy in medical record  Decision maker: AARON is Cynthia Xie  Code Status: Full code    Next Follow-Up Visit:  Return to clinic in 1 month    Signature and billing  Medical complexity was low level due to due to complexity of problems, extensive data review, and high risk of management/treatment.  Time was spent on the following: Prep Time, Time Directly with Patient/Family/Caregiver, Documentation Time. Total time spent: 30      Data  Diagnostic tests and information reviewed for today's visit:  Most recent labs, Most recent imaging, Medications         Some elements copied from Cynthia Johnson note on 2/14/24, the elements have been updated and all reflect current decision making from today, 4/1/2024.      Plan of Care discussed with: Patient    SIGNATURE: MAGDA Downing-CNP    Contact information:  Supportive and Palliative Oncology  Monday-Friday 8 AM-5 PM  Phone:  [phone], press option #5, then option #1.   Or Epic Secure Chat

## 2024-04-01 NOTE — TELEPHONE ENCOUNTER
Refills requested by patient to palliative care NP. She did not have labs done at last visit to evaluate her electrolytes. refills submitted.

## 2024-04-03 ENCOUNTER — PHARMACY VISIT (OUTPATIENT)
Dept: PHARMACY | Facility: CLINIC | Age: 49
End: 2024-04-03
Payer: MEDICAID

## 2024-04-03 DIAGNOSIS — G89.3 CANCER RELATED PAIN: ICD-10-CM

## 2024-04-03 PROCEDURE — RXMED WILLOW AMBULATORY MEDICATION CHARGE

## 2024-04-03 RX ORDER — OXYCODONE HYDROCHLORIDE 15 MG/1
15 TABLET ORAL EVERY 4 HOURS PRN
Qty: 180 TABLET | Refills: 0 | Status: SHIPPED | OUTPATIENT
Start: 2024-04-03 | End: 2024-05-09 | Stop reason: SDUPTHER

## 2024-04-03 NOTE — PROGRESS NOTES
[facility] pharmacy only had 7 tabs of oxycodone 15 mg in stock. Call to [facility] who states that they can fill #[phone] tabs of Oxycodone 15 mg. Patient updated.

## 2024-04-16 ENCOUNTER — APPOINTMENT (OUTPATIENT)
Dept: HEMATOLOGY/ONCOLOGY | Facility: CLINIC | Age: 49
End: 2024-04-16
Payer: COMMERCIAL

## 2024-04-22 ENCOUNTER — INFUSION (OUTPATIENT)
Dept: HEMATOLOGY/ONCOLOGY | Facility: CLINIC | Age: 49
End: 2024-04-22
Payer: COMMERCIAL

## 2024-04-22 DIAGNOSIS — C81.48: ICD-10-CM

## 2024-04-22 PROCEDURE — 96523 IRRIG DRUG DELIVERY DEVICE: CPT

## 2024-04-24 ENCOUNTER — HOSPITAL ENCOUNTER (OUTPATIENT)
Dept: CARDIOLOGY | Facility: CLINIC | Age: 49
Discharge: HOME | End: 2024-04-24
Payer: COMMERCIAL

## 2024-04-24 VITALS
SYSTOLIC BLOOD PRESSURE: 103 MMHG | WEIGHT: 115 LBS | DIASTOLIC BLOOD PRESSURE: 54 MMHG | BODY MASS INDEX: 21.71 KG/M2 | HEIGHT: 61 IN

## 2024-04-24 DIAGNOSIS — Z79.899 ENCOUNTER FOR MONITORING CARDIOTOXIC DRUG THERAPY: ICD-10-CM

## 2024-04-24 DIAGNOSIS — Z51.81 ENCOUNTER FOR MONITORING CARDIOTOXIC DRUG THERAPY: ICD-10-CM

## 2024-04-24 DIAGNOSIS — C81.48: ICD-10-CM

## 2024-04-24 PROCEDURE — 93325 DOPPLER ECHO COLOR FLOW MAPG: CPT | Performed by: INTERNAL MEDICINE

## 2024-04-24 PROCEDURE — 93308 TTE F-UP OR LMTD: CPT | Performed by: INTERNAL MEDICINE

## 2024-04-24 PROCEDURE — 93321 DOPPLER ECHO F-UP/LMTD STD: CPT | Performed by: INTERNAL MEDICINE

## 2024-04-24 PROCEDURE — 93321 DOPPLER ECHO F-UP/LMTD STD: CPT

## 2024-04-25 LAB
EJECTION FRACTION APICAL 4 CHAMBER: 44.3
LEFT ATRIUM VOLUME AREA LENGTH INDEX BSA: 30.8 ML/M2
LEFT VENTRICLE INTERNAL DIMENSION DIASTOLE: 4.01 CM (ref 3.5–6)
LEFT VENTRICULAR OUTFLOW TRACT DIAMETER: 1.86 CM
LV EJECTION FRACTION BIPLANE: 51 %
MITRAL VALVE E/A RATIO: 0.81
RIGHT VENTRICLE FREE WALL PEAK S': 0.08 CM/S
RIGHT VENTRICLE PEAK SYSTOLIC PRESSURE: 21.4 MMHG
TRICUSPID ANNULAR PLANE SYSTOLIC EXCURSION: 1.5 CM

## 2024-04-29 ENCOUNTER — TELEMEDICINE (OUTPATIENT)
Dept: PALLIATIVE MEDICINE | Facility: CLINIC | Age: 49
End: 2024-04-29
Payer: COMMERCIAL

## 2024-04-29 DIAGNOSIS — G89.3 CANCER RELATED PAIN: Primary | ICD-10-CM

## 2024-04-29 DIAGNOSIS — C81.48: ICD-10-CM

## 2024-04-29 DIAGNOSIS — T45.1X5A CHEMOTHERAPY-INDUCED PERIPHERAL NEUROPATHY (MULTI): ICD-10-CM

## 2024-04-29 DIAGNOSIS — F32.89 OTHER DEPRESSION: ICD-10-CM

## 2024-04-29 DIAGNOSIS — G62.0 CHEMOTHERAPY-INDUCED PERIPHERAL NEUROPATHY (MULTI): ICD-10-CM

## 2024-04-29 PROCEDURE — 3008F BODY MASS INDEX DOCD: CPT

## 2024-04-29 PROCEDURE — 99213 OFFICE O/P EST LOW 20 MIN: CPT

## 2024-04-29 RX ORDER — MORPHINE SULFATE 15 MG/1
15 TABLET, FILM COATED, EXTENDED RELEASE ORAL DAILY
Qty: 14 TABLET | Refills: 0 | Status: SHIPPED | OUTPATIENT
Start: 2024-04-29 | End: 2024-05-13 | Stop reason: SDUPTHER

## 2024-04-29 RX ORDER — CYCLOBENZAPRINE HCL 10 MG
10 TABLET ORAL 3 TIMES DAILY PRN
Qty: 90 TABLET | Refills: 3 | Status: SHIPPED | OUTPATIENT
Start: 2024-04-29 | End: 2024-05-29

## 2024-04-29 RX ORDER — GABAPENTIN 300 MG/1
300 CAPSULE ORAL 3 TIMES DAILY
Qty: 90 CAPSULE | Refills: 3 | Status: SHIPPED | OUTPATIENT
Start: 2024-04-29

## 2024-04-29 RX ORDER — ESCITALOPRAM OXALATE 20 MG/1
20 TABLET ORAL DAILY
Qty: 30 TABLET | Refills: 3 | Status: SHIPPED | OUTPATIENT
Start: 2024-04-29 | End: 2024-05-29

## 2024-04-29 RX ORDER — PANTOPRAZOLE SODIUM 40 MG/1
40 TABLET, DELAYED RELEASE ORAL DAILY
Qty: 30 TABLET | Refills: 3 | Status: SHIPPED | OUTPATIENT
Start: 2024-04-29

## 2024-04-29 NOTE — PROGRESS NOTES
SUPPORTIVE AND PALLIATIVE ONCOLOGY OUTPATIENT FOLLOW-UP      SERVICE DATE: 4/29/2024    Subjective   HISTORY OF PRESENT ILLNESS: Pamela Lopez is a 48 y.o. female who presents with newly diagnosed Hodgkin Lymphoma. She was admitted d/t back pain and a CT c/a/p revealed multiple hepatic masses and several osteoblastic  lesions. MRI brain was negative. She underwent biopsy of L3 on 6/9/23 which was nondiagnostic. PET scan showed FDG-avid lymph nodes in the neck, tonsil, bones, and liver. Biopsy of right tonsil 6/22 revealed Hodgkin Lymphoma. She underwent RT to T7 x10  fractions. She started treatment with AVD + Brentuximab 7/17/23. Recent PET scan with excellent response to treatment. PET scan 3/4 showed complete response.     Pain Assessment:  Pain Score: 7/10  Location: mid-back  Description: shooting, burning pain. Worse with increased movement and in the morning. She is down to Morphine 30 mg once daily in the morning and oxycodone 15 mg 2-3 times per day. She has been having increased leg soreness/pain at night time which she thinks is due to increased activity.     Symptom Assessment:  Pain:somewhat  Headache: none  Dizziness:none  Lack of energy: a little. improving  Difficulty sleeping: very much.   Worrying: very much  Anxiety: somewhat   Depression: a little.   Pain in mouth/swallowing: none  Dry mouth: none  Taste changes: none  Shortness of breath: none  Lack of appetite: none   Nausea: none  Vomiting: none  Constipation: none  Diarrhea: none  Sore muscles: none  Numbness or tingling in hands/feet/other: somewhat  Weight loss: none  Other: none      Information obtained from: interview of patient  ______________________________________________________________________        Objective     Hospital Outpatient Visit on 04/24/2024   Component Date Value Ref Range Status    LVOT diam 04/24/2024 1.86  cm Final-Edited    LV Biplane EF 04/24/2024 51  % Final-Edited    MV E/A ratio 04/24/2024 0.81    Final-Edited    LA vol index A/L 04/24/2024 30.8  ml/m2 Final-Edited    Tricuspid annular plane systolic e* 04/24/2024 1.5  cm Final-Edited    RV free wall pk S' 04/24/2024 0.08  cm/s Final-Edited    LVIDd 04/24/2024 4.01  cm Final-Edited    RVSP 04/24/2024 21.4  mmHg Final-Edited    LV A4C EF 04/24/2024 44.3   Final-Edited       NM PET CT lymphoma staging    Result Date: 3/5/2024  Interpreted By:  Russell Elise and Weaver Jakob STUDY: NM PET CT LYMPHOMA STAGING;  3/4/2024 12:12 pm   INDICATION: Signs/Symptoms:follow up PET/CT after hodgkins lymphoma tx. Per EMR: Patient is a 48-year-old female with history of lymphoma status post 6 cycles of chemotherapy.   COMPARISON: PET-CT 10/09/2023, 06/13/2023   ACCESSION NUMBER(S): [id number]   ORDERING CLINICIAN: DANILO GERBER   TECHNIQUE: DIVISION OF NUCLEAR MEDICINE POSITRON EMISSION TOMOGRAPHY (PET-CT)   The patient received an intravenous dose of 11.1 mCi of Fluorine-18 fluorodeoxyglucose (FDG). Positron emission tomographic (PET) images from skull base to mid-thighs were then acquired after a one hour delay. Also acquired was a contemporaneous low dose non-contrast CT scan performed for attenuation correction of PET images and anatomic localization.  The PET and CT images were digitally fused for display.  All images were acquired on a combined PET-CT scanner unit. Some areas of FDG accumulation may be described in standardized uptake value (SUV) units.   CODING: Subsequent Treatment Strategy (PS)   CALIBRATION: Dose Injection-to-Scan Interval (mins): 50 min Mediastinal bloodpool SUV (normal 1.5-2.5): 2.0 Blood glucose: 97 mg/dL   FINDINGS: HEAD AND NECK: No evidence of focal hypermetabolic lesion in the brain parenchyma, noting that evaluation is limited because of the expected physiologic diffuse FDG uptake in the brain. No focal hypermetabolic soft tissue lesion is seen in the neck. No hypermetabolic cervical lymphadenopathy is present.   CHEST: Focal area of increased  metabolic activity subjacent to the right chest port compatible with injection site. No focal hypermetabolic lesion is seen in the lung parenchyma. No evidence of hypermetabolic mediastinal, hilar or axillary lymphadenopathy.   ABDOMEN AND PELVIS: No hypermetabolic soft tissue lesion is present in the abdomen and pelvis. No evidence of hypermetabolic lymphadenopathy. Physiologic radiotracer uptake is present in the liver and spleen with excretion into the bowel loops and the genitourinary tract.   MUSCULOSKELETAL/EXTREMITIES: No focal hypermetabolic lesion is seen in the axial or appendicular to suggest osseous metastasis.       1. No PET evidence of FDG avid kylah or extranodal disease (Deauville 1).   I personally reviewed the images/study and I agree with the findings as stated by Brandt Salinas MD. This study was interpreted at [facility], [place], [place].   MACRO: None   Signed by: Russell Elise 3/5/2024 9:39 PM Dictation workstation:   [id number]     PHYSICAL EXAMINATION   Vital Signs: not completed due to virtual visit      Physical Exam not completed due to virtual visit    Social  Not . Lives with her 3 grown children.  History of drug use with Meth x8 years. Quit 1 year ago.  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     ASSESSMENT/PLAN    Pain  Pain is: cancer related pain  Type: somatic and neuropathic  Pain control: well-controlled  Home regimen:   - Continue oxycodone 15 mg every 4 hours as needed  - Wean Morphine to 15 mg once daily x2 weeks  - Start Flexeril 10 mg TID as needed  - Encouraged Tylenol 500-1000 mg every 8 hours. Do not exceed more than 3000 mg per day.  - Start Ibuprofen 600 mg every 6-8 hours as needed  - Discussed getting back in to see Dr. Wagner with spine surgery  - Discussed a chronic pain referral in the future   - Continue Gabapentin 300 mg TID  - referred to aquatherapy for pain and  weakness    Opioid Use  Medication Management:   - OARRS report reviewed with no aberrant behavior; consistent with  prescriptions/records and patient history  - MED 45.  Overdose Risk Score 290.   This has been discussed with patient.   - We will continue to closely monitor the patient for signs of prescription misuse including UDS, OARRS review and subjective reports at each visit.  - No concurrent benzodiazepine use   - I am a provider who either is or has consulted and collaborated with a provider certified in Hospice and Palliative Medicine and have conducted a face-face visit and examination for this patient.  - Routine Urine Drug Screen completed 6/6/23 appropriately positive for opioids and negative for illicit substances. repeat yearly   - Controlled Substance Agreement: completed 9/12/23. repeat yearly   - Specifically discussed that controlled substance prescriptions will only be provided by our group as outlined in the completed agreement  - Prescribed naloxone: Has prescription at home  - Red Flags: History of drug use with Meth. Quit 1 year ago     Nausea   Intermittent nausea without vomiting related to chemotherapy   - Continue Prochlorperazine 10 mg every 6 hours as needed  - Continue Ondansetron 8 mg every 8 hours as needed    Constipation  At risk for constipation related to opioids,  currently not constipated  Usual bowel pattern: daily  Home regimen:   - Continue Senna-S 1 tab BID- no longer taking due to diarrhea  - Start Magnesium Citrate - may start with 1/2 bottle to 1 bottle if no BM within 48-72 hours     Altered Mood  Chronic anxiety and depression related to health concerns   controlled with home regimen  Home regimen:   - Continue Lexapro back to 20 mg  - Continue Wellbutrin 150 mg BID  - Discussed meeting with a community therapist for therapy as well    Sleeping Difficulty:  Home regimen:    - Trazodone stopped inpatient r/t QTc concerns  - Continue Risperidone 0.5 mg every night.  Encouraged her to restart this since she is having trouble sleeping.   - Discussed taking 5-10 mg of Melatonin. Encouraged to restart this.    Decreased appetite  Related to malignancy, chemotherapy, and disease process  Nutrition following  Home regimen:    - Olanzapine 2.5 mg every day at bedtime- this was stopped inpatient d/t QTc  - Following with Martha Khan      Advance Directives  Existence of Advance Directives:Yes, documentation or copy in medical record  Decision maker: STORMYOA is Cynthia Xie  Code Status: Full code    Next Follow-Up Visit:  Return to clinic in 1 month    Signature and billing  Medical complexity was low level due to due to complexity of problems, extensive data review, and high risk of management/treatment.  Time was spent on the following: Prep Time, Time Directly with Patient/Family/Caregiver, Documentation Time. Total time spent: 30      Data  Diagnostic tests and information reviewed for today's visit:  Most recent labs, Most recent imaging, Medications         Some elements copied from Cynthia Johnson note on 4/1/24, the elements have been updated and all reflect current decision making from today, 4/29/2024.      Plan of Care discussed with: Patient    SIGNATURE: MAGDA Downing-CNP    Contact information:  [facility]ive [facility] Palliative Oncology  Monday-Friday 8 AM-5 PM  Phone:  [phone], press option #5, then option #1.   Or Epic Secure Chat

## 2024-05-09 ENCOUNTER — TELEPHONE (OUTPATIENT)
Dept: ADMISSION | Facility: HOSPITAL | Age: 49
End: 2024-05-09
Payer: COMMERCIAL

## 2024-05-09 DIAGNOSIS — G89.3 CANCER RELATED PAIN: ICD-10-CM

## 2024-05-09 RX ORDER — OXYCODONE HYDROCHLORIDE 15 MG/1
15 TABLET ORAL EVERY 4 HOURS PRN
Qty: 180 TABLET | Refills: 0 | Status: SHIPPED | OUTPATIENT
Start: 2024-05-09 | End: 2024-06-10 | Stop reason: SDUPTHER

## 2024-05-09 NOTE — TELEPHONE ENCOUNTER
Pamela Lopez called the refill line for oxycodone and morphine. Would like refills to be sent to [facility] pharmacy on file. Message sent to Palliative Care team to send in.     OARRS report reviewed and reflects  prescription history, no aberrancy noted. Per OARRS, patient last filled Oxycodone IR 15 mg 30 day supply 4/3/24. Per last visit with Cynthia Monroe 4/29/24 patient to continue Oxycodone 15 mg. A discussion with the patient with regards to the Morphine refill will take place before filling. Patient with follow up visit scheduled with Cynthia 5/13/24. Patient updated that medication will be sent to [facility]. Refill request routed to provider.

## 2024-05-13 ENCOUNTER — TELEPHONE (OUTPATIENT)
Dept: PALLIATIVE MEDICINE | Facility: HOSPITAL | Age: 49
End: 2024-05-13

## 2024-05-13 ENCOUNTER — DOCUMENTATION (OUTPATIENT)
Dept: PALLIATIVE MEDICINE | Facility: HOSPITAL | Age: 49
End: 2024-05-13

## 2024-05-13 ENCOUNTER — OFFICE VISIT (OUTPATIENT)
Dept: CARDIOLOGY | Facility: CLINIC | Age: 49
End: 2024-05-13
Payer: COMMERCIAL

## 2024-05-13 ENCOUNTER — TELEMEDICINE (OUTPATIENT)
Dept: PALLIATIVE MEDICINE | Facility: CLINIC | Age: 49
End: 2024-05-13
Payer: COMMERCIAL

## 2024-05-13 VITALS
SYSTOLIC BLOOD PRESSURE: 108 MMHG | TEMPERATURE: 97.5 F | WEIGHT: 124 LBS | HEIGHT: 61 IN | HEART RATE: 47 BPM | BODY MASS INDEX: 23.41 KG/M2 | DIASTOLIC BLOOD PRESSURE: 66 MMHG

## 2024-05-13 DIAGNOSIS — Z51.81 ENCOUNTER FOR MONITORING CARDIOTOXIC DRUG THERAPY: Primary | ICD-10-CM

## 2024-05-13 DIAGNOSIS — Z79.899 ENCOUNTER FOR MONITORING CARDIOTOXIC DRUG THERAPY: Primary | ICD-10-CM

## 2024-05-13 DIAGNOSIS — G89.3 CHRONIC PAIN DUE TO NEOPLASM: Primary | ICD-10-CM

## 2024-05-13 DIAGNOSIS — G89.3 CANCER RELATED PAIN: ICD-10-CM

## 2024-05-13 DIAGNOSIS — I35.1 NONRHEUMATIC AORTIC VALVE INSUFFICIENCY: ICD-10-CM

## 2024-05-13 DIAGNOSIS — G89.3 CANCER RELATED PAIN: Primary | ICD-10-CM

## 2024-05-13 DIAGNOSIS — R42 ORTHOSTATIC LIGHTHEADEDNESS: ICD-10-CM

## 2024-05-13 DIAGNOSIS — Z87.891 FORMER SMOKER: ICD-10-CM

## 2024-05-13 DIAGNOSIS — C81.48: ICD-10-CM

## 2024-05-13 PROCEDURE — 99214 OFFICE O/P EST MOD 30 MIN: CPT | Performed by: INTERNAL MEDICINE

## 2024-05-13 PROCEDURE — 99213 OFFICE O/P EST LOW 20 MIN: CPT

## 2024-05-13 PROCEDURE — 1036F TOBACCO NON-USER: CPT | Performed by: INTERNAL MEDICINE

## 2024-05-13 PROCEDURE — 3008F BODY MASS INDEX DOCD: CPT

## 2024-05-13 PROCEDURE — 3008F BODY MASS INDEX DOCD: CPT | Performed by: INTERNAL MEDICINE

## 2024-05-13 RX ORDER — MORPHINE SULFATE 15 MG/1
15 TABLET, FILM COATED, EXTENDED RELEASE ORAL DAILY
Qty: 14 TABLET | Refills: 0 | Status: SHIPPED | OUTPATIENT
Start: 2024-05-13 | End: 2024-05-27

## 2024-05-13 ASSESSMENT — ENCOUNTER SYMPTOMS
CARDIOVASCULAR NEGATIVE: 1
NEUROLOGICAL NEGATIVE: 1
FATIGUE: 1
RESPIRATORY NEGATIVE: 1
GASTROINTESTINAL NEGATIVE: 1
MYALGIAS: 1
BRUISES/BLEEDS EASILY: 1
ARTHRALGIAS: 1
PSYCHIATRIC NEGATIVE: 1

## 2024-05-13 NOTE — TELEPHONE ENCOUNTER
Call to pharmacy to inquire about PA as Ms. Lopez states that request for PA  for 15 MG Morphine ER was faxed to [facility].  Gabrielle at [facility] states that PA is needed as well as new script as this one has . New script pended  to provider. Request for PA sent to [facility]. Awaiting response.

## 2024-05-13 NOTE — PROGRESS NOTES
Patient:  Pamela Lopez  YOB: 1975  MRN: [id number]       Chief Complaint/Active Symptoms:       Pamela Lopez is a 48 y.o. female who returns today for cardiac follow-up.    Patient here for routine CV follow-up. Has no chest pain or shortness of breath. No edema, palpitations, dizziness or lightheadedness.     Cancer Diagnosis: Hodgkins lymphoma - initial diagnosis of metastatic carcinoma to vertebra and ribs of unknown primary dates back to 4/2023  Treatment team: Lamonte Mckenzie, Jess Gomez, Sherif Low - radiation oncology  Treatment: AVD + brentuximab per ECHELON-1 trial  Radiation: palliative radiotherapy to C5 planned, received T7 vertebral body radiation for palliative care  Total dose: 303.45 mg/m2    Review of Systems   Constitutional:  Positive for fatigue.   Respiratory: Negative.     Cardiovascular: Negative.    Gastrointestinal: Negative.    Genitourinary: Negative.    Musculoskeletal:  Positive for arthralgias and myalgias.   Neurological: Negative.    Hematological:  Bruises/bleeds easily.   Psychiatric/Behavioral: Negative.     All other systems reviewed and are negative.      Objective:     Vitals:    05/13/24 1549   BP: 108/66   Pulse: (!) 47   Temp: 36.4 °C (97.5 °F)       Vitals:    05/13/24 1549   Weight: 56.2 kg (124 lb)       Allergies:     Allergies   Allergen Reactions    Benadryl [Diphenhydramine Hcl] Confusion and Drowsiness    Lorazepam Confusion and Drowsiness          Medications:     Current Outpatient Medications   Medication Instructions    albuterol 90 mcg/actuation inhaler 2 puffs, inhalation, Every 6 hours PRN    albuterol 90 mcg/actuation inhaler 2 puffs, inhalation, Every 6 hours PRN    buPROPion SR (WELLBUTRIN SR) 150 mg, oral, 2 times daily, Do not crush, chew, or split.    cyclobenzaprine (FLEXERIL) 10 mg, oral, 3 times daily PRN    escitalopram (LEXAPRO) 20 mg, oral, Daily    FeroSuL tablet 1 tablet, oral, Every other day    gabapentin (NEURONTIN)  300 mg, oral, 3 times daily    magnesium oxide (MAG-OX) 400 mg, oral, Daily    morphine CR (MS CONTIN) 15 mg, oral, Daily, Do not crush, chew, or split.    naloxone (Narcan) 4 mg/0.1 mL nasal spray INSTILL 1 SPRAY IN ONE NOSTRIL AS NEEDED FOR ACCIDENTAL OPIOID OVERDOSE; REPEAT WITH SECOND DOSE IN 5 MINUTES IF NO RESPONSE    oxyCODONE (ROXICODONE) 15 mg, oral, Every 4 hours PRN    pantoprazole (PROTONIX) 40 mg, oral, Daily    potassium chloride CR 10 mEq ER tablet 10 mEq, oral, 2 times daily    prochlorperazine (Compazine) 10 mg tablet TAKE 1 TABLET BY MOUTH EVERY 8 HOURS, AS NEEDED FOR NAUSEA AND VOMITING, DO NOT TAKE AT THE SAME TIME AS ZOFRAN WAIT AT LEAST 1 HOUR IN BETWEEN MEDICATIONS    risperiDONE (RISPERDAL) 0.5 mg, oral, Nightly    sennosides-docusate sodium (Corinne-Colace) 8.6-50 mg tablet TAKE 2 TABLETS BY MOUTH TWO TIMES A DAY AS NEEDED FOR CONSTIPATION       Physical Examination:   GENERAL:  Well developed, well nourished, in no acute distress.  Appears older than stated age  HEENT: NC AT, EOMI with anicteric sclera  NECK:  Supple, no JVD, no bruit.  CHEST:  Symmetric and nontender.  LUNGS:  Clear to auscultation bilaterally, normal respiratory effort.  HEART:  PMI is nondisplaced. RRR with normal S1 and S2, no S3, soft systolic ejection murmur at upper sternal border no diastolic murmurs appreciated. No carotid or abdominal bruits  ABDOMEN: Soft, NT, ND without palpable organomegaly or bruits  EXTREMITIES:  Warm with good color, no clubbing or cyanosis.  There is no edema noted.  PERIPHERAL VASCULAR:  Pulses present and equally palpable; 2+ throughout.  MUSCULOSKELETAL: No significant joint or limb deformity  NEURO/PSYCH:  Alert and oriented times three with approppriate behavior and responses. Nonfocal motor examination with normal gait and ambulation  Skin: no rash or lesions on exposed skin or reported.  Vitiligo    Lab:     CBC:   Lab Results   Component Value Date    WBC 6.6 01/22/2024    RBC 3.10 (L)  "01/22/2024    HGB 10.0 (L) 01/22/2024    HCT 31.6 (L) 01/22/2024     01/22/2024        CMP:    Lab Results   Component Value Date     01/29/2024    K 3.6 01/29/2024     01/29/2024    CO2 26 01/29/2024    BUN 13 01/29/2024    CREATININE 0.42 (L) 01/29/2024    GLUCOSE 138 (H) 01/29/2024    CALCIUM 8.9 01/29/2024       Magnesium:    Lab Results   Component Value Date    MG 1.60 02/05/2024       Lipid Profile:    No results found for: \"CHLPL\", \"TRIG\", \"HDL\", \"LDLCALC\", \"LDLDIRECT\"    TSH:    Lab Results   Component Value Date    TSH 1.02 07/10/2023       BNP:   Lab Results   Component Value Date     (H) 07/10/2023        PT/INR:    Lab Results   Component Value Date    PROTIME 13.1 (H) 12/11/2023    INR 1.2 (H) 12/11/2023       HgBA1c:    Lab Results   Component Value Date    HGBA1C 6.3 (A) 06/08/2023       BMP:  Lab Results   Component Value Date     01/29/2024     01/22/2024     (L) 01/15/2024    K 3.6 01/29/2024    K 3.5 01/22/2024    K 3.5 01/15/2024     01/29/2024     01/22/2024     01/15/2024    CO2 26 01/29/2024    CO2 24 01/22/2024    CO2 24 01/15/2024    BUN 13 01/29/2024    BUN 10 01/22/2024    BUN 16 01/15/2024    CREATININE 0.42 (L) 01/29/2024    CREATININE 0.65 01/22/2024    CREATININE 0.55 01/15/2024       Cardiac Enzymes:    Lab Results   Component Value Date    TROPHS 11 07/10/2023    TROPHS 10 07/04/2023    TROPHS CANCELED 07/03/2023       Hepatic Function Panel:    Lab Results   Component Value Date    ALKPHOS 74 01/22/2024    ALT 7 01/22/2024    AST 12 01/22/2024    PROT 6.1 (L) 01/22/2024    BILITOT 0.2 01/22/2024    BILIDIR 0.1 12/13/2023         Diagnostic Studies:     No echocardiogram results found for the past 12 months  Echocardiogram done April 2024 shows normal LV function EF 55 to 60%, moderate aortic insufficiency.  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  Non-Hodgkin's lymphoma with encounter for cardiotoxic drug therapy.  Patient's doxorubicin total dose was about 303 mg/m².  This is below the maximum lifetime limit but still a significant dose.  There is no early symptoms of heart failure or LV dysfunction.  Posttreatment echo shows a preserved EF.  In the absence of symptoms would repeat her echocardiogram in a years time.  2.  Aortic insufficiency.  The patient had mild aortic's insufficiency last year moderate in this echo.  I did not see a pressure half-time.  Murmurs not clearly heard on examination today this will be seen as part of her regular follow-up echocardiograms.  3.  Orthostatic lightheadedness.  This is improved we have encouraged her to continue her hydration and avoid a lot of activities in the summer heat.  4.  Tobacco weight status.  Patient is a former smoker remains tobacco free BMI is appropriate 19-25.    The patient had a lot of questions about exercise we went through those along with her daughter we have encouraged her to slowly start an exercise regimen and gradually ramp up her activities and follow any restrictions set forth by her oncologist.    Will see her in follow-up in a years time after her echo.

## 2024-05-13 NOTE — PROGRESS NOTES
SUPPORTIVE [facility] PALLIATIVE ONCOLOGY OUTPATIENT FOLLOW-UP      SERVICE DATE: 5/13/2024    Subjective   HISTORY OF PRESENT ILLNESS: Pamela Lopez is a 48 y.o. female who presents with newly diagnosed Hodgkin Lymphoma. She was admitted d/t back pain and a CT c/a/p revealed multiple hepatic masses and several osteoblastic  lesions. MRI brain was negative. She underwent biopsy of L3 on 6/9/23 which was nondiagnostic. PET scan showed FDG-avid lymph nodes in the neck, tonsil, bones, and liver. Biopsy of right tonsil 6/22 revealed Hodgkin Lymphoma. She underwent RT to T7 x10  fractions. She started treatment with AVD + Brentuximab 7/17/23. Recent PET scan with excellent response to treatment. PET scan 3/4 showed complete response.     Pain Assessment:  Pain Score: 7/10  Location: mid-back and lower back  Description: aching pain with intermittent stabbing pain. The pain is constant and increased over the last 4 days. She feels that the pain may have increased due to more activity recently. She has been taking oxycodone 15 mg 1-3 times per day and Morphine ER 30 mg in the morning.     Symptom Assessment:  Pain:somewhat  Headache: none  Dizziness:none  Lack of energy: a little. improving  Difficulty sleeping: a little improved with Flexeril at bedtime.   Worrying: a little worries about her back pain  Anxiety: none   Depression: a little.   Pain in mouth/swallowing: none  Dry mouth: none  Taste changes: none  Shortness of breath: none  Lack of appetite: none   Nausea: none  Vomiting: none  Constipation: none  Diarrhea: none  Sore muscles: none  Numbness or tingling in hands/feet/other: somewhat  Weight loss: none  Other: none      Information obtained from: interview of patient  ______________________________________________________________________        Objective     Hospital Outpatient Visit on 04/24/2024   Component Date Value Ref Range Status    LVOT diam 04/24/2024 1.86  cm Final-Edited    LV Biplane EF 04/24/2024 51   % Final-Edited    MV E/A ratio 04/24/2024 0.81   Final-Edited    LA vol index A/L 04/24/2024 30.8  ml/m2 Final-Edited    Tricuspid annular plane systolic e* 04/24/2024 1.5  cm Final-Edited    RV free wall pk S' 04/24/2024 0.08  cm/s Final-Edited    LVIDd 04/24/2024 4.01  cm Final-Edited    RVSP 04/24/2024 21.4  mmHg Final-Edited    LV A4C EF 04/24/2024 44.3   Final-Edited     PHYSICAL EXAMINATION   Vital Signs: not completed due to virtual visit      Physical Exam not completed due to virtual visit    Social  Not . Lives with her 3 grown children.  History of drug use with Meth x8 years. Quit 1 year ago.  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     ASSESSMENT/PLAN    Pain  Pain is: cancer related pain  Type: somatic and neuropathic  Pain control: well-controlled  Home regimen:   - Continue oxycodone 15 mg every 4 hours as needed  - Wean Morphine to 15 mg once daily x2 weeks. Discussed calling if she felt like she needed to continue this dose for longer than 2 weeks  - Will refer for massage therapy and aquatic therapy today 5/13/24  - Continue Flexeril 10 mg TID as needed  - Encouraged Tylenol 500-1000 mg every 8 hours. Do not exceed more than 3000 mg per day.  - Start Ibuprofen 600 mg every 6-8 hours as needed  - Discussed getting back in to see Dr. Wagner with spine surgery  - Discussed a chronic pain referral in the future   - Continue Gabapentin 300 mg TID  - referred to aquatherapy for pain and weakness    Opioid Use  Medication Management:   - OARRS report reviewed with no aberrant behavior; consistent with  prescriptions/records and patient history  - MED 45.  Overdose Risk Score 290.   This has been discussed with patient.   - We will continue to closely monitor the patient for signs of prescription misuse including UDS, OARRS review and subjective reports at each visit.  - No concurrent benzodiazepine use   - I am a provider who either is or has  consulted and collaborated with a provider certified in Hospice and Palliative Medicine and have conducted a face-face visit and examination for this patient.  - Routine Urine Drug Screen completed 6/6/23 appropriately positive for opioids and negative for illicit substances. repeat yearly   - Controlled Substance Agreement: completed 9/12/23. repeat yearly   - Specifically discussed that controlled substance prescriptions will only be provided by our group as outlined in the completed agreement  - Prescribed naloxone: Has prescription at home  - Red Flags: History of drug use with Meth. Quit 1 year ago     Nausea   Intermittent nausea without vomiting related to chemotherapy   - Continue Prochlorperazine 10 mg every 6 hours as needed  - Continue Ondansetron 8 mg every 8 hours as needed    Constipation  At risk for constipation related to opioids,  currently not constipated  Usual bowel pattern: daily  Home regimen:   - Continue Senna-S 1 tab BID- no longer taking due to diarrhea  - Start Magnesium Citrate - may start with 1/2 bottle to 1 bottle if no BM within 48-72 hours     Altered Mood  Chronic anxiety and depression related to health concerns   controlled with home regimen  Home regimen:   - Continue Lexapro back to 20 mg  - Continue Wellbutrin 150 mg BID  - Discussed meeting with a community therapist for therapy as well    Sleeping Difficulty:  Home regimen:    - Trazodone stopped inpatient r/t QTc concerns  - Continue Risperidone 0.5 mg every night. Encouraged her to restart this since she is having trouble sleeping.   - Flexeril has been effective for sleep   - Discussed taking 5-10 mg of Melatonin. Encouraged to restart this.    Decreased appetite  Related to malignancy, chemotherapy, and disease process  Nutrition following  Home regimen:    - Olanzapine 2.5 mg every day at bedtime- this was stopped inpatient d/t QTc  - Following with Martha Khan      Advance Directives  Existence of Advance  Directives:Yes, documentation or copy in medical record  Decision maker: STORMYOA is Cynthia Xie  Code Status: Full code    Next Follow-Up Visit:  Return to clinic in 1 month    Signature and billing  Medical complexity was low level due to due to complexity of problems, extensive data review, and high risk of management/treatment.  Time was spent on the following: Prep Time, Time Directly with Patient/Family/Caregiver, Documentation Time. Total time spent: 30      Data  Diagnostic tests and information reviewed for today's visit:  Most recent labs, Most recent imaging, Medications         Some elements copied from Cynthia Johnson note on 4/29/24, the elements have been updated and all reflect current decision making from today, 5/13/2024.      Plan of Care discussed with: Patient    SIGNATURE: THOMAS Downing    Contact information:  [facility]  Monday-Friday 8 AM-5 PM  Phone:  [phone], press option #5, then option #1.   Or Epic Secure Chat

## 2024-05-16 ENCOUNTER — TELEPHONE (OUTPATIENT)
Dept: PALLIATIVE MEDICINE | Facility: HOSPITAL | Age: 49
End: 2024-05-16
Payer: COMMERCIAL

## 2024-05-16 NOTE — TELEPHONE ENCOUNTER
Prior Authorization obtained for [facility] Contin. Approved on 5/13/24 for 90 days. PA assigned [id number]. Lora notified, prescription ready for

## 2024-06-05 ENCOUNTER — APPOINTMENT (OUTPATIENT)
Dept: HEMATOLOGY/ONCOLOGY | Facility: CLINIC | Age: 49
End: 2024-06-05
Payer: COMMERCIAL

## 2024-06-06 ENCOUNTER — APPOINTMENT (OUTPATIENT)
Dept: HEMATOLOGY/ONCOLOGY | Facility: CLINIC | Age: 49
End: 2024-06-06
Payer: COMMERCIAL

## 2024-06-10 ENCOUNTER — INFUSION (OUTPATIENT)
Dept: HEMATOLOGY/ONCOLOGY | Facility: CLINIC | Age: 49
End: 2024-06-10
Payer: COMMERCIAL

## 2024-06-10 ENCOUNTER — TELEMEDICINE (OUTPATIENT)
Dept: PALLIATIVE MEDICINE | Facility: CLINIC | Age: 49
End: 2024-06-10
Payer: COMMERCIAL

## 2024-06-10 ENCOUNTER — APPOINTMENT (OUTPATIENT)
Dept: HEMATOLOGY/ONCOLOGY | Facility: CLINIC | Age: 49
End: 2024-06-10
Payer: COMMERCIAL

## 2024-06-10 ENCOUNTER — DOCUMENTATION (OUTPATIENT)
Dept: PALLIATIVE MEDICINE | Facility: HOSPITAL | Age: 49
End: 2024-06-10

## 2024-06-10 ENCOUNTER — OFFICE VISIT (OUTPATIENT)
Dept: HEMATOLOGY/ONCOLOGY | Facility: CLINIC | Age: 49
End: 2024-06-10
Payer: COMMERCIAL

## 2024-06-10 VITALS
TEMPERATURE: 97.7 F | RESPIRATION RATE: 16 BRPM | DIASTOLIC BLOOD PRESSURE: 62 MMHG | SYSTOLIC BLOOD PRESSURE: 95 MMHG | HEART RATE: 80 BPM

## 2024-06-10 VITALS
OXYGEN SATURATION: 96 % | TEMPERATURE: 97.7 F | DIASTOLIC BLOOD PRESSURE: 62 MMHG | RESPIRATION RATE: 16 BRPM | HEART RATE: 80 BPM | SYSTOLIC BLOOD PRESSURE: 95 MMHG

## 2024-06-10 DIAGNOSIS — C81.48: ICD-10-CM

## 2024-06-10 DIAGNOSIS — R11.2 NAUSEA AND VOMITING, UNSPECIFIED VOMITING TYPE: ICD-10-CM

## 2024-06-10 DIAGNOSIS — Z51.5 PALLIATIVE CARE ENCOUNTER: Primary | ICD-10-CM

## 2024-06-10 DIAGNOSIS — C81.48: Primary | ICD-10-CM

## 2024-06-10 DIAGNOSIS — G89.3 CANCER RELATED PAIN: Primary | ICD-10-CM

## 2024-06-10 DIAGNOSIS — G89.3 CANCER RELATED PAIN: ICD-10-CM

## 2024-06-10 LAB
ALBUMIN SERPL BCP-MCNC: 4.2 G/DL (ref 3.4–5)
ALP SERPL-CCNC: 116 U/L (ref 33–110)
ALT SERPL W P-5'-P-CCNC: 9 U/L (ref 7–45)
ANION GAP SERPL CALC-SCNC: 14 MMOL/L (ref 10–20)
AST SERPL W P-5'-P-CCNC: 15 U/L (ref 9–39)
BASOPHILS # BLD AUTO: 0.09 X10*3/UL (ref 0–0.1)
BASOPHILS NFR BLD AUTO: 1.4 %
BILIRUB SERPL-MCNC: 0.5 MG/DL (ref 0–1.2)
BUN SERPL-MCNC: 16 MG/DL (ref 6–23)
CALCIUM SERPL-MCNC: 10 MG/DL (ref 8.6–10.3)
CHLORIDE SERPL-SCNC: 104 MMOL/L (ref 98–107)
CO2 SERPL-SCNC: 27 MMOL/L (ref 21–32)
CREAT SERPL-MCNC: 0.64 MG/DL (ref 0.5–1.05)
EGFRCR SERPLBLD CKD-EPI 2021: >90 ML/MIN/1.73M*2
EOSINOPHIL # BLD AUTO: 0.18 X10*3/UL (ref 0–0.7)
EOSINOPHIL NFR BLD AUTO: 2.8 %
ERYTHROCYTE [DISTWIDTH] IN BLOOD BY AUTOMATED COUNT: 13.9 % (ref 11.5–14.5)
GLUCOSE SERPL-MCNC: 64 MG/DL (ref 74–99)
HCT VFR BLD AUTO: 43.4 % (ref 36–46)
HGB BLD-MCNC: 14.3 G/DL (ref 12–16)
IMM GRANULOCYTES # BLD AUTO: 0.01 X10*3/UL (ref 0–0.7)
IMM GRANULOCYTES NFR BLD AUTO: 0.2 % (ref 0–0.9)
IRON SATN MFR SERPL: 47 % (ref 25–45)
IRON SERPL-MCNC: 159 UG/DL (ref 35–150)
LDH SERPL L TO P-CCNC: 171 U/L (ref 84–246)
LYMPHOCYTES # BLD AUTO: 1.86 X10*3/UL (ref 1.2–4.8)
LYMPHOCYTES NFR BLD AUTO: 28.7 %
MAGNESIUM SERPL-MCNC: 1.9 MG/DL (ref 1.6–2.4)
MCH RBC QN AUTO: 30 PG (ref 26–34)
MCHC RBC AUTO-ENTMCNC: 32.9 G/DL (ref 32–36)
MCV RBC AUTO: 91 FL (ref 80–100)
MONOCYTES # BLD AUTO: 0.31 X10*3/UL (ref 0.1–1)
MONOCYTES NFR BLD AUTO: 4.8 %
NEUTROPHILS # BLD AUTO: 4.04 X10*3/UL (ref 1.2–7.7)
NEUTROPHILS NFR BLD AUTO: 62.1 %
PLATELET # BLD AUTO: 237 X10*3/UL (ref 150–450)
POTASSIUM SERPL-SCNC: 4.5 MMOL/L (ref 3.5–5.3)
PROT SERPL-MCNC: 7.3 G/DL (ref 6.4–8.2)
RBC # BLD AUTO: 4.76 X10*6/UL (ref 4–5.2)
SODIUM SERPL-SCNC: 140 MMOL/L (ref 136–145)
TIBC SERPL-MCNC: 338 UG/DL (ref 240–445)
UIBC SERPL-MCNC: 179 UG/DL (ref 110–370)
WBC # BLD AUTO: 6.5 X10*3/UL (ref 4.4–11.3)

## 2024-06-10 PROCEDURE — 36591 DRAW BLOOD OFF VENOUS DEVICE: CPT

## 2024-06-10 PROCEDURE — 99213 OFFICE O/P EST LOW 20 MIN: CPT

## 2024-06-10 PROCEDURE — 3008F BODY MASS INDEX DOCD: CPT

## 2024-06-10 PROCEDURE — 83550 IRON BINDING TEST: CPT

## 2024-06-10 PROCEDURE — 85025 COMPLETE CBC W/AUTO DIFF WBC: CPT

## 2024-06-10 PROCEDURE — 83615 LACTATE (LD) (LDH) ENZYME: CPT

## 2024-06-10 PROCEDURE — 99214 OFFICE O/P EST MOD 30 MIN: CPT

## 2024-06-10 PROCEDURE — 84075 ASSAY ALKALINE PHOSPHATASE: CPT

## 2024-06-10 PROCEDURE — 83735 ASSAY OF MAGNESIUM: CPT

## 2024-06-10 PROCEDURE — 82607 VITAMIN B-12: CPT

## 2024-06-10 PROCEDURE — 1036F TOBACCO NON-USER: CPT

## 2024-06-10 RX ORDER — HEPARIN 100 UNIT/ML
500 SYRINGE INTRAVENOUS AS NEEDED
OUTPATIENT
Start: 2024-06-10

## 2024-06-10 RX ORDER — HEPARIN SODIUM,PORCINE/PF 10 UNIT/ML
50 SYRINGE (ML) INTRAVENOUS AS NEEDED
OUTPATIENT
Start: 2024-06-10

## 2024-06-10 RX ORDER — OXYCODONE HYDROCHLORIDE 15 MG/1
15 TABLET ORAL EVERY 8 HOURS PRN
Qty: 90 TABLET | Refills: 0 | Status: SHIPPED | OUTPATIENT
Start: 2024-06-10 | End: 2024-07-10

## 2024-06-10 NOTE — PROGRESS NOTES
Pt came to clinic for FUV with Lamonte Mckenzie CNP and port flush/labs. During infusion appointment, pt requested to be weighed, as she was to be seen by the provider, this RN got a full set of vitals. On electronic BP cuff pt's heart rate registered as 44. Apically, HR was regularly irregular and was 80, radial pulse was 88 and no irregularity was felt. She denies any symptoms or complaints and is followed by Dr Katy Samaniego onco-cardiology. Lamonte Mckenzie CNP notified of irregularity with apical heart rate, she will discuss this with pt when she is seen. Pt denying questions at this time and is agreeable with plan.

## 2024-06-10 NOTE — PROGRESS NOTES
SUPPORTIVE AND PALLIATIVE ONCOLOGY OUTPATIENT FOLLOW-UP      SERVICE DATE: 6/10/2024    Subjective   HISTORY OF PRESENT ILLNESS: Pamela Lopez is a 48 y.o. female who presents with newly diagnosed Hodgkin Lymphoma. She was admitted d/t back pain and a CT c/a/p revealed multiple hepatic masses and several osteoblastic  lesions. MRI brain was negative. She underwent biopsy of L3 on 6/9/23 which was nondiagnostic. PET scan showed FDG-avid lymph nodes in the neck, tonsil, bones, and liver. Biopsy of right tonsil 6/22 revealed Hodgkin Lymphoma. She underwent RT to T7 x10  fractions. She started treatment with AVD + Brentuximab 7/17/23. Recent PET scan with excellent response to treatment. PET scan 3/4 showed complete response.     Pain Assessment:  Pain Score: 7/10  Location: mid-back and lower back  Description: constant aching pain that fluctuates between her upper and lower back. She has completely weaned off of her morphine ER. She has been taking oxycodone 15 mg 2 times per day which does provide good relief. She is taking Flexeril at bedtime.     Symptom Assessment:  Pain:somewhat  Headache: none  Dizziness:none  Lack of energy: a little. improving  Difficulty sleeping: a little improved with Flexeril at bedtime.   Worrying: a little   Anxiety: none   Depression: a little.   Pain in mouth/swallowing: none  Dry mouth: none  Taste changes: none  Shortness of breath: none  Lack of appetite: none   Nausea: a little  Vomiting: none  Constipation: none  Diarrhea: none  Sore muscles: none  Numbness or tingling in hands/feet/other: somewhat  Weight loss: none  Other: none      Information obtained from: interview of patient  ______________________________________________________________________        Objective     Infusion on 06/10/2024   Component Date Value Ref Range Status    WBC 06/10/2024 6.5  4.4 - 11.3 x10*3/uL Final    RBC 06/10/2024 4.76  4.00 - 5.20 x10*6/uL Final    Hemoglobin 06/10/2024 14.3  12.0 - 16.0  g/dL Final    Hematocrit 06/10/2024 43.4  36.0 - 46.0 % Final    MCV 06/10/2024 91  80 - 100 fL Final    MCH 06/10/2024 30.0  26.0 - 34.0 pg Final    MCHC 06/10/2024 32.9  32.0 - 36.0 g/dL Final    RDW 06/10/2024 13.9  11.5 - 14.5 % Final    Platelets 06/10/2024 237  150 - 450 x10*3/uL Final    Neutrophils % 06/10/2024 62.1  40.0 - 80.0 % Final    Immature Granulocytes %, Automated 06/10/2024 0.2  0.0 - 0.9 % Final    Immature Granulocyte Count (IG) includes promyelocytes, myelocytes and metamyelocytes but does not include bands. Percent differential counts (%) should be interpreted in the context of the absolute cell counts (cells/UL).    Lymphocytes % 06/10/2024 28.7  13.0 - 44.0 % Final    Monocytes % 06/10/2024 4.8  2.0 - 10.0 % Final    Eosinophils % 06/10/2024 2.8  0.0 - 6.0 % Final    Basophils % 06/10/2024 1.4  0.0 - 2.0 % Final    Neutrophils Absolute 06/10/2024 4.04  1.20 - 7.70 x10*3/uL Final    Percent differential counts (%) should be interpreted in the context of the absolute cell counts (cells/uL).    Immature Granulocytes Absolute, Au* 06/10/2024 0.01  0.00 - 0.70 x10*3/uL Final    Lymphocytes Absolute 06/10/2024 1.86  1.20 - 4.80 x10*3/uL Final    Monocytes Absolute 06/10/2024 0.31  0.10 - 1.00 x10*3/uL Final    Eosinophils Absolute 06/10/2024 0.18  0.00 - 0.70 x10*3/uL Final    Basophils Absolute 06/10/2024 0.09  0.00 - 0.10 x10*3/uL Final    Glucose 06/10/2024 64 (L)  74 - 99 mg/dL Final    Sodium 06/10/2024 140  136 - 145 mmol/L Final    Potassium 06/10/2024 4.5  3.5 - 5.3 mmol/L Final    Chloride 06/10/2024 104  98 - 107 mmol/L Final    Bicarbonate 06/10/2024 27  21 - 32 mmol/L Final    Anion Gap 06/10/2024 14  10 - 20 mmol/L Final    Urea Nitrogen 06/10/2024 16  6 - 23 mg/dL Final    Creatinine 06/10/2024 0.64  0.50 - 1.05 mg/dL Final    eGFR 06/10/2024 >90  >60 mL/min/1.73m*2 Final    Calculations of estimated GFR are performed using the 2021 CKD-EPI Study Refit equation without the race  variable for the IDMS-Traceable creatinine methods.  https://jasn.asnjournals.org/content/early/2021/09/22/ASN.2558145255    Calcium 06/10/2024 10.0  8.6 - 10.3 mg/dL Final    Albumin 06/10/2024 4.2  3.4 - 5.0 g/dL Final    Alkaline Phosphatase 06/10/2024 116 (H)  33 - 110 U/L Final    Total Protein 06/10/2024 7.3  6.4 - 8.2 g/dL Final    AST 06/10/2024 15  9 - 39 U/L Final    Bilirubin, Total 06/10/2024 0.5  0.0 - 1.2 mg/dL Final    ALT 06/10/2024 9  7 - 45 U/L Final    Patients treated with Sulfasalazine may generate falsely decreased results for ALT.    Magnesium 06/10/2024 1.90  1.60 - 2.40 mg/dL Final     PHYSICAL EXAMINATION   Vital Signs: not completed due to virtual visit      Physical Exam not completed due to virtual visit    Social  Not . Lives with her 3 grown children.  History of drug use with Meth x8 years. Quit 1 year ago.  Worked as an LPN and at [employer]   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     ASSESSMENT/PLAN    Pain  Pain is: cancer related pain  Type: somatic and neuropathic  Pain control: well-controlled  Home regimen:   - Continue oxycodone 15 mg every 8 hours. Will try to wean this.   - She has completely weaned off of MSContin  - She never heard back regarding massage or aquatic therapy. Will re-refer today 6/10/24.   - Continue Flexeril 10 mg TID as needed. Taking at bedtime.   - Encouraged Tylenol 500-1000 mg every 8 hours. Do not exceed more than 3000 mg per day.  - Start Ibuprofen 600 mg every 6-8 hours as needed  - Discussed getting back in to see Dr. Wagner with spine surgery  - Discussed a chronic pain referral in the future   - Continue Gabapentin 300 mg TID  - referred to aquatherapy for pain and weakness    Opioid Use  Medication Management:   - OARRS report reviewed with no aberrant behavior; consistent with  prescriptions/records and patient history  - MED 45.  Overdose Risk Score 290.   This has been discussed with patient.   - We will continue to  closely monitor the patient for signs of prescription misuse including UDS, OARRS review and subjective reports at each visit.  - No concurrent benzodiazepine use   - I am a provider who either is or has consulted and collaborated with a provider certified in Hospice and Palliative Medicine and have conducted a face-face visit and examination for this patient.  - Routine Urine Drug Screen completed 6/6/23 appropriately positive for opioids and negative for illicit substances. repeat yearly   - Controlled Substance Agreement: completed 9/12/23. repeat yearly   - Specifically discussed that controlled substance prescriptions will only be provided by our group as outlined in the completed agreement  - Prescribed naloxone: Has prescription at home  - Red Flags: History of drug use with Meth. Quit 1 year ago     Nausea   Intermittent nausea without vomiting related to chemotherapy   - Continue Prochlorperazine 10 mg every 6 hours as needed  - Continue Ondansetron 8 mg every 8 hours as needed    Constipation  At risk for constipation related to opioids,  currently not constipated  Usual bowel pattern: daily  Home regimen:   - Continue Senna-S 1 tab BID- no longer taking due to diarrhea  - Start Magnesium Citrate - may start with 1/2 bottle to 1 bottle if no BM within 48-72 hours     Altered Mood  Chronic anxiety and depression related to health concerns   controlled with home regimen  Home regimen:   - Continue Lexapro back to 20 mg  - Continue Wellbutrin 150 mg BID  - Discussed meeting with a community therapist for therapy as well    Sleeping Difficulty:  Home regimen:    - Trazodone stopped inpatient r/t QTc concerns  - Continue Risperidone 0.5 mg every night. Encouraged her to restart this since she is having trouble sleeping.   - Flexeril has been effective for sleep   - Discussed taking 5-10 mg of Melatonin. Encouraged to restart this.    Decreased appetite  Related to malignancy, chemotherapy, and disease  process  Nutrition following  Home regimen:    - Olanzapine 2.5 mg every day at bedtime- this was stopped inpatient d/t QTc  - Following with Martha Khan      Advance Directives  Existence of Advance Directives:Yes, documentation or copy in medical record  Decision maker: AARON is Cynthia Xie  Code Status: Full code    Next Follow-Up Visit:  Return to clinic in 1 month    Signature and billing  Medical complexity was low level due to due to complexity of problems, extensive data review, and high risk of management/treatment.  Time was spent on the following: Prep Time, Time Directly with Patient/Family/Caregiver, Documentation Time. Total time spent: 30      Data  Diagnostic tests and information reviewed for today's visit:  Most recent labs, Most recent imaging, Medications         Some elements copied from Cynthia Johnson note on 5/13/24, the elements have been updated and all reflect current decision making from today, 6/10/2024.      Plan of Care discussed with: Patient    SIGNATURE: MAGDA Downing-CNP    Contact information:  [facility]ive [facility] Palliative Oncology  Monday-Friday 8 AM-5 PM  Phone:  [phone], press option #5, then option #1.   Or Epic Secure Chat

## 2024-06-10 NOTE — PROGRESS NOTES
Patient ID: Pamela Lopez is a 48 y.o. female.    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 Pamela Lopez is a 49 y/o F presenting for follow-up for Hodgkin's lymphoma.      Patient had a PET CT on 3/4/2024, which shows no hypermetabolic lymphadenopathy above or below the diaphragm. No evidence of disease at this point.    Patient reports that overall she has been feeling well with stable, fluctuating energy and appetite. She reports no worsening or new pains, and that she has weaned off of morphine, continues to follow with palliative medicine for pain management. She reports she does not have a primary care but will make a visit with her son's PCP for primary care health/wellness. She requests a refill on her Acyclovir because it helps prevent cold sores. She reports she last ate a midnight. She is on oral iron tab.     Patient denies any new chest pain, cough or SOB. Denies urinary or GI issues. She has stable neuropathy. She denies new lumps, bumps, rashes or dysphagia. She does not note fevers or drenching night sweats.     Patient's past medical history, surgical history, family history and social history reviewed.     Objective    Visit Vitals  Smoking Status Former     Visit Vitals  BP 95/62 (BP Location: Right arm)   Pulse 80   Temp 36.5 °C (97.7 °F) (Temporal)   Resp 16   Smoking Status Former           Review of Systems:   Review of Systems:    Positive per HPI, otherwise negative.     Physical Exam:   Constitutional: Patient appears in no acute distress.   Sitting comfortably in chair.  Eyes: EOMI, clear sclera  ENMT: mucous membranes moist, no apparent injury  Head/Neck: Neck supple, no  JVD  Respiratory/Thorax: Patent airways, CTAB, normal  breath sounds, no increased work of breathing  Cardiovascular: Regular, rate and rhythm, no murmurs  Gastrointestinal: Nondistended, soft, non-tender,  no rebound tenderness or guarding, no masses palpable  Extremities: normal extremities, no cyanosis edema,  no swelling  Neurological: alert and oriented x3, nonfocal, normal  speech and hearing  Lymphatic: No palpable lymphadenopathy in cervical,  axillary  lymph nodes.  Spleen appears normal size.  Psychological: Appropriate mood and behavior, normal  affect  Skin: Warm and dry, no lesions, no rashes     Performance Status:  Symptomatic; fully ambulatory    Labs/Imaging/Pathology: personally reviewed reports and images in Epic electronic medical record system. Pertinent results as it related to the plan represented in below in assessment and plan.      Labs:  Lab Results   Component Value Date    WBC 6.5 06/10/2024    NEUTROABS 4.04 06/10/2024    IGABSOL 0.01 06/10/2024    LYMPHSABS 1.86 06/10/2024    MONOSABS 0.31 06/10/2024    EOSABS 0.18 06/10/2024    BASOSABS 0.09 06/10/2024    RBC 4.76 06/10/2024    MCV 91 06/10/2024    MCHC 32.9 06/10/2024    HGB 14.3 06/10/2024    HCT 43.4 06/10/2024     06/10/2024     Lab Results   Component Value Date    CREATININE 0.64 06/10/2024    BUN 16 06/10/2024    EGFR >90 06/10/2024     06/10/2024    K 4.5 06/10/2024     06/10/2024    CO2 27 06/10/2024      Lab Results   Component Value Date    ALT 9 06/10/2024    AST 15 06/10/2024    ALKPHOS 116 (H) 06/10/2024    BILITOT 0.5 06/10/2024        Lab Results   Component Value Date    SORNTOPT30 376 06/10/2024      Lab Results   Component Value Date    FOLATE 13.4 08/22/2023       Assessment/Plan   1. Classical Hodgkin lymphoma, lymphocyte- rich subtype, stage IVB     - developed tonsil enlargement initially over two years ago and thought to be related to infection  - 6/5/23- 6/723 admitted for new pain and found  to have multiple spine mets, neck lymphadenopathy and possible liver lesions  -  biopsy 6/9/23 of her L3 was nondiagnostic  - 6/13/23 PET/CT with FDG avid right palantine tonsil, b/l cervical, portacaval, RP nodes, hepatic met, osseous lesions in axial skeleton  - 6/19/23 planned for 10 fx to T7  - 6/22/23 tonsillectomy path consistent with classical Hodgkin lymphoma  - Discussed diagnosis of classical Hodgkin's lymphoma with patient and given stage IV disease with B symptom along with history of strong tobacco use, recommend brentuximab plus AVD per ECHELON-1 trial (Lisa, et al. [facility] 2018)  - Reviewed side effects and consent signed  - pt is planning to see dental next week as she has active abscess with pain and still has RT next week  - 7/17/12 AVD+ brentuximab  -hospitalized for fever and discharge 8/27/23 and no infectious cause found.  - delays due to neutropenia, cycle 3 day on 9/25/23.  - 10/9/23 PET CT interim with significant treatment response   11/19/23 C5D1 reduced brentuximab to 0.9 mg/mkg   12/11/2023 C5 D15 delayed due to hypotension and tooth infection  - 1/22/24 C6 D15      3/5/24:  - PET CT on 3/4/24 shows a complete response.   - Overall, she continues to have some neuropathy. We discussed that this will take some time to improve.  - She is still having trouble with weight gain. She is going to work on meals.  - She will continue her electrolytes.  - We will plan for a port flush in 6 weeks and follow-up with me in 3 months with repeat labs.  - RTC with me in 3 months.     6/10/2024:  - Presents for 3 month follow up visit   - Having port flushed every 6 weeks   - Labs reviewed, we will have stop her oral iron   - We will recheck her LFTs at night port flush/labs in 6 weeks due to elevated Alk Phos  - She prefers to stay on Acyclovir for now and I discussed with Dr. Gomez who was ok with refilling for now   - She continues following with palliative medicine   - She denies B symptoms and no  abnormalities noted on exam   - She will therefore return in 12 weeks for next FUV with Dr. 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      Lamonte Mckenzie, MAGDA-CNP

## 2024-06-11 ENCOUNTER — TELEPHONE (OUTPATIENT)
Dept: HEMATOLOGY/ONCOLOGY | Facility: CLINIC | Age: 49
End: 2024-06-11
Payer: COMMERCIAL

## 2024-06-11 LAB — VIT B12 SERPL-MCNC: 376 PG/ML (ref 211–911)

## 2024-06-11 RX ORDER — ACYCLOVIR 200 MG/1
400 CAPSULE ORAL 2 TIMES DAILY
Qty: 120 CAPSULE | Refills: 3 | Status: SHIPPED | OUTPATIENT
Start: 2024-06-11 | End: 2024-10-09

## 2024-06-11 NOTE — TELEPHONE ENCOUNTER
I spoke with Pradeep and notified him that per Lamonte GUZMAN, Crystals ldh was normal, but that her iron was slightly elevated and that she should stop taking her oral iron tab. Pradeep was also made aware that Lamonte sent in a refill for Crystals acyclovir to their preferred pharmacy. Pradeep gave verbal understanding, was appreciative of the call and all questions were answered.

## 2024-07-01 ENCOUNTER — CLINICAL SUPPORT (OUTPATIENT)
Dept: PHYSICAL THERAPY | Facility: CLINIC | Age: 49
End: 2024-07-01
Payer: COMMERCIAL

## 2024-07-01 DIAGNOSIS — G89.29 CHRONIC BACK PAIN, UNSPECIFIED BACK LOCATION, UNSPECIFIED BACK PAIN LATERALITY: ICD-10-CM

## 2024-07-01 DIAGNOSIS — M54.9 CHRONIC BACK PAIN, UNSPECIFIED BACK LOCATION, UNSPECIFIED BACK PAIN LATERALITY: ICD-10-CM

## 2024-07-01 DIAGNOSIS — G89.3 CANCER RELATED PAIN: Primary | ICD-10-CM

## 2024-07-01 PROCEDURE — 97161 PT EVAL LOW COMPLEX 20 MIN: CPT | Mod: GP

## 2024-07-01 NOTE — PROGRESS NOTES
Patient Name Pamela Lopez  MRN: [id number]  Today's Date: 2024  Time Calculation  Start Time: 255  Stop Time: 330  Time Calculation (min): 35 min  Insurance:   Visit #: 1  Insurance Reviewed  (per information provided by  pre-cert team)   Authorization required:  Y  Approved # of visits: MN    Therapy Diagnoses:   Problem List Items Addressed This Visit             ICD-10-CM    Chronic back pain M54.9, G89.29    Cancer related pain - Primary G89.3    Relevant Orders    Follow Up In Physical Therapy     General:  Reason for visit: Thoracolumbar pain, cancer related   Referred by: Cynthia Monroe, APRN-CNP  Next MD appt:  7/15  Preferred Name:  Pamela   Script:  Aquatics  Onset Date:  6/10/2024    Assessment:      Patient with chronic thoracic and low back pain since undergoing radiation and chemo in  for Hodgkin's Lymphoma. Pt has compression fracture of T7 as a result of cancer treatment. Though recently in remission, patient still has the presence of tumors throughout her spine.     Problems:    Pain:  _x__  Posture/Body mechanics deficits:  _x__  Decreased knowledge HEP:  _x__  Decreased knowledge of Precautions:  _x__  Activity Limitations:   _x__  ADL's/IADL's/Self-care skills:  ___  ROM/Joint Mobility:  ___x  Strength:  __x_  Decreased functional level:   _x__  Flexibility:  _x__  Tenderness/decreased soft tissue mobility:  __x_  Gait/Stairs:  ___  Fall Risk:  ___  Balance:  ___  Edema:  ___  Participation restrictions:  ___  Sensory:  ___  Transfers:  ___  Decreased knowledge of brace:   ___  Other:  ___    X Indicates included in problem list    Goals:      ST weeks  Increased postural awareness    Compliant with HEP.     LTG:  by discharge  Increased postural awareness and posture WFL.     pain to 3-5 and patient I with self-management of symptoms.     Decreased pain and increased function with ADL's and IADL's.  Improve Oswestry to 20 % limitation of function.    Normal gait  on level and uneven surfaces community level distances for improved function in the community.    Increase trunk AROM to 75% to WFL for improved function with ADLs and chores.      Increase trunk strength and stability and R/L LE strength to WFL for improved function with chores and ADLs.      Increase B LE flexibility to WFL.      Decrease B upper/lower quarter tenderness 50-75% per patient report.    I and compliant with HEP and proper back care.       Rehab potential to achieve the above goals is good.    Patient is aware of diagnosis and prognosis and agrees with established plan of care and goals.    Plan:   Skilled PT 2 x/week for 6 weeks (Until goals achieved, maximum rehab potential has been achieved, or patient has plateaued)  for:    Aquatics  ___x__  CP   ____  Dry needling  ____  Education  _x___  Electrical Stimulation  ____  Gait training  ____  HEP  __x__  HP  ____  Manual  __x__  Mechanical Traction  ____  NMR  __x__  Self-care/home management  __x__  Therapeutic Exercise   __x__  Therapeutic Activities  __x__    ____  Work Conditioning  ____  Re-assessment  __x__  Other  ____    X Indicates included in treatment plan    PT for aquatics program to initiate gait, strengthening, flexibility, ROM, and balance to help improve daily function     Subjective:    HPI: Diagnosis of Hodking's Lymphoma in 2023. See Med Hx below. Underwent radiation and chemo. Has compression fracture of T7. Currently considered in remission, though still has presence of tumors throughout her spine and lymph nodes.     Pain:  Right sided thoracic pain, and simeon LS pain, constant  Pain 5-7/10 constant, after pain pill only down to 5     Type of pain:  Throbbing aching  What increases pain: bed mobility, standing still, sitting  What decreases pain:  swaying back and forth, walking better than sitting    Medical Hx/  Precautions: June 2023 Lymphoma involving, rib cage, pelvic floor, left hip, entire spine. Tumors are still  "present in c-spine, t-spine, and lumbar spine, but is in remission. No E-stim/modalites       Adult Screening Risk:      Fall Risk:  low    Patient goal:  Less pain  Patient is aware of diagnosis and prognosis.    Living Environment:    Social Support:  lives with:  family    Prior Function:   Pt has been very limited in function since 2023. Formerly a nurse.    Function:    A and O x 3  Sleep:  Interrupted sleep due to pain  ADL's: Can complete ADLs indep  Chores: limited by pain and weakness  Driving: indep  Work: off work  Recreation: limited  Sitting: Increased pain Standing: increased pain if standing still    Walking: Can walk for limited distances    Objective:        Outcome Measures:  Other Measures  Oswestry Disablity Index (LOIS): 54     Posture:  prominent vertebrae posteriorly at T7 where presence of compression Fx is.    Gait/Stairs: not restricted    Palpation: pain throughout the spine    ROM:  Trunk:  Flexion:  3rd to floor 12\"  Extension: approx 20 deg, pain  RSB:  3rd to fibular head  LSB:  3rd to fibular head    MMT:  Flexion Leon 4  Abd: leon 3+  Bridge/LE extensors: leon 3+  B LE myotomes: WFL    Flexibility:  Hamstrings:  SLR:  R: 50  L: 70  Hip flexors: mod    Treatment:    Evaluation:  30 minutes    Education:  poc, anatomy, physiology, posture, body mechanics, safety awareness, HEP.  Preferred learning:  pictures, demonstration. Preparation for pool at next visit. Review contraindications and precautions  Verbalized good understanding.                                    "

## 2024-07-02 PROBLEM — G89.3 CANCER RELATED PAIN: Status: ACTIVE | Noted: 2024-07-02

## 2024-07-02 ASSESSMENT — COLUMBIA-SUICIDE SEVERITY RATING SCALE - C-SSRS
2. HAVE YOU ACTUALLY HAD ANY THOUGHTS OF KILLING YOURSELF?: NO
1. IN THE PAST MONTH, HAVE YOU WISHED YOU WERE DEAD OR WISHED YOU COULD GO TO SLEEP AND NOT WAKE UP?: NO
6. HAVE YOU EVER DONE ANYTHING, STARTED TO DO ANYTHING, OR PREPARED TO DO ANYTHING TO END YOUR LIFE?: NO

## 2024-07-02 ASSESSMENT — ENCOUNTER SYMPTOMS
OCCASIONAL FEELINGS OF UNSTEADINESS: 0
DEPRESSION: 0
LOSS OF SENSATION IN FEET: 0

## 2024-07-08 ENCOUNTER — TREATMENT (OUTPATIENT)
Dept: PHYSICAL THERAPY | Facility: CLINIC | Age: 49
End: 2024-07-08
Payer: COMMERCIAL

## 2024-07-08 DIAGNOSIS — G89.29 CHRONIC BACK PAIN, UNSPECIFIED BACK LOCATION, UNSPECIFIED BACK PAIN LATERALITY: Primary | ICD-10-CM

## 2024-07-08 DIAGNOSIS — G89.3 CANCER RELATED PAIN: ICD-10-CM

## 2024-07-08 DIAGNOSIS — M54.9 CHRONIC BACK PAIN, UNSPECIFIED BACK LOCATION, UNSPECIFIED BACK PAIN LATERALITY: Primary | ICD-10-CM

## 2024-07-08 PROCEDURE — 97113 AQUATIC THERAPY/EXERCISES: CPT | Mod: GP,CQ

## 2024-07-08 NOTE — PROGRESS NOTES
Physical Therapy  Physical Therapy Progress Note    Patient Name Pamela Lopez   MRN: [id number]  Today's Date: 07/08/24  Time Calculation  Start Time: 1516  Stop Time: 1606  Time Calculation (min): 50 min  Non-billable: 6 minutes    Insurance:    Visit #:   2   Visit #: 1  Insurance Reviewed  (per information provided by  pre-cert team)   Authorization required:  Y  Approved # of visits: MN     Therapy Diagnoses:   Problem List Items Addressed This Visit             ICD-10-CM       Hematology and Neoplasia    Cancer related pain G89.3       Musculoskeletal and Injuries    Chronic back pain - Primary M54.9, G89.29       General:  Reason for visit: Thoracolumbar pain, cancer related   Referred by: MAGDA Troncoso-CNP  Next MD appt:  7/15  Preferred Name:  Pamela   Script:  Aquatics  Onset Date:  6/10/2024    Assessment:   Patient tolerated session well in the open setting of the [facility] community pool, showing high motivation to return to prior function. Patient showed frustration with muscle atrophy and lack of flexibility following her hospital stay, and was educated on the importance of gradual improvement, as well as pacing.  Patient needs continued work on/skilled PT for:  strength, endurance, flexibility, postural awareness, and function. to address remaining functional, objective and subjective deficits to allow them to return to prior /optimal level of function with ADLs.  Patient is progressing with goals: improved BLE control against buoyancy and improved TA activation  with verbal cueing.   Skilled care:  initiation of Aquatics, patient education    Plan:    Continue to progress per poc:   NV add yoga. Monitor response to initial aquatic visit.    Subjective:   Patient reports: she has a high pain tolerance. Wants to get stronger. Feels weak.     Have you fallen since last visit:  no    Precautions:   June 2023 Lymphoma involving, rib cage, pelvic floor, left hip, entire spine. Tumors are  "still present in c-spine, t-spine, and lumbar spine, but is in remission. No E-stim/modalites       Pain:  5-6/10 (in the water)  Location/Type of pain: :  Right sided thoracic pain, and simeon LS pain, constant, throbbing  What increases pain: bed mobility, standing still, sitting  What decreases pain:  swaying back and forth, walking better than sitting    HEP compliance/understanding:  building AQUA HEP    Objective:   Objective Measurements:    Function:     Posture: Posture: forward head, rounded shoulders  Gait: Ambulates on pool deck with step through gait without assistive device. With [place]. Ambulated in 3.6 water depth with increased bilateral heel strike and toe off with verbal cueing  Balance: Challenged with dynamic balance and trunk, ankle and hip stabilization in 3.6 feet water depth.  ROM:    Strength:  Flexibility:    Treatment performed at [facility]    Treatment:   **= HEP  NV= Next visit  np= not performed  nb= non-billable  G= group HEP= discharged to [facility]  Therapeutic Exercise:       minutes      Manual Therapy:       minutes      Neuromuscular Re-education:      minutes      Gait Training:            minutes      Aquatics:          44 minutes  Floater or sinker tendency: Floater  Looking into [facility] for AQUA [facility]  FWD/Retro Ambulation w/ reciprocal arm swing 6 laps each   Lateral amb with BUE abd/add 4 laps  3 way hip AROM w/ one hand on wall x 10 each siemon   Partial squats w/ wall support x 10  Hip circumduction w/ wall support CW/CCW x 10 each   BTW open board push/pull, up/down x 10 each   BTW DLS BUE open paddle flex/ext, abd/add x 10 each    Chi #1-5 with diaphragmatic breathing x 5 each  Qi Gong \"play with water\" with diaphragmatic breathing x 5  Cycle w/ Red noodle under arms 1 lap    The following exercises we performed with a student and are non-billable:  Deep end with red noodle under BUE:  DKTC at wall 10\" x 5 NB  Cross country, Hip abd/add 1 minute each NB  Hang 2 minutes " NB  Cycle 2 minutes NB    Therapeutic Activity:      minutes      Modalities:       Vasopneumatic Device       minutes  Electrical Stimulation          minutes  Ultrasound            minutes  Iontophoresis                     minutes  Cold Pack            minutes  Mechanical Traction           minutes    Self Care Home Management:    minutes    Canalith Reposition:          minutes     Education:         minutes    Other:                minutes          Education:  Benefits of Aquatics including buoyancy, resistance ,unloading and hydrostatic pressure postural awareness, pacing in pain free ROM, hydration and rest post initial treatment, balance strategies, pacing, pain management, pne    HEP Progression:  building AQUA [facility]       OBI Sullivan

## 2024-07-10 ENCOUNTER — TELEPHONE (OUTPATIENT)
Dept: HEMATOLOGY/ONCOLOGY | Facility: HOSPITAL | Age: 49
End: 2024-07-10
Payer: COMMERCIAL

## 2024-07-10 ENCOUNTER — APPOINTMENT (OUTPATIENT)
Dept: PHYSICAL THERAPY | Facility: CLINIC | Age: 49
End: 2024-07-10
Payer: COMMERCIAL

## 2024-07-10 DIAGNOSIS — G89.3 CANCER RELATED PAIN: ICD-10-CM

## 2024-07-10 RX ORDER — OXYCODONE HYDROCHLORIDE 15 MG/1
15 TABLET ORAL EVERY 8 HOURS PRN
Qty: 90 TABLET | Refills: 0 | Status: SHIPPED | OUTPATIENT
Start: 2024-07-10 | End: 2024-08-09

## 2024-07-10 NOTE — TELEPHONE ENCOUNTER
Pt is requesting a refill of oxycodone 16mg q8 prn, #90.  Preferred pharmacy is [facility] in [place].

## 2024-07-10 NOTE — TELEPHONE ENCOUNTER
Patient last seen by MAGDA Monroe on 6/10 with plan to continue oxycodone 15mg q8h. Follow up visit is scheduled for 7/15. OARRS reviewed and no aberrancy noted. Patient last filled oxycodone 15mg #90/30 days on 6/10. Prescription pended to provider to approve.

## 2024-07-12 DIAGNOSIS — C81.48: ICD-10-CM

## 2024-07-12 RX ORDER — POTASSIUM CHLORIDE 750 MG/1
10 TABLET, EXTENDED RELEASE ORAL 2 TIMES DAILY
Qty: 60 TABLET | Refills: 2 | Status: CANCELLED | OUTPATIENT
Start: 2024-07-12

## 2024-07-15 ENCOUNTER — OFFICE VISIT (OUTPATIENT)
Dept: PALLIATIVE MEDICINE | Facility: CLINIC | Age: 49
End: 2024-07-15
Payer: COMMERCIAL

## 2024-07-15 VITALS
HEART RATE: 105 BPM | OXYGEN SATURATION: 98 % | RESPIRATION RATE: 18 BRPM | WEIGHT: 129.2 LBS | DIASTOLIC BLOOD PRESSURE: 85 MMHG | BODY MASS INDEX: 24.41 KG/M2 | SYSTOLIC BLOOD PRESSURE: 125 MMHG | TEMPERATURE: 97.3 F

## 2024-07-15 DIAGNOSIS — G89.3 CANCER RELATED PAIN: ICD-10-CM

## 2024-07-15 DIAGNOSIS — Z79.891 ENCOUNTER FOR MONITORING OPIOID MAINTENANCE THERAPY: Primary | ICD-10-CM

## 2024-07-15 DIAGNOSIS — Z51.5 PALLIATIVE CARE ENCOUNTER: ICD-10-CM

## 2024-07-15 DIAGNOSIS — R11.2 NAUSEA AND VOMITING, UNSPECIFIED VOMITING TYPE: ICD-10-CM

## 2024-07-15 DIAGNOSIS — C81.48: ICD-10-CM

## 2024-07-15 DIAGNOSIS — Z51.81 ENCOUNTER FOR MONITORING OPIOID MAINTENANCE THERAPY: Primary | ICD-10-CM

## 2024-07-15 DIAGNOSIS — G47.00 INSOMNIA, UNSPECIFIED TYPE: ICD-10-CM

## 2024-07-15 DIAGNOSIS — G25.81 RESTLESS LEG SYNDROME: ICD-10-CM

## 2024-07-15 DIAGNOSIS — R61 NIGHT SWEATS: ICD-10-CM

## 2024-07-15 LAB
AMPHETAMINES UR QL SCN: ABNORMAL
BARBITURATES UR QL SCN: ABNORMAL
BENZODIAZ UR QL SCN: ABNORMAL
BZE UR QL SCN: ABNORMAL
CANNABINOIDS UR QL SCN: ABNORMAL
FENTANYL+NORFENTANYL UR QL SCN: ABNORMAL
METHADONE UR QL SCN: ABNORMAL
OPIATES UR QL SCN: ABNORMAL
OXYCODONE+OXYMORPHONE UR QL SCN: ABNORMAL
PCP UR QL SCN: ABNORMAL

## 2024-07-15 PROCEDURE — 3008F BODY MASS INDEX DOCD: CPT

## 2024-07-15 PROCEDURE — 80361 OPIATES 1 OR MORE: CPT

## 2024-07-15 PROCEDURE — 99215 OFFICE O/P EST HI 40 MIN: CPT

## 2024-07-15 PROCEDURE — 80307 DRUG TEST PRSMV CHEM ANLYZR: CPT

## 2024-07-15 PROCEDURE — 1036F TOBACCO NON-USER: CPT

## 2024-07-15 RX ORDER — AMOXICILLIN 250 MG
2 CAPSULE ORAL 2 TIMES DAILY PRN
Qty: 120 TABLET | Refills: 3 | Status: SHIPPED | OUTPATIENT
Start: 2024-07-15 | End: 2025-07-15

## 2024-07-15 RX ORDER — ROPINIROLE 0.25 MG/1
0.25 TABLET, FILM COATED ORAL NIGHTLY
Qty: 30 TABLET | Refills: 2 | Status: SHIPPED | OUTPATIENT
Start: 2024-07-15 | End: 2024-08-14

## 2024-07-15 RX ORDER — POTASSIUM CHLORIDE 750 MG/1
10 TABLET, EXTENDED RELEASE ORAL 2 TIMES DAILY
Qty: 60 TABLET | Refills: 2 | Status: SHIPPED | OUTPATIENT
Start: 2024-07-15

## 2024-07-15 RX ORDER — ONDANSETRON 8 MG/1
8 TABLET, ORALLY DISINTEGRATING ORAL EVERY 8 HOURS PRN
Qty: 30 TABLET | Refills: 3 | Status: SHIPPED | OUTPATIENT
Start: 2024-07-15 | End: 2024-08-14

## 2024-07-15 RX ORDER — RISPERIDONE 0.5 MG/1
0.5 TABLET ORAL NIGHTLY
Qty: 30 TABLET | Refills: 3 | Status: SHIPPED | OUTPATIENT
Start: 2024-07-15

## 2024-07-15 RX ORDER — OXYBUTYNIN CHLORIDE 5 MG/1
5 TABLET ORAL 2 TIMES DAILY
Qty: 60 TABLET | Refills: 2 | Status: SHIPPED | OUTPATIENT
Start: 2024-07-15 | End: 2024-08-14

## 2024-07-15 ASSESSMENT — PATIENT HEALTH QUESTIONNAIRE - PHQ9
2. FEELING DOWN, DEPRESSED OR HOPELESS: NOT AT ALL
SUM OF ALL RESPONSES TO PHQ9 QUESTIONS 1 AND 2: 0
1. LITTLE INTEREST OR PLEASURE IN DOING THINGS: NOT AT ALL

## 2024-07-15 ASSESSMENT — PAIN SCALES - GENERAL: PAINLEVEL: 6

## 2024-07-15 ASSESSMENT — ENCOUNTER SYMPTOMS
DEPRESSION: 0
LOSS OF SENSATION IN FEET: 0
OCCASIONAL FEELINGS OF UNSTEADINESS: 0

## 2024-07-15 NOTE — PROGRESS NOTES
SUPPORTIVE AND PALLIATIVE ONCOLOGY OUTPATIENT FOLLOW-UP      SERVICE DATE: 7/15/2024    Subjective   HISTORY OF PRESENT ILLNESS: Pamela Lopez is a 48 y.o. female who presents with newly diagnosed Hodgkin Lymphoma. She was admitted d/t back pain and a CT c/a/p revealed multiple hepatic masses and several osteoblastic  lesions. MRI brain was negative. She underwent biopsy of L3 on 6/9/23 which was nondiagnostic. PET scan showed FDG-avid lymph nodes in the neck, tonsil, bones, and liver. Biopsy of right tonsil 6/22 revealed Hodgkin Lymphoma. She underwent RT to T7 x10  fractions. She started treatment with AVD + Brentuximab 7/17/23. Recent PET scan with excellent response to treatment. PET scan 3/4 showed complete response.     Pain Assessment:  Pain Score: 7/10  Location: mid-back and lower back  Description: aching and throbbing pain in her right back/flank. She has has shooting pain down from her right neck through her arm. She had increased pain in her back and shoulders after aquatic therapy. She states she had a lot of pain after therapy for 4 days. She was taking oxycodone 4-5 times per day. This was helpful.     Symptom Assessment:  Pain:somewhat  Headache: none  Dizziness:none  Lack of energy: a little. improving  Difficulty sleeping: a little improved with Flexeril at bedtime.   Worrying: a little   Anxiety: none   Depression: a little.   Pain in mouth/swallowing: none  Dry mouth: none  Taste changes: none  Shortness of breath: none  Lack of appetite: none   Nausea: a little. Taking ondansetron 8 mg as needed.   Vomiting: none  Constipation: none  Diarrhea: none  Sore muscles: none  Numbness or tingling in hands/feet/other: somewhat. Hands and feet are numb.   Weight loss: none  Other: none      Information obtained from: interview of patient  ______________________________________________________________________        Objective     No visits with results within 1 Month(s) from this visit.   Latest  known visit with results is:   Infusion on 06/10/2024   Component Date Value Ref Range Status    WBC 06/10/2024 6.5  4.4 - 11.3 x10*3/uL Final    RBC 06/10/2024 4.76  4.00 - 5.20 x10*6/uL Final    Hemoglobin 06/10/2024 14.3  12.0 - 16.0 g/dL Final    Hematocrit 06/10/2024 43.4  36.0 - 46.0 % Final    MCV 06/10/2024 91  80 - 100 fL Final    MCH 06/10/2024 30.0  26.0 - 34.0 pg Final    MCHC 06/10/2024 32.9  32.0 - 36.0 g/dL Final    RDW 06/10/2024 13.9  11.5 - 14.5 % Final    Platelets 06/10/2024 237  150 - 450 x10*3/uL Final    Neutrophils % 06/10/2024 62.1  40.0 - 80.0 % Final    Immature Granulocytes %, Automated 06/10/2024 0.2  0.0 - 0.9 % Final    Immature Granulocyte Count (IG) includes promyelocytes, myelocytes and metamyelocytes but does not include bands. Percent differential counts (%) should be interpreted in the context of the absolute cell counts (cells/UL).    Lymphocytes % 06/10/2024 28.7  13.0 - 44.0 % Final    Monocytes % 06/10/2024 4.8  2.0 - 10.0 % Final    Eosinophils % 06/10/2024 2.8  0.0 - 6.0 % Final    Basophils % 06/10/2024 1.4  0.0 - 2.0 % Final    Neutrophils Absolute 06/10/2024 4.04  1.20 - 7.70 x10*3/uL Final    Percent differential counts (%) should be interpreted in the context of the absolute cell counts (cells/uL).    Immature Granulocytes Absolute, Au* 06/10/2024 0.01  0.00 - 0.70 x10*3/uL Final    Lymphocytes Absolute 06/10/2024 1.86  1.20 - 4.80 x10*3/uL Final    Monocytes Absolute 06/10/2024 0.31  0.10 - 1.00 x10*3/uL Final    Eosinophils Absolute 06/10/2024 0.18  0.00 - 0.70 x10*3/uL Final    Basophils Absolute 06/10/2024 0.09  0.00 - 0.10 x10*3/uL Final    Glucose 06/10/2024 64 (L)  74 - 99 mg/dL Final    Sodium 06/10/2024 140  136 - 145 mmol/L Final    Potassium 06/10/2024 4.5  3.5 - 5.3 mmol/L Final    Chloride 06/10/2024 104  98 - 107 mmol/L Final    Bicarbonate 06/10/2024 27  21 - 32 mmol/L Final    Anion Gap 06/10/2024 14  10 - 20 mmol/L Final    Urea Nitrogen 06/10/2024 16   6 - 23 mg/dL Final    Creatinine 06/10/2024 0.64  0.50 - 1.05 mg/dL Final    eGFR 06/10/2024 >90  >60 mL/min/1.73m*2 Final    Calculations of estimated GFR are performed using the 2021 CKD-EPI Study Refit equation without the race variable for the IDMS-Traceable creatinine methods.  https://jasn.asnjournals.org/content/early/2021/09/22/ASN.9524486725    Calcium 06/10/2024 10.0  8.6 - 10.3 mg/dL Final    Albumin 06/10/2024 4.2  3.4 - 5.0 g/dL Final    Alkaline Phosphatase 06/10/2024 116 (H)  33 - 110 U/L Final    Total Protein 06/10/2024 7.3  6.4 - 8.2 g/dL Final    AST 06/10/2024 15  9 - 39 U/L Final    Bilirubin, Total 06/10/2024 0.5  0.0 - 1.2 mg/dL Final    ALT 06/10/2024 9  7 - 45 U/L Final    Patients treated with Sulfasalazine may generate falsely decreased results for ALT.    Vitamin B12 06/10/2024 376  211 - 911 pg/mL Final    Iron 06/10/2024 159 (H)  35 - 150 ug/dL Final    UIBC 06/10/2024 179  110 - 370 ug/dL Final    TIBC 06/10/2024 338  240 - 445 ug/dL Final    % Saturation 06/10/2024 47 (H)  25 - 45 % Final    LDH 06/10/2024 171  84 - 246 U/L Final    Magnesium 06/10/2024 1.90  1.60 - 2.40 mg/dL Final     PHYSICAL EXAMINATION   Vital Signs:   Vitals:    07/15/24 1429   BP: 125/85   Pulse: 105   Resp: 18   Temp: 36.3 °C (97.3 °F)   SpO2: 98%   Vital signs reviewed      Physical Exam  HENT:      Head: Normocephalic.      Nose: Nose normal.   Eyes:      Pupils: Pupils are equal, round, and reactive to light.   Pulmonary:      Effort: Pulmonary effort is normal.   Musculoskeletal:         General: Normal range of motion.   Neurological:      Mental Status: She is alert and oriented to person, place, and time.   Psychiatric:         Mood and Affect: Mood normal.         Behavior: Behavior normal.         Social  Not . Lives with her 3 grown children.  History of drug use with Meth x8 years. Quit 1 year ago.  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      ASSESSMENT/PLAN    Pain  Pain is: cancer related pain  Type: somatic and neuropathic  Pain control: well-controlled  Home regimen:   - Continue oxycodone 15 mg every 8 hours. Will try to wean this.   - She has completely weaned off of MSContin  - Continue with aquatic therapy   - Continue Flexeril 10 mg TID as needed.   - Start Ropinirole .25 mg at bedtime for RLS  - Encouraged Tylenol 500-1000 mg every 8 hours. Do not exceed more than 3000 mg per day.  - Start Ibuprofen 600 mg every 6-8 hours as needed  - Discussed getting back in to see Dr. Wagner with spine surgery  - Discussed a chronic pain referral in the future   - Continue Gabapentin 300 mg TID  - referred to aquatherapy for pain and weakness    Opioid Use  Medication Management:   - OARRS report reviewed with no aberrant behavior; consistent with  prescriptions/records and patient history  - MED 45.  Overdose Risk Score 290.   This has been discussed with patient.   - We will continue to closely monitor the patient for signs of prescription misuse including UDS, OARRS review and subjective reports at each visit.  - No concurrent benzodiazepine use   - I am a provider who either is or has consulted and collaborated with a provider certified in Hospice and Palliative Medicine and have conducted a face-face visit and examination for this patient.  - Routine Urine Drug Screen completed 7/15/24 results pending  - Controlled Substance Agreement: completed 9/12/23. repeat yearly   - Specifically discussed that controlled substance prescriptions will only be provided by our group as outlined in the completed agreement  - Prescribed naloxone: Has prescription at home  - Red Flags: History of drug use with Meth. Quit 1 year ago     Nausea   Intermittent nausea without vomiting related to chemotherapy   - Continue Prochlorperazine 10 mg every 6 hours as needed  - Continue Ondansetron 8 mg every 8 hours as needed    Constipation  At risk for constipation  related to opioids,  currently not constipated  Usual bowel pattern: daily  Home regimen:   - Continue Senna-S 1 tab BID- no longer taking due to diarrhea  - Start Magnesium Citrate - may start with 1/2 bottle to 1 bottle if no BM within 48-72 hours     Altered Mood  Chronic anxiety and depression related to health concerns   controlled with home regimen  Home regimen:   - Continue Lexapro back to 20 mg  - no longer taking Wellbutrin 150 mg BID.   - Discussed meeting with a community therapist for therapy as well    Sleeping Difficulty:  Home regimen:    - Trazodone stopped inpatient r/t QTc concerns  - Continue Risperidone 0.5 mg every night.   - Flexeril has been effective for sleep   - Discussed taking 5-10 mg of Melatonin. Encouraged to restart this.    Decreased appetite  Related to malignancy, chemotherapy, and disease process  Nutrition following  Home regimen:    - Olanzapine 2.5 mg every day at bedtime- this was stopped inpatient d/t QTc  - Following with Martha Khan    Night Sweats  - Start Oxybutynin 5-10 mg nightly as needed. RX sent today.       Advance Directives  Existence of Advance Directives:Yes, documentation or copy in medical record  Decision maker: AARON is Cynthia Xie  Code Status: Full code    Next Follow-Up Visit:  Return to clinic in 6 weeks virtually     Signature and billing  Medical complexity was low level due to due to complexity of problems, extensive data review, and high risk of management/treatment.  Time was spent on the following: Prep Time, Time Directly with Patient/Family/Caregiver, Documentation Time. Total time spent: 30      Data  Diagnostic tests and information reviewed for today's visit:  Most recent labs, Most recent imaging, Medications         Some elements copied from Cynthia porter on 6/10/24, the elements have been updated and all reflect current decision making from today, 7/15/2024.      Plan of Care discussed with: Patient    SIGNATURE:  Cynthia Monroe, APRN-CNP    Contact information:  [facility]ive [facility] Palliative [facility]cology  Monday-Friday 8 AM-5 PM  Phone:  [phone], press option #5, then option #1.   Or Epic Secure Chat

## 2024-07-17 ENCOUNTER — TREATMENT (OUTPATIENT)
Dept: PHYSICAL THERAPY | Facility: CLINIC | Age: 49
End: 2024-07-17
Payer: COMMERCIAL

## 2024-07-17 DIAGNOSIS — G89.29 CHRONIC BACK PAIN, UNSPECIFIED BACK LOCATION, UNSPECIFIED BACK PAIN LATERALITY: Primary | ICD-10-CM

## 2024-07-17 DIAGNOSIS — G89.3 CANCER RELATED PAIN: ICD-10-CM

## 2024-07-17 DIAGNOSIS — M54.9 CHRONIC BACK PAIN, UNSPECIFIED BACK LOCATION, UNSPECIFIED BACK PAIN LATERALITY: Primary | ICD-10-CM

## 2024-07-17 PROCEDURE — 97113 AQUATIC THERAPY/EXERCISES: CPT | Mod: GP,CQ

## 2024-07-17 NOTE — PROGRESS NOTES
"  Physical Therapy  Physical Therapy Progress Note    Patient Name Pamela Lopez   MRN: [id number]  Today's Date: 7/17/2024  Time Calculation  Start Time: 0145  Stop Time: 0230  Time Calculation (min): 45 min    Insurance:    Visit #:  3   Insurance Reviewed  (per information provided by  pre-cert team)   Authorization required:  Y  Approved # of visits: MN  Therapy Diagnoses:   1. Chronic back pain, unspecified back location, unspecified back pain laterality        2. Cancer related pain  Follow Up In Physical Therapy          General:  Reason for visit: Thoracolumbar pain, cancer related   Referred by: MAGDA Troncoso-CNP  Next MD appt:  7/15  Preferred Name:  Pamela   Script:  Aquatics  Onset Date:  6/10/2024  Assessment:  Patient tolerated treatment: well, Reported over doing last session and required heavy pain meds. Reported it took several days to calm down with the spine pain. Advised patient we would  modify her program and instructed in sciatic nerve stretch and proper mechanics verbally.  Patient needs continued work on core strength and stability within her means/skilled PT for: gradual progression in exercise and  to address remaining functional, objective and subjective deficits to allow them to return to prior /optimal level of function with ADLs.  Patient is progressing with goals: yes  Skilled care:  ex modification     Plan:         Continue to progress per poc:   NV add step ups     Subjective:   Patient reports:  she must of over did it last session because her back was \"killing \" her. Reported  she needed  a lot of pain meds.    Have you fallen since last visit:  no        Precautions:   June 2023 Lymphoma involving, rib cage, pelvic floor, left hip, entire spine. Tumors are still present in c-spine, t-spine, and lumbar spine, but is in remission. No E-stim/modalites      Pain:  5/10  Location/Type of pain:  spine     HEP compliance/understanding:  yes    Objective:   Objective " Measurements:      Posture: cueing in keeping abdominal bracing with core strengthening . Rounded shoulders     Balance:  requires hand on wall with single leg balance exercises.  :      Treatment:   **= HEP  NV= Next visit  np= not performed  nb= non-billable  G= group HEP= discharged to [facility]      Aquatics:          45 minutes  Floater or sinker tendency: Floater  Looking into [facility] for AQUA HEP  FWD/Retro Ambulation w/ reciprocal arm swing 6 laps each   Lateral amb with BUE abd/add 4 laps  3 way hip AROM w/ one hand on wall x 10 each simeon  no  Partial squats w/ wall support x 10  Hip circumduction w/ wall support CW/CCW x 10 each   BTW open board push/pull, up/down x 10 each  np  BTW DLS BUE open paddle flex/ext, abd/add x 10 each   BTW can can 15x  BTW ham kicks 15x     Deep end with red noodle under BUE:  Cross country, Hip abd  Cycle 2 minutes   Hang 3 min   Stair stretch hams /hip flexors 20sec x 2               Education:  ex modification   HEP Progression:  n/a

## 2024-07-19 LAB
6MAM UR CFM-MCNC: <25 NG/ML
CODEINE UR CFM-MCNC: <50 NG/ML
HYDROCODONE CTO UR CFM-MCNC: <25 NG/ML
HYDROMORPHONE UR CFM-MCNC: <25 NG/ML
MORPHINE UR CFM-MCNC: <50 NG/ML
NORHYDROCODONE UR CFM-MCNC: <25 NG/ML
NOROXYCODONE UR CFM-MCNC: >1000 NG/ML
OXYCODONE UR CFM-MCNC: >2500 NG/ML
OXYMORPHONE UR CFM-MCNC: >2500 NG/ML

## 2024-07-22 ENCOUNTER — INFUSION (OUTPATIENT)
Dept: HEMATOLOGY/ONCOLOGY | Facility: CLINIC | Age: 49
End: 2024-07-22
Payer: COMMERCIAL

## 2024-07-22 DIAGNOSIS — C81.48: Primary | ICD-10-CM

## 2024-07-22 LAB
ALBUMIN SERPL BCP-MCNC: 3.8 G/DL (ref 3.4–5)
ALP SERPL-CCNC: 117 U/L (ref 33–110)
ALT SERPL W P-5'-P-CCNC: 8 U/L (ref 7–45)
ANION GAP SERPL CALC-SCNC: 12 MMOL/L (ref 10–20)
AST SERPL W P-5'-P-CCNC: 15 U/L (ref 9–39)
BASOPHILS # BLD AUTO: 0.05 X10*3/UL (ref 0–0.1)
BASOPHILS NFR BLD AUTO: 0.7 %
BILIRUB DIRECT SERPL-MCNC: 0.1 MG/DL (ref 0–0.3)
BILIRUB SERPL-MCNC: 0.2 MG/DL (ref 0–1.2)
BUN SERPL-MCNC: 12 MG/DL (ref 6–23)
CALCIUM SERPL-MCNC: 9 MG/DL (ref 8.6–10.3)
CHLORIDE SERPL-SCNC: 103 MMOL/L (ref 98–107)
CO2 SERPL-SCNC: 27 MMOL/L (ref 21–32)
CREAT SERPL-MCNC: 0.74 MG/DL (ref 0.5–1.05)
EGFRCR SERPLBLD CKD-EPI 2021: >90 ML/MIN/1.73M*2
EOSINOPHIL # BLD AUTO: 0.23 X10*3/UL (ref 0–0.7)
EOSINOPHIL NFR BLD AUTO: 3.2 %
ERYTHROCYTE [DISTWIDTH] IN BLOOD BY AUTOMATED COUNT: 13.7 % (ref 11.5–14.5)
GLUCOSE SERPL-MCNC: 123 MG/DL (ref 74–99)
HCT VFR BLD AUTO: 40.4 % (ref 36–46)
HGB BLD-MCNC: 13.4 G/DL (ref 12–16)
IMM GRANULOCYTES # BLD AUTO: 0.01 X10*3/UL (ref 0–0.7)
IMM GRANULOCYTES NFR BLD AUTO: 0.1 % (ref 0–0.9)
LDH SERPL L TO P-CCNC: 180 U/L (ref 84–246)
LYMPHOCYTES # BLD AUTO: 1.9 X10*3/UL (ref 1.2–4.8)
LYMPHOCYTES NFR BLD AUTO: 26.8 %
MCH RBC QN AUTO: 31.5 PG (ref 26–34)
MCHC RBC AUTO-ENTMCNC: 33.2 G/DL (ref 32–36)
MCV RBC AUTO: 95 FL (ref 80–100)
MONOCYTES # BLD AUTO: 0.47 X10*3/UL (ref 0.1–1)
MONOCYTES NFR BLD AUTO: 6.6 %
NEUTROPHILS # BLD AUTO: 4.43 X10*3/UL (ref 1.2–7.7)
NEUTROPHILS NFR BLD AUTO: 62.6 %
PLATELET # BLD AUTO: 219 X10*3/UL (ref 150–450)
POTASSIUM SERPL-SCNC: 3.6 MMOL/L (ref 3.5–5.3)
PROT SERPL-MCNC: 6.8 G/DL (ref 6.4–8.2)
RBC # BLD AUTO: 4.26 X10*6/UL (ref 4–5.2)
SODIUM SERPL-SCNC: 138 MMOL/L (ref 136–145)
WBC # BLD AUTO: 7.1 X10*3/UL (ref 4.4–11.3)

## 2024-07-22 PROCEDURE — 84075 ASSAY ALKALINE PHOSPHATASE: CPT

## 2024-07-22 PROCEDURE — 80048 BASIC METABOLIC PNL TOTAL CA: CPT

## 2024-07-22 PROCEDURE — 36591 DRAW BLOOD OFF VENOUS DEVICE: CPT

## 2024-07-22 PROCEDURE — 85025 COMPLETE CBC W/AUTO DIFF WBC: CPT

## 2024-07-22 PROCEDURE — 83615 LACTATE (LD) (LDH) ENZYME: CPT

## 2024-07-22 RX ORDER — HEPARIN SODIUM,PORCINE/PF 10 UNIT/ML
50 SYRINGE (ML) INTRAVENOUS AS NEEDED
OUTPATIENT
Start: 2024-07-22

## 2024-07-22 RX ORDER — HEPARIN 100 UNIT/ML
500 SYRINGE INTRAVENOUS AS NEEDED
OUTPATIENT
Start: 2024-07-22

## 2024-07-23 ENCOUNTER — APPOINTMENT (OUTPATIENT)
Dept: RADIOLOGY | Facility: HOSPITAL | Age: 49
End: 2024-07-23
Payer: COMMERCIAL

## 2024-07-23 ENCOUNTER — HOSPITAL ENCOUNTER (EMERGENCY)
Facility: HOSPITAL | Age: 49
Discharge: HOME | End: 2024-07-23
Attending: STUDENT IN AN ORGANIZED HEALTH CARE EDUCATION/TRAINING PROGRAM
Payer: COMMERCIAL

## 2024-07-23 ENCOUNTER — PATIENT MESSAGE (OUTPATIENT)
Dept: HEMATOLOGY/ONCOLOGY | Facility: CLINIC | Age: 49
End: 2024-07-23

## 2024-07-23 ENCOUNTER — APPOINTMENT (OUTPATIENT)
Dept: PHYSICAL THERAPY | Facility: CLINIC | Age: 49
End: 2024-07-23
Payer: COMMERCIAL

## 2024-07-23 ENCOUNTER — TELEPHONE (OUTPATIENT)
Dept: PALLIATIVE MEDICINE | Facility: HOSPITAL | Age: 49
End: 2024-07-23
Payer: COMMERCIAL

## 2024-07-23 ENCOUNTER — TELEPHONE (OUTPATIENT)
Dept: HEMATOLOGY/ONCOLOGY | Facility: CLINIC | Age: 49
End: 2024-07-23
Payer: COMMERCIAL

## 2024-07-23 VITALS
BODY MASS INDEX: 24.52 KG/M2 | TEMPERATURE: 98.1 F | HEART RATE: 47 BPM | WEIGHT: 129.9 LBS | DIASTOLIC BLOOD PRESSURE: 70 MMHG | SYSTOLIC BLOOD PRESSURE: 125 MMHG | HEIGHT: 61 IN | OXYGEN SATURATION: 97 % | RESPIRATION RATE: 18 BRPM

## 2024-07-23 DIAGNOSIS — G89.29 CHRONIC MIDLINE LOW BACK PAIN WITHOUT SCIATICA: ICD-10-CM

## 2024-07-23 DIAGNOSIS — M54.50 CHRONIC MIDLINE LOW BACK PAIN WITHOUT SCIATICA: ICD-10-CM

## 2024-07-23 DIAGNOSIS — G89.3 CANCER RELATED PAIN: ICD-10-CM

## 2024-07-23 DIAGNOSIS — R15.9 INCONTINENCE OF FECES, UNSPECIFIED FECAL INCONTINENCE TYPE: Primary | ICD-10-CM

## 2024-07-23 LAB
ALBUMIN SERPL BCP-MCNC: 3.8 G/DL (ref 3.4–5)
ALP SERPL-CCNC: 118 U/L (ref 33–110)
ALT SERPL W P-5'-P-CCNC: 7 U/L (ref 7–45)
ANION GAP SERPL CALC-SCNC: 12 MMOL/L (ref 10–20)
AST SERPL W P-5'-P-CCNC: 14 U/L (ref 9–39)
BASOPHILS # BLD AUTO: 0.07 X10*3/UL (ref 0–0.1)
BASOPHILS NFR BLD AUTO: 1.2 %
BILIRUB SERPL-MCNC: 0.2 MG/DL (ref 0–1.2)
BUN SERPL-MCNC: 12 MG/DL (ref 6–23)
CALCIUM SERPL-MCNC: 9.2 MG/DL (ref 8.6–10.3)
CHLORIDE SERPL-SCNC: 105 MMOL/L (ref 98–107)
CO2 SERPL-SCNC: 27 MMOL/L (ref 21–32)
CREAT SERPL-MCNC: 0.59 MG/DL (ref 0.5–1.05)
EGFRCR SERPLBLD CKD-EPI 2021: >90 ML/MIN/1.73M*2
EOSINOPHIL # BLD AUTO: 0.3 X10*3/UL (ref 0–0.7)
EOSINOPHIL NFR BLD AUTO: 5.1 %
ERYTHROCYTE [DISTWIDTH] IN BLOOD BY AUTOMATED COUNT: 13.7 % (ref 11.5–14.5)
GLUCOSE SERPL-MCNC: 91 MG/DL (ref 74–99)
HCT VFR BLD AUTO: 39.5 % (ref 36–46)
HGB BLD-MCNC: 13.4 G/DL (ref 12–16)
IMM GRANULOCYTES # BLD AUTO: 0.02 X10*3/UL (ref 0–0.7)
IMM GRANULOCYTES NFR BLD AUTO: 0.3 % (ref 0–0.9)
LYMPHOCYTES # BLD AUTO: 1.98 X10*3/UL (ref 1.2–4.8)
LYMPHOCYTES NFR BLD AUTO: 33.3 %
MCH RBC QN AUTO: 32 PG (ref 26–34)
MCHC RBC AUTO-ENTMCNC: 33.9 G/DL (ref 32–36)
MCV RBC AUTO: 94 FL (ref 80–100)
MONOCYTES # BLD AUTO: 0.49 X10*3/UL (ref 0.1–1)
MONOCYTES NFR BLD AUTO: 8.2 %
NEUTROPHILS # BLD AUTO: 3.08 X10*3/UL (ref 1.2–7.7)
NEUTROPHILS NFR BLD AUTO: 51.9 %
NRBC BLD-RTO: 0 /100 WBCS (ref 0–0)
PLATELET # BLD AUTO: 202 X10*3/UL (ref 150–450)
POTASSIUM SERPL-SCNC: 3.7 MMOL/L (ref 3.5–5.3)
PROT SERPL-MCNC: 6.8 G/DL (ref 6.4–8.2)
RBC # BLD AUTO: 4.19 X10*6/UL (ref 4–5.2)
SODIUM SERPL-SCNC: 140 MMOL/L (ref 136–145)
WBC # BLD AUTO: 5.9 X10*3/UL (ref 4.4–11.3)

## 2024-07-23 PROCEDURE — 2500000001 HC RX 250 WO HCPCS SELF ADMINISTERED DRUGS (ALT 637 FOR MEDICARE OP): Performed by: STUDENT IN AN ORGANIZED HEALTH CARE EDUCATION/TRAINING PROGRAM

## 2024-07-23 PROCEDURE — 72156 MRI NECK SPINE W/O & W/DYE: CPT | Performed by: STUDENT IN AN ORGANIZED HEALTH CARE EDUCATION/TRAINING PROGRAM

## 2024-07-23 PROCEDURE — 99285 EMERGENCY DEPT VISIT HI MDM: CPT | Mod: 25

## 2024-07-23 PROCEDURE — 85025 COMPLETE CBC W/AUTO DIFF WBC: CPT | Performed by: STUDENT IN AN ORGANIZED HEALTH CARE EDUCATION/TRAINING PROGRAM

## 2024-07-23 PROCEDURE — 2500000004 HC RX 250 GENERAL PHARMACY W/ HCPCS (ALT 636 FOR OP/ED): Performed by: STUDENT IN AN ORGANIZED HEALTH CARE EDUCATION/TRAINING PROGRAM

## 2024-07-23 PROCEDURE — 72157 MRI CHEST SPINE W/O & W/DYE: CPT | Performed by: STUDENT IN AN ORGANIZED HEALTH CARE EDUCATION/TRAINING PROGRAM

## 2024-07-23 PROCEDURE — 72156 MRI NECK SPINE W/O & W/DYE: CPT

## 2024-07-23 PROCEDURE — 96374 THER/PROPH/DIAG INJ IV PUSH: CPT | Mod: 59

## 2024-07-23 PROCEDURE — 80053 COMPREHEN METABOLIC PANEL: CPT | Performed by: STUDENT IN AN ORGANIZED HEALTH CARE EDUCATION/TRAINING PROGRAM

## 2024-07-23 PROCEDURE — A9575 INJ GADOTERATE MEGLUMI 0.1ML: HCPCS | Performed by: STUDENT IN AN ORGANIZED HEALTH CARE EDUCATION/TRAINING PROGRAM

## 2024-07-23 PROCEDURE — 72158 MRI LUMBAR SPINE W/O & W/DYE: CPT

## 2024-07-23 PROCEDURE — 2550000001 HC RX 255 CONTRASTS: Performed by: STUDENT IN AN ORGANIZED HEALTH CARE EDUCATION/TRAINING PROGRAM

## 2024-07-23 PROCEDURE — 72158 MRI LUMBAR SPINE W/O & W/DYE: CPT | Performed by: STUDENT IN AN ORGANIZED HEALTH CARE EDUCATION/TRAINING PROGRAM

## 2024-07-23 PROCEDURE — 72157 MRI CHEST SPINE W/O & W/DYE: CPT

## 2024-07-23 RX ORDER — GADOTERATE MEGLUMINE 376.9 MG/ML
10 INJECTION INTRAVENOUS
Status: COMPLETED | OUTPATIENT
Start: 2024-07-23 | End: 2024-07-23

## 2024-07-23 RX ORDER — OXYCODONE HYDROCHLORIDE 5 MG/1
10 TABLET ORAL ONCE
Status: COMPLETED | OUTPATIENT
Start: 2024-07-23 | End: 2024-07-23

## 2024-07-23 RX ORDER — OXYCODONE HYDROCHLORIDE 5 MG/1
10 TABLET ORAL ONCE
Status: DISCONTINUED | OUTPATIENT
Start: 2024-07-23 | End: 2024-07-23

## 2024-07-23 ASSESSMENT — PAIN SCALES - GENERAL
PAINLEVEL_OUTOF10: 6
PAINLEVEL_OUTOF10: 10 - WORST POSSIBLE PAIN
PAINLEVEL_OUTOF10: 6

## 2024-07-23 ASSESSMENT — PAIN DESCRIPTION - LOCATION: LOCATION: BACK

## 2024-07-23 ASSESSMENT — LIFESTYLE VARIABLES
EVER FELT BAD OR GUILTY ABOUT YOUR DRINKING: NO
EVER HAD A DRINK FIRST THING IN THE MORNING TO STEADY YOUR NERVES TO GET RID OF A HANGOVER: NO
HAVE PEOPLE ANNOYED YOU BY CRITICIZING YOUR DRINKING: NO
TOTAL SCORE: 0
HAVE YOU EVER FELT YOU SHOULD CUT DOWN ON YOUR DRINKING: NO

## 2024-07-23 NOTE — TELEPHONE ENCOUNTER
Patient messaged me (in response to a message I sent her regarding her labs) that she had called into Cyntiha's office today, her palliative care provider, with new symptoms in the last day. I called and spoke to patient who says yesterday she started having incontinence of stool, no urine incontinence. She denies recent fall or injury. She also reports worsening lower back pain. I encouraged her to notify Dr. Wagner of this update and that she is going to go to the ED for further evaluation. She says she will be going to the [place] ED.

## 2024-07-23 NOTE — ED TRIAGE NOTES
Pt states numbness in hands and feet, compression fracture, increased pain in hands and feet, bowel incontinence

## 2024-07-23 NOTE — TELEPHONE ENCOUNTER
Spoke with Pamela who states that she has bowel control since yesterday. She states that she is having regaulr bowel movements but cannot feel the urge to go nor does she feel when defecation has occurred. She also describes increased pain located on the right side of her back. The numbness is also worse in right arm and foot.  Strongly encouraged Pamela to seek medical attention in the ER ASAP. She states that she needs to wait for her son to come home from work but anticipates she will be in the ER between 5-6 pm. Provider Mabel updated.

## 2024-07-23 NOTE — ED PROVIDER NOTES
HPI   Chief Complaint   Patient presents with    Numbness       This is a 48-year-old female with past medical history of Hodgkin's lymphoma with diffuse metastasis including C/T/L-spine now status post radiation and chemotherapy presenting to the emergency department with concern for bowel incontinence.  Patient states her symptoms occurred yesterday when she had 4 episodes of stool incontinence.  States she had no knowledge of the fact that she passed stool until she noticed something when her leg.  States she decreased sensation when she wiped after these episodes as well.  She has not had any episodes today.  Over the last year she has had numbness to her feet and hands.  States this has slowly gotten worse over last year and now include the distal legs.  She denies any worsening weakness over the last couple days.  Denies bladder incontinence.  She endorses chronic back pain and has known compression fractures which is largely unchanged.  She otherwise denies additional symptoms including headaches, chest pain, shortness of breath, abdominal pain, fever/chills, nausea/vomiting.        History provided by:  Patient   used: No            Patient History   Past Medical History:   Diagnosis Date    Asthma ([facility]-H[facility])     Hodgkin's lymphoma ([facility])     Hyperlipidemia      Past Surgical History:   Procedure Laterality Date    CT GUIDED PERCUTANEOUS BIOPSY BONE DEEP  2023    CT GUIDED PERCUTANEOUS BIOPSY BONE DEEP 2023 [facility] CT     No family history on file.  Social History     Tobacco Use    Smoking status: Former     Current packs/day: 0.00     Average packs/day: 0.3 packs/day for 33.0 years (8.3 ttl pk-yrs)     Types: Cigarettes     Start date:      Quit date:      Years since quittin.5    Smokeless tobacco: Never   Substance Use Topics    Alcohol use: Not Currently    Drug use: Never       Physical Exam   ED Triage Vitals [24 1556]   Temperature Heart Rate Respirations BP    36.7 °C (98.1 °F) 68 18 119/55      Pulse Ox Temp src Heart Rate Source Patient Position   95 % -- -- --      BP Location FiO2 (%)     -- --       Physical Exam  GEN: well appearing, no acute distress  HEAD: atraumatic  NECK: supple, no C-spine tenderness, no stepoffs or deformities  CVS/CHEST: reg rate, nl rhythm, no murmurs/gallops/rubs  PULM: CTAB b/l no wheezes, crackles, or rhonchi   GI: NT/ND, no masses or organomegaly, soft, no guarding  BACK: no CVA tenderness, mid thoracic ttp  EXT: no LE edema, 2+ periph pulses in bilat radial and DP   NEURO: no focal deficits, no facial asymmetry, moving all extremities, 5/5 Strength in bicep/tricep/hip flexor/plantar flexion.  Diminished sensation distal lower extremities near the ankle and foot bilaterally. Can ambulate without gait disturbance.  PSYCH: AAOx3 answers questions appropriately    ED Course & MDM   Diagnoses as of 07/24/24 1101   Incontinence of feces, unspecified fecal incontinence type   Chronic midline low back pain without sciatica                       Syed Coma Scale Score: 15                        Medical Decision Making  This is a 48-year-old female with past medical history of Hodgkin's lymphoma with diffuse metastasis including C/T/L-spine now status post radiation and chemotherapy presenting to the emergency department with concern for bowel incontinence.  Patient stable upon presentation to the emergency department, no acute distress and vitals are unremarkable.  On exam patient does have some spinal tenderness to palpation as well as decreased sensation to the lower extremities.  Remainder of exam largely unremarkable.  With her episodes of incontinence and history of cancer with metastasis initial concerns are for cauda equina/cord compression.  MRI is to be performed.  Remainder of patient's exam largely unremarkable.  She was given oxycodone for her pain.    Patient's lab work was unremarkable.  MRI of the C-spine showed overall  improvement of the previously seen hyperintense lesions from 2003 with no new pathologic enhancement.  MRI of the T-spine showed some worsening of the T7 vertebral body compression fracture but otherwise largely stable appearance of appearance of the anterior subarachnoid space with mild deformity of the ventral thoracic spinal cord at T7-T8 compared to prior.  T8 did have worsening symptoms compared to prior.  MRI of the lumbar spine with no new metastatic lesions or spinal Canal Stenosis.  There Was Neural for Aminal Stenosis at L4-L5 and L5-S1 due to disc bulging and hypertrophic facet changes slightly worsened compared to prior.  Results were discussed with the patient.  At this time no indication for emergent intervention.  Her pain is controlled.  She is ambulating with good strength.  She has not had any episodes of incontinence today.  She does feel comfortable going home and following up with her neurosurgeon and I do feel that she is safe for discharge.  Return precautions discussed and patient discharged in stable condition.  Procedure  Procedures     Ervin Chaidez MD  07/24/24 1025

## 2024-07-24 NOTE — DISCHARGE INSTRUCTIONS
Please follow-up with your neurosurgeon for further evaluation.  If you have additional episodes of passing stool without knowing, weakness to the legs, worsening back pain, urinating without knowing or inability to urinate/pass stool return to the emergency department immediately for reevaluation..

## 2024-07-26 RX ORDER — OXYCODONE HYDROCHLORIDE 15 MG/1
15 TABLET ORAL EVERY 4 HOURS PRN
Qty: 150 TABLET | Refills: 0 | Status: SHIPPED | OUTPATIENT
Start: 2024-07-29 | End: 2024-08-28

## 2024-07-26 NOTE — PATIENT COMMUNICATION
Called patient to discuss her pain further. She reports 6-7/10 pain in her back and has been needing to take her oxycodone more often between 3-5 times a day. She knows she has at least 20 tablets left and would be fine until Monday. Cynthia Monroe is ok with her filling early and taking up to 5 tablets per day for now as often as every 4 hours. Patient agreeable with this plan and mentioned she will only take 5/day if she really needs to. She is seeing NS NP on 7/31.

## 2024-07-29 ENCOUNTER — TELEPHONE (OUTPATIENT)
Dept: PALLIATIVE MEDICINE | Facility: HOSPITAL | Age: 49
End: 2024-07-29
Payer: COMMERCIAL

## 2024-07-29 ENCOUNTER — PATIENT MESSAGE (OUTPATIENT)
Dept: PALLIATIVE MEDICINE | Facility: HOSPITAL | Age: 49
End: 2024-07-29
Payer: COMMERCIAL

## 2024-07-29 DIAGNOSIS — G89.3 CANCER RELATED PAIN: ICD-10-CM

## 2024-07-29 DIAGNOSIS — G89.3 CANCER RELATED PAIN: Primary | ICD-10-CM

## 2024-07-29 RX ORDER — OXYCODONE HYDROCHLORIDE 15 MG/1
15 TABLET ORAL EVERY 4 HOURS PRN
Qty: 150 TABLET | Refills: 0 | Status: SHIPPED | OUTPATIENT
Start: 2024-07-29 | End: 2024-07-30 | Stop reason: SDUPTHER

## 2024-07-29 NOTE — TELEPHONE ENCOUNTER
Called pharmacy to see if PA was required, they did not have enough tablets in stock. Send to [facility] for this fill. Patient updated.

## 2024-07-29 NOTE — TELEPHONE ENCOUNTER
Pharmacy states they called insurance but are unable to get insurance to cover this refill until 8/2. Discussing alternative options with provider and patient, may send short supply and have her fill with a good rx card. See [employer] messages.

## 2024-07-30 ENCOUNTER — TELEPHONE (OUTPATIENT)
Dept: PALLIATIVE MEDICINE | Facility: HOSPITAL | Age: 49
End: 2024-07-30
Payer: COMMERCIAL

## 2024-07-30 DIAGNOSIS — G89.3 CANCER RELATED PAIN: ICD-10-CM

## 2024-07-30 RX ORDER — OXYCODONE HYDROCHLORIDE 15 MG/1
15 TABLET ORAL EVERY 4 HOURS PRN
Qty: 150 TABLET | Refills: 0 | Status: SHIPPED | OUTPATIENT
Start: 2024-07-30 | End: 2024-08-29

## 2024-07-30 RX ORDER — OXYCODONE HYDROCHLORIDE 15 MG/1
15 TABLET ORAL EVERY 4 HOURS PRN
Qty: 20 TABLET | Refills: 0 | Status: SHIPPED | OUTPATIENT
Start: 2024-07-30

## 2024-07-30 NOTE — TELEPHONE ENCOUNTER
Pharmacy received full 150 tabs in stock today, they will call insurance to be able to fill this today. They will fill 20 tabs rx with good rx card today if not.  Patient updated

## 2024-07-31 ENCOUNTER — APPOINTMENT (OUTPATIENT)
Dept: NEUROSURGERY | Facility: CLINIC | Age: 49
End: 2024-07-31
Payer: COMMERCIAL

## 2024-07-31 VITALS
TEMPERATURE: 97.7 F | BODY MASS INDEX: 25.13 KG/M2 | HEIGHT: 60 IN | WEIGHT: 128 LBS | DIASTOLIC BLOOD PRESSURE: 70 MMHG | SYSTOLIC BLOOD PRESSURE: 112 MMHG

## 2024-07-31 DIAGNOSIS — Z85.71 H/O HODGKIN'S LYMPHOMA: ICD-10-CM

## 2024-07-31 DIAGNOSIS — C79.51 METASTATIC CANCER TO SPINE (MULTI): Primary | ICD-10-CM

## 2024-07-31 PROCEDURE — 1036F TOBACCO NON-USER: CPT | Performed by: NURSE PRACTITIONER

## 2024-07-31 PROCEDURE — 3008F BODY MASS INDEX DOCD: CPT | Performed by: NURSE PRACTITIONER

## 2024-07-31 PROCEDURE — 99214 OFFICE O/P EST MOD 30 MIN: CPT | Performed by: NURSE PRACTITIONER

## 2024-07-31 ASSESSMENT — PATIENT HEALTH QUESTIONNAIRE - PHQ9
6. FEELING BAD ABOUT YOURSELF - OR THAT YOU ARE A FAILURE OR HAVE LET YOURSELF OR YOUR FAMILY DOWN: NEARLY EVERY DAY
10. IF YOU CHECKED OFF ANY PROBLEMS, HOW DIFFICULT HAVE THESE PROBLEMS MADE IT FOR YOU TO DO YOUR WORK, TAKE CARE OF THINGS AT HOME, OR GET ALONG WITH OTHER PEOPLE: VERY DIFFICULT
4. FEELING TIRED OR HAVING LITTLE ENERGY: NEARLY EVERY DAY
3. TROUBLE FALLING OR STAYING ASLEEP: NEARLY EVERY DAY
7. TROUBLE CONCENTRATING ON THINGS, SUCH AS READING THE NEWSPAPER OR WATCHING TELEVISION: NEARLY EVERY DAY
2. FEELING DOWN, DEPRESSED OR HOPELESS: NEARLY EVERY DAY
1. LITTLE INTEREST OR PLEASURE IN DOING THINGS: SEVERAL DAYS
5. POOR APPETITE OR OVEREATING: NEARLY EVERY DAY
8. MOVING OR SPEAKING SO SLOWLY THAT OTHER PEOPLE COULD HAVE NOTICED. OR THE OPPOSITE, BEING SO FIGETY OR RESTLESS THAT YOU HAVE BEEN MOVING AROUND A LOT MORE THAN USUAL: NEARLY EVERY DAY
SUM OF ALL RESPONSES TO PHQ9 QUESTIONS 1 & 2: 4

## 2024-07-31 ASSESSMENT — PAIN SCALES - GENERAL: PAINLEVEL: 6

## 2024-07-31 NOTE — PROGRESS NOTES
Pamela Lopez is here, with her daughter, today for [facility] ED follow up for progression of thoracic fracture, saddle anesthesia and to review images.     PMH: Hodgkin's lymphoma (diffuse bony metastasis - s/p XRT, chemo),     To review, she was evaluated by Dr. Constantino Wagner on 09/26/2023. MRIs were reviewed and demonstrated metastatic disease at C5, T6 - 8, L3, and sacrum. T7 pathologic fracture was noted with mild retropulsion and moderate stenosis. No significant cervical / lumbar stenosis.. She was to continue treatment for Hodgkin's Lymphoma and was to follow up PRN.    She was evaluated in the [facility] ED on 07/23/2024, with bowel incontinence and saddle anesthesia, since 07/22/2024. She reported continuation of baseline back pain. She was ambulatory. MRI C/T/L spine completed with note of progression of T7 and new T8 fracture and of lumbar foraminal stenosis L4 - S1. Since her pain was controlled, she was discharged home and instructed to follow up in our office.    Today, she continues with saddle anesthesia. Denies bowel incontinence since ED visit. She is able to ambulate without assistance, and complete ADLs without assistance.    MRI C, T, and L SPINE WITH / WITHOUT IV CONTRAST on 07/23/2024  IMPRESSION:  CERVICAL SPINE:  1. STIR hyperintense lesion within the C5 spinous process described  on previous MRI in July of 2023 is no longer visualized on current  examination. No discrete T1 hypointense, enhancing marrow replacing  lesion is identified, although the C4 and C5 vertebral bodies  demonstrates subtle increased STIR hyperintense nonspecific signal,  possibly secondary to underlying degenerative disc disease.  2. Within limits of mild motion no new pathologic leptomeningeal or  epidural enhancement is identified in the cervical spine. No new  spinal canal or neural foraminal stenosis is present.      THORACIC SPINE:  1. Interval worsening/progression of pathologic compression fracture  of the  T7 vertebral body compared to prior MRI on 07/13/2023, now  with up for its of 90% height loss, but minimal if any associated  worsening no associated bony retropulsion, with persistent but  unchanged effacement of the anterior subarachnoid space and mild  deformity of the ventral thoracic spinal cord at the level of T7-T8.  2. Worsening T2 stir signal abnormality with the associated  postcontrast within the T8 vertebral body compared to prior MRI in  July of 2023, without associated compression deformity or bony  retropulsion. Signal abnormality in the T6 vertebral body is similar  to prior exam. No new marrow replacing lesions are identified in the  thoracic spine.  3. No new areas of spinal canal stenosis are present in the thoracic  spine.      LUMBAR SPINE:  1. No evidence of new metastatic lesions in the lumbar spine. No new  spinal canal stenosis.  2. Moderate neural foraminal stenosis at the levels of L4-L5 and  L5-S1 due to bulging disc and hypertrophic facet changes appears to  perhaps slightly worsened since prior exam on 07/13/2023.  Signed by: Jeanne Alcantar        TREATMENTS:  XRT  Chemo  Opioid  Gabapentin    SMOKER: Former  ANTICOAGULANT USE: No    ROS x 10 is, otherwise, negative unless documented in HPI above    On Exam: Appears comfortable  A&O x 4, speech clear / fluent  Respirations even / unlabored  Abdomen without distension  [place]  Gait is steady    Pamela Lopez has progression of T7 fracture and T8 hypointensity without fracture on MRI. We reviewed MRI T, L spine images reviewed with patient: note of progression of T7 fracture with comparison with 2023 MRI and of STIR inflammation of T8. There is moderate spinal canal narrowing noted. She has follow up appointment with Dr Wagner on 12/31/2024. Offered sooner appointment with another neurosurgeon, but she declines. Discussed going to ED should symptoms worsen prior to visit with Dr. Wagner.

## 2024-08-26 ENCOUNTER — TELEMEDICINE (OUTPATIENT)
Dept: PALLIATIVE MEDICINE | Facility: CLINIC | Age: 49
End: 2024-08-26
Payer: COMMERCIAL

## 2024-08-26 DIAGNOSIS — G89.3 CANCER RELATED PAIN: ICD-10-CM

## 2024-08-26 DIAGNOSIS — F32.89 OTHER DEPRESSION: ICD-10-CM

## 2024-08-26 DIAGNOSIS — R63.0 DECREASED APPETITE: ICD-10-CM

## 2024-08-26 DIAGNOSIS — C81.48: ICD-10-CM

## 2024-08-26 DIAGNOSIS — Z51.5 PALLIATIVE CARE ENCOUNTER: Primary | ICD-10-CM

## 2024-08-26 PROCEDURE — 99215 OFFICE O/P EST HI 40 MIN: CPT

## 2024-08-26 RX ORDER — OXYCODONE HYDROCHLORIDE 15 MG/1
15 TABLET ORAL EVERY 4 HOURS PRN
Qty: 150 TABLET | Refills: 0 | Status: SHIPPED | OUTPATIENT
Start: 2024-08-26 | End: 2024-09-25

## 2024-08-26 NOTE — PROGRESS NOTES
SUPPORTIVE AND PALLIATIVE ONCOLOGY OUTPATIENT FOLLOW-UP      SERVICE DATE: 8/26/2024    Subjective   HISTORY OF PRESENT ILLNESS: Pamela Lopez is a 48 y.o. female who presents with newly diagnosed Hodgkin Lymphoma. She was admitted d/t back pain and a CT c/a/p revealed multiple hepatic masses and several osteoblastic  lesions. MRI brain was negative. She underwent biopsy of L3 on 6/9/23 which was nondiagnostic. PET scan showed FDG-avid lymph nodes in the neck, tonsil, bones, and liver. Biopsy of right tonsil 6/22 revealed Hodgkin Lymphoma. She underwent RT to T7 x10  fractions. She started treatment with AVD + Brentuximab 7/17/23. Recent PET scan with excellent response to treatment. PET scan 3/4 showed complete response.     Pain Assessment:  Pain Score: 7/10  Location: mid-back and lower back  Description: constant aching pain in her lower and mid-back. Reports the pain as severe. She is not able to do any strenuous activity per her appt with neurosurgery. When she cleans the house, she is down for 2 days in pain. She has been taking oxycodone ~5 times per day which does help the pain. She is using different types of pillows, icy hot, heat/ice, advil and tylenol.     Symptom Assessment:  Pain:somewhat  Headache: none  Dizziness:none  Lack of energy: a little.   Difficulty sleeping: a little due to pain  Worrying: a little   Anxiety: none   Depression: a little. Now related to increased pain and unable to be active.   Pain in mouth/swallowing: none  Dry mouth: none  Taste changes: none  Shortness of breath: none  Lack of appetite: a little due to feeling depressed about her back   Nausea: none.   Vomiting: none  Constipation: none  Diarrhea: none  Sore muscles: none  Numbness or tingling in hands/feet/other: somewhat. Hands and feet are numb. Having numbness and pins and needles in her legs and arms depending on how much activity she is doing.   Weight loss: none  Other: none      Information obtained from:  interview of patient  ______________________________________________________________________        Objective     No visits with results within 1 Month(s) from this visit.   Latest known visit with results is:   Admission on 07/23/2024, Discharged on 07/23/2024   Component Date Value Ref Range Status    WBC 07/23/2024 5.9  4.4 - 11.3 x10*3/uL Final    nRBC 07/23/2024 0.0  0.0 - 0.0 /100 WBCs Final    RBC 07/23/2024 4.19  4.00 - 5.20 x10*6/uL Final    Hemoglobin 07/23/2024 13.4  12.0 - 16.0 g/dL Final    Hematocrit 07/23/2024 39.5  36.0 - 46.0 % Final    MCV 07/23/2024 94  80 - 100 fL Final    MCH 07/23/2024 32.0  26.0 - 34.0 pg Final    MCHC 07/23/2024 33.9  32.0 - 36.0 g/dL Final    RDW 07/23/2024 13.7  11.5 - 14.5 % Final    Platelets 07/23/2024 202  150 - 450 x10*3/uL Final    Neutrophils % 07/23/2024 51.9  40.0 - 80.0 % Final    Immature Granulocytes %, Automated 07/23/2024 0.3  0.0 - 0.9 % Final    Immature Granulocyte Count (IG) includes promyelocytes, myelocytes and metamyelocytes but does not include bands. Percent differential counts (%) should be interpreted in the context of the absolute cell counts (cells/UL).    Lymphocytes % 07/23/2024 33.3  13.0 - 44.0 % Final    Monocytes % 07/23/2024 8.2  2.0 - 10.0 % Final    Eosinophils % 07/23/2024 5.1  0.0 - 6.0 % Final    Basophils % 07/23/2024 1.2  0.0 - 2.0 % Final    Neutrophils Absolute 07/23/2024 3.08  1.20 - 7.70 x10*3/uL Final    Percent differential counts (%) should be interpreted in the context of the absolute cell counts (cells/uL).    Immature Granulocytes Absolute, Au* 07/23/2024 0.02  0.00 - 0.70 x10*3/uL Final    Lymphocytes Absolute 07/23/2024 1.98  1.20 - 4.80 x10*3/uL Final    Monocytes Absolute 07/23/2024 0.49  0.10 - 1.00 x10*3/uL Final    Eosinophils Absolute 07/23/2024 0.30  0.00 - 0.70 x10*3/uL Final    Basophils Absolute 07/23/2024 0.07  0.00 - 0.10 x10*3/uL Final    Glucose 07/23/2024 91  74 - 99 mg/dL Final    Sodium 07/23/2024 140   136 - 145 mmol/L Final    Potassium 07/23/2024 3.7  3.5 - 5.3 mmol/L Final    Chloride 07/23/2024 105  98 - 107 mmol/L Final    Bicarbonate 07/23/2024 27  21 - 32 mmol/L Final    Anion Gap 07/23/2024 12  10 - 20 mmol/L Final    Urea Nitrogen 07/23/2024 12  6 - 23 mg/dL Final    Creatinine 07/23/2024 0.59  0.50 - 1.05 mg/dL Final    eGFR 07/23/2024 >90  >60 mL/min/1.73m*2 Final    Calculations of estimated GFR are performed using the 2021 CKD-EPI Study Refit equation without the race variable for the IDMS-Traceable creatinine methods.  https://jasn.asnjournals.org/content/early/2021/09/22/ASN.4014452009    Calcium 07/23/2024 9.2  8.6 - 10.3 mg/dL Final    Albumin 07/23/2024 3.8  3.4 - 5.0 g/dL Final    Alkaline Phosphatase 07/23/2024 118 (H)  33 - 110 U/L Final    Total Protein 07/23/2024 6.8  6.4 - 8.2 g/dL Final    AST 07/23/2024 14  9 - 39 U/L Final    Bilirubin, Total 07/23/2024 0.2  0.0 - 1.2 mg/dL Final    ALT 07/23/2024 7  7 - 45 U/L Final    Patients treated with Sulfasalazine may generate falsely decreased results for ALT.     PHYSICAL EXAMINATION   Vital Signs: not completed due to virtual visit  There were no vitals filed for this visit.      Physical Exam not completed due to virtual visit    Social  Not . Lives with her 3 grown children.  History of drug use with Meth x8 years. Quit 1 year ago.  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     ASSESSMENT/PLAN    Pain  Pain is: cancer related pain  Type: somatic and neuropathic  Pain control: well-controlled  Home regimen:   - Continue oxycodone 15 mg every 4 hours. Allow for 5 tabs per day.  - Will send referral to chronic pain for assistance with back pain  - Has an appointment with Dr. Wagner in December - hoping to get a sooner appt. Will reach out to Dr. Gomez for assistance.  - Continue Advil + Tylenol as needed  - She has completely weaned off of MSContin  - Continue with aquatic therapy   -  Continue Flexeril 10 mg TID as needed.   - Start Ropinirole .25 mg at bedtime for RLS  - Continue Gabapentin 300 mg TID  - Completed 2 sessions of aquatic therapy and now with increased pain    Opioid Use  Medication Management:   - OARRS report reviewed with no aberrant behavior; consistent with  prescriptions/records and patient history  - MED 45.  Overdose Risk Score 290.   This has been discussed with patient.   - We will continue to closely monitor the patient for signs of prescription misuse including UDS, OARRS review and subjective reports at each visit.  - No concurrent benzodiazepine use   - I am a provider who either is or has consulted and collaborated with a provider certified in Hospice and Palliative Medicine and have conducted a face-face visit and examination for this patient.  - Routine Urine Drug Screen completed 7/15/24 results pending  - Controlled Substance Agreement: completed 9/12/23. repeat yearly   - Specifically discussed that controlled substance prescriptions will only be provided by our group as outlined in the completed agreement  - Prescribed naloxone: Has prescription at home  - Red Flags: History of drug use with Meth. Quit 1 year ago     Nausea   Intermittent nausea without vomiting related to chemotherapy   - Continue Prochlorperazine 10 mg every 6 hours as needed  - Continue Ondansetron 8 mg every 8 hours as needed    Constipation  At risk for constipation related to opioids,  currently not constipated  Usual bowel pattern: daily  Home regimen:   - Continue Senna-S 1 tab BID- no longer taking due to diarrhea  - Start Magnesium Citrate - may start with 1/2 bottle to 1 bottle if no BM within 48-72 hours     Altered Mood  Chronic anxiety and depression related to health concerns   controlled with home regimen  Home regimen:   - Continue Lexapro back to 20 mg  - no longer taking Wellbutrin 150 mg BID.   - Discussed meeting with a community therapist for therapy as well    Sleeping  Difficulty:  Home regimen:    - Trazodone stopped inpatient r/t QTc concerns  - Continue Risperidone 0.5 mg every night.   - Flexeril has been effective for sleep   - Discussed taking 5-10 mg of Melatonin. Encouraged to restart this.    Decreased appetite  Related to malignancy, chemotherapy, and disease process  Nutrition following  Home regimen:    - Olanzapine 2.5 mg every day at bedtime- this was stopped inpatient d/t QTc  - Following with Martha Khan    Night Sweats  - Continue Oxybutynin 5-10 mg nightly as needed. RX sent today.       Advance Directives  Existence of Advance Directives:Yes, documentation or copy in medical record  Decision maker: STORMYOA is Cynthia Xie  Code Status: Full code    Next Follow-Up Visit:  Return to clinic in 3 weeks virtually     Signature and billing  Medical complexity was low level due to due to complexity of problems, extensive data review, and high risk of management/treatment.  Time was spent on the following: Prep Time, Time Directly with Patient/Family/Caregiver, Documentation Time. Total time spent: 45      Data  Diagnostic tests and information reviewed for today's visit:  Most recent labs, Most recent imaging, Medications         Some elements copied from Cynthia porter on 7/15/24, the elements have been updated and all reflect current decision making from today, 8/26/2024.      Plan of Care discussed with: Patient    SIGNATURE: MAGDA Downing-CNP    Contact information:  [facility]  Monday-Friday 8 AM-5 PM  Phone:  [phone], press option #5, then option #1.   Or Epic Secure Chat

## 2024-08-27 ENCOUNTER — TELEPHONE (OUTPATIENT)
Dept: ADMISSION | Facility: HOSPITAL | Age: 49
End: 2024-08-27
Payer: COMMERCIAL

## 2024-08-27 NOTE — TELEPHONE ENCOUNTER
"Pharmacist needs a daily dosing for oxycodone script.   Last script read \"not to exceed 5 tablets daily\" - current script did not have that notation so 150 tablets may be less than 30 day supply l  Please call to clarify.   Last FUV yesterday 8/26.   "

## 2024-09-06 ENCOUNTER — TELEPHONE (OUTPATIENT)
Dept: HEMATOLOGY/ONCOLOGY | Facility: CLINIC | Age: 49
End: 2024-09-06
Payer: COMMERCIAL

## 2024-09-06 NOTE — TELEPHONE ENCOUNTER
I spoke with Dr. Gomez and there is no contradiction for Pamela to receive twilight or general anesthetic. Message left for Pamela on her identifiable answering machine. I did let her know that Dr. Gomez will write her a letter for clearance and it will be available at her visit on Monday. Office number was left and I encouraged her to call if she needs anything prior  to Monday.     Clearance letter was completed and scanned under media

## 2024-09-06 NOTE — TELEPHONE ENCOUNTER
I spoke with Pamela and told her all information. She was appreciative and all questions were answered

## 2024-09-06 NOTE — TELEPHONE ENCOUNTER
Patient asking to speak with Keily.  Needs letter from office regarding clearance for [place].  [facility] refusing to do this for Oral surgery without clearance letter.  Patient states she has had this done several times and also put under general anesthetic with no problems.  They don't want to do it because she smoked years ago.  Patient can  Monday when she is in for her appointment.

## 2024-09-08 NOTE — PROGRESS NOTES
Patient ID: Pamela Lopez is a 48 y.o. female.  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 Pamela Lopez is a 47 y/o F presenting for follow-up for Hodgkin's lymphoma. Blood work from today is pending. No recent imaging.     The patient reports experiencing back pain, worsening imbalance, and numbness in her legs and hands. She has a history of Lyme disease from 15 years ago and notes that she develops a fever when she overexerts herself. She notes hot flashes and night sweats. She expresses having depression from not being able to go back to work after treatment.      Patient's past medical history, surgical history, family history and social history reviewed.    Review of Systems:   Review of Systems:    Positive per HPI, otherwise negative.     Objective    Vitals:    09/09/24 1500   BP: 103/64   Pulse: (!) 46   Resp: 16   Temp: 36.2 °C (97.2 °F)   SpO2: 93%         Physical Exam  Gen: appears well in clinic, NAD  HEENT: atraumatic head, normocephalic, EOMI, conjunctiva normal  LUNG: no increased WOB, CTAB  CV: No JVD. RRR  GI: soft, NT, ND  LE: no LE edema  Skin: no obvious rashes or lesions on visible skin  Neuro: interactive, no focal deficits noted  Psych: normal mood and affect  Performance Status:  Symptomatic; fully ambulatory    Labs/Imaging/Pathology: personally reviewed reports and images in Epic electronic medical record system. Pertinent results as it related to the plan represented in below in assessment and plan.   Assessment/Plan   1. Classical Hodgkin lymphoma, lymphocyte- rich subtype, stage IVB     - developed tonsil enlargement initially over two years ago and thought to be related to  infection  - 6/5/23- 6/723 admitted for new pain and found to have multiple spine mets, neck lymphadenopathy and possible liver lesions  -  biopsy 6/9/23 of her L3 was nondiagnostic  - 6/13/23 PET/CT with FDG avid right palantine tonsil, b/l cervical, portacaval, RP nodes, hepatic met, osseous lesions in axial skeleton  - 6/19/23 planned for 10 fx to T7  - 6/22/23 tonsillectomy path consistent with classical Hodgkin lymphoma  - Discussed diagnosis of classical Hodgkin's lymphoma with patient and given stage IV disease with B symptom along with history of strong tobacco use, recommend brentuximab plus AVD per ECHELON-1 trial (Lisa, et al. [facility] 2018)  - Reviewed side effects and consent signed  - pt is planning to see dental next week as she has active abscess with pain and still has RT next week  - 7/17/12 AVD+ brentuximab  -hospitalized for fever and discharge 8/27/23 and no infectious cause found.  - delays due to neutropenia, cycle 3 day on 9/25/23.  - 10/9/23 PET CT interim with significant treatment response   11/19/23 C5D1 reduced brentuximab to 0.9 mg/mkg   12/11/2023 C5 D15 delayed due to hypotension and tooth infection  - 1/22/24 C6 D15        3/5/24:  - PET CT on 3/4/24 shows a complete response.   - Overall, she continues to have some neuropathy. We discussed that this will take some time to improve.  - She is still having trouble with weight gain. She is going to work on meals.  - She will continue her electrolytes.  - We will plan for a port flush in 6 weeks and follow-up with me in 3 months with repeat labs.  - RTC with me in 3 months.      6/10/2024:  - Presents for 3 month follow up visit   - Having port flushed every 6 weeks   - Labs reviewed, we will have stop her oral iron   - We will recheck her LFTs at night port flush/labs in 6 weeks due to elevated Alk Phos  - She prefers to stay on Acyclovir for now and I discussed with Dr. Gomez who was ok with refilling for now   - She continues following  with palliative medicine   - She denies B symptoms and no abnormalities noted on exam   - She will therefore return in 12 weeks for next FUV with Dr. Gerber     9/9/24:  - Patient had some leg spikes, however, nothing like she was having in the past   - We discussed ultimately her biggest concern is her back..   - She does have shattered fracture in her cervical and thoracic spine.   - She met with a nurse practitioner for neurosurgery and now is planned for imaging end of October and neurosurgery visit in December.   - She is hoping to expedite this workup.   - Will plan for labs today  and I will reach out to their team if anything can be done sooner.  - She is having depression from not being able to go to work post treatment and would like to meet with psychiatrist. Previously met with Trav   - RTC in 3 months with Gaby   - She will call us with new symptoms or new concerns.     Reviewed ongoing medical problems and how they relate to her hodgkin lymphoma, will continue long term monitoring.    RTC in 3 months with Gaby . This note has been transcribed using a medical scribe and there is a possibility of unintentional typing misprints.         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 (Multi)  -     Clinic Appointment Request DANILO GERBER  -     Referral to Psychiatry; Future  -     CBC and Auto Differential; Future  -     Comprehensive metabolic panel; Future  -     Lactate dehydrogenase; Future  -     Clinic Appointment Request GABY FRANCIS; Future  -     Infusion Appointment Request; Future  -     Infusion Appointment Request; Future  Danilo Gerber MD  Hematology/Oncology  [facility]    Scribe Attestation  By signing my name below, Rashida AZEVEDO Scribe attest that this documentation has been prepared under the direction and in the presence of Danilo Gerber MD.  Time Spent  Prep time on day of patient  encounter: 5 minutes  Time spent directly with patient, family or caregiver: 19 minutes  Additional Time Spent on Patient Care Activities: 5 minutes  Documentation Time: 5 minutes  Other Time Spent: 0 minutes  Total: 34 minutes

## 2024-09-09 ENCOUNTER — INFUSION (OUTPATIENT)
Dept: HEMATOLOGY/ONCOLOGY | Facility: CLINIC | Age: 49
End: 2024-09-09
Payer: COMMERCIAL

## 2024-09-09 ENCOUNTER — OFFICE VISIT (OUTPATIENT)
Dept: HEMATOLOGY/ONCOLOGY | Facility: CLINIC | Age: 49
End: 2024-09-09
Payer: COMMERCIAL

## 2024-09-09 VITALS
WEIGHT: 130.73 LBS | HEART RATE: 46 BPM | OXYGEN SATURATION: 93 % | SYSTOLIC BLOOD PRESSURE: 103 MMHG | BODY MASS INDEX: 25.53 KG/M2 | TEMPERATURE: 97.2 F | RESPIRATION RATE: 16 BRPM | DIASTOLIC BLOOD PRESSURE: 64 MMHG

## 2024-09-09 DIAGNOSIS — C81.48: ICD-10-CM

## 2024-09-09 LAB
ALBUMIN SERPL BCP-MCNC: 3.9 G/DL (ref 3.4–5)
ALP SERPL-CCNC: 118 U/L (ref 33–110)
ALT SERPL W P-5'-P-CCNC: 10 U/L (ref 7–45)
ANION GAP SERPL CALC-SCNC: 13 MMOL/L (ref 10–20)
AST SERPL W P-5'-P-CCNC: 16 U/L (ref 9–39)
BASOPHILS # BLD AUTO: 0.06 X10*3/UL (ref 0–0.1)
BASOPHILS NFR BLD AUTO: 0.9 %
BILIRUB SERPL-MCNC: 0.3 MG/DL (ref 0–1.2)
BUN SERPL-MCNC: 13 MG/DL (ref 6–23)
CALCIUM SERPL-MCNC: 9.3 MG/DL (ref 8.6–10.3)
CHLORIDE SERPL-SCNC: 104 MMOL/L (ref 98–107)
CO2 SERPL-SCNC: 24 MMOL/L (ref 21–32)
CREAT SERPL-MCNC: 0.77 MG/DL (ref 0.5–1.05)
EGFRCR SERPLBLD CKD-EPI 2021: >90 ML/MIN/1.73M*2
EOSINOPHIL # BLD AUTO: 0.21 X10*3/UL (ref 0–0.7)
EOSINOPHIL NFR BLD AUTO: 3.1 %
ERYTHROCYTE [DISTWIDTH] IN BLOOD BY AUTOMATED COUNT: 12.8 % (ref 11.5–14.5)
FERRITIN SERPL-MCNC: 109 NG/ML (ref 8–150)
GLUCOSE SERPL-MCNC: 119 MG/DL (ref 74–99)
HCT VFR BLD AUTO: 42.9 % (ref 36–46)
HGB BLD-MCNC: 14 G/DL (ref 12–16)
IMM GRANULOCYTES # BLD AUTO: 0 X10*3/UL (ref 0–0.7)
IMM GRANULOCYTES NFR BLD AUTO: 0 % (ref 0–0.9)
IRON SATN MFR SERPL: 32 % (ref 25–45)
IRON SERPL-MCNC: 102 UG/DL (ref 35–150)
LDH SERPL L TO P-CCNC: 180 U/L (ref 84–246)
LYMPHOCYTES # BLD AUTO: 2.06 X10*3/UL (ref 1.2–4.8)
LYMPHOCYTES NFR BLD AUTO: 30 %
MCH RBC QN AUTO: 31.3 PG (ref 26–34)
MCHC RBC AUTO-ENTMCNC: 32.6 G/DL (ref 32–36)
MCV RBC AUTO: 96 FL (ref 80–100)
MONOCYTES # BLD AUTO: 0.52 X10*3/UL (ref 0.1–1)
MONOCYTES NFR BLD AUTO: 7.6 %
NEUTROPHILS # BLD AUTO: 4.02 X10*3/UL (ref 1.2–7.7)
NEUTROPHILS NFR BLD AUTO: 58.4 %
PLATELET # BLD AUTO: 215 X10*3/UL (ref 150–450)
POTASSIUM SERPL-SCNC: 3.9 MMOL/L (ref 3.5–5.3)
PROT SERPL-MCNC: 6.9 G/DL (ref 6.4–8.2)
RBC # BLD AUTO: 4.47 X10*6/UL (ref 4–5.2)
SODIUM SERPL-SCNC: 137 MMOL/L (ref 136–145)
TIBC SERPL-MCNC: 322 UG/DL (ref 240–445)
UIBC SERPL-MCNC: 220 UG/DL (ref 110–370)
WBC # BLD AUTO: 6.9 X10*3/UL (ref 4.4–11.3)

## 2024-09-09 PROCEDURE — 83615 LACTATE (LD) (LDH) ENZYME: CPT

## 2024-09-09 PROCEDURE — 99214 OFFICE O/P EST MOD 30 MIN: CPT | Performed by: INTERNAL MEDICINE

## 2024-09-09 PROCEDURE — 80053 COMPREHEN METABOLIC PANEL: CPT

## 2024-09-09 PROCEDURE — 82728 ASSAY OF FERRITIN: CPT

## 2024-09-09 PROCEDURE — 36591 DRAW BLOOD OFF VENOUS DEVICE: CPT

## 2024-09-09 PROCEDURE — 83540 ASSAY OF IRON: CPT

## 2024-09-09 PROCEDURE — 82607 VITAMIN B-12: CPT

## 2024-09-09 PROCEDURE — 85025 COMPLETE CBC W/AUTO DIFF WBC: CPT

## 2024-09-09 RX ORDER — HEPARIN 100 UNIT/ML
500 SYRINGE INTRAVENOUS AS NEEDED
OUTPATIENT
Start: 2024-09-09

## 2024-09-09 RX ORDER — OXYBUTYNIN CHLORIDE 5 MG/1
5 TABLET ORAL DAILY
COMMUNITY
Start: 2024-08-11

## 2024-09-09 RX ORDER — HEPARIN SODIUM,PORCINE/PF 10 UNIT/ML
50 SYRINGE (ML) INTRAVENOUS AS NEEDED
OUTPATIENT
Start: 2024-09-09

## 2024-09-09 ASSESSMENT — PAIN SCALES - GENERAL: PAINLEVEL: 0-NO PAIN

## 2024-09-10 LAB — VIT B12 SERPL-MCNC: 306 PG/ML (ref 211–911)

## 2024-09-16 ENCOUNTER — TELEMEDICINE (OUTPATIENT)
Dept: PALLIATIVE MEDICINE | Facility: CLINIC | Age: 49
End: 2024-09-16
Payer: COMMERCIAL

## 2024-09-16 DIAGNOSIS — F32.89 OTHER DEPRESSION: ICD-10-CM

## 2024-09-16 DIAGNOSIS — C81.48: ICD-10-CM

## 2024-09-16 DIAGNOSIS — G25.81 RESTLESS LEG SYNDROME: ICD-10-CM

## 2024-09-16 DIAGNOSIS — G62.0 CHEMOTHERAPY-INDUCED PERIPHERAL NEUROPATHY (MULTI): ICD-10-CM

## 2024-09-16 DIAGNOSIS — G89.3 CANCER RELATED PAIN: ICD-10-CM

## 2024-09-16 DIAGNOSIS — Z51.5 PALLIATIVE CARE ENCOUNTER: Primary | ICD-10-CM

## 2024-09-16 DIAGNOSIS — T45.1X5A CHEMOTHERAPY-INDUCED PERIPHERAL NEUROPATHY (MULTI): ICD-10-CM

## 2024-09-16 PROCEDURE — 99215 OFFICE O/P EST HI 40 MIN: CPT

## 2024-09-16 RX ORDER — ESCITALOPRAM OXALATE 20 MG/1
20 TABLET ORAL DAILY
Qty: 30 TABLET | Refills: 3 | Status: SHIPPED | OUTPATIENT
Start: 2024-09-16 | End: 2024-10-16

## 2024-09-16 RX ORDER — PANTOPRAZOLE SODIUM 40 MG/1
40 TABLET, DELAYED RELEASE ORAL DAILY
Qty: 30 TABLET | Refills: 3 | Status: SHIPPED | OUTPATIENT
Start: 2024-09-16

## 2024-09-16 RX ORDER — GABAPENTIN 300 MG/1
300 CAPSULE ORAL 3 TIMES DAILY
Qty: 90 CAPSULE | Refills: 3 | Status: SHIPPED | OUTPATIENT
Start: 2024-09-16

## 2024-09-16 RX ORDER — CYCLOBENZAPRINE HCL 10 MG
10 TABLET ORAL 3 TIMES DAILY PRN
Qty: 90 TABLET | Refills: 3 | Status: SHIPPED | OUTPATIENT
Start: 2024-09-16 | End: 2024-10-16

## 2024-09-16 RX ORDER — OXYBUTYNIN CHLORIDE 5 MG/1
5 TABLET ORAL DAILY
Qty: 30 TABLET | Refills: 3 | Status: SHIPPED | OUTPATIENT
Start: 2024-09-16 | End: 2024-10-16

## 2024-09-16 RX ORDER — ROPINIROLE 0.25 MG/1
0.25 TABLET, FILM COATED ORAL NIGHTLY
Qty: 30 TABLET | Refills: 2 | Status: SHIPPED | OUTPATIENT
Start: 2024-09-16 | End: 2024-10-16

## 2024-09-16 RX ORDER — OXYCODONE HYDROCHLORIDE 15 MG/1
15 TABLET ORAL EVERY 4 HOURS PRN
Qty: 150 TABLET | Refills: 0 | Status: SHIPPED | OUTPATIENT
Start: 2024-09-27 | End: 2024-10-27

## 2024-09-16 NOTE — PROGRESS NOTES
History of Present Complaint:  The patient was referred to us by Referring Provider: Cynthia Monroe CNP palliative care for T7 fracture. this is 48 y.o.  female with a past history of History of drug use with Meth for x8 years. Quit 1 year ago. 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 started treatment with AVD + Brentuximab 7/17/23 with a PET scan showed excellent response to treatment the patient is involved with palliative care on oxycodone 15 mg 4-5 times daily and gabapentin 300 mg 3 times daily presenting with who is here today complaining of significant mid thoracic pain with episode of numbness going down to her legs and associated with incontinence.  The patient in the process of getting evaluated for surgical intervention with Dr. Wagner.  The patient currently on oxycodone 15 mg from palliative care and she believes when she was on morphine 30 mg 3 times a day she was doing a lot better than the oxycodone and then she was sent to me for this.  I explained to her I really do not have a degree in palliative care and my pain management experience for patient with cancer related pain is to proceed with the medication that gives them relief.  The patient does not have any history of opioid use disorder or heroin abuse.  The only drug she used was methamphetamine when she was young but never after she started having her children.    We had a lengthy discussion about adjuvant therapy and I recommended for her to increase her gabapentin to 600 and may be up to 1200 mg 3 times a day that may help a lot with her pain.  She cannot take cyclobenzaprine more than 1 at night because makes her very sleepy I added tizanidine 2 to 4 mg 3 times daily during the day to help her with her pain.  We discussed intrathecal  therapy and I told her I will place a consult to Dr. Lockhart at [place] or Dr. Diaz at [facility] for her intrathecal therapy if she became resistant to morphine as well.      Procedures:   None    Portions of record reviewed for pertinent issues: active problem list, medication list, allergies, family history, social history, notes from last encounter, encounters, lab results, imaging and other available records.    I have personally reviewed the OARRS report for this patient. This report is scanned into the electronic medical record. I have considered the risks of abuse, dependence, addiction and diversion. It showed: Oxycodone 15 mg from palliative care  OPIOID RISK ASSESSMENT SCORE 11/26  Aberrant behavior: none      Diagnostic studies:  7/23/2024 thoracic MRI showed T7 pathological fracture with compression fracture and mild retropulsion.  The rest of the spine does not show any metastasis anymore, Moderate neural foraminal  stenosis at the levels of L4-L5 and L5-S1 due to bulging disc and hypertrophic facet changes appears to perhaps slightly worsened since prior exam on 07/13/2023.  Interpreted By:  Jeanne Alcantar,   STUDY:  MR CERVICAL SPINE W AND WO IV CONTRAST; MR THORACIC SPINE W AND WO IV  CONTRAST; MR LUMBAR SPINE W AND WO IV CONTRAST;  7/23/2024 8:35 pm      INDICATION:  Signs/Symptoms:known cancer with mets, now with worsening numbness  and incontinence..      COMPARISON:  MRI of the cervical, thoracic and lumbar spine dated 07/13/2023;  PET-CT dated 03/04/2024;      ACCESSION NUMBER(S):  [id number]; [id number]; [id number]      ORDERING CLINICIAN:  LESLYE ESTRADA      TECHNIQUE:  Sagittal T1, T2, STIR, axial T1 and axial T2 weighted images were  acquired through the cervical, thoracic and lumbar spine. Additional  axial and sagittal T1 weighted images of the cervical and thoracic  spine were obtained after intravenous administration of 10 mL of  Dotarem gadolinium contrast.       FINDINGS:  CERVICAL SPINE:      Cervical vertebral alignment is maintained, without evidence of new  spondylolisthesis.      Cervical vertebral body heights are preserved without evidence of  compression fractures. There is subtle new STIR hyperintense signal  are present within the C4 vertebral body without associated T1  hypointense signal, incompletely characterized on current exam. STIR  hyperintense signal previously visualized within the C5 spinous  process has resolved. No new T1 hypointense, enhancing processes are  identified in the cervical spine.      Craniocervical junction is intact. Facet joints are preserved without  evidence of traumatic subluxation or perching.      Multilevel intervertebral disc desiccation is present with mild disc  height loss at C3-C4 and C4-C5.      Although exam is somewhat degraded by motion, cervical spinal cord is  unremarkable in appearance. No discrete T2 hyperintense  intramedullary cord signal abnormality is identified. No pathologic  intramedullary or epidural enhancement is present in the cervical  spinal cord.      No significant new posterior disc contour abnormality is present in  the cervical spine. No new spinal canal stenosis is present.      No significant new neural foraminal narrowing is present in the  cervical spine.      Prevertebral and paraspinal soft tissues do not demonstrate any acute  abnormalities. Visualized large arterial flow voids are preserved.      THORACIC SPINE:      Thoracic vertebral alignment is maintained, without evidence of new  spondylolisthesis.      There has been worsening of pathologic compression fracture of the T7  vertebral body in the interim since prior MRI on 07/13/2023, with now  with greater than 90% height loss and slight 2-3 mm bony retropulsion  posterior into the spinal canal, with the associated effacement of  the anterior subarachnoid space and minimal deformity of the ventral  thoracic spinal cord.      There are  is new STIR and T2 hyperintense signal are present within  the T8 vertebral body in the interim since prior study on 07/13/2023,  somewhat concerning for underlying osseous metastatic disease, with  associated postcontrast enhancement (series 23, image 8). Slight STIR  hyperintense signal along the anterior inferior aspect of the T6  vertebral body is nonspecific.      T2 hyperintense signal is present in the spinous process of T7 with  some associated postcontrast enhancement, slightly improved from  prior exam. Mild postcontrast enhancement is present in the intra  spinous T7-T8 space.      No new areas of STIR hyperintense signal are identified in the  thoracic spine. No T1 hypointense, enhancing marrow replacing lesions  are present in the thoracic spine.      Multilevel intervertebral disc desiccation is present in the thoracic  spine with mild disc height loss at T8-T9 and T9-T10.      Within limits of mild motion, no T2 hyperintense intramedullary cord  signal abnormality is present in the thoracic spine. No definite  evidence of intramedullary or leptomeningeal enhancement within  limits of motion.      Mild spinal canal narrowing is present at the level of T7-T8 due to  bony retropulsion which effaces the anterior subarachnoid space and  somewhat effaces the ventral thoracic spinal cord, similar in  appearance to prior exam on 07/13/2023.      No new areas of spinal canal stenosis are identified.      Paraspinal musculature does not demonstrate any acute abnormality.      LUMBAR SPINE:      There are 5 lumbar type non rib-bearing vertebral bodies, with the  lowest well-formed intervertebral disc space labeled L5-S1.      Lumbar vertebral body alignment is maintained without  spondylolisthesis.      No pathologic compression fractures are present in the lumbar spine,  although there may been slight increase in size of Schmorl's node  along the superior endplate of L2 with subtle associated STIR  hyperintense  edema. Schmorl's node is also present in the lungs  superior endplate of L1.      No new signal abnormalities are present in the posterior elements of  the lumbar spine. Mild STIR hyperintense edema is present about the  right facet of L3-L4, likely degenerative in etiology.      No T1 hypointense enhancing marrow replacing lesions are present.      Mild multilevel intervertebral disc desiccation is noted.      Visualized lower thoracic spinal cord terminates at the level of L1.  Conus medullaris is unremarkable. No pathologic leptomeningeal  enhancement is present.      No new posterior disc contour abnormality or spinal canal stenosis is  present in the lumbar spine.      No new neural foraminal stenosis is identified, with moderate  bilateral neural foraminal narrowing again present at the levels of  L4-L5 and L5-S1 due to bulging disc and hypertrophic facet changes,  appearing to perhaps slightly worsened since prior study.      Paraspinal musculature does not demonstrate any acute abnormalities.  Mild degenerate facet osteoarthropathy edema is present at L3-L4 on  the right.      IMPRESSION:  CERVICAL SPINE:  1. STIR hyperintense lesion within the C5 spinous process described  on previous MRI in July of 2023 is no longer visualized on current  examination. No discrete T1 hypointense, enhancing marrow replacing  lesion is identified, although the C4 and C5 vertebral bodies  demonstrates subtle increased STIR hyperintense nonspecific signal,  possibly secondary to underlying degenerative disc disease.  2. Within limits of mild motion no new pathologic leptomeningeal or  epidural enhancement is identified in the cervical spine. No new  spinal canal or neural foraminal stenosis is present.      THORACIC SPINE:  1. Interval worsening/progression of pathologic compression fracture  of the T7 vertebral body compared to prior MRI on 07/13/2023, now  with up for its of 90% height loss, but minimal if any  associated  worsening no associated bony retropulsion, with persistent but  unchanged effacement of the anterior subarachnoid space and mild  deformity of the ventral thoracic spinal cord at the level of T7-T8.  2. Worsening T2 stir signal abnormality with the associated  postcontrast within the T8 vertebral body compared to prior MRI in  July of 2023, without associated compression deformity or bony  retropulsion. Signal abnormality in the T6 vertebral body is similar  to prior exam. No new marrow replacing lesions are identified in the  thoracic spine.  3. No new areas of spinal canal stenosis are present in the thoracic  spine.      LUMBAR SPINE:  1. No evidence of new metastatic lesions in the lumbar spine. No new  spinal canal stenosis.  2. Moderate neural foraminal stenosis at the levels of L4-L5 and  L5-S1 due to bulging disc and hypertrophic facet changes appears to  perhaps slightly worsened since prior exam on 07/13/2023.      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     Eyes: Negative.    Respiratory: Negative.     Cardiovascular: Negative.    Gastrointestinal: Negative.    Endocrine: Negative.    Genitourinary: Negative.    Musculoskeletal:  Positive for back pain and myalgias.   Skin: Negative.    Neurological:  Positive for weakness and numbness.   Hematological: Negative.    Psychiatric/Behavioral: Negative.            Physical Exam  Vitals and nursing note reviewed.   Constitutional:       Appearance: Normal appearance.   HENT:      Head: Normocephalic and atraumatic.      Nose: Nose normal.   Eyes:      Extraocular Movements: Extraocular movements intact.       Conjunctiva/sclera: Conjunctivae normal.      Pupils: Pupils are equal, round, and reactive to light.   Cardiovascular:      Rate and Rhythm: Normal rate and regular rhythm.      Pulses: Normal pulses.      Heart sounds: Normal heart sounds.   Pulmonary:      Effort: Pulmonary effort is normal.      Breath sounds: Normal breath sounds.   Abdominal:      General: Abdomen is flat. Bowel sounds are normal.      Palpations: Abdomen is soft.   Musculoskeletal:         General: Tenderness present.      Comments: Significant step up in the thoracic spinous processes   Skin:     General: Skin is warm.   Neurological:      General: No focal deficit present.      Mental Status: She is alert and oriented to person, place, and time.   Psychiatric:         Mood and Affect: Mood normal.         Behavior: Behavior normal.              Plan  At least 50% of the visit was involved in the discussion of the options for treatment. We discussed exercises, medication, interventional therapies and surgery. Healthy life style is essential with patient hard work to achieve the wellness. In addition; discussion with the patient and/or family about any of the diagnostic results, impressions and/or recommended diagnostic studies, prognosis, risks and benefits of treatment options, instructions for treatment and/or follow-up, importance of compliance with chosen treatment options, risk-factor reduction, and patient/family education.     Not really sure why the patient was sent to pain management since she is going for surgery with Dr. Jacques sometime soon.  If the patient needs any pain management treatment would be intrathecal therapy and pump implant which we do not do at [place].  Changing her medication to morphine something every palliative care can do and rotating opioid may help a lot with the controlling of the pain.  The patient is really not candidate for comprehensive program or for IV infusion therapy.    Recommended Pool therapy,  walking in the pool, at least 3x per week for 30 minutes  Recommend self-directed physical therapy with at least daily exercises for minimum of 20-minute, brochure was handed to the patient  Smoking cessation  Continue opioid therapy with palliative care may rotate opioids to morphine 30 mg 3 times daily to 4 times daily  If the patient need further or became resistant to oral opioid she may require intrathecal therapy and [facility]  healthy lifestyle and anti-inflammatory diet in addition to weight control discussed with the patient  Alternative chronic pain therapies was discussed, encouraged and information was handed  No need to follow-up with pain management       *Please note this report has been produced using speech recognition software and may contain errors related to that system including grammar, punctuation and spelling as well as words and phrases that may be inappropriate. If there are questions or concerns, please feel free to contact me to clarify.    Adarsh Mckeon MD

## 2024-09-16 NOTE — PROGRESS NOTES
SUPPORTIVE AND PALLIATIVE ONCOLOGY OUTPATIENT FOLLOW-UP      SERVICE DATE: 9/16/2024    Subjective   HISTORY OF PRESENT ILLNESS: Pamela Lopez is a 48 y.o. female who presents with newly diagnosed Hodgkin Lymphoma. She was admitted d/t back pain and a CT c/a/p revealed multiple hepatic masses and several osteoblastic  lesions. MRI brain was negative. She underwent biopsy of L3 on 6/9/23 which was nondiagnostic. PET scan showed FDG-avid lymph nodes in the neck, tonsil, bones, and liver. Biopsy of right tonsil 6/22 revealed Hodgkin Lymphoma. She underwent RT to T7 x10  fractions. She started treatment with AVD + Brentuximab 7/17/23. Recent PET scan with excellent response to treatment. PET scan 3/4 showed complete response.     Pain Assessment:  Pain Score: 7/10  Location: mid-back and lower back  Description: aching, throbbing pain in her back. The pain fluctuates in intensity. She has good and bad days. Feels that it may be getting worse. She has numbness in her arms and legs. If her pain is more severe, the numbness in her legs can go up to her knees. Her pain is an 8/10 at its worst. It improves to a 6/10 with oxycodone on a bad day. She has been taking it 4-5 times per day.     Symptom Assessment:  Pain:very much   Headache: none  Dizziness:none  Lack of energy: a little.   Difficulty sleeping: a little due to pain  Worrying: a little   Anxiety: none   Depression: a little. Now related to increased pain and unable to be active.   Pain in mouth/swallowing: none  Dry mouth: none  Taste changes: none  Shortness of breath: none  Lack of appetite: none    Nausea: none.   Vomiting: none  Constipation: none  Diarrhea: none  Sore muscles: none  Numbness or tingling in hands/feet/other: somewhat. Hands and feet are numb. Having numbness and pins and needles in her legs and arms depending on how much activity she is doing.   Weight loss: none  Other: none      Information obtained from: interview of  patient  ______________________________________________________________________        Objective     Infusion on 09/09/2024   Component Date Value Ref Range Status    WBC 09/09/2024 6.9  4.4 - 11.3 x10*3/uL Final    RBC 09/09/2024 4.47  4.00 - 5.20 x10*6/uL Final    Hemoglobin 09/09/2024 14.0  12.0 - 16.0 g/dL Final    Hematocrit 09/09/2024 42.9  36.0 - 46.0 % Final    MCV 09/09/2024 96  80 - 100 fL Final    MCH 09/09/2024 31.3  26.0 - 34.0 pg Final    MCHC 09/09/2024 32.6  32.0 - 36.0 g/dL Final    RDW 09/09/2024 12.8  11.5 - 14.5 % Final    Platelets 09/09/2024 215  150 - 450 x10*3/uL Final    Neutrophils % 09/09/2024 58.4  40.0 - 80.0 % Final    Immature Granulocytes %, Automated 09/09/2024 0.0  0.0 - 0.9 % Final    Immature Granulocyte Count (IG) includes promyelocytes, myelocytes and metamyelocytes but does not include bands. Percent differential counts (%) should be interpreted in the context of the absolute cell counts (cells/UL).    Lymphocytes % 09/09/2024 30.0  13.0 - 44.0 % Final    Monocytes % 09/09/2024 7.6  2.0 - 10.0 % Final    Eosinophils % 09/09/2024 3.1  0.0 - 6.0 % Final    Basophils % 09/09/2024 0.9  0.0 - 2.0 % Final    Neutrophils Absolute 09/09/2024 4.02  1.20 - 7.70 x10*3/uL Final    Percent differential counts (%) should be interpreted in the context of the absolute cell counts (cells/uL).    Immature Granulocytes Absolute, Au* 09/09/2024 0.00  0.00 - 0.70 x10*3/uL Final    Lymphocytes Absolute 09/09/2024 2.06  1.20 - 4.80 x10*3/uL Final    Monocytes Absolute 09/09/2024 0.52  0.10 - 1.00 x10*3/uL Final    Eosinophils Absolute 09/09/2024 0.21  0.00 - 0.70 x10*3/uL Final    Basophils Absolute 09/09/2024 0.06  0.00 - 0.10 x10*3/uL Final    Glucose 09/09/2024 119 (H)  74 - 99 mg/dL Final    Sodium 09/09/2024 137  136 - 145 mmol/L Final    Potassium 09/09/2024 3.9  3.5 - 5.3 mmol/L Final    Chloride 09/09/2024 104  98 - 107 mmol/L Final    Bicarbonate 09/09/2024 24  21 - 32 mmol/L Final    Anion  Gap 09/09/2024 13  10 - 20 mmol/L Final    Urea Nitrogen 09/09/2024 13  6 - 23 mg/dL Final    Creatinine 09/09/2024 0.77  0.50 - 1.05 mg/dL Final    eGFR 09/09/2024 >90  >60 mL/min/1.73m*2 Final    Calculations of estimated GFR are performed using the 2021 CKD-EPI Study Refit equation without the race variable for the IDMS-Traceable creatinine methods.  https://jasn.asnjournals.org/content/early/2021/09/22/ASN.8329735336    Calcium 09/09/2024 9.3  8.6 - 10.3 mg/dL Final    Albumin 09/09/2024 3.9  3.4 - 5.0 g/dL Final    Alkaline Phosphatase 09/09/2024 118 (H)  33 - 110 U/L Final    Total Protein 09/09/2024 6.9  6.4 - 8.2 g/dL Final    AST 09/09/2024 16  9 - 39 U/L Final    Bilirubin, Total 09/09/2024 0.3  0.0 - 1.2 mg/dL Final    ALT 09/09/2024 10  7 - 45 U/L Final    Patients treated with Sulfasalazine may generate falsely decreased results for ALT.    LDH 09/09/2024 180  84 - 246 U/L Final    Ferritin 09/09/2024 109  8 - 150 ng/mL Final    Iron 09/09/2024 102  35 - 150 ug/dL Final    UIBC 09/09/2024 220  110 - 370 ug/dL Final    TIBC 09/09/2024 322  240 - 445 ug/dL Final    % Saturation 09/09/2024 32  25 - 45 % Final    Vitamin B12 09/09/2024 306  211 - 911 pg/mL Final     PHYSICAL EXAMINATION   Vital Signs: not completed due to virtual visit  There were no vitals filed for this visit.      Physical Exam not completed due to virtual visit    Social  Not . Lives with her 3 grown children.  History of drug use with Meth x8 years. Quit 1 year ago.  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     ASSESSMENT/PLAN    Pain  Pain is: cancer related pain  Type: somatic and neuropathic  Pain control: well-controlled  Home regimen:   - Continue oxycodone 15 mg every 4 hours. Allow for 5 tabs per day.  - Seeing Dr. Mckeon 9/19/24  - Has an appointment with Dr. Wagner in December - hoping to get a sooner appt.   - Continue Advil + Tylenol as needed  - She has completely  weaned off of MSContin  - Continue with aquatic therapy   - Continue Flexeril 10 mg TID as needed.   - Continue Ropinirole .25 mg at bedtime for RLS as needed  - Continue Gabapentin 300 mg TID. States she is not taking this consistently. Recommended taking daily at bedtime.   - Completed 2 sessions of aquatic therapy and now with increased pain    Opioid Use  Medication Management:   - OARRS report reviewed with no aberrant behavior; consistent with  prescriptions/records and patient history  - MED 45.  Overdose Risk Score 290.   This has been discussed with patient.   - We will continue to closely monitor the patient for signs of prescription misuse including UDS, OARRS review and subjective reports at each visit.  - No concurrent benzodiazepine use   - I am a provider who either is or has consulted and collaborated with a provider certified in Hospice and Palliative Medicine and have conducted a face-face visit and examination for this patient.  - Routine Urine Drug Screen completed 7/15/24 positive for oxycodone and negative for other illicit substances  - Controlled Substance Agreement: completed 9/12/23. repeat next visit   - Specifically discussed that controlled substance prescriptions will only be provided by our group as outlined in the completed agreement  - Prescribed naloxone: Has prescription at home  - Red Flags: History of drug use with Meth. Quit 1 year ago     Nausea   Intermittent nausea without vomiting related to chemotherapy   - Continue Prochlorperazine 10 mg every 6 hours as needed  - Continue Ondansetron 8 mg every 8 hours as needed    Constipation  At risk for constipation related to opioids,  currently not constipated  Usual bowel pattern: daily  Home regimen:   - Continue Senna-S 1 tab BID- no longer taking due to diarrhea  - Start Magnesium Citrate - may start with 1/2 bottle to 1 bottle if no BM within 48-72 hours     Altered Mood  Chronic anxiety and depression related to health  concerns   controlled with home regimen  Home regimen:   - Continue Lexapro back to 20 mg  - no longer taking Wellbutrin 150 mg BID.   - Discussed meeting with a community therapist for therapy as well    Sleeping Difficulty:  Home regimen:    - Trazodone stopped inpatient r/t QTc concerns  - No longer taking Risperidone 0.5 mg nightly as needed.   - Flexeril has been effective for sleep   - Discussed taking 5-10 mg of Melatonin. Encouraged to restart this.    Decreased appetite  Related to malignancy, chemotherapy, and disease process  Nutrition following  Home regimen:    - Olanzapine 2.5 mg every day at bedtime- this was stopped inpatient d/t QTc  - Following with Martha Khan    Night Sweats  - Continue Oxybutynin 5-10 mg nightly as needed.       Advance Directives  Existence of Advance Directives:Yes, documentation or copy in medical record  Decision maker: AARON is Cynthia Xie  Code Status: Full code    Next Follow-Up Visit:  Return to clinic in 4 weeks      Signature and billing  Medical complexity was low level due to due to complexity of problems, extensive data review, and high risk of management/treatment.  Time was spent on the following: Prep Time, Time Directly with Patient/Family/Caregiver, Documentation Time. Total time spent: 45      Data  Diagnostic tests and information reviewed for today's visit:  Most recent labs, Most recent imaging, Medications         Some elements copied from Cynthia porter on 8/26/24, the elements have been updated and all reflect current decision making from today, 9/16/2024.      Plan of Care discussed with: Patient    SIGNATURE: MAGDA Downing-CNP    Contact information:  [facility]  Monday-Friday 8 AM-5 PM  Phone:  [phone], press option #5, then option #1.   Or Epic Secure Chat

## 2024-09-19 ENCOUNTER — OFFICE VISIT (OUTPATIENT)
Dept: PAIN MEDICINE | Facility: CLINIC | Age: 49
End: 2024-09-19
Payer: COMMERCIAL

## 2024-09-19 VITALS
WEIGHT: 130 LBS | OXYGEN SATURATION: 97 % | HEIGHT: 60 IN | RESPIRATION RATE: 18 BRPM | BODY MASS INDEX: 25.52 KG/M2 | TEMPERATURE: 96.4 F | DIASTOLIC BLOOD PRESSURE: 63 MMHG | HEART RATE: 46 BPM | SYSTOLIC BLOOD PRESSURE: 128 MMHG

## 2024-09-19 DIAGNOSIS — G89.3 CANCER RELATED PAIN: ICD-10-CM

## 2024-09-19 PROCEDURE — 3008F BODY MASS INDEX DOCD: CPT | Performed by: ANESTHESIOLOGY

## 2024-09-19 PROCEDURE — 99214 OFFICE O/P EST MOD 30 MIN: CPT | Performed by: ANESTHESIOLOGY

## 2024-09-19 PROCEDURE — 99204 OFFICE O/P NEW MOD 45 MIN: CPT | Performed by: ANESTHESIOLOGY

## 2024-09-19 RX ORDER — TIZANIDINE 2 MG/1
TABLET ORAL
Qty: 180 TABLET | Refills: 11 | Status: SHIPPED | OUTPATIENT
Start: 2024-09-19

## 2024-09-19 SDOH — ECONOMIC STABILITY: FOOD INSECURITY: WITHIN THE PAST 12 MONTHS, YOU WORRIED THAT YOUR FOOD WOULD RUN OUT BEFORE YOU GOT MONEY TO BUY MORE.: NEVER TRUE

## 2024-09-19 SDOH — ECONOMIC STABILITY: FOOD INSECURITY: WITHIN THE PAST 12 MONTHS, THE FOOD YOU BOUGHT JUST DIDN'T LAST AND YOU DIDN'T HAVE MONEY TO GET MORE.: NEVER TRUE

## 2024-09-19 ASSESSMENT — PATIENT HEALTH QUESTIONNAIRE - PHQ9
7. TROUBLE CONCENTRATING ON THINGS, SUCH AS READING THE NEWSPAPER OR WATCHING TELEVISION: NEARLY EVERY DAY
6. FEELING BAD ABOUT YOURSELF - OR THAT YOU ARE A FAILURE OR HAVE LET YOURSELF OR YOUR FAMILY DOWN: NEARLY EVERY DAY
SUM OF ALL RESPONSES TO PHQ9 QUESTIONS 1 AND 2: 6
9. THOUGHTS THAT YOU WOULD BE BETTER OFF DEAD, OR OF HURTING YOURSELF: NOT AT ALL
3. TROUBLE FALLING OR STAYING ASLEEP OR SLEEPING TOO MUCH: NEARLY EVERY DAY
8. MOVING OR SPEAKING SO SLOWLY THAT OTHER PEOPLE COULD HAVE NOTICED. OR THE OPPOSITE, BEING SO FIGETY OR RESTLESS THAT YOU HAVE BEEN MOVING AROUND A LOT MORE THAN USUAL: NEARLY EVERY DAY
2. FEELING DOWN, DEPRESSED OR HOPELESS: NEARLY EVERY DAY
SUM OF ALL RESPONSES TO PHQ QUESTIONS 1-9: 24
4. FEELING TIRED OR HAVING LITTLE ENERGY: NEARLY EVERY DAY
5. POOR APPETITE OR OVEREATING: NEARLY EVERY DAY
1. LITTLE INTEREST OR PLEASURE IN DOING THINGS: NEARLY EVERY DAY
10. IF YOU CHECKED OFF ANY PROBLEMS, HOW DIFFICULT HAVE THESE PROBLEMS MADE IT FOR YOU TO DO YOUR WORK, TAKE CARE OF THINGS AT HOME, OR GET ALONG WITH OTHER PEOPLE: VERY DIFFICULT

## 2024-09-19 ASSESSMENT — LIFESTYLE VARIABLES
AUDIT TOTAL SCORE: 0
HAS A RELATIVE, FRIEND, DOCTOR, OR ANOTHER HEALTH PROFESSIONAL EXPRESSED CONCERN ABOUT YOUR DRINKING OR SUGGESTED YOU CUT DOWN: NO
HOW MANY STANDARD DRINKS CONTAINING ALCOHOL DO YOU HAVE ON A TYPICAL DAY: PATIENT DOES NOT DRINK
HOW OFTEN DURING THE LAST YEAR HAVE YOU BEEN UNABLE TO REMEMBER WHAT HAPPENED THE NIGHT BEFORE BECAUSE YOU HAD BEEN DRINKING: NEVER
HOW OFTEN DO YOU HAVE A DRINK CONTAINING ALCOHOL: NEVER
HAVE YOU OR SOMEONE ELSE BEEN INJURED AS A RESULT OF YOUR DRINKING: NO
HOW OFTEN DO YOU HAVE SIX OR MORE DRINKS ON ONE OCCASION: NEVER
SKIP TO QUESTIONS 9-10: 1
TOTAL SCORE: 11
AUDIT-C TOTAL SCORE: 0

## 2024-09-19 ASSESSMENT — PAIN SCALES - GENERAL
PAINLEVEL_OUTOF10: 6
PAINLEVEL: 6

## 2024-09-19 ASSESSMENT — ENCOUNTER SYMPTOMS
LOSS OF SENSATION IN FEET: 1
OCCASIONAL FEELINGS OF UNSTEADINESS: 1
DEPRESSION: 1

## 2024-09-19 ASSESSMENT — COLUMBIA-SUICIDE SEVERITY RATING SCALE - C-SSRS
2. HAVE YOU ACTUALLY HAD ANY THOUGHTS OF KILLING YOURSELF?: NO
6. HAVE YOU EVER DONE ANYTHING, STARTED TO DO ANYTHING, OR PREPARED TO DO ANYTHING TO END YOUR LIFE?: NO
1. IN THE PAST MONTH, HAVE YOU WISHED YOU WERE DEAD OR WISHED YOU COULD GO TO SLEEP AND NOT WAKE UP?: NO

## 2024-09-19 ASSESSMENT — PAIN - FUNCTIONAL ASSESSMENT: PAIN_FUNCTIONAL_ASSESSMENT: 0-10

## 2024-09-19 ASSESSMENT — PAIN DESCRIPTION - DESCRIPTORS: DESCRIPTORS: NUMBNESS

## 2024-09-19 NOTE — PROGRESS NOTES
Chief Complaint   Patient presents with    New Patient Visit     Patient has pain in her neck mid lower back pain and bilateral leg pain     History of Present Complaint:  The patient was referred to us by Referring Provider: Cynthia Monroe CNP palliative care for T7 fracture . this is 48 y.o.  female  with a past history of past history of drug use with Meth for x8 years. Quit 1 year ago. 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 started treatment with AVD + Brentuximab 7/17/23 with a PET scan showed excellent response to treatment the patient is involved with palliative care on oxycodone 15 mg 4-5 times daily and gabapentin 300 mg 3 times daily  presenting with Fibromyalgia and neck pain mid to lower back and bilateral leg pain.  presenting with Fibromyalgia patient also has neck mid and lower back pain with bilateral leg pain.    Pain started 1 year ago almost 2 years  Pain is worst sitting too long laying too long standing too long.     The pain is described as achiness and throbbing pain  and is relieved by Medications 50% depending on the day and in her activities.    Prior Pain Therapies:  Palliative care    Past surgical history:   Hysterectomy, tonsillectomy, spinal biopsy,    Employment/disability/litigation:  Not able to work at this time.  Social history: , Has 3children, 0grandchildren and 0great-grandchildren, Finished high school, and Finished college major in associates in nursing geriatrics.    Diagnostic studies: MRI studies, CT scan films, bone scan films    Opioid Risk Assessment Score 11/26    Trav Each Box that Applies Female Male   FAMILY HISTORY OF SUBSTANCE ABUSE  Trav the boxes that applies   Alcohol x  1    ? 3   Illegal drugs ?  2 ? 3   Rx drugs ?  4 ? 4   PERSONAL HISTORY OF  SUBSTNACE ABUSE   Alcohol x  3 ?  3   Illegal drugs x  4 ?  4    Rx drugs ?  5 ?  5   Age Between 16-45 years ?  1 ?  1   History of Preadolescent Sexual Abuse ?  3 ?  0   PSYCHOLOGIC DISEASE   ADD, OCD, bipolar, schizophrenia   x  2 ADHD ?  2   Depression x  1 ?  1   Scoring Totals 11      Scoring (Risk)  0-3 - Low  4-7 - Moderate  8 - High    Opioid Risk Assessment Score 11/26

## 2024-09-19 NOTE — LETTER
September 20, 2024     MAGDA Downing-ROXANNE  [address]  [facility] [address]    Patient: Pamela Lopez   YOB: 1975   Date of Visit: 9/19/2024       Dear Dr. Cynthia Monroe, MAGDA-CNP:    Thank you for referring Pamela Lopez to me for evaluation. Below are my notes for this consultation.  If you have questions, please do not hesitate to call me. I look forward to following your patient along with you.       Sincerely,     Adarsh Mckeon MD      CC: No Recipients  ______________________________________________________________________________________    History of Present Complaint:  The patient was referred to us by Referring Provider: Cynthia Monroe CNP palliative care for T7 fracture. this is 48 y.o.  female with a past history of History of drug use with Meth for x8 years. Quit 1 year ago. 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 started treatment with AVD + Brentuximab 7/17/23 with a PET scan showed excellent response to treatment the patient is involved with palliative care on oxycodone 15 mg 4-5 times daily and gabapentin 300 mg 3 times daily presenting with who is here today complaining of significant mid thoracic pain with episode of numbness going down to her legs and associated with incontinence.  The patient in the process of getting evaluated for surgical intervention with Dr. Wagner.  The patient currently on oxycodone 15 mg from palliative care and she believes when she was on morphine 30 mg 3 times a day she was doing a lot better than the oxycodone and then she was sent to me for this.  I explained to her I really do not have a degree in palliative care and my pain management experience for patient with cancer related pain is to proceed with the medication  that gives them relief.  The patient does not have any history of opioid use disorder or heroin abuse.  The only drug she used was methamphetamine when she was young but never after she started having her children.    We had a lengthy discussion about adjuvant therapy and I recommended for her to increase her gabapentin to 600 and may be up to 1200 mg 3 times a day that may help a lot with her pain.  She cannot take cyclobenzaprine more than 1 at night because makes her very sleepy I added tizanidine 2 to 4 mg 3 times daily during the day to help her with her pain.  We discussed intrathecal therapy and I told her I will place a consult to Dr. Lockhart at [place] or Dr. Diaz at [facility] for her intrathecal therapy if she became resistant to morphine as well.      Procedures:   None    Portions of record reviewed for pertinent issues: active problem list, medication list, allergies, family history, social history, notes from last encounter, encounters, lab results, imaging and other available records.    I have personally reviewed the OARRS report for this patient. This report is scanned into the electronic medical record. I have considered the risks of abuse, dependence, addiction and diversion. It showed: Oxycodone 15 mg from palliative care  OPIOID RISK ASSESSMENT SCORE 11/26  Aberrant behavior: none      Diagnostic studies:  7/23/2024 thoracic MRI showed T7 pathological fracture with compression fracture and mild retropulsion.  The rest of the spine does not show any metastasis anymore, Moderate neural foraminal  stenosis at the levels of L4-L5 and L5-S1 due to bulging disc and hypertrophic facet changes appears to perhaps slightly worsened since prior exam on 07/13/2023.  Interpreted By:  Jeanne Alcantar,   STUDY:  MR CERVICAL SPINE W AND WO IV CONTRAST; MR THORACIC SPINE W AND WO IV  CONTRAST; MR LUMBAR SPINE W AND WO IV CONTRAST;  7/23/2024 8:35 pm      INDICATION:  Signs/Symptoms:known cancer with  mets, now with worsening numbness  and incontinence..      COMPARISON:  MRI of the cervical, thoracic and lumbar spine dated 07/13/2023;  PET-CT dated 03/04/2024;      ACCESSION NUMBER(S):  [id number]; [id number]; [id number]      ORDERING CLINICIAN:  LESLYE ESTRADA      TECHNIQUE:  Sagittal T1, T2, STIR, axial T1 and axial T2 weighted images were  acquired through the cervical, thoracic and lumbar spine. Additional  axial and sagittal T1 weighted images of the cervical and thoracic  spine were obtained after intravenous administration of 10 mL of  Dotarem gadolinium contrast.      FINDINGS:  CERVICAL SPINE:      Cervical vertebral alignment is maintained, without evidence of new  spondylolisthesis.      Cervical vertebral body heights are preserved without evidence of  compression fractures. There is subtle new STIR hyperintense signal  are present within the C4 vertebral body without associated T1  hypointense signal, incompletely characterized on current exam. STIR  hyperintense signal previously visualized within the C5 spinous  process has resolved. No new T1 hypointense, enhancing processes are  identified in the cervical spine.      Craniocervical junction is intact. Facet joints are preserved without  evidence of traumatic subluxation or perching.      Multilevel intervertebral disc desiccation is present with mild disc  height loss at C3-C4 and C4-C5.      Although exam is somewhat degraded by motion, cervical spinal cord is  unremarkable in appearance. No discrete T2 hyperintense  intramedullary cord signal abnormality is identified. No pathologic  intramedullary or epidural enhancement is present in the cervical  spinal cord.      No significant new posterior disc contour abnormality is present in  the cervical spine. No new spinal canal stenosis is present.      No significant new neural foraminal narrowing is present in the  cervical spine.      Prevertebral and paraspinal soft tissues do not  demonstrate any acute  abnormalities. Visualized large arterial flow voids are preserved.      THORACIC SPINE:      Thoracic vertebral alignment is maintained, without evidence of new  spondylolisthesis.      There has been worsening of pathologic compression fracture of the T7  vertebral body in the interim since prior MRI on 07/13/2023, with now  with greater than 90% height loss and slight 2-3 mm bony retropulsion  posterior into the spinal canal, with the associated effacement of  the anterior subarachnoid space and minimal deformity of the ventral  thoracic spinal cord.      There are is new STIR and T2 hyperintense signal are present within  the T8 vertebral body in the interim since prior study on 07/13/2023,  somewhat concerning for underlying osseous metastatic disease, with  associated postcontrast enhancement (series 23, image 8). Slight STIR  hyperintense signal along the anterior inferior aspect of the T6  vertebral body is nonspecific.      T2 hyperintense signal is present in the spinous process of T7 with  some associated postcontrast enhancement, slightly improved from  prior exam. Mild postcontrast enhancement is present in the intra  spinous T7-T8 space.      No new areas of STIR hyperintense signal are identified in the  thoracic spine. No T1 hypointense, enhancing marrow replacing lesions  are present in the thoracic spine.      Multilevel intervertebral disc desiccation is present in the thoracic  spine with mild disc height loss at T8-T9 and T9-T10.      Within limits of mild motion, no T2 hyperintense intramedullary cord  signal abnormality is present in the thoracic spine. No definite  evidence of intramedullary or leptomeningeal enhancement within  limits of motion.      Mild spinal canal narrowing is present at the level of T7-T8 due to  bony retropulsion which effaces the anterior subarachnoid space and  somewhat effaces the ventral thoracic spinal cord, similar in  appearance to prior  exam on 07/13/2023.      No new areas of spinal canal stenosis are identified.      Paraspinal musculature does not demonstrate any acute abnormality.      LUMBAR SPINE:      There are 5 lumbar type non rib-bearing vertebral bodies, with the  lowest well-formed intervertebral disc space labeled L5-S1.      Lumbar vertebral body alignment is maintained without  spondylolisthesis.      No pathologic compression fractures are present in the lumbar spine,  although there may been slight increase in size of Schmorl's node  along the superior endplate of L2 with subtle associated STIR  hyperintense edema. Schmorl's node is also present in the lungs  superior endplate of L1.      No new signal abnormalities are present in the posterior elements of  the lumbar spine. Mild STIR hyperintense edema is present about the  right facet of L3-L4, likely degenerative in etiology.      No T1 hypointense enhancing marrow replacing lesions are present.      Mild multilevel intervertebral disc desiccation is noted.      Visualized lower thoracic spinal cord terminates at the level of L1.  Conus medullaris is unremarkable. No pathologic leptomeningeal  enhancement is present.      No new posterior disc contour abnormality or spinal canal stenosis is  present in the lumbar spine.      No new neural foraminal stenosis is identified, with moderate  bilateral neural foraminal narrowing again present at the levels of  L4-L5 and L5-S1 due to bulging disc and hypertrophic facet changes,  appearing to perhaps slightly worsened since prior study.      Paraspinal musculature does not demonstrate any acute abnormalities.  Mild degenerate facet osteoarthropathy edema is present at L3-L4 on  the right.      IMPRESSION:  CERVICAL SPINE:  1. STIR hyperintense lesion within the C5 spinous process described  on previous MRI in July of 2023 is no longer visualized on current  examination. No discrete T1 hypointense, enhancing marrow replacing  lesion is  identified, although the C4 and C5 vertebral bodies  demonstrates subtle increased STIR hyperintense nonspecific signal,  possibly secondary to underlying degenerative disc disease.  2. Within limits of mild motion no new pathologic leptomeningeal or  epidural enhancement is identified in the cervical spine. No new  spinal canal or neural foraminal stenosis is present.      THORACIC SPINE:  1. Interval worsening/progression of pathologic compression fracture  of the T7 vertebral body compared to prior MRI on 07/13/2023, now  with up for its of 90% height loss, but minimal if any associated  worsening no associated bony retropulsion, with persistent but  unchanged effacement of the anterior subarachnoid space and mild  deformity of the ventral thoracic spinal cord at the level of T7-T8.  2. Worsening T2 stir signal abnormality with the associated  postcontrast within the T8 vertebral body compared to prior MRI in  July of 2023, without associated compression deformity or bony  retropulsion. Signal abnormality in the T6 vertebral body is similar  to prior exam. No new marrow replacing lesions are identified in the  thoracic spine.  3. No new areas of spinal canal stenosis are present in the thoracic  spine.      LUMBAR SPINE:  1. No evidence of new metastatic lesions in the lumbar spine. No new  spinal canal stenosis.  2. Moderate neural foraminal stenosis at the levels of L4-L5 and  L5-S1 due to bulging disc and hypertrophic facet changes appears to  perhaps slightly worsened since prior exam on 07/13/2023.      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     Eyes: Negative.     Respiratory: Negative.     Cardiovascular: Negative.    Gastrointestinal: Negative.    Endocrine: Negative.    Genitourinary: Negative.    Musculoskeletal:  Positive for back pain and myalgias.   Skin: Negative.    Neurological:  Positive for weakness and numbness.   Hematological: Negative.    Psychiatric/Behavioral: Negative.            Physical Exam  Vitals and nursing note reviewed.   Constitutional:       Appearance: Normal appearance.   HENT:      Head: Normocephalic and atraumatic.      Nose: Nose normal.   Eyes:      Extraocular Movements: Extraocular movements intact.      Conjunctiva/sclera: Conjunctivae normal.      Pupils: Pupils are equal, round, and reactive to light.   Cardiovascular:      Rate and Rhythm: Normal rate and regular rhythm.      Pulses: Normal pulses.      Heart sounds: Normal heart sounds.   Pulmonary:      Effort: Pulmonary effort is normal.      Breath sounds: Normal breath sounds.   Abdominal:      General: Abdomen is flat. Bowel sounds are normal.      Palpations: Abdomen is soft.   Musculoskeletal:         General: Tenderness present.      Comments: Significant step up in the thoracic spinous processes   Skin:     General: Skin is warm.   Neurological:      General: No focal deficit present.      Mental Status: She is alert and oriented to person, place, and time.   Psychiatric:         Mood and Affect: Mood normal.         Behavior: Behavior normal.              Plan  At least 50% of the visit was involved in the discussion of the options for treatment. We discussed exercises, medication, interventional therapies and surgery. Healthy life style is essential with patient hard work to achieve the wellness. In addition; discussion with the patient and/or family about any of the diagnostic results, impressions and/or recommended diagnostic studies, prognosis, risks and benefits of treatment options, instructions for treatment and/or follow-up, importance of compliance with chosen  treatment options, risk-factor reduction, and patient/family education.     Not really sure why the patient was sent to pain management since she is going for surgery with Dr. Jacques sometime soon.  If the patient needs any pain management treatment would be intrathecal therapy and pump implant which we do not do at [place].  Changing her medication to morphine something every palliative care can do and rotating opioid may help a lot with the controlling of the pain.  The patient is really not candidate for comprehensive program or for IV infusion therapy.    Recommended Pool therapy, walking in the pool, at least 3x per week for 30 minutes  Recommend self-directed physical therapy with at least daily exercises for minimum of 20-minute, brochure was handed to the patient  Smoking cessation  Continue opioid therapy with palliative care may rotate opioids to morphine 30 mg 3 times daily to 4 times daily  If the patient need further or became resistant to oral opioid she may require intrathecal therapy and that is done at [facility] or [facility]  healthy lifestyle and anti-inflammatory diet in addition to weight control discussed with the patient  Alternative chronic pain therapies was discussed, encouraged and information was handed  No need to follow-up with pain management       *Please note this report has been produced using speech recognition software and may contain errors related to that system including grammar, punctuation and spelling as well as words and phrases that may be inappropriate. If there are questions or concerns, please feel free to contact me to clarify.    Adarsh Mckeon MD

## 2024-09-19 NOTE — PATIENT INSTRUCTIONS
Gabapentin titration     Please take Gabapentinas follows:              AM         PM    Bedtime       Day 1 0 0 1   Day 2 0 0 1   Day 3 0 1 1   Day 4 0 1 1   Day 5 1 1 1   Day 6 1 1 1   Day 7 1 1 1   Day 8 1 1 2   Day 9 1 1 2   Day 10 1 2 2   Day 11 1 2 2   Day 12 2 2 2   Day 13 2 2 2   Day 14 2 2 3   Day 15 2 2 3   Day 16 2 3 3   Day 17 2 3 3   Day 18 3 3 3   Day 19 3 3 3   Day 20 3 3 4   Day 21 3 3 4   Day 22 3 4 4   Day 23 3 4 4   Day 24 4 4 4     Do not exceed 3600mg per day.   Stop and maintain dose when symptoms resolve.  Reduce as instructed if side effects not tolerated.

## 2024-09-20 ENCOUNTER — PATIENT MESSAGE (OUTPATIENT)
Dept: PALLIATIVE MEDICINE | Facility: CLINIC | Age: 49
End: 2024-09-20
Payer: COMMERCIAL

## 2024-09-20 ENCOUNTER — DOCUMENTATION (OUTPATIENT)
Dept: PALLIATIVE MEDICINE | Facility: CLINIC | Age: 49
End: 2024-09-20
Payer: COMMERCIAL

## 2024-09-20 DIAGNOSIS — G89.3 CANCER RELATED PAIN: Primary | ICD-10-CM

## 2024-09-20 DIAGNOSIS — G89.3 CANCER RELATED PAIN: ICD-10-CM

## 2024-09-20 RX ORDER — MORPHINE SULFATE 30 MG/1
30 TABLET, FILM COATED, EXTENDED RELEASE ORAL 2 TIMES DAILY
Qty: 6 TABLET | Refills: 0 | Status: SHIPPED | OUTPATIENT
Start: 2024-09-20 | End: 2024-09-23

## 2024-09-20 RX ORDER — MORPHINE SULFATE 30 MG/1
30 TABLET, FILM COATED, EXTENDED RELEASE ORAL 2 TIMES DAILY
Qty: 60 TABLET | Refills: 0 | Status: SHIPPED | OUTPATIENT
Start: 2024-09-20 | End: 2024-10-20

## 2024-09-20 ASSESSMENT — ENCOUNTER SYMPTOMS
NUMBNESS: 1
GASTROINTESTINAL NEGATIVE: 1
ENDOCRINE NEGATIVE: 1
EYES NEGATIVE: 1
MYALGIAS: 1
BACK PAIN: 1
PSYCHIATRIC NEGATIVE: 1
WEAKNESS: 1
RESPIRATORY NEGATIVE: 1
CARDIOVASCULAR NEGATIVE: 1
HEMATOLOGIC/LYMPHATIC NEGATIVE: 1

## 2024-09-20 NOTE — PROGRESS NOTES
PA required for Mscontin, sent to [facility] for processing.  PA also required for tizanidine also sent to [facility]

## 2024-09-25 DIAGNOSIS — G89.3 CANCER RELATED PAIN: Primary | ICD-10-CM

## 2024-09-30 ENCOUNTER — TELEPHONE (OUTPATIENT)
Dept: HEMATOLOGY/ONCOLOGY | Facility: HOSPITAL | Age: 49
End: 2024-09-30
Payer: COMMERCIAL

## 2024-09-30 DIAGNOSIS — G89.3 CANCER RELATED PAIN: ICD-10-CM

## 2024-09-30 RX ORDER — OXYCODONE HYDROCHLORIDE 15 MG/1
15 TABLET ORAL EVERY 4 HOURS PRN
Qty: 60 TABLET | Refills: 0 | Status: SHIPPED | OUTPATIENT
Start: 2024-10-14 | End: 2024-10-26

## 2024-09-30 NOTE — TELEPHONE ENCOUNTER
OARRS reviewed and no aberrancy noted. Patient filled oxycodone 15mg #90 tablets of the #150 that was sent in due to stock issues on 9/27. Prescription for the remaining tablets pended to provider to approve and fill on 10/14.

## 2024-09-30 NOTE — TELEPHONE ENCOUNTER
Ez, a pharmacist at [facility] at [address] in [place], calls today because the patient is due for the rest of her Oxycodone script that they were unable to fill dute to limited quantities available in stock. On 9/27, he dispensed what they left of Oxycodone 15mg - 90 tabs. She is still due another 60 tabs on 10/15. He would either need a script for 60 days of Oxycodone 15mg, or a new script for 150 tabs.    Pharmacy phone number is [phone].    Message sent to Palliative Care team.

## 2024-10-14 ENCOUNTER — DOCUMENTATION (OUTPATIENT)
Dept: PALLIATIVE MEDICINE | Facility: HOSPITAL | Age: 49
End: 2024-10-14

## 2024-10-14 ENCOUNTER — OFFICE VISIT (OUTPATIENT)
Dept: PALLIATIVE MEDICINE | Facility: CLINIC | Age: 49
End: 2024-10-14
Payer: COMMERCIAL

## 2024-10-14 ENCOUNTER — OFFICE VISIT (OUTPATIENT)
Dept: PAIN MEDICINE | Facility: CLINIC | Age: 49
End: 2024-10-14
Payer: COMMERCIAL

## 2024-10-14 VITALS
BODY MASS INDEX: 26.29 KG/M2 | RESPIRATION RATE: 16 BRPM | DIASTOLIC BLOOD PRESSURE: 58 MMHG | SYSTOLIC BLOOD PRESSURE: 101 MMHG | HEART RATE: 45 BPM | OXYGEN SATURATION: 98 % | TEMPERATURE: 97.3 F | WEIGHT: 134.6 LBS

## 2024-10-14 VITALS
OXYGEN SATURATION: 99 % | TEMPERATURE: 98.2 F | SYSTOLIC BLOOD PRESSURE: 129 MMHG | DIASTOLIC BLOOD PRESSURE: 57 MMHG | HEART RATE: 47 BPM | RESPIRATION RATE: 18 BRPM

## 2024-10-14 DIAGNOSIS — T45.1X5A CHEMOTHERAPY-INDUCED PERIPHERAL NEUROPATHY (MULTI): ICD-10-CM

## 2024-10-14 DIAGNOSIS — C81.48: ICD-10-CM

## 2024-10-14 DIAGNOSIS — G89.3 CANCER RELATED PAIN: Primary | ICD-10-CM

## 2024-10-14 DIAGNOSIS — F32.89 OTHER DEPRESSION: ICD-10-CM

## 2024-10-14 DIAGNOSIS — G62.0 CHEMOTHERAPY-INDUCED PERIPHERAL NEUROPATHY (MULTI): ICD-10-CM

## 2024-10-14 DIAGNOSIS — Z51.5 PALLIATIVE CARE ENCOUNTER: ICD-10-CM

## 2024-10-14 DIAGNOSIS — G89.3 CANCER RELATED PAIN: ICD-10-CM

## 2024-10-14 PROCEDURE — 99213 OFFICE O/P EST LOW 20 MIN: CPT | Performed by: PAIN MEDICINE

## 2024-10-14 PROCEDURE — 99214 OFFICE O/P EST MOD 30 MIN: CPT

## 2024-10-14 ASSESSMENT — PAIN SCALES - GENERAL
PAINLEVEL: 7
PAINLEVEL_OUTOF10: 7
PAINLEVEL: 7

## 2024-10-14 ASSESSMENT — PAIN - FUNCTIONAL ASSESSMENT: PAIN_FUNCTIONAL_ASSESSMENT: 0-10

## 2024-10-14 NOTE — PROGRESS NOTES
Controlled Substance Agreement for Palliative Care reviewed and completed with patient. Education provided to outline both the expectations of the provider and the patient about the proper and safe use of controlled substances. This is a CSA renewal..

## 2024-10-14 NOTE — H&P
History Of Present Illness  Pamela Lopez is a 48 y.o. female presenting with back pain described in the mid thoracic spine area around T7 and in the lower lumbar spine area  She is under palliative care for T7 fracture. this is 48 y.o.  female with a past history of History of drug use with Meth for x8 years. Quit 1 year ago. 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 started treatment with AVD + Brentuximab 7/17/23 with a PET scan showed excellent response to treatment the patient is involved with palliative care on oxycodone 15 mg 4-5 times daily and gabapentin 600 mg 4  times daily presenting with who is here today complaining of significant mid thoracic pain with episode of numbness going down to her legs and associated with incontinence.  The patient in the process of getting evaluated for surgical intervention with Dr. Wagner.  The patient currently on oxycodone 15 mg from palliative care and currently on morphine 30 mg twice per day she since the increase of the gabapentin she believes her symptoms has been improved but she continues to rate the pain at the level of 7 out of 10 aggravated with any activity she was referred to me by Dr. Mckeon for consideration of intrathecal morphine trial           Past Medical History  Past Medical History:   Diagnosis Date    Asthma ([facility])     Hodgkin's lymphoma (Multi)     Hyperlipidemia      Surgical History  Past Surgical History:   Procedure Laterality Date    CT GUIDED PERCUTANEOUS BIOPSY BONE DEEP  6/9/2023    CT GUIDED PERCUTANEOUS BIOPSY BONE DEEP 6/9/2023 [facility] CT     Social History  She reports that she quit smoking about 21 months ago. Her smoking use included cigarettes. She started smoking about 34 years ago. She has a 8.3 pack-year smoking history. She  has never used smokeless tobacco. She reports that she does not currently use alcohol. She reports current drug use. Drugs: Morphine and Oxycodone.    Family History  No family history on file.     Allergies  Allergies   Allergen Reactions    Benadryl [Diphenhydramine Hcl] Confusion and Drowsiness    Lorazepam Confusion and Drowsiness     Review of Systems   12 Systems have been reviewed as follows.   Constitutional: Fever, weight gain, weight loss, appetite change, night sweats, fatigue, chills.  Eyes : blurry, double vision, vision, loss, tearing, redness, pain, sensitivity to light, glaucoma.  Ears, nose, mouth, and throat: Hearing loss, ringing in the ears, ear pain, nasal congestion, nasal drainage, nosebleeds, mouth, throat, irritation tooth problem.  Cardiovascular :chest pain, pressure, heart tracing,palpaitations , sweating, leg swelling, high or low blood pressure  Pulmonary: Cough, yellow or green sputum, blood and sputum, shortness of breath, wheezing  Gastrointestinal: Nause, vomiting, diarrhea, constipation, pain, blood in stool, or vomitus, heartburn, difficulty swallowing  Genitourinary: incontinence, abnormal bleeding, abnormal discharge, urinary frequency, urinary hesitancy, pain, impotence sexual problem, infection, urinary retention  Musculoskeletal: Pain, stiffness, joint, redness or warmth, arthritis, back pain, weakness, muscle wasting, sprain or fracture  Neuro: Weight weakness, dizziness, change in voice, change in taste change in vision, change in hearing, loss, or change of sensation, trouble walking, balance problems coordination problems, shaking, speech problem  Endocrine , cold or heat intolerance, blood sugar problem, weight gain or loss missed periods hot flashes, sweats, change in body hair, change in libido, increased thirst, increased urination  Heme/lymph: Swelling, bleeding, problem anemia, bruising, enlarged lymph nodes  Allergic/immunologic: H. plus nasal drip, watery itchy  eyes, nasal drainage, immunosuppressed  The above, were reviewed and noted negative except as noted.   Describing occasional stool incontinence  Physical Exam       Past medical history no interval changes has been noted    On physical examination    General   Alert, oriented x3 pleasant and cooperative. Does not look in any major distress.    HEENT  Pupils normal in size. Ears, nose, mouth, and throat appear to be in normal condition.  Head atraumatic      No signs of sedation or signs of withdrawal apparent.    Psychiatric   No signs of depression apparent.    Neuro   No focal neurological deficit apparent. Ambulation at baseline.      Respiratory  No respiratory distress     Abdomen  no distention     Skin  No skin markings supportive of recent IV drug usage .    Cardiovascular  Regular rate and rhythm     Last Recorded Vitals  Blood pressure 129/57, pulse (!) 47, temperature 36.8 °C (98.2 °F), resp. rate 18, SpO2 99%.  MR cervical spine w and wo IV contrast    Result Date: 7/23/2024  Interpreted By:  Jeanne Alcantar, STUDY: MR CERVICAL SPINE W AND WO IV CONTRAST; MR THORACIC SPINE W AND WO IV CONTRAST; MR LUMBAR SPINE W AND WO IV CONTRAST;  7/23/2024 8:35 pm   INDICATION: Signs/Symptoms:known cancer with mets, now with worsening numbness and incontinence..   COMPARISON: MRI of the cervical, thoracic and lumbar spine dated 07/13/2023; PET-CT dated 03/04/2024;   ACCESSION NUMBER(S): [id number]; [id number]; [id number]   ORDERING CLINICIAN: LESLYE ESTRADA   TECHNIQUE: Sagittal T1, T2, STIR, axial T1 and axial T2 weighted images were acquired through the cervical, thoracic and lumbar spine. Additional axial and sagittal T1 weighted images of the cervical and thoracic spine were obtained after intravenous administration of 10 mL of Dotarem gadolinium contrast.   FINDINGS: CERVICAL SPINE:   Cervical vertebral alignment is maintained, without evidence of new spondylolisthesis.   Cervical vertebral body heights  are preserved without evidence of compression fractures. There is subtle new STIR hyperintense signal are present within the C4 vertebral body without associated T1 hypointense signal, incompletely characterized on current exam. STIR hyperintense signal previously visualized within the C5 spinous process has resolved. No new T1 hypointense, enhancing processes are identified in the cervical spine.   Craniocervical junction is intact. Facet joints are preserved without evidence of traumatic subluxation or perching.   Multilevel intervertebral disc desiccation is present with mild disc height loss at C3-C4 and C4-C5.   Although exam is somewhat degraded by motion, cervical spinal cord is unremarkable in appearance. No discrete T2 hyperintense intramedullary cord signal abnormality is identified. No pathologic intramedullary or epidural enhancement is present in the cervical spinal cord.   No significant new posterior disc contour abnormality is present in the cervical spine. No new spinal canal stenosis is present.   No significant new neural foraminal narrowing is present in the cervical spine.   Prevertebral and paraspinal soft tissues do not demonstrate any acute abnormalities. Visualized large arterial flow voids are preserved.   THORACIC SPINE:   Thoracic vertebral alignment is maintained, without evidence of new spondylolisthesis.   There has been worsening of pathologic compression fracture of the T7 vertebral body in the interim since prior MRI on 07/13/2023, with now with greater than 90% height loss and slight 2-3 mm bony retropulsion posterior into the spinal canal, with the associated effacement of the anterior subarachnoid space and minimal deformity of the ventral thoracic spinal cord.   There are is new STIR and T2 hyperintense signal are present within the T8 vertebral body in the interim since prior study on 07/13/2023, somewhat concerning for underlying osseous metastatic disease, with associated  postcontrast enhancement (series 23, image 8). Slight STIR hyperintense signal along the anterior inferior aspect of the T6 vertebral body is nonspecific.   T2 hyperintense signal is present in the spinous process of T7 with some associated postcontrast enhancement, slightly improved from prior exam. Mild postcontrast enhancement is present in the intra spinous T7-T8 space.   No new areas of STIR hyperintense signal are identified in the thoracic spine. No T1 hypointense, enhancing marrow replacing lesions are present in the thoracic spine.   Multilevel intervertebral disc desiccation is present in the thoracic spine with mild disc height loss at T8-T9 and T9-T10.   Within limits of mild motion, no T2 hyperintense intramedullary cord signal abnormality is present in the thoracic spine. No definite evidence of intramedullary or leptomeningeal enhancement within limits of motion.   Mild spinal canal narrowing is present at the level of T7-T8 due to bony retropulsion which effaces the anterior subarachnoid space and somewhat effaces the ventral thoracic spinal cord, similar in appearance to prior exam on 07/13/2023.   No new areas of spinal canal stenosis are identified.   Paraspinal musculature does not demonstrate any acute abnormality.   LUMBAR SPINE:   There are 5 lumbar type non rib-bearing vertebral bodies, with the lowest well-formed intervertebral disc space labeled L5-S1.   Lumbar vertebral body alignment is maintained without spondylolisthesis.   No pathologic compression fractures are present in the lumbar spine, although there may been slight increase in size of Schmorl's node along the superior endplate of L2 with subtle associated STIR hyperintense edema. Schmorl's node is also present in the lungs superior endplate of L1.   No new signal abnormalities are present in the posterior elements of the lumbar spine. Mild STIR hyperintense edema is present about the right facet of L3-L4, likely degenerative in  etiology.   No T1 hypointense enhancing marrow replacing lesions are present.   Mild multilevel intervertebral disc desiccation is noted.   Visualized lower thoracic spinal cord terminates at the level of L1. Conus medullaris is unremarkable. No pathologic leptomeningeal enhancement is present.   No new posterior disc contour abnormality or spinal canal stenosis is present in the lumbar spine.   No new neural foraminal stenosis is identified, with moderate bilateral neural foraminal narrowing again present at the levels of L4-L5 and L5-S1 due to bulging disc and hypertrophic facet changes, appearing to perhaps slightly worsened since prior study.   Paraspinal musculature does not demonstrate any acute abnormalities. Mild degenerate facet osteoarthropathy edema is present at L3-L4 on the right.       CERVICAL SPINE: 1. STIR hyperintense lesion within the C5 spinous process described on previous MRI in July of 2023 is no longer visualized on current examination. No discrete T1 hypointense, enhancing marrow replacing lesion is identified, although the C4 and C5 vertebral bodies demonstrates subtle increased STIR hyperintense nonspecific signal, possibly secondary to underlying degenerative disc disease. 2. Within limits of mild motion no new pathologic leptomeningeal or epidural enhancement is identified in the cervical spine. No new spinal canal or neural foraminal stenosis is present.   THORACIC SPINE: 1. Interval worsening/progression of pathologic compression fracture of the T7 vertebral body compared to prior MRI on 07/13/2023, now with up for its of 90% height loss, but minimal if any associated worsening no associated bony retropulsion, with persistent but unchanged effacement of the anterior subarachnoid space and mild deformity of the ventral thoracic spinal cord at the level of T7-T8. 2. Worsening T2 stir signal abnormality with the associated postcontrast within the T8 vertebral body compared to prior MRI in  July of 2023, without associated compression deformity or bony retropulsion. Signal abnormality in the T6 vertebral body is similar to prior exam. No new marrow replacing lesions are identified in the thoracic spine. 3. No new areas of spinal canal stenosis are present in the thoracic spine.   LUMBAR SPINE: 1. No evidence of new metastatic lesions in the lumbar spine. No new spinal canal stenosis. 2. Moderate neural foraminal stenosis at the levels of L4-L5 and L5-S1 due to bulging disc and hypertrophic facet changes appears to perhaps slightly worsened since prior exam on 07/13/2023.   MACRO: None   Signed by: Jeanne Alcantar 7/23/2024 9:13 PM Dictation workstation:   [id number]    MR thoracic spine w and wo IV contrast    Result Date: 7/23/2024  Interpreted By:  Jeanne Alcantar, STUDY: MR CERVICAL SPINE W AND WO IV CONTRAST; MR THORACIC SPINE W AND WO IV CONTRAST; MR LUMBAR SPINE W AND WO IV CONTRAST;  7/23/2024 8:35 pm   INDICATION: Signs/Symptoms:known cancer with mets, now with worsening numbness and incontinence..   COMPARISON: MRI of the cervical, thoracic and lumbar spine dated 07/13/2023; PET-CT dated 03/04/2024;   ACCESSION NUMBER(S): [id number]; [id number]; [id number]   ORDERING CLINICIAN: LESLYE ESTRADA   TECHNIQUE: Sagittal T1, T2, STIR, axial T1 and axial T2 weighted images were acquired through the cervical, thoracic and lumbar spine. Additional axial and sagittal T1 weighted images of the cervical and thoracic spine were obtained after intravenous administration of 10 mL of Dotarem gadolinium contrast.   FINDINGS: CERVICAL SPINE:   Cervical vertebral alignment is maintained, without evidence of new spondylolisthesis.   Cervical vertebral body heights are preserved without evidence of compression fractures. There is subtle new STIR hyperintense signal are present within the C4 vertebral body without associated T1 hypointense signal, incompletely characterized on current exam. STIR  hyperintense signal previously visualized within the C5 spinous process has resolved. No new T1 hypointense, enhancing processes are identified in the cervical spine.   Craniocervical junction is intact. Facet joints are preserved without evidence of traumatic subluxation or perching.   Multilevel intervertebral disc desiccation is present with mild disc height loss at C3-C4 and C4-C5.   Although exam is somewhat degraded by motion, cervical spinal cord is unremarkable in appearance. No discrete T2 hyperintense intramedullary cord signal abnormality is identified. No pathologic intramedullary or epidural enhancement is present in the cervical spinal cord.   No significant new posterior disc contour abnormality is present in the cervical spine. No new spinal canal stenosis is present.   No significant new neural foraminal narrowing is present in the cervical spine.   Prevertebral and paraspinal soft tissues do not demonstrate any acute abnormalities. Visualized large arterial flow voids are preserved.   THORACIC SPINE:   Thoracic vertebral alignment is maintained, without evidence of new spondylolisthesis.   There has been worsening of pathologic compression fracture of the T7 vertebral body in the interim since prior MRI on 07/13/2023, with now with greater than 90% height loss and slight 2-3 mm bony retropulsion posterior into the spinal canal, with the associated effacement of the anterior subarachnoid space and minimal deformity of the ventral thoracic spinal cord.   There are is new STIR and T2 hyperintense signal are present within the T8 vertebral body in the interim since prior study on 07/13/2023, somewhat concerning for underlying osseous metastatic disease, with associated postcontrast enhancement (series 23, image 8). Slight STIR hyperintense signal along the anterior inferior aspect of the T6 vertebral body is nonspecific.   T2 hyperintense signal is present in the spinous process of T7 with some  associated postcontrast enhancement, slightly improved from prior exam. Mild postcontrast enhancement is present in the intra spinous T7-T8 space.   No new areas of STIR hyperintense signal are identified in the thoracic spine. No T1 hypointense, enhancing marrow replacing lesions are present in the thoracic spine.   Multilevel intervertebral disc desiccation is present in the thoracic spine with mild disc height loss at T8-T9 and T9-T10.   Within limits of mild motion, no T2 hyperintense intramedullary cord signal abnormality is present in the thoracic spine. No definite evidence of intramedullary or leptomeningeal enhancement within limits of motion.   Mild spinal canal narrowing is present at the level of T7-T8 due to bony retropulsion which effaces the anterior subarachnoid space and somewhat effaces the ventral thoracic spinal cord, similar in appearance to prior exam on 07/13/2023.   No new areas of spinal canal stenosis are identified.   Paraspinal musculature does not demonstrate any acute abnormality.   LUMBAR SPINE:   There are 5 lumbar type non rib-bearing vertebral bodies, with the lowest well-formed intervertebral disc space labeled L5-S1.   Lumbar vertebral body alignment is maintained without spondylolisthesis.   No pathologic compression fractures are present in the lumbar spine, although there may been slight increase in size of Schmorl's node along the superior endplate of L2 with subtle associated STIR hyperintense edema. Schmorl's node is also present in the lungs superior endplate of L1.   No new signal abnormalities are present in the posterior elements of the lumbar spine. Mild STIR hyperintense edema is present about the right facet of L3-L4, likely degenerative in etiology.   No T1 hypointense enhancing marrow replacing lesions are present.   Mild multilevel intervertebral disc desiccation is noted.   Visualized lower thoracic spinal cord terminates at the level of L1. Conus medullaris is  unremarkable. No pathologic leptomeningeal enhancement is present.   No new posterior disc contour abnormality or spinal canal stenosis is present in the lumbar spine.   No new neural foraminal stenosis is identified, with moderate bilateral neural foraminal narrowing again present at the levels of L4-L5 and L5-S1 due to bulging disc and hypertrophic facet changes, appearing to perhaps slightly worsened since prior study.   Paraspinal musculature does not demonstrate any acute abnormalities. Mild degenerate facet osteoarthropathy edema is present at L3-L4 on the right.       CERVICAL SPINE: 1. STIR hyperintense lesion within the C5 spinous process described on previous MRI in July of 2023 is no longer visualized on current examination. No discrete T1 hypointense, enhancing marrow replacing lesion is identified, although the C4 and C5 vertebral bodies demonstrates subtle increased STIR hyperintense nonspecific signal, possibly secondary to underlying degenerative disc disease. 2. Within limits of mild motion no new pathologic leptomeningeal or epidural enhancement is identified in the cervical spine. No new spinal canal or neural foraminal stenosis is present.   THORACIC SPINE: 1. Interval worsening/progression of pathologic compression fracture of the T7 vertebral body compared to prior MRI on 07/13/2023, now with up for its of 90% height loss, but minimal if any associated worsening no associated bony retropulsion, with persistent but unchanged effacement of the anterior subarachnoid space and mild deformity of the ventral thoracic spinal cord at the level of T7-T8. 2. Worsening T2 stir signal abnormality with the associated postcontrast within the T8 vertebral body compared to prior MRI in July of 2023, without associated compression deformity or bony retropulsion. Signal abnormality in the T6 vertebral body is similar to prior exam. No new marrow replacing lesions are identified in the thoracic spine. 3. No new  areas of spinal canal stenosis are present in the thoracic spine.   LUMBAR SPINE: 1. No evidence of new metastatic lesions in the lumbar spine. No new spinal canal stenosis. 2. Moderate neural foraminal stenosis at the levels of L4-L5 and L5-S1 due to bulging disc and hypertrophic facet changes appears to perhaps slightly worsened since prior exam on 07/13/2023.   MACRO: None   Signed by: Jeanne Alcantar 7/23/2024 9:13 PM Dictation workstation:   [id number]    MR lumbar spine w and wo IV contrast    Result Date: 7/23/2024  Interpreted By:  Jeanne Alcantar, STUDY: MR CERVICAL SPINE W AND WO IV CONTRAST; MR THORACIC SPINE W AND WO IV CONTRAST; MR LUMBAR SPINE W AND WO IV CONTRAST;  7/23/2024 8:35 pm   INDICATION: Signs/Symptoms:known cancer with mets, now with worsening numbness and incontinence..   COMPARISON: MRI of the cervical, thoracic and lumbar spine dated 07/13/2023; PET-CT dated 03/04/2024;   ACCESSION NUMBER(S): [id number]; [id number]; [id number]   ORDERING CLINICIAN: LESLYE ESTRADA   TECHNIQUE: Sagittal T1, T2, STIR, axial T1 and axial T2 weighted images were acquired through the cervical, thoracic and lumbar spine. Additional axial and sagittal T1 weighted images of the cervical and thoracic spine were obtained after intravenous administration of 10 mL of Dotarem gadolinium contrast.   FINDINGS: CERVICAL SPINE:   Cervical vertebral alignment is maintained, without evidence of new spondylolisthesis.   Cervical vertebral body heights are preserved without evidence of compression fractures. There is subtle new STIR hyperintense signal are present within the C4 vertebral body without associated T1 hypointense signal, incompletely characterized on current exam. STIR hyperintense signal previously visualized within the C5 spinous process has resolved. No new T1 hypointense, enhancing processes are identified in the cervical spine.   Craniocervical junction is intact. Facet joints are preserved  without evidence of traumatic subluxation or perching.   Multilevel intervertebral disc desiccation is present with mild disc height loss at C3-C4 and C4-C5.   Although exam is somewhat degraded by motion, cervical spinal cord is unremarkable in appearance. No discrete T2 hyperintense intramedullary cord signal abnormality is identified. No pathologic intramedullary or epidural enhancement is present in the cervical spinal cord.   No significant new posterior disc contour abnormality is present in the cervical spine. No new spinal canal stenosis is present.   No significant new neural foraminal narrowing is present in the cervical spine.   Prevertebral and paraspinal soft tissues do not demonstrate any acute abnormalities. Visualized large arterial flow voids are preserved.   THORACIC SPINE:   Thoracic vertebral alignment is maintained, without evidence of new spondylolisthesis.   There has been worsening of pathologic compression fracture of the T7 vertebral body in the interim since prior MRI on 07/13/2023, with now with greater than 90% height loss and slight 2-3 mm bony retropulsion posterior into the spinal canal, with the associated effacement of the anterior subarachnoid space and minimal deformity of the ventral thoracic spinal cord.   There are is new STIR and T2 hyperintense signal are present within the T8 vertebral body in the interim since prior study on 07/13/2023, somewhat concerning for underlying osseous metastatic disease, with associated postcontrast enhancement (series 23, image 8). Slight STIR hyperintense signal along the anterior inferior aspect of the T6 vertebral body is nonspecific.   T2 hyperintense signal is present in the spinous process of T7 with some associated postcontrast enhancement, slightly improved from prior exam. Mild postcontrast enhancement is present in the intra spinous T7-T8 space.   No new areas of STIR hyperintense signal are identified in the thoracic spine. No T1  hypointense, enhancing marrow replacing lesions are present in the thoracic spine.   Multilevel intervertebral disc desiccation is present in the thoracic spine with mild disc height loss at T8-T9 and T9-T10.   Within limits of mild motion, no T2 hyperintense intramedullary cord signal abnormality is present in the thoracic spine. No definite evidence of intramedullary or leptomeningeal enhancement within limits of motion.   Mild spinal canal narrowing is present at the level of T7-T8 due to bony retropulsion which effaces the anterior subarachnoid space and somewhat effaces the ventral thoracic spinal cord, similar in appearance to prior exam on 07/13/2023.   No new areas of spinal canal stenosis are identified.   Paraspinal musculature does not demonstrate any acute abnormality.   LUMBAR SPINE:   There are 5 lumbar type non rib-bearing vertebral bodies, with the lowest well-formed intervertebral disc space labeled L5-S1.   Lumbar vertebral body alignment is maintained without spondylolisthesis.   No pathologic compression fractures are present in the lumbar spine, although there may been slight increase in size of Schmorl's node along the superior endplate of L2 with subtle associated STIR hyperintense edema. Schmorl's node is also present in the lungs superior endplate of L1.   No new signal abnormalities are present in the posterior elements of the lumbar spine. Mild STIR hyperintense edema is present about the right facet of L3-L4, likely degenerative in etiology.   No T1 hypointense enhancing marrow replacing lesions are present.   Mild multilevel intervertebral disc desiccation is noted.   Visualized lower thoracic spinal cord terminates at the level of L1. Conus medullaris is unremarkable. No pathologic leptomeningeal enhancement is present.   No new posterior disc contour abnormality or spinal canal stenosis is present in the lumbar spine.   No new neural foraminal stenosis is identified, with moderate  bilateral neural foraminal narrowing again present at the levels of L4-L5 and L5-S1 due to bulging disc and hypertrophic facet changes, appearing to perhaps slightly worsened since prior study.   Paraspinal musculature does not demonstrate any acute abnormalities. Mild degenerate facet osteoarthropathy edema is present at L3-L4 on the right.       CERVICAL SPINE: 1. STIR hyperintense lesion within the C5 spinous process described on previous MRI in July of 2023 is no longer visualized on current examination. No discrete T1 hypointense, enhancing marrow replacing lesion is identified, although the C4 and C5 vertebral bodies demonstrates subtle increased STIR hyperintense nonspecific signal, possibly secondary to underlying degenerative disc disease. 2. Within limits of mild motion no new pathologic leptomeningeal or epidural enhancement is identified in the cervical spine. No new spinal canal or neural foraminal stenosis is present.   THORACIC SPINE: 1. Interval worsening/progression of pathologic compression fracture of the T7 vertebral body compared to prior MRI on 07/13/2023, now with up for its of 90% height loss, but minimal if any associated worsening no associated bony retropulsion, with persistent but unchanged effacement of the anterior subarachnoid space and mild deformity of the ventral thoracic spinal cord at the level of T7-T8. 2. Worsening T2 stir signal abnormality with the associated postcontrast within the T8 vertebral body compared to prior MRI in July of 2023, without associated compression deformity or bony retropulsion. Signal abnormality in the T6 vertebral body is similar to prior exam. No new marrow replacing lesions are identified in the thoracic spine. 3. No new areas of spinal canal stenosis are present in the thoracic spine.   LUMBAR SPINE: 1. No evidence of new metastatic lesions in the lumbar spine. No new spinal canal stenosis. 2. Moderate neural foraminal stenosis at the levels of  L4-L5 and L5-S1 due to bulging disc and hypertrophic facet changes appears to perhaps slightly worsened since prior exam on 07/13/2023.   MACRO: None   Signed by: Jeanne Alcantar 7/23/2024 9:13 PM Dictation workstation:   [id number]     Assessment/Plan   48 years old with history and physical examination supportive of Hodgkin lymphoma with metastatic disease to the T7 level requiring maintenance on opioid therapy currently on morphine and oxycodone with suboptimal improvement patient is interested in intrathecal morphine trial    Plan  Discussed with the patient the benefits and the risk of the intrathecal morphine trial she understand the benefits and the risk and she would like to proceed with a single shot morphine trial she understand that she will be holding the morphine and the oxycodone that day in order to assess the improvement on the intrathecal route if she describes significant improvement at that time we will be further discussing with her a implant for an intrathecal pump      The above clinical summary has been dictated with voice recognition software. It has not been proofread for grammatical errors, typographical mistakes, or other semantic inconsistencies.    Thank you for visiting our office today. It was our pleasure to take part in your healthcare.     Please do not hesitate to contact the pain clinic after your visit with any questions or concerns at  M-F 8-4 pm       Jerome Lockhart M.D.  Medical Director , Division of Pain Medicine [facility]   of Anesthesiology and Pain Medicine  [facility] [employer]     [place] [address] [address] [place] [address]     Office: [phone]  Fax: [phone]      Jerome Lockhart MD

## 2024-10-14 NOTE — PROGRESS NOTES
Upper back pain with fractures. Sent here for possible pain pump insertion. Palliative care for cancer

## 2024-10-14 NOTE — PROGRESS NOTES
SUPPORTIVE AND PALLIATIVE ONCOLOGY OUTPATIENT FOLLOW-UP      SERVICE DATE: 10/14/2024    Subjective   HISTORY OF PRESENT ILLNESS: Pamela Lopez is a 48 y.o. female who presents with newly diagnosed Hodgkin Lymphoma. She was admitted d/t back pain and a CT c/a/p revealed multiple hepatic masses and several osteoblastic  lesions. MRI brain was negative. She underwent biopsy of L3 on 6/9/23 which was nondiagnostic. PET scan showed FDG-avid lymph nodes in the neck, tonsil, bones, and liver. Biopsy of right tonsil 6/22 revealed Hodgkin Lymphoma. She underwent RT to T7 x10  fractions. She started treatment with AVD + Brentuximab 7/17/23. Recent PET scan with excellent response to treatment. PET scan 3/4 showed complete response.     Pain Assessment:  Pain Score: 7/10  Location: mid-back and lower back  Description: aching, throbbing pain in her back. The pain fluctuates in intensity. She has good and bad days. She has intermittent shocks of pain in her lower back. She reports taking gabapentin 600 mg TID which has improved the numbness in her hands, feet, and legs. Since starting MSContin again, her pain has significantly improved. She is taking oxycodone 3-4 times per day with good relief.     Symptom Assessment:  Pain:very much   Headache: none  Dizziness:none  Lack of energy: a little.   Difficulty sleeping: a little due to pain. Waking up every 2 hours.   Worrying: a little   Anxiety: none   Depression: a little. Now related to increased pain and unable to be active.   Pain in mouth/swallowing: none  Dry mouth: none  Taste changes: none  Shortness of breath: none  Lack of appetite: none    Nausea: none.   Vomiting: none  Constipation: none  Diarrhea: none  Sore muscles: none  Numbness or tingling in hands/feet/other: somewhat. Hands and feet are numb. Improved with Gabapentin  Weight loss: none  Other: none      Information obtained from: interview of  patient  ______________________________________________________________________        Objective     No visits with results within 1 Month(s) from this visit.   Latest known visit with results is:   Infusion on 09/09/2024   Component Date Value Ref Range Status    WBC 09/09/2024 6.9  4.4 - 11.3 x10*3/uL Final    RBC 09/09/2024 4.47  4.00 - 5.20 x10*6/uL Final    Hemoglobin 09/09/2024 14.0  12.0 - 16.0 g/dL Final    Hematocrit 09/09/2024 42.9  36.0 - 46.0 % Final    MCV 09/09/2024 96  80 - 100 fL Final    MCH 09/09/2024 31.3  26.0 - 34.0 pg Final    MCHC 09/09/2024 32.6  32.0 - 36.0 g/dL Final    RDW 09/09/2024 12.8  11.5 - 14.5 % Final    Platelets 09/09/2024 215  150 - 450 x10*3/uL Final    Neutrophils % 09/09/2024 58.4  40.0 - 80.0 % Final    Immature Granulocytes %, Automated 09/09/2024 0.0  0.0 - 0.9 % Final    Immature Granulocyte Count (IG) includes promyelocytes, myelocytes and metamyelocytes but does not include bands. Percent differential counts (%) should be interpreted in the context of the absolute cell counts (cells/UL).    Lymphocytes % 09/09/2024 30.0  13.0 - 44.0 % Final    Monocytes % 09/09/2024 7.6  2.0 - 10.0 % Final    Eosinophils % 09/09/2024 3.1  0.0 - 6.0 % Final    Basophils % 09/09/2024 0.9  0.0 - 2.0 % Final    Neutrophils Absolute 09/09/2024 4.02  1.20 - 7.70 x10*3/uL Final    Percent differential counts (%) should be interpreted in the context of the absolute cell counts (cells/uL).    Immature Granulocytes Absolute, Au* 09/09/2024 0.00  0.00 - 0.70 x10*3/uL Final    Lymphocytes Absolute 09/09/2024 2.06  1.20 - 4.80 x10*3/uL Final    Monocytes Absolute 09/09/2024 0.52  0.10 - 1.00 x10*3/uL Final    Eosinophils Absolute 09/09/2024 0.21  0.00 - 0.70 x10*3/uL Final    Basophils Absolute 09/09/2024 0.06  0.00 - 0.10 x10*3/uL Final    Glucose 09/09/2024 119 (H)  74 - 99 mg/dL Final    Sodium 09/09/2024 137  136 - 145 mmol/L Final    Potassium 09/09/2024 3.9  3.5 - 5.3 mmol/L Final    Chloride  09/09/2024 104  98 - 107 mmol/L Final    Bicarbonate 09/09/2024 24  21 - 32 mmol/L Final    Anion Gap 09/09/2024 13  10 - 20 mmol/L Final    Urea Nitrogen 09/09/2024 13  6 - 23 mg/dL Final    Creatinine 09/09/2024 0.77  0.50 - 1.05 mg/dL Final    eGFR 09/09/2024 >90  >60 mL/min/1.73m*2 Final    Calculations of estimated GFR are performed using the 2021 CKD-EPI Study Refit equation without the race variable for the IDMS-Traceable creatinine methods.  https://jasn.asnjournals.org/content/early/2021/09/22/ASN.4644982608    Calcium 09/09/2024 9.3  8.6 - 10.3 mg/dL Final    Albumin 09/09/2024 3.9  3.4 - 5.0 g/dL Final    Alkaline Phosphatase 09/09/2024 118 (H)  33 - 110 U/L Final    Total Protein 09/09/2024 6.9  6.4 - 8.2 g/dL Final    AST 09/09/2024 16  9 - 39 U/L Final    Bilirubin, Total 09/09/2024 0.3  0.0 - 1.2 mg/dL Final    ALT 09/09/2024 10  7 - 45 U/L Final    Patients treated with Sulfasalazine may generate falsely decreased results for ALT.    LDH 09/09/2024 180  84 - 246 U/L Final    Ferritin 09/09/2024 109  8 - 150 ng/mL Final    Iron 09/09/2024 102  35 - 150 ug/dL Final    UIBC 09/09/2024 220  110 - 370 ug/dL Final    TIBC 09/09/2024 322  240 - 445 ug/dL Final    % Saturation 09/09/2024 32  25 - 45 % Final    Vitamin B12 09/09/2024 306  211 - 911 pg/mL Final     PHYSICAL EXAMINATION   Vital Signs: not completed due to virtual visit  Vitals:    10/14/24 1448   BP: 101/58   Pulse: (!) 45   Resp: 16   Temp: 36.3 °C (97.3 °F)   SpO2: 98%         Physical Exam  HENT:      Head: Normocephalic.      Nose: Nose normal.   Eyes:      Pupils: Pupils are equal, round, and reactive to light.   Pulmonary:      Effort: Pulmonary effort is normal.   Musculoskeletal:         General: Normal range of motion.   Neurological:      Mental Status: She is alert and oriented to person, place, and time.   Psychiatric:         Mood and Affect: Mood normal.         Behavior: Behavior normal.          Social  Not . Lives with  her 3 grown children.  History of drug use with Meth x8 years. Quit 1 year ago.  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     ASSESSMENT/PLAN    Pain  Pain is: cancer related pain  Type: somatic and neuropathic  Pain control: well-controlled  Home regimen:   - Continue MSContin 30 mg BID  - Continue oxycodone 15 mg every 4 hours. Allow for 5 tabs per day.  - Saw Dr. Lockhart and will try intrathecal morphine 11/5  - Has an appointment with Dr. Wagner in December - hoping to get a sooner appt.   - Continue Gabapentin 600 mg TID. Recently increased this.   - Continue Advil + Tylenol as needed  - Continue with aquatic therapy   - Continue Flexeril 10 mg at bedtime  - Continue Tizanidine 2 mg during the day  - Continue Ropinirole .25 mg at bedtime for RLS as needed  consistently. Recommended taking daily at bedtime.   - Completed 2 sessions of aquatic therapy and now with increased pain    Opioid Use  Medication Management:   - OARRS report reviewed with no aberrant behavior; consistent with  prescriptions/records and patient history  - MED 45.  Overdose Risk Score 290.   This has been discussed with patient.   - We will continue to closely monitor the patient for signs of prescription misuse including UDS, OARRS review and subjective reports at each visit.  - No concurrent benzodiazepine use   - I am a provider who either is or has consulted and collaborated with a provider certified in Hospice and Palliative Medicine and have conducted a face-face visit and examination for this patient.  - Routine Urine Drug Screen completed 7/15/24 positive for oxycodone and negative for other illicit substances  - Controlled Substance Agreement: completed 10/14/24  - Specifically discussed that controlled substance prescriptions will only be provided by our group as outlined in the completed agreement  - Prescribed naloxone: Has prescription at home  - Red Flags: History of drug use  with Meth. Quit 1 year ago     Nausea   Intermittent nausea without vomiting related to chemotherapy   - Continue Prochlorperazine 10 mg every 6 hours as needed  - Continue Ondansetron 8 mg every 8 hours as needed    Constipation  At risk for constipation related to opioids,  currently not constipated  Usual bowel pattern: daily  Home regimen:   - Continue Senna-S 1 tab BID- no longer taking due to diarrhea  - Start Magnesium Citrate - may start with 1/2 bottle to 1 bottle if no BM within 48-72 hours     Altered Mood  Chronic anxiety and depression related to health concerns   controlled with home regimen  Home regimen:   - Restart Lexapro 20 mg- encouraged her to restart this since feeling more depressed and emotional   - no longer taking Wellbutrin 150 mg BID.   - Discussed meeting with a community therapist for therapy as well    Sleeping Difficulty:  Home regimen:    - Trazodone stopped inpatient r/t QTc concerns  - No longer taking Risperidone 0.5 mg nightly as needed.   - Flexeril has been effective for sleep   - Discussed taking 5-10 mg of Melatonin. Encouraged to restart this.    Decreased appetite  Related to malignancy, chemotherapy, and disease process  Nutrition following  Home regimen:    - Olanzapine 2.5 mg every day at bedtime- this was stopped inpatient d/t QTc  - Following with Martha Khan    Night Sweats  - Continue Oxybutynin 5-10 mg nightly as needed.       Advance Directives  Existence of Advance Directives:Yes, documentation or copy in medical record  Decision maker: AARON Xie  Code Status: Full code    Next Follow-Up Visit:  Return to clinic in 4 weeks      Signature and billing  Medical complexity was low level due to due to complexity of problems, extensive data review, and high risk of management/treatment.  Time was spent on the following: Prep Time, Time Directly with Patient/Family/Caregiver, Documentation Time. Total time spent: 45      Data  Diagnostic tests  and information reviewed for today's visit:  Most recent labs, Most recent imaging, Medications         Some elements copied from Cynthia Johnson note on 9/16/24, the elements have been updated and all reflect current decision making from today, 10/14/2024.      Plan of Care discussed with: Patient    SIGNATURE: MAGDA Downing-CNP    Contact information:  Supportive and Palliative Oncology  Monday-Friday 8 AM-5 PM  Phone:  [phone], press option #5, then option #1.   Or Epic Secure Chat

## 2024-10-17 ENCOUNTER — TELEPHONE (OUTPATIENT)
Dept: CARDIOLOGY | Facility: CLINIC | Age: 49
End: 2024-10-17
Payer: COMMERCIAL

## 2024-10-17 NOTE — TELEPHONE ENCOUNTER
Called patient's son and explained that we tried getting in contact with Crystal but her VM was full - told him that if she is still experiencing those symptoms or even in light of the symptoms she had 2 days ago she should definitely go to the Emergency Room. He said he will get in contact with Crystal and get her to the hospital

## 2024-10-17 NOTE — TELEPHONE ENCOUNTER
Attempted to call patient regarding appt she made through [facility] on 10/15 - appt note states that she is having pain across her chest and down her left arm. Her VM was full, sent SMS notification

## 2024-10-23 ENCOUNTER — HOSPITAL ENCOUNTER (OUTPATIENT)
Dept: RADIOLOGY | Facility: HOSPITAL | Age: 49
Discharge: HOME | End: 2024-10-23
Payer: COMMERCIAL

## 2024-10-23 DIAGNOSIS — Z85.71 H/O HODGKIN'S LYMPHOMA: ICD-10-CM

## 2024-10-23 DIAGNOSIS — C79.51 METASTATIC CANCER TO SPINE (MULTI): ICD-10-CM

## 2024-10-23 PROCEDURE — 72128 CT CHEST SPINE W/O DYE: CPT

## 2024-10-25 DIAGNOSIS — G89.3 CANCER RELATED PAIN: ICD-10-CM

## 2024-10-25 RX ORDER — OXYCODONE HYDROCHLORIDE 15 MG/1
15 TABLET ORAL EVERY 4 HOURS PRN
Qty: 90 TABLET | Refills: 0 | Status: SHIPPED | OUTPATIENT
Start: 2024-10-25 | End: 2024-11-12

## 2024-10-25 NOTE — TELEPHONE ENCOUNTER
OARRS report reviewed and reflects  prescription history, no aberrancy noted. Per OARRS, patient last filled Oxycodone IR 15 mg 10/14/24, 12 day supply. Per last visit with Cynthia Monroe 10/14/24 patient to continue medication. Patient with follow up visit scheduled with Cynthia 11/25/24. Patient updated that medication will be sent to [facility]. Refill request routed to provider.

## 2024-10-28 RX ORDER — HEPARIN SODIUM,PORCINE/PF 10 UNIT/ML
50 SYRINGE (ML) INTRAVENOUS AS NEEDED
OUTPATIENT
Start: 2024-10-28

## 2024-10-28 RX ORDER — HEPARIN 100 UNIT/ML
500 SYRINGE INTRAVENOUS AS NEEDED
OUTPATIENT
Start: 2024-10-28

## 2024-11-04 DIAGNOSIS — C81.48: ICD-10-CM

## 2024-11-04 DIAGNOSIS — G89.3 CANCER RELATED PAIN: ICD-10-CM

## 2024-11-05 ENCOUNTER — HOSPITAL ENCOUNTER (OUTPATIENT)
Dept: PAIN MEDICINE | Facility: CLINIC | Age: 49
Discharge: HOME | End: 2024-11-05
Payer: COMMERCIAL

## 2024-11-05 ENCOUNTER — INFUSION (OUTPATIENT)
Dept: HEMATOLOGY/ONCOLOGY | Facility: CLINIC | Age: 49
End: 2024-11-05
Payer: COMMERCIAL

## 2024-11-05 VITALS
TEMPERATURE: 97.7 F | RESPIRATION RATE: 16 BRPM | SYSTOLIC BLOOD PRESSURE: 116 MMHG | DIASTOLIC BLOOD PRESSURE: 57 MMHG | OXYGEN SATURATION: 98 % | HEART RATE: 47 BPM

## 2024-11-05 DIAGNOSIS — C81.48: ICD-10-CM

## 2024-11-05 DIAGNOSIS — G89.3 CANCER RELATED PAIN: ICD-10-CM

## 2024-11-05 PROCEDURE — 96523 IRRIG DRUG DELIVERY DEVICE: CPT

## 2024-11-05 PROCEDURE — 2500000004 HC RX 250 GENERAL PHARMACY W/ HCPCS (ALT 636 FOR OP/ED): Performed by: PAIN MEDICINE

## 2024-11-05 PROCEDURE — 62323 NJX INTERLAMINAR LMBR/SAC: CPT | Performed by: PAIN MEDICINE

## 2024-11-05 RX ORDER — MORPHINE SULFATE 1 MG/ML
INJECTION, SOLUTION EPIDURAL; INTRATHECAL; INTRAVENOUS AS NEEDED
Status: DISCONTINUED | OUTPATIENT
Start: 2024-11-05 | End: 2024-11-06 | Stop reason: HOSPADM

## 2024-11-05 RX ORDER — LIDOCAINE HYDROCHLORIDE 10 MG/ML
INJECTION, SOLUTION EPIDURAL; INFILTRATION; INTRACAUDAL; PERINEURAL AS NEEDED
Status: DISCONTINUED | OUTPATIENT
Start: 2024-11-05 | End: 2024-11-06 | Stop reason: HOSPADM

## 2024-11-05 ASSESSMENT — COLUMBIA-SUICIDE SEVERITY RATING SCALE - C-SSRS
1. IN THE PAST MONTH, HAVE YOU WISHED YOU WERE DEAD OR WISHED YOU COULD GO TO SLEEP AND NOT WAKE UP?: NO
2. HAVE YOU ACTUALLY HAD ANY THOUGHTS OF KILLING YOURSELF?: NO
6. HAVE YOU EVER DONE ANYTHING, STARTED TO DO ANYTHING, OR PREPARED TO DO ANYTHING TO END YOUR LIFE?: NO

## 2024-11-05 ASSESSMENT — PAIN - FUNCTIONAL ASSESSMENT: PAIN_FUNCTIONAL_ASSESSMENT: 0-10

## 2024-11-05 ASSESSMENT — PAIN DESCRIPTION - DESCRIPTORS: DESCRIPTORS: ACHING;DULL;THROBBING

## 2024-11-05 ASSESSMENT — PAIN SCALES - GENERAL: PAINLEVEL_OUTOF10: 8

## 2024-11-05 NOTE — DISCHARGE INSTRUCTIONS
INTRATHECAL MORPHINE DISCHARGE INSTRUCTIONS        DO NOT GET INJECTION SITE WET FOR 24 HOURS  IF BANDAGE HAS BEEN PLACED YOU MAY REMOVE AFTER 24 HOURS  MONITOR FOR SIGNS/SYMPTOMS OF INFECTION:FEVERS REDNESS OR INCREASED PAIN AT INJECTION SITE   YOU MAY RESUME YOUR NORMAL ACTIVITY       IF SIGNS OR SYMPTOMS OF OPIATE OVERDOSE ARE NOTED AFTER YOUR INJECTION INCLUDING UNUSUAL SLEEPINESS, UNRESPONSIVENESS, SLOW OR ABSENT BREATHING ADMINISTER NARCAN AS DIRECTED AND CALL 911     HOLD USING ANY OPIOID MEDICATION TODAY     SHOULD YOU HAVE ANY QUESTIONS OR CONCERNS PLEASE CALL THE OFFICE  [phone]

## 2024-11-07 ENCOUNTER — TELEPHONE (OUTPATIENT)
Dept: PAIN MEDICINE | Facility: CLINIC | Age: 49
End: 2024-11-07
Payer: COMMERCIAL

## 2024-11-07 NOTE — TELEPHONE ENCOUNTER
"Patient reports \"amazing\" relief , > 80% after intrathecal morphine injection. Can you order next step please?  "

## 2024-11-08 ENCOUNTER — TELEPHONE (OUTPATIENT)
Dept: PAIN MEDICINE | Facility: CLINIC | Age: 49
End: 2024-11-08
Payer: COMMERCIAL

## 2024-11-08 NOTE — TELEPHONE ENCOUNTER
I tried to reach Pamela to let her know the doctor is advising she needs to come in for an office visit to discuss pump implantation. I reached her voicemail and it is not accepting calls.

## 2024-11-11 ENCOUNTER — OFFICE VISIT (OUTPATIENT)
Dept: PAIN MEDICINE | Facility: CLINIC | Age: 49
End: 2024-11-11
Payer: COMMERCIAL

## 2024-11-11 DIAGNOSIS — C81.48: Primary | ICD-10-CM

## 2024-11-11 PROCEDURE — 99213 OFFICE O/P EST LOW 20 MIN: CPT | Performed by: PAIN MEDICINE

## 2024-11-11 PROCEDURE — 99417 PROLNG OP E/M EACH 15 MIN: CPT | Performed by: PAIN MEDICINE

## 2024-11-11 ASSESSMENT — PAIN SCALES - GENERAL: PAINLEVEL_OUTOF10: 5 - MODERATE PAIN

## 2024-11-11 ASSESSMENT — PAIN - FUNCTIONAL ASSESSMENT: PAIN_FUNCTIONAL_ASSESSMENT: 0-10

## 2024-11-11 ASSESSMENT — PAIN DESCRIPTION - DESCRIPTORS: DESCRIPTORS: ACHING

## 2024-11-11 NOTE — PROGRESS NOTES
Subjective   Pamela Lopez is a 48 y.o. female who presents for evaluation of her back pain. Te pt is here to discuss post intrathecal morphine injection and get set up for a pain pump.     Past Medical History:   Diagnosis Date    Asthma (HHS-HCC)     Hodgkin's lymphoma (Multi)     Hyperlipidemia      Past Surgical History:   Procedure Laterality Date    CT GUIDED PERCUTANEOUS BIOPSY BONE DEEP  6/9/2023    CT GUIDED PERCUTANEOUS BIOPSY BONE DEEP 6/9/2023 [facility] CT       I have reviewed the nurses notes and am aware of family/social history.     Review of Systems    Objective   Physical Exam    ASSESSMENT:     PLAN:   Discussed treatment plan with patient.   Call or return to clinic prn if these symptoms worsen or fail to improve as anticipated.     Kalpana Hastings RN

## 2024-11-11 NOTE — H&P
History Of Present Illness  Pamela Lopez is a 48 y.o. female presenting  for evaluation of her back pain. The patient is here to discuss post intrathecal morphine injection and get set up for a pain pump. Confirms that with the spinal morphine lasted her for 25 hours at the 100 percentile improvement denies any significant side effect associated with the usage of the spinal morphine and she would like to proceed with an intrathecal pump.        Past Medical History  Past Medical History:   Diagnosis Date    Asthma (HHS-HCC)     Hodgkin's lymphoma (Multi)     Hyperlipidemia      Surgical History  Past Surgical History:   Procedure Laterality Date    CT GUIDED PERCUTANEOUS BIOPSY BONE DEEP  6/9/2023    CT GUIDED PERCUTANEOUS BIOPSY BONE DEEP 6/9/2023 [facility] CT     Social History  She reports that she quit smoking about 22 months ago. Her smoking use included cigarettes. She started smoking about 34 years ago. She has a 8.3 pack-year smoking history. She has never used smokeless tobacco. She reports that she does not currently use alcohol. She reports current drug use. Drugs: Morphine and Oxycodone.    Family History  No family history on file.     Allergies  Allergies   Allergen Reactions    Benadryl [Diphenhydramine Hcl] Confusion and Drowsiness    Lorazepam Confusion and Drowsiness     Review of Systems   All 13 systems were reviewed and are within normal levels except as noted below or per HPI. Positive and pertinent negative responses are noted below or in the HPI   Denied any fever or chills. No weight loss and no night sweats. No cough or sputum production. No diarrhea   No constipation  No bladder and bowel incontinence and no other changes in bladder and bowel. No skin changes.   Denied opioids diversion and abuse and denies alcoholism. Denies overuse of  pain medications.    Physical Exam       Past medical history no interval changes has been noted    On physical examination    General   Alert, oriented x3  pleasant and cooperative. Does not look in any major distress.    HEENT  Pupils normal in size. Ears, nose, mouth, and throat appear to be in normal condition.  Head atraumatic      No signs of sedation or signs of withdrawal apparent.    Psychiatric   No signs of depression apparent.    Neuro   No focal neurological deficit apparent. Ambulation at baseline.      Respiratory  No respiratory distress     Abdomen  no distention     Skin  No skin markings supportive of recent IV drug usage .    Cardiovascular  Regular rate and rhythm     Last Recorded Vitals  There were no vitals taken for this visit.    Assessment/Plan   48 years old with history and physical examination supportive of Hodgkin lymphoma with metastatic disease to the T7 level requiring maintenance on opioid therapy currently on morphine and oxycodone with suboptimal improvement patient is interested in intrathecal morphine trial   Plan  Knowing that the patient had positive response to the spinal morphine and she had no significant side effect and the intrathecal morphine provided her with an improvement in the quality of her life and a good pain score that she is rating at that time close to 100% tile I would recommend for the patient to proceed with an intrathecal pump implant to assist her with her chronic pain issues I will be directing the patient to her psychological clearance to obtain insurance approval I did provide her today with educational material about the intrathecal pump and I did show her a pump and the patient would like to proceed benefits and risk were discussed and she would like to proceed      The above clinical summary has been dictated with voice recognition software. It has not been proofread for grammatical errors, typographical mistakes, or other semantic inconsistencies.    Thank you for visiting our office today. It was our pleasure to take part in your healthcare.     Please do not hesitate to contact the pain clinic after  your visit with any questions or concerns at  M-F 8-4 pm       Jerome Lockhart M.D.  Medical Director , Division of Pain Medicine [facility]   of Anesthesiology and Pain Medicine  [facility] [employer]     [facility]  [id number] [place], [place] [address] [place] [id number]     Office: [phone]  Fax: [phone]      Jerome Lockhart MD

## 2024-11-13 ENCOUNTER — PATIENT MESSAGE (OUTPATIENT)
Dept: PALLIATIVE MEDICINE | Facility: CLINIC | Age: 49
End: 2024-11-13
Payer: COMMERCIAL

## 2024-11-13 ENCOUNTER — TELEPHONE (OUTPATIENT)
Dept: PALLIATIVE MEDICINE | Facility: HOSPITAL | Age: 49
End: 2024-11-13
Payer: COMMERCIAL

## 2024-11-13 DIAGNOSIS — T45.1X5A CHEMOTHERAPY-INDUCED PERIPHERAL NEUROPATHY (MULTI): ICD-10-CM

## 2024-11-13 DIAGNOSIS — C81.48: ICD-10-CM

## 2024-11-13 DIAGNOSIS — G62.0 CHEMOTHERAPY-INDUCED PERIPHERAL NEUROPATHY (MULTI): ICD-10-CM

## 2024-11-13 DIAGNOSIS — G89.3 CANCER RELATED PAIN: ICD-10-CM

## 2024-11-13 RX ORDER — GABAPENTIN 300 MG/1
600 CAPSULE ORAL 3 TIMES DAILY
Qty: 180 CAPSULE | Refills: 3 | Status: SHIPPED | OUTPATIENT
Start: 2024-11-13 | End: 2024-12-13

## 2024-11-13 RX ORDER — OXYCODONE HYDROCHLORIDE 15 MG/1
15 TABLET ORAL EVERY 4 HOURS PRN
Qty: 150 TABLET | Refills: 0 | Status: SHIPPED | OUTPATIENT
Start: 2024-11-13 | End: 2024-12-13

## 2024-11-13 RX ORDER — MORPHINE SULFATE 30 MG/1
30 TABLET, FILM COATED, EXTENDED RELEASE ORAL 2 TIMES DAILY
Qty: 60 TABLET | Refills: 0 | Status: SHIPPED | OUTPATIENT
Start: 2024-11-13 | End: 2024-12-13

## 2024-11-13 NOTE — TELEPHONE ENCOUNTER
Patient requesting refills of Oxycodone, MS CONTIN and Gabapentin.   OARRS reviewed and no concerns noted. Per last visit with Cynthia Monroe NP  on 10/14/24, patient to continue above medications.  F/U visit scheduled for 11/25/24. Refills sent to provider for review/approval . Patient updated.

## 2024-11-25 ENCOUNTER — TELEMEDICINE (OUTPATIENT)
Dept: PALLIATIVE MEDICINE | Facility: CLINIC | Age: 49
End: 2024-11-25
Payer: COMMERCIAL

## 2024-11-25 ENCOUNTER — APPOINTMENT (OUTPATIENT)
Dept: CARDIOLOGY | Facility: CLINIC | Age: 49
End: 2024-11-25
Payer: COMMERCIAL

## 2024-11-25 DIAGNOSIS — Z51.5 PALLIATIVE CARE ENCOUNTER: ICD-10-CM

## 2024-11-25 DIAGNOSIS — R11.2 NAUSEA AND VOMITING, UNSPECIFIED VOMITING TYPE: ICD-10-CM

## 2024-11-25 DIAGNOSIS — G89.29 OTHER CHRONIC PAIN: ICD-10-CM

## 2024-11-25 DIAGNOSIS — C81.48: ICD-10-CM

## 2024-11-25 DIAGNOSIS — G89.3 CANCER RELATED PAIN: Primary | ICD-10-CM

## 2024-11-25 PROCEDURE — 99215 OFFICE O/P EST HI 40 MIN: CPT

## 2024-11-25 NOTE — PROGRESS NOTES
SUPPORTIVE AND PALLIATIVE ONCOLOGY OUTPATIENT FOLLOW-UP      SERVICE DATE: 11/25/2024    Virtual or Telephone Consent    An interactive audio and video telecommunication system which permits real time communications between the patient (at the originating site) and provider (at the distant site) was utilized to provide this telehealth service.   Verbal consent was requested and obtained from Pamela Lopez on this date, 11/25/24 for a telehealth visit.     Subjective   HISTORY OF PRESENT ILLNESS: Pamela Lopez is a 48 y.o. female who presents with newly diagnosed Hodgkin Lymphoma. She was admitted d/t back pain and a CT c/a/p revealed multiple hepatic masses and several osteoblastic  lesions. MRI brain was negative. She underwent biopsy of L3 on 6/9/23 which was nondiagnostic. PET scan showed FDG-avid lymph nodes in the neck, tonsil, bones, and liver. Biopsy of right tonsil 6/22 revealed Hodgkin Lymphoma. She underwent RT to T7 x10  fractions. She started treatment with AVD + Brentuximab 7/17/23. Recent PET scan with excellent response to treatment. PET scan 3/4 showed complete response.     Pain Assessment:  Pain Score: 7/10  Location: mid-back and lower back  Description: sharp, shooting pain in her lower back. The pain is worse after a fall 4 days ago. She continues to take oxycodone 15 mg 5 times per day. This brings her pain down from a 7/10 to a 5/10. She said that she cannot take morphine and oxycodone together anymore because it makes her sick to her stomach.     Symptom Assessment:  Pain:very much   Headache: none  Dizziness:none  Lack of energy: a little.   Difficulty sleeping: none sleeping well with morphine, gabapentin, and oxycodone   Worrying: a little   Anxiety: none   Depression: a little. Now related to increased pain and unable to be active.   Pain in mouth/swallowing: none  Dry mouth: none  Taste changes: none  Shortness of breath: none  Lack of appetite: none    Nausea: none. States that  she thinks the morphine is making her sick to her stomach and reports her head feeling heavy.  Vomiting: a little due to pain medications  Constipation: none  Diarrhea: none  Sore muscles: none  Numbness or tingling in hands/feet/other: somewhat. Hands and feet are numb. Improved with Gabapentin  Weight loss: none  Other: none      Information obtained from: interview of patient  ______________________________________________________________________        Objective     No visits with results within 1 Month(s) from this visit.   Latest known visit with results is:   Infusion on 09/09/2024   Component Date Value Ref Range Status    WBC 09/09/2024 6.9  4.4 - 11.3 x10*3/uL Final    RBC 09/09/2024 4.47  4.00 - 5.20 x10*6/uL Final    Hemoglobin 09/09/2024 14.0  12.0 - 16.0 g/dL Final    Hematocrit 09/09/2024 42.9  36.0 - 46.0 % Final    MCV 09/09/2024 96  80 - 100 fL Final    MCH 09/09/2024 31.3  26.0 - 34.0 pg Final    MCHC 09/09/2024 32.6  32.0 - 36.0 g/dL Final    RDW 09/09/2024 12.8  11.5 - 14.5 % Final    Platelets 09/09/2024 215  150 - 450 x10*3/uL Final    Neutrophils % 09/09/2024 58.4  40.0 - 80.0 % Final    Immature Granulocytes %, Automated 09/09/2024 0.0  0.0 - 0.9 % Final    Immature Granulocyte Count (IG) includes promyelocytes, myelocytes and metamyelocytes but does not include bands. Percent differential counts (%) should be interpreted in the context of the absolute cell counts (cells/UL).    Lymphocytes % 09/09/2024 30.0  13.0 - 44.0 % Final    Monocytes % 09/09/2024 7.6  2.0 - 10.0 % Final    Eosinophils % 09/09/2024 3.1  0.0 - 6.0 % Final    Basophils % 09/09/2024 0.9  0.0 - 2.0 % Final    Neutrophils Absolute 09/09/2024 4.02  1.20 - 7.70 x10*3/uL Final    Percent differential counts (%) should be interpreted in the context of the absolute cell counts (cells/uL).    Immature Granulocytes Absolute, Au* 09/09/2024 0.00  0.00 - 0.70 x10*3/uL Final    Lymphocytes Absolute 09/09/2024 2.06  1.20 - 4.80  x10*3/uL Final    Monocytes Absolute 09/09/2024 0.52  0.10 - 1.00 x10*3/uL Final    Eosinophils Absolute 09/09/2024 0.21  0.00 - 0.70 x10*3/uL Final    Basophils Absolute 09/09/2024 0.06  0.00 - 0.10 x10*3/uL Final    Glucose 09/09/2024 119 (H)  74 - 99 mg/dL Final    Sodium 09/09/2024 137  136 - 145 mmol/L Final    Potassium 09/09/2024 3.9  3.5 - 5.3 mmol/L Final    Chloride 09/09/2024 104  98 - 107 mmol/L Final    Bicarbonate 09/09/2024 24  21 - 32 mmol/L Final    Anion Gap 09/09/2024 13  10 - 20 mmol/L Final    Urea Nitrogen 09/09/2024 13  6 - 23 mg/dL Final    Creatinine 09/09/2024 0.77  0.50 - 1.05 mg/dL Final    eGFR 09/09/2024 >90  >60 mL/min/1.73m*2 Final    Calculations of estimated GFR are performed using the 2021 CKD-EPI Study Refit equation without the race variable for the IDMS-Traceable creatinine methods.  https://jasn.asnjournals.org/content/early/2021/09/22/ASN.0402219117    Calcium 09/09/2024 9.3  8.6 - 10.3 mg/dL Final    Albumin 09/09/2024 3.9  3.4 - 5.0 g/dL Final    Alkaline Phosphatase 09/09/2024 118 (H)  33 - 110 U/L Final    Total Protein 09/09/2024 6.9  6.4 - 8.2 g/dL Final    AST 09/09/2024 16  9 - 39 U/L Final    Bilirubin, Total 09/09/2024 0.3  0.0 - 1.2 mg/dL Final    ALT 09/09/2024 10  7 - 45 U/L Final    Patients treated with Sulfasalazine may generate falsely decreased results for ALT.    LDH 09/09/2024 180  84 - 246 U/L Final    Ferritin 09/09/2024 109  8 - 150 ng/mL Final    Iron 09/09/2024 102  35 - 150 ug/dL Final    UIBC 09/09/2024 220  110 - 370 ug/dL Final    TIBC 09/09/2024 322  240 - 445 ug/dL Final    % Saturation 09/09/2024 32  25 - 45 % Final    Vitamin B12 09/09/2024 306  211 - 911 pg/mL Final     PHYSICAL EXAMINATION   Vital Signs: not completed due to virtual visit  There were no vitals filed for this visit.        Physical Exam  HENT:      Head: Normocephalic.      Nose: Nose normal.   Eyes:      Pupils: Pupils are equal, round, and reactive to light.   Pulmonary:       Effort: Pulmonary effort is normal.   Musculoskeletal:         General: Normal range of motion.   Neurological:      Mental Status: She is alert and oriented to person, place, and time.   Psychiatric:         Mood and Affect: Mood normal.         Behavior: Behavior normal.          Social  Not . Lives with her 3 grown children.  History of drug use with Meth x8 years. Quit 1 year ago.  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     ASSESSMENT/PLAN    Pain  Pain is: cancer related pain  Type: somatic and neuropathic  Pain control: well-controlled  Home regimen:   - Decrease MSContin to 30 mg at bedtime. Will see if this helps with her upset stomach. Could consider Fentanyl patches if morphine is causing her to feel sick.   - Continue oxycodone 15 mg every 4 hours. Allow for 5 tabs per day.  - Saw Dr. Lockhart planning intrathecal pain pump. Will reach out to Dr. Lockhart to schedule this  - Has an appointment with Dr. Wagner in December - hoping to get a sooner appt.   - Continue Gabapentin 600 mg TID. Recently increased this. She has been taking 300/300/600 mg   - Continue Advil + Tylenol as needed  - Continue with aquatic therapy   - Continue Flexeril 10 mg at bedtime  - Continue Tizanidine 2 mg during the day  - Continue Ropinirole .25 mg at bedtime for RLS as needed  consistently. Recommended taking daily at bedtime.   - Completed 2 sessions of aquatic therapy and now with increased pain    Opioid Use  Medication Management:   - OARRS report reviewed with no aberrant behavior; consistent with  prescriptions/records and patient history  - MED 45.  Overdose Risk Score 290.   This has been discussed with patient.   - We will continue to closely monitor the patient for signs of prescription misuse including UDS, OARRS review and subjective reports at each visit.  - No concurrent benzodiazepine use   - I am a provider who either is or has consulted and collaborated  with a provider certified in Hospice and Palliative Medicine and have conducted a face-face visit and examination for this patient.  - Routine Urine Drug Screen completed 7/15/24 positive for oxycodone and negative for other illicit substances  - Controlled Substance Agreement: completed 10/14/24  - Specifically discussed that controlled substance prescriptions will only be provided by our group as outlined in the completed agreement  - Prescribed naloxone: Has prescription at home  - Red Flags: History of drug use with Meth. Quit 1 year ago     Nausea   Intermittent nausea without vomiting related to chemotherapy   - Continue Prochlorperazine 10 mg every 6 hours as needed  - Continue Ondansetron 8 mg every 8 hours as needed    Constipation  At risk for constipation related to opioids,  currently not constipated  Usual bowel pattern: daily  Home regimen:   - Continue Senna-S 1 tab BID- no longer taking due to diarrhea  - Start Magnesium Citrate - may start with 1/2 bottle to 1 bottle if no BM within 48-72 hours     Altered Mood  Chronic anxiety and depression related to health concerns   controlled with home regimen  Home regimen:   - Restart Lexapro 20 mg- encouraged her to restart this since feeling more depressed and emotional   - no longer taking Wellbutrin 150 mg BID.   - Discussed meeting with a community therapist for therapy as well    Sleeping Difficulty:  Home regimen:    - Trazodone stopped inpatient r/t QTc concerns  - No longer taking Risperidone 0.5 mg nightly as needed.   - Flexeril has been effective for sleep   - Discussed taking 5-10 mg of Melatonin. Encouraged to restart this.    Decreased appetite  Related to malignancy, chemotherapy, and disease process  Nutrition following  Home regimen:    - Olanzapine 2.5 mg every day at bedtime- this was stopped inpatient d/t QTc  - Following with Martha Khan    Night Sweats  - Continue Oxybutynin 5-10 mg nightly as needed.       Advance  Directives  Existence of Advance Directives:Yes, documentation or copy in medical record  Decision maker: AARON is Cynthia Xie  Code Status: Full code    Next Follow-Up Visit:  Return to clinic in 2 weeks    Signature and billing  Medical complexity was low level due to due to complexity of problems, extensive data review, and high risk of management/treatment.  Time was spent on the following: Prep Time, Time Directly with Patient/Family/Caregiver, Documentation Time. Total time spent: 45      Data  Diagnostic tests and information reviewed for today's visit:  Most recent labs, Most recent imaging, Medications         Some elements copied from Cynthia Johnson note on 9/16/24, the elements have been updated and all reflect current decision making from today, 11/25/2024.      Plan of Care discussed with: Patient    SIGNATURE: THOMAS Downing    Contact information:  Supportive and Palliative Oncology  Monday-Friday 8 AM-5 PM  Phone:  [phone], press option #5, then option #1.   Or Epic Secure Chat

## 2024-11-26 ENCOUNTER — APPOINTMENT (OUTPATIENT)
Dept: CARDIOLOGY | Facility: CLINIC | Age: 49
End: 2024-11-26
Payer: COMMERCIAL

## 2024-12-04 ENCOUNTER — PATIENT MESSAGE (OUTPATIENT)
Dept: PALLIATIVE MEDICINE | Facility: CLINIC | Age: 49
End: 2024-12-04
Payer: COMMERCIAL

## 2024-12-04 DIAGNOSIS — G89.3 CANCER RELATED PAIN: Primary | ICD-10-CM

## 2024-12-06 ENCOUNTER — DOCUMENTATION (OUTPATIENT)
Dept: PALLIATIVE MEDICINE | Facility: CLINIC | Age: 49
End: 2024-12-06
Payer: COMMERCIAL

## 2024-12-06 RX ORDER — FENTANYL 50 UG/1
1 PATCH TRANSDERMAL
Qty: 5 PATCH | Refills: 0 | Status: SHIPPED | OUTPATIENT
Start: 2024-12-06 | End: 2024-12-21

## 2024-12-06 NOTE — PATIENT COMMUNICATION
Phone call to pharmacy to see if PA needed for Fentanyl patches. PA needed, sent to [facility]. I will update patient via My chart.

## 2024-12-06 NOTE — PATIENT COMMUNICATION
Per Cynthia Monroe, CNP okay to send Fentanyl 50mcg #5 patches/15 day supply. Script pended to provider. I will call pharmacy to see if PA approved once sent. I will then update patient.

## 2024-12-06 NOTE — PROGRESS NOTES
Per pharmacy PA required for fentanyl patches, sent to [facility] for processing. Pt previously on MSER and did not tolerate see last note.

## 2024-12-06 NOTE — PATIENT COMMUNICATION
Per Cynthia Monroe, CNP stop MSER and start Fentanyl 50mcg if patient is agreeable to medication. We will wait to see if patient is open to starting new medication prior to sending prescription to pharmacy.

## 2024-12-09 ENCOUNTER — TELEMEDICINE (OUTPATIENT)
Dept: PALLIATIVE MEDICINE | Facility: CLINIC | Age: 49
End: 2024-12-09
Payer: COMMERCIAL

## 2024-12-09 ENCOUNTER — INFUSION (OUTPATIENT)
Dept: HEMATOLOGY/ONCOLOGY | Facility: CLINIC | Age: 49
End: 2024-12-09
Payer: COMMERCIAL

## 2024-12-09 ENCOUNTER — PATIENT MESSAGE (OUTPATIENT)
Dept: PALLIATIVE MEDICINE | Facility: CLINIC | Age: 49
End: 2024-12-09

## 2024-12-09 ENCOUNTER — OFFICE VISIT (OUTPATIENT)
Dept: HEMATOLOGY/ONCOLOGY | Facility: CLINIC | Age: 49
End: 2024-12-09
Payer: COMMERCIAL

## 2024-12-09 VITALS
OXYGEN SATURATION: 98 % | HEART RATE: 42 BPM | SYSTOLIC BLOOD PRESSURE: 128 MMHG | TEMPERATURE: 97.3 F | DIASTOLIC BLOOD PRESSURE: 68 MMHG | RESPIRATION RATE: 15 BRPM | WEIGHT: 132.28 LBS | BODY MASS INDEX: 25.83 KG/M2

## 2024-12-09 DIAGNOSIS — C81.48: ICD-10-CM

## 2024-12-09 DIAGNOSIS — G89.3 CANCER RELATED PAIN: ICD-10-CM

## 2024-12-09 DIAGNOSIS — C81.48: Primary | ICD-10-CM

## 2024-12-09 DIAGNOSIS — R07.9 LEFT-SIDED CHEST PAIN: ICD-10-CM

## 2024-12-09 LAB
ALBUMIN SERPL BCP-MCNC: 3.8 G/DL (ref 3.4–5)
ALP SERPL-CCNC: 105 U/L (ref 33–110)
ALT SERPL W P-5'-P-CCNC: 9 U/L (ref 7–45)
ANION GAP SERPL CALC-SCNC: 10 MMOL/L (ref 10–20)
AST SERPL W P-5'-P-CCNC: 16 U/L (ref 9–39)
BASOPHILS # BLD AUTO: 0.06 X10*3/UL (ref 0–0.1)
BASOPHILS NFR BLD AUTO: 0.9 %
BILIRUB SERPL-MCNC: 0.4 MG/DL (ref 0–1.2)
BUN SERPL-MCNC: 14 MG/DL (ref 6–23)
CALCIUM SERPL-MCNC: 9.1 MG/DL (ref 8.6–10.3)
CHLORIDE SERPL-SCNC: 104 MMOL/L (ref 98–107)
CO2 SERPL-SCNC: 26 MMOL/L (ref 21–32)
CREAT SERPL-MCNC: 0.62 MG/DL (ref 0.5–1.05)
EGFRCR SERPLBLD CKD-EPI 2021: >90 ML/MIN/1.73M*2
EOSINOPHIL # BLD AUTO: 0.22 X10*3/UL (ref 0–0.7)
EOSINOPHIL NFR BLD AUTO: 3.3 %
ERYTHROCYTE [DISTWIDTH] IN BLOOD BY AUTOMATED COUNT: 12.5 % (ref 11.5–14.5)
GLUCOSE SERPL-MCNC: 123 MG/DL (ref 74–99)
HCT VFR BLD AUTO: 41.6 % (ref 36–46)
HGB BLD-MCNC: 13.7 G/DL (ref 12–16)
IMM GRANULOCYTES # BLD AUTO: 0 X10*3/UL (ref 0–0.7)
IMM GRANULOCYTES NFR BLD AUTO: 0 % (ref 0–0.9)
LDH SERPL L TO P-CCNC: 200 U/L (ref 84–246)
LYMPHOCYTES # BLD AUTO: 2.15 X10*3/UL (ref 1.2–4.8)
LYMPHOCYTES NFR BLD AUTO: 31.9 %
MCH RBC QN AUTO: 31.1 PG (ref 26–34)
MCHC RBC AUTO-ENTMCNC: 32.9 G/DL (ref 32–36)
MCV RBC AUTO: 95 FL (ref 80–100)
MONOCYTES # BLD AUTO: 0.38 X10*3/UL (ref 0.1–1)
MONOCYTES NFR BLD AUTO: 5.6 %
NEUTROPHILS # BLD AUTO: 3.94 X10*3/UL (ref 1.2–7.7)
NEUTROPHILS NFR BLD AUTO: 58.3 %
PLATELET # BLD AUTO: 168 X10*3/UL (ref 150–450)
POTASSIUM SERPL-SCNC: 3.8 MMOL/L (ref 3.5–5.3)
PROT SERPL-MCNC: 6.6 G/DL (ref 6.4–8.2)
RBC # BLD AUTO: 4.4 X10*6/UL (ref 4–5.2)
SODIUM SERPL-SCNC: 136 MMOL/L (ref 136–145)
WBC # BLD AUTO: 6.8 X10*3/UL (ref 4.4–11.3)

## 2024-12-09 PROCEDURE — 85025 COMPLETE CBC W/AUTO DIFF WBC: CPT

## 2024-12-09 PROCEDURE — 99214 OFFICE O/P EST MOD 30 MIN: CPT

## 2024-12-09 PROCEDURE — 36591 DRAW BLOOD OFF VENOUS DEVICE: CPT

## 2024-12-09 PROCEDURE — 83615 LACTATE (LD) (LDH) ENZYME: CPT

## 2024-12-09 PROCEDURE — 82565 ASSAY OF CREATININE: CPT

## 2024-12-09 RX ORDER — HEPARIN 100 UNIT/ML
500 SYRINGE INTRAVENOUS AS NEEDED
OUTPATIENT
Start: 2024-12-09

## 2024-12-09 RX ORDER — HEPARIN SODIUM,PORCINE/PF 10 UNIT/ML
50 SYRINGE (ML) INTRAVENOUS AS NEEDED
Status: DISCONTINUED | OUTPATIENT
Start: 2024-12-09 | End: 2024-12-09 | Stop reason: HOSPADM

## 2024-12-09 RX ORDER — HEPARIN SODIUM,PORCINE/PF 10 UNIT/ML
50 SYRINGE (ML) INTRAVENOUS AS NEEDED
OUTPATIENT
Start: 2024-12-09

## 2024-12-09 ASSESSMENT — PAIN SCALES - GENERAL: PAINLEVEL_OUTOF10: 4

## 2024-12-09 NOTE — PROGRESS NOTES
Patient ID: Pamela Lopez is a 49 y.o. female.  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 Pamela Lopez is a 49 y/o F presenting for follow-up for Hodgkin's lymphoma.     Patient continues to follow with pain management who recently noted she may be undergoing workup for intrathecal pump for pain control. She also follows with cardiology at [facility] who in November ordered a Zio patch x2 weeks. She is waiting for results but was told her HR is likely normal, not bradycardic, due to bigeminy. She reports that she has been doing well except worsening fatigue. She also had a fall recently, down 13 stairs, and has been having left sided pain that radiates to her back since. Says it is constant and throbbing/ache. She says she has worsening of her lower back pain that is intermittent and worries her.     She denies night sweats, appetite changes or weight loss, fevers, infections, diarrhea, constipation, urinary changes, vomiting, new lumps/bumps or edema. She denies other concerns.      Patient's past medical history, surgical history, family history and social history reviewed.    Review of Systems:   Review of Systems:    Positive per HPI, otherwise negative.     Objective    Visit Vitals  /68   Pulse (!) 42   Temp 36.3 °C (97.3 °F)   Resp 15   Wt 60 kg (132 lb 4.4 oz)   SpO2 98%   BMI 25.83 kg/m²   OB Status Chemotherapy/radiation   Smoking Status Former   BSA 1.59 m²     Physical Exam  Constitutional:       General: She is not in acute distress.     Appearance: Normal appearance.   HENT:      Head: Normocephalic.      Mouth/Throat:      Mouth: Mucous membranes are moist.      Pharynx: No oropharyngeal  exudate or posterior oropharyngeal erythema.   Eyes:      General: No scleral icterus.     Pupils: Pupils are equal, round, and reactive to light.   Cardiovascular:      Rate and Rhythm: Regular rhythm. Bradycardia present.   Pulmonary:      Effort: Pulmonary effort is normal. No respiratory distress.      Breath sounds: Normal breath sounds. No wheezing.   Abdominal:      General: Abdomen is flat. Bowel sounds are normal. There is no distension.      Palpations: Abdomen is soft.      Tenderness: There is no abdominal tenderness.   Musculoskeletal:         General: Tenderness present. Normal range of motion.      Cervical back: Normal range of motion and neck supple.      Comments: Pain with palpitation of left rib cage along axillary line to mid back   Skin:     General: Skin is warm and dry.   Neurological:      General: No focal deficit present.      Mental Status: She is alert and oriented to person, place, and time. Mental status is at baseline.      Motor: No weakness.      Gait: Gait normal.   Psychiatric:         Mood and Affect: Mood normal.         Behavior: Behavior normal.         Judgment: Judgment normal.       Performance Status:  Symptomatic; fully ambulatory    Labs/Imaging/Pathology: personally reviewed reports and images in Epic electronic medical record system. Pertinent results as it related to the plan represented in below in assessment and plan.     Labs:  Lab Results   Component Value Date    WBC 6.8 12/09/2024    NEUTROABS 3.94 12/09/2024    IGABSOL 0.00 12/09/2024    LYMPHSABS 2.15 12/09/2024    MONOSABS 0.38 12/09/2024    EOSABS 0.22 12/09/2024    BASOSABS 0.06 12/09/2024    RBC 4.40 12/09/2024    MCV 95 12/09/2024    MCHC 32.9 12/09/2024    HGB 13.7 12/09/2024    HCT 41.6 12/09/2024     12/09/2024     Lab Results   Component Value Date    CREATININE 0.62 12/09/2024    BUN 14 12/09/2024    EGFR >90 12/09/2024     12/09/2024    K 3.8 12/09/2024     12/09/2024    CO2 26  12/09/2024      Lab Results   Component Value Date    ALT 9 12/09/2024    AST 16 12/09/2024    ALKPHOS 105 12/09/2024    BILITOT 0.4 12/09/2024          Assessment/Plan   1. Classical Hodgkin lymphoma, lymphocyte- rich subtype, stage IVB     - developed tonsil enlargement initially over two years ago and thought to be related to infection  - 6/5/23- 6/723 admitted for new pain and found to have multiple spine mets, neck lymphadenopathy and possible liver lesions  -  biopsy 6/9/23 of her L3 was nondiagnostic  - 6/13/23 PET/CT with FDG avid right palantine tonsil, b/l cervical, portacaval, RP nodes, hepatic met, osseous lesions in axial skeleton  - 6/19/23 planned for 10 fx to T7  - 6/22/23 tonsillectomy path consistent with classical Hodgkin lymphoma  - Discussed diagnosis of classical Hodgkin's lymphoma with patient and given stage IV disease with B symptom along with history of strong tobacco use, recommend brentuximab plus AVD per ECHELON-1 trial (Lisa et al. [facility] 2018)  - Reviewed side effects and consent signed  - pt is planning to see dental next week as she has active abscess with pain and still has RT next week  - 7/17/12 AVD+ brentuximab  -hospitalized for fever and discharge 8/27/23 and no infectious cause found.  - delays due to neutropenia, cycle 3 day on 9/25/23.  - 10/9/23 PET CT interim with significant treatment response   11/19/23 C5D1 reduced brentuximab to 0.9 mg/mkg   12/11/2023 C5 D15 delayed due to hypotension and tooth infection  - 1/22/24 C6 D15     3/5/24:  - PET CT on 3/4/24 shows a complete response.   - Overall, she continues to have some neuropathy. We discussed that this will take some time to improve.  - She is still having trouble with weight gain. She is going to work on meals.  - She will continue her electrolytes.  - We will plan for a port flush in 6 weeks and follow-up with me in 3 months with repeat labs.  - RTC with me in 3 months.      6/10/2024:  - Presents for 3 month  "follow up visit   - Having port flushed every 6 weeks   - Labs reviewed, we will have stop her oral iron   - We will recheck her LFTs at night port flush/labs in 6 weeks due to elevated Alk Phos  - She prefers to stay on Acyclovir for now and I discussed with Dr. Gomez who was ok with refilling for now   - She continues following with palliative medicine   - She denies B symptoms and no abnormalities noted on exam   - She will therefore return in 12 weeks for next FUV with Dr. Gomez     9/9/24:  - Patient had some leg spikes, however, nothing like she was having in the past   - We discussed ultimately her biggest concern is her back..   - She does have shattered fracture in her cervical and thoracic spine.   - She met with a nurse practitioner for neurosurgery and now is planned for imaging end of October and neurosurgery visit in December.   - She is hoping to expedite this workup.   - Will plan for labs today  and I will reach out to their team if anything can be done sooner.  - She is having depression from not being able to go to work post treatment and would like to meet with psychiatrist. Previously met with [facility] RTC in 3 months with Lamonte   - She will call us with new symptoms or new concerns.     12/9/2024:   - Presents for follow up visit   - Labs via mediport unremarkable, LDH still pending  - Doing well other than new left side pain radiating to her back   - Notes acute on chronic lower back pain   - She feels worried about pains and cancer recurrence and prefers a PET scan  - CT T spine on 10/23/24 showing \"suspected blastic metastasis in the posteromedial left 7th rib\" so we will order PET scan due to pain of left side and worsening fatigue   - Advised to go to the ED if she has worsening pain or new symptoms in the meantime   - She will RTC in 2 weeks for PET scan with a FUV in 3 weeks with Dr. Gomez to review results (she prefers in person).   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

## 2024-12-10 ENCOUNTER — DOCUMENTATION (OUTPATIENT)
Dept: PALLIATIVE MEDICINE | Facility: HOSPITAL | Age: 49
End: 2024-12-10
Payer: COMMERCIAL

## 2024-12-10 RX ORDER — OXYCODONE HYDROCHLORIDE 15 MG/1
15 TABLET ORAL EVERY 4 HOURS PRN
Qty: 150 TABLET | Refills: 0 | Status: SHIPPED | OUTPATIENT
Start: 2024-12-12 | End: 2025-01-11

## 2024-12-10 NOTE — PROGRESS NOTES
Per [facility], PA has been obtained for the 50 MCG Fentanyl patches. Pharmacy updated. Patient updated via Spout.

## 2024-12-12 DIAGNOSIS — C81.90 HODGKIN LYMPHOMA, UNSPECIFIED HODGKIN LYMPHOMA TYPE, UNSPECIFIED BODY REGION (MULTI): ICD-10-CM

## 2024-12-12 DIAGNOSIS — C85.98 NON-HODGKIN LYMPHOMA OF LYMPH NODES OF MULTIPLE REGIONS, UNSPECIFIED NON-HODGKIN LYMPHOMA TYPE (MULTI): Primary | ICD-10-CM

## 2024-12-17 ENCOUNTER — TELEMEDICINE (OUTPATIENT)
Dept: PALLIATIVE MEDICINE | Facility: CLINIC | Age: 49
End: 2024-12-17
Payer: COMMERCIAL

## 2024-12-17 DIAGNOSIS — G89.3 CANCER RELATED PAIN: Primary | ICD-10-CM

## 2024-12-17 DIAGNOSIS — Z51.5 PALLIATIVE CARE ENCOUNTER: ICD-10-CM

## 2024-12-17 DIAGNOSIS — C81.48: ICD-10-CM

## 2024-12-17 PROCEDURE — 99214 OFFICE O/P EST MOD 30 MIN: CPT

## 2024-12-17 RX ORDER — FENTANYL 50 UG/1
1 PATCH TRANSDERMAL
Qty: 10 PATCH | Refills: 0 | Status: SHIPPED | OUTPATIENT
Start: 2024-12-23 | End: 2025-01-22

## 2024-12-17 NOTE — PROGRESS NOTES
SUPPORTIVE AND PALLIATIVE ONCOLOGY OUTPATIENT FOLLOW-UP      SERVICE DATE: 12/17/2024    Virtual or Telephone Consent    An interactive audio and video telecommunication system which permits real time communications between the patient (at the originating site) and provider (at the distant site) was utilized to provide this telehealth service.   Verbal consent was requested and obtained from Pamela Lopez on this date, 12/17/24 for a telehealth visit.     Subjective   HISTORY OF PRESENT ILLNESS: Pamela Lopez is a 49 y.o. female who presents with newly diagnosed Hodgkin Lymphoma. She was admitted d/t back pain and a CT c/a/p revealed multiple hepatic masses and several osteoblastic  lesions. MRI brain was negative. She underwent biopsy of L3 on 6/9/23 which was nondiagnostic. PET scan showed FDG-avid lymph nodes in the neck, tonsil, bones, and liver. Biopsy of right tonsil 6/22 revealed Hodgkin Lymphoma. She underwent RT to T7 x10  fractions. She started treatment with AVD + Brentuximab 7/17/23. Recent PET scan with excellent response to treatment. PET scan 3/4 showed complete response.     - underwent full dental extraction on 12/16    Pain Assessment:  Pain Score: 7/10  Location: mid-back and lower back  Description: sharp, shooting pain in her lower back. She reports that overall her pain has significantly improved since wearing a fentanyl patch instead of morphine. She has decreased her oxycodone to 1-2 tabs per day with good relief. She does have increased pain that is throbbing and aching before she has a bowel movement. She notes pain in her mouth since her dental extraction yesterday.     Symptom Assessment:  Pain:very much   Headache: none  Dizziness:none  Lack of energy: a little.   Difficulty sleeping: none   Worrying: a little   Anxiety: none   Depression: a little.   Pain in mouth/swallowing: none  Dry mouth: none  Taste changes: none  Shortness of breath: none  Lack of appetite: none    Nausea:  none. No longer nauseated since stopping MSContin  Vomiting: none   Constipation: none  Diarrhea: none  Sore muscles: none  Numbness or tingling in hands/feet/other: somewhat. Hands and feet are numb. Improved with Gabapentin  Weight loss: none  Other: none      Information obtained from: interview of patient  ______________________________________________________________________        Objective     Office Visit on 12/09/2024   Component Date Value Ref Range Status    WBC 12/09/2024 6.8  4.4 - 11.3 x10*3/uL Final    RBC 12/09/2024 4.40  4.00 - 5.20 x10*6/uL Final    Hemoglobin 12/09/2024 13.7  12.0 - 16.0 g/dL Final    Hematocrit 12/09/2024 41.6  36.0 - 46.0 % Final    MCV 12/09/2024 95  80 - 100 fL Final    MCH 12/09/2024 31.1  26.0 - 34.0 pg Final    MCHC 12/09/2024 32.9  32.0 - 36.0 g/dL Final    RDW 12/09/2024 12.5  11.5 - 14.5 % Final    Platelets 12/09/2024 168  150 - 450 x10*3/uL Final    Neutrophils % 12/09/2024 58.3  40.0 - 80.0 % Final    Immature Granulocytes %, Automated 12/09/2024 0.0  0.0 - 0.9 % Final    Immature Granulocyte Count (IG) includes promyelocytes, myelocytes and metamyelocytes but does not include bands. Percent differential counts (%) should be interpreted in the context of the absolute cell counts (cells/UL).    Lymphocytes % 12/09/2024 31.9  13.0 - 44.0 % Final    Monocytes % 12/09/2024 5.6  2.0 - 10.0 % Final    Eosinophils % 12/09/2024 3.3  0.0 - 6.0 % Final    Basophils % 12/09/2024 0.9  0.0 - 2.0 % Final    Neutrophils Absolute 12/09/2024 3.94  1.20 - 7.70 x10*3/uL Final    Percent differential counts (%) should be interpreted in the context of the absolute cell counts (cells/uL).    Immature Granulocytes Absolute, Au* 12/09/2024 0.00  0.00 - 0.70 x10*3/uL Final    Lymphocytes Absolute 12/09/2024 2.15  1.20 - 4.80 x10*3/uL Final    Monocytes Absolute 12/09/2024 0.38  0.10 - 1.00 x10*3/uL Final    Eosinophils Absolute 12/09/2024 0.22  0.00 - 0.70 x10*3/uL Final    Basophils Absolute  12/09/2024 0.06  0.00 - 0.10 x10*3/uL Final    Glucose 12/09/2024 123 (H)  74 - 99 mg/dL Final    Sodium 12/09/2024 136  136 - 145 mmol/L Final    Potassium 12/09/2024 3.8  3.5 - 5.3 mmol/L Final    Chloride 12/09/2024 104  98 - 107 mmol/L Final    Bicarbonate 12/09/2024 26  21 - 32 mmol/L Final    Anion Gap 12/09/2024 10  10 - 20 mmol/L Final    Urea Nitrogen 12/09/2024 14  6 - 23 mg/dL Final    Creatinine 12/09/2024 0.62  0.50 - 1.05 mg/dL Final    eGFR 12/09/2024 >90  >60 mL/min/1.73m*2 Final    Calculations of estimated GFR are performed using the 2021 CKD-EPI Study Refit equation without the race variable for the IDMS-Traceable creatinine methods.  https://jasn.asnjournals.org/content/early/2021/09/22/ASN.8948451222    Calcium 12/09/2024 9.1  8.6 - 10.3 mg/dL Final    Albumin 12/09/2024 3.8  3.4 - 5.0 g/dL Final    Alkaline Phosphatase 12/09/2024 105  33 - 110 U/L Final    Total Protein 12/09/2024 6.6  6.4 - 8.2 g/dL Final    AST 12/09/2024 16  9 - 39 U/L Final    Bilirubin, Total 12/09/2024 0.4  0.0 - 1.2 mg/dL Final    ALT 12/09/2024 9  7 - 45 U/L Final    Patients treated with Sulfasalazine may generate falsely decreased results for ALT.    LDH 12/09/2024 200  84 - 246 U/L Final     PHYSICAL EXAMINATION   Vital Signs: not completed due to virtual visit  There were no vitals filed for this visit.        Physical Exam  HENT:      Head: Normocephalic.      Nose: Nose normal.   Eyes:      Pupils: Pupils are equal, round, and reactive to light.   Pulmonary:      Effort: Pulmonary effort is normal.   Musculoskeletal:         General: Normal range of motion.   Neurological:      Mental Status: She is alert and oriented to person, place, and time.   Psychiatric:         Mood and Affect: Mood normal.         Behavior: Behavior normal.          Social  Not . Lives with her 3 grown children.  History of drug use with Meth x8 years. Quit 1 year ago.  Worked as an LPN and at [employer]   Enjoys riding her  bike and walking her dogs.     ASSESSMENT/PLAN    Pain  Pain is: cancer related pain  Type: somatic and neuropathic  Pain control: well-controlled  Home regimen:   - MSContin caused nausea, vomiting and heavy feeling  - Continue Fentanyl 50 mcg patch every 72 hours  - Continue oxycodone 15 mg every 4 hours. Allow for 5 tabs per day.  - Saw Dr. Lockhart planning intrathecal pain pump.   - Has an appointment with Dr. Wagner 12/31  - Continue Gabapentin 600 mg TID. Recently increased this. She has been taking 300/300/600 mg   - Continue Advil + Tylenol as needed  - Continue with aquatic therapy   - Continue Flexeril 10 mg at bedtime  - Continue Tizanidine 2 mg during the day  - Continue Ropinirole .25 mg at bedtime for RLS as needed  consistently. Recommended taking daily at bedtime.   - Completed 2 sessions of aquatic therapy and now with increased pain    Opioid Use  Medication Management:   - OARRS report reviewed with no aberrant behavior; consistent with  prescriptions/records and patient history  - MED 45.  Overdose Risk Score 290.   This has been discussed with patient.   - We will continue to closely monitor the patient for signs of prescription misuse including UDS, OARRS review and subjective reports at each visit.  - No concurrent benzodiazepine use   - I am a provider who either is or has consulted and collaborated with a provider certified in Hospice and Palliative Medicine and have conducted a face-face visit and examination for this patient.  - Routine Urine Drug Screen completed 7/15/24 positive for oxycodone and negative for other illicit substances  - Controlled Substance Agreement: completed 10/14/24  - Specifically discussed that controlled substance prescriptions will only be provided by our group as outlined in the completed agreement  - Prescribed naloxone: Has prescription at home  - Red Flags: History of drug use with Meth. Quit 1 year ago     Nausea   Intermittent nausea without vomiting  related to chemotherapy   - Continue Prochlorperazine 10 mg every 6 hours as needed  - Continue Ondansetron 8 mg every 8 hours as needed    Constipation  At risk for constipation related to opioids,  currently not constipated  Usual bowel pattern: daily  Home regimen:   - Continue Senna-S 1 tab BID- no longer taking due to diarrhea  - Start Magnesium Citrate - may start with 1/2 bottle to 1 bottle if no BM within 48-72 hours     Altered Mood  Chronic anxiety and depression related to health concerns   controlled with home regimen  Home regimen:   - Restart Lexapro 20 mg- encouraged her to restart this since feeling more depressed and emotional   - no longer taking Wellbutrin 150 mg BID.   - Discussed meeting with a community therapist for therapy as well    Sleeping Difficulty:  Home regimen:    - Trazodone stopped inpatient r/t QTc concerns  - No longer taking Risperidone 0.5 mg nightly as needed.   - Flexeril has been effective for sleep   - Discussed taking 5-10 mg of Melatonin. Encouraged to restart this.    Decreased appetite  Related to malignancy, chemotherapy, and disease process  Nutrition following  Home regimen:    - Olanzapine 2.5 mg every day at bedtime- this was stopped inpatient d/t QTc  - Following with Martha Khan    Night Sweats  - Continue Oxybutynin 5-10 mg nightly as needed.       Advance Directives  Existence of Advance Directives:Yes, documentation or copy in medical record  Decision maker: AARON is Cynthia Xie  Code Status: Full code    Next Follow-Up Visit:  Return to clinic in 1 month     Signature and billing  Medical complexity was low level due to due to complexity of problems, extensive data review, and high risk of management/treatment.  Time was spent on the following: Prep Time, Time Directly with Patient/Family/Caregiver, Documentation Time. Total time spent: 30      Data  Diagnostic tests and information reviewed for today's visit:  Most recent labs, Most recent  imaging, Medications         Some elements copied from Cynthia Johnson note on 11/25/24, the elements have been updated and all reflect current decision making from today, 12/17/2024.      Plan of Care discussed with: Patient    SIGNATURE: THOMAS Downing    Contact information:  Supportive and Palliative Oncology  Monday-Friday 8 AM-5 PM  Phone:  [phone], press option #5, then option #1.   Or Epic Secure Chat

## 2024-12-21 NOTE — PROGRESS NOTES
Consent:  Verbal consent was requested and obtained from patient on this date for a telehealth visit.     Patient ID: Pamela Lopez is a 49 y.o. female.  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     Ms. Pamela Lopez is a 50 y/o F presenting for follow-up for Hodgkin's lymphoma.     Blood work 12/9/24 shows CMP and CBC unremarkable and LDH is normal.    Patient reports some worsening back pain since falling down 14 basement stairs and states when she asked for a CT scan they recommended a PET CT. She is scheduled to see surgeon on 12/31/24 to discuss pain pump vs. Surgery. She states she got good relief from the morphine injection she received and the fentanyl patch she is using currently. She had recent mouth surgery to remove teeth and is still recovering from this. No other major complaints at this time.     Patient's past medical history, surgical history, family history and social history reviewed.     Objective      Review of Systems:   Review of Systems:    Positive per HPI, otherwise negative.      Performance Status:  Symptomatic; fully ambulatory    Labs/Imaging/Pathology: personally reviewed reports and images in Epic electronic medical record system. Pertinent results as it related to the plan represented in below in assessment and plan.   Assessment/Plan   . Classical Hodgkin lymphoma, lymphocyte- rich subtype, stage IVB     - developed tonsil enlargement initially over two years ago and thought to be related to infection  - 6/5/23-  6/723 admitted for new pain and found to have multiple spine mets, neck lymphadenopathy and possible liver lesions  -  biopsy 6/9/23 of her L3 was nondiagnostic  - 6/13/23 PET/CT with FDG avid right palantine tonsil, b/l cervical, portacaval, RP nodes, hepatic met, osseous lesions in axial skeleton  - 6/19/23 planned for 10 fx to T7  - 6/22/23 tonsillectomy path consistent with classical Hodgkin lymphoma  - Discussed diagnosis of classical Hodgkin's lymphoma with patient and given stage IV disease with B symptom along with history of strong tobacco use, recommend brentuximab plus AVD per ECHELON-1 trial (Lisa, et al. [facility] 2018)  - Reviewed side effects and consent signed  - pt is planning to see dental next week as she has active abscess with pain and still has RT next week  - 7/17/12 AVD+ brentuximab  -hospitalized for fever and discharge 8/27/23 and no infectious cause found.  - delays due to neutropenia, cycle 3 day on 9/25/23.  - 10/9/23 PET CT interim with significant treatment response   11/19/23 C5D1 reduced brentuximab to 0.9 mg/mkg   12/11/2023 C5 D15 delayed due to hypotension and tooth infection  - 1/22/24 C6 D15     3/5/24:  - PET CT on 3/4/24 shows a complete response.   - Overall, she continues to have some neuropathy. We discussed that this will take some time to improve.  - She is still having trouble with weight gain. She is going to work on meals.  - She will continue her electrolytes.  - We will plan for a port flush in 6 weeks and follow-up with me in 3 months with repeat labs.  - RTC with me in 3 months.      6/10/2024:  - Presents for 3 month follow up visit   - Having port flushed every 6 weeks   - Labs reviewed, we will have stop her oral iron   - We will recheck her LFTs at night port flush/labs in 6 weeks due to elevated Alk Phos  - She prefers to stay on Acyclovir for now and I discussed with Dr. Gomez who was ok with refilling for now   - She continues following with palliative  "medicine   - She denies B symptoms and no abnormalities noted on exam   - She will therefore return in 12 weeks for next FUV with Dr. Gomez      9/9/24:  - Patient had some leg spikes, however, nothing like she was having in the past   - We discussed ultimately her biggest concern is her back..   - She does have shattered fracture in her cervical and thoracic spine.   - She met with a nurse practitioner for neurosurgery and now is planned for imaging end of October and neurosurgery visit in December.   - She is hoping to expedite this workup.   - Will plan for labs today  and I will reach out to their team if anything can be done sooner.  - She is having depression from not being able to go to work post treatment and would like to meet with psychiatrist. Previously met with Trav   - RTC in 3 months with Lamonte   - She will call us with new symptoms or new concerns.      12/9/2024:   - Presents for follow up visit   - Labs via mediport unremarkable, LDH still pending  - Doing well other than new left side pain radiating to her back   - Notes acute on chronic lower back pain   - She feels worried about pains and cancer recurrence and prefers a PET scan  - CT T spine on 10/23/24 showing \"suspected blastic metastasis in the posteromedial left 7th rib\" so we will order PET scan due to pain of left side and worsening fatigue   - Advised to go to the ED if she has worsening pain or new symptoms in the meantime   - She will RTC in 2 weeks for PET scan with a FUV in 3 weeks with Dr. Gomez to review results (she prefers in person).   - She will then need port flushes depending on FUV schedule      12/27/24: Telephone visit  - Reviewed recent PET CT that shows no evidence of recurrence of lymphoma.   - I do believe her back pain is related to recent fall.  - She is planned to see Dr. Wagner again to discuss pain pump vs. Surgery.  - She will call us with any new concerns in the meantime.   - RTC in 6 months.    Reviewed " ongoing medical problems and how they relate to her classical hodgkin lymphoma, will continue long term monitoring.    RTC in 6 months. This note has been transcribed using a medical scribe and there is a possibility of unintentional typing misprints.       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 (Multi)  -     Clinic Appointment Request; Future  -     CBC and Auto Differential; Future  -     Comprehensive metabolic panel; Future  -     Lactate dehydrogenase; Future  -     Infusion Appointment Request; Future  -     Infusion Appointment Request; Future  -     Infusion Appointment Request; Future  -     Infusion Appointment Request; Future       Jess Gomez MD  Hematology/Oncology  [facility]    Scribe Attestation  By signing my name below, I, Amauri Jones   attest that this documentation has been prepared under the direction and in the presence of Jess Gomez MD.     30 min spent on this encounter

## 2024-12-22 ENCOUNTER — APPOINTMENT (OUTPATIENT)
Dept: BEHAVIORAL HEALTH | Facility: CLINIC | Age: 49
End: 2024-12-22
Payer: COMMERCIAL

## 2024-12-23 ENCOUNTER — HOSPITAL ENCOUNTER (OUTPATIENT)
Dept: RADIOLOGY | Facility: CLINIC | Age: 49
Discharge: HOME | End: 2024-12-23
Payer: COMMERCIAL

## 2024-12-23 ENCOUNTER — INFUSION (OUTPATIENT)
Dept: HEMATOLOGY/ONCOLOGY | Facility: CLINIC | Age: 49
End: 2024-12-23
Payer: COMMERCIAL

## 2024-12-23 DIAGNOSIS — C81.48: ICD-10-CM

## 2024-12-23 PROCEDURE — A9552 F18 FDG: HCPCS

## 2024-12-23 PROCEDURE — 3430000001 HC RX 343 DIAGNOSTIC RADIOPHARMACEUTICALS

## 2024-12-23 PROCEDURE — 78815 PET IMAGE W/CT SKULL-THIGH: CPT | Mod: PET TUMOR SUBSQ TX STRATEGY | Performed by: RADIOLOGY

## 2024-12-23 PROCEDURE — 78815 PET IMAGE W/CT SKULL-THIGH: CPT | Mod: PS

## 2024-12-23 PROCEDURE — 96523 IRRIG DRUG DELIVERY DEVICE: CPT

## 2024-12-23 RX ORDER — FLUDEOXYGLUCOSE F 18 200 MCI/ML
11.4 INJECTION, SOLUTION INTRAVENOUS
Status: COMPLETED | OUTPATIENT
Start: 2024-12-23 | End: 2024-12-23

## 2024-12-27 ENCOUNTER — TELEMEDICINE (OUTPATIENT)
Dept: HEMATOLOGY/ONCOLOGY | Facility: CLINIC | Age: 49
End: 2024-12-27
Payer: COMMERCIAL

## 2024-12-27 DIAGNOSIS — C81.48: Primary | ICD-10-CM

## 2024-12-27 PROCEDURE — 99214 OFFICE O/P EST MOD 30 MIN: CPT | Performed by: INTERNAL MEDICINE

## 2024-12-29 ENCOUNTER — APPOINTMENT (OUTPATIENT)
Dept: BEHAVIORAL HEALTH | Facility: CLINIC | Age: 49
End: 2024-12-29
Payer: COMMERCIAL

## 2024-12-31 ENCOUNTER — APPOINTMENT (OUTPATIENT)
Dept: NEUROSURGERY | Facility: CLINIC | Age: 49
End: 2024-12-31
Payer: COMMERCIAL

## 2024-12-31 VITALS
DIASTOLIC BLOOD PRESSURE: 82 MMHG | SYSTOLIC BLOOD PRESSURE: 102 MMHG | WEIGHT: 132 LBS | BODY MASS INDEX: 25.91 KG/M2 | TEMPERATURE: 97.7 F | HEART RATE: 43 BPM | HEIGHT: 60 IN

## 2024-12-31 DIAGNOSIS — C79.51 METASTATIC CANCER TO SPINE (MULTI): Primary | ICD-10-CM

## 2024-12-31 RX ORDER — CHLORHEXIDINE GLUCONATE ORAL RINSE 1.2 MG/ML
15 SOLUTION DENTAL 2 TIMES DAILY
COMMUNITY
Start: 2024-12-16

## 2024-12-31 RX ORDER — ACETAMINOPHEN 500 MG
1 TABLET ORAL EVERY 6 HOURS PRN
COMMUNITY
Start: 2024-12-16

## 2024-12-31 RX ORDER — IBUPROFEN 600 MG/1
1 TABLET ORAL EVERY 6 HOURS PRN
COMMUNITY
Start: 2024-12-16

## 2024-12-31 ASSESSMENT — PATIENT HEALTH QUESTIONNAIRE - PHQ9
4. FEELING TIRED OR HAVING LITTLE ENERGY: MORE THAN HALF THE DAYS
3. TROUBLE FALLING OR STAYING ASLEEP: MORE THAN HALF THE DAYS
8. MOVING OR SPEAKING SO SLOWLY THAT OTHER PEOPLE COULD HAVE NOTICED. OR THE OPPOSITE, BEING SO FIGETY OR RESTLESS THAT YOU HAVE BEEN MOVING AROUND A LOT MORE THAN USUAL: MORE THAN HALF THE DAYS
1. LITTLE INTEREST OR PLEASURE IN DOING THINGS: MORE THAN HALF THE DAYS
9. THOUGHTS THAT YOU WOULD BE BETTER OFF DEAD, OR OF HURTING YOURSELF: NOT AT ALL
SUM OF ALL RESPONSES TO PHQ9 QUESTIONS 1 & 2: 4
SUM OF ALL RESPONSES TO PHQ QUESTIONS 1-9: 16
10. IF YOU CHECKED OFF ANY PROBLEMS, HOW DIFFICULT HAVE THESE PROBLEMS MADE IT FOR YOU TO DO YOUR WORK, TAKE CARE OF THINGS AT HOME, OR GET ALONG WITH OTHER PEOPLE: VERY DIFFICULT
5. POOR APPETITE OR OVEREATING: MORE THAN HALF THE DAYS
6. FEELING BAD ABOUT YOURSELF - OR THAT YOU ARE A FAILURE OR HAVE LET YOURSELF OR YOUR FAMILY DOWN: MORE THAN HALF THE DAYS
2. FEELING DOWN, DEPRESSED OR HOPELESS: MORE THAN HALF THE DAYS
7. TROUBLE CONCENTRATING ON THINGS, SUCH AS READING THE NEWSPAPER OR WATCHING TELEVISION: MORE THAN HALF THE DAYS

## 2024-12-31 ASSESSMENT — PAIN SCALES - GENERAL: PAINLEVEL_OUTOF10: 4

## 2024-12-31 NOTE — PROGRESS NOTES
[facility] [place]  Department of Neurological Surgery  Established Patient Visit    History of Present Illness  Pamela Lopez is a 49 y.o. year old female who presents to the spine clinic in follow up. She has a history of Hodgkin's lymphoma with diffuse bony metastasis (s/p XRT, chemo), with pathologic fractures.      To review, she was evaluated by me on 09/26/2023. MRIs were reviewed and demonstrated metastatic disease at C5, T6 - 8, L3, and sacrum. T7 pathologic fracture was noted with mild retropulsion and moderate stenosis. No significant cervical / lumbar stenosis. She was to continue treatment for Hodgkin's Lymphoma and was to follow up PRN.     She was seen in the [facility] ED on 07/23/2024, with bowel incontinence and saddle anesthesia, since 07/22/2024. She reported continuation of baseline back pain. She was ambulatory. MRI C/T/L spine completed with note of progression of T7 and new T8 fracture and of lumbar foraminal stenosis L4 - S1. Since her pain was controlled, she was discharged home and instructed to follow up in our office.     Today, her pain is about the same, with addition of some new lumbar pain. She has had some falls in the past months that could contribute to this. She is following with pain management who has recommended a pain pump. She is awaiting to hear back about scheduling that surgery. In the interim, her pain is well controlled with fentanyl patches.     BMI: Body mass index is 25.78 kg/m².    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 (Multi)    Prediabetes    Mild intermittent asthma without complication (HHS-HCC)    Dental caries    Abnormal EKG    Adjustment disorder with mixed anxiety and depressed mood    Chronic back pain    Dehydration     History of drug abuse (Multi)    Hypokalemia    Malaise and fatigue    Tonsillar mass    Vitiligo    Mandibular mass    Healthcare-associated pneumonia    COVID-19 virus infection    Former smoker    Encounter for monitoring cardiotoxic drug therapy    Orthostatic lightheadedness    PAC (premature atrial contraction)    Aortic regurgitation    Cancer related pain     Past Medical History:   Diagnosis Date    Asthma     Hodgkin's lymphoma (Multi)     Hyperlipidemia      Past Surgical History:   Procedure Laterality Date    CT GUIDED PERCUTANEOUS BIOPSY BONE DEEP  2023    CT GUIDED PERCUTANEOUS BIOPSY BONE DEEP 2023 [facility]J CT     Social History     Tobacco Use    Smoking status: Former     Current packs/day: 0.00     Average packs/day: 0.3 packs/day for 33.0 years (8.3 ttl pk-yrs)     Types: Cigarettes     Start date:      Quit date:      Years since quittin.0    Smokeless tobacco: Never   Substance Use Topics    Alcohol use: Not Currently     family history is not on file.    Current Outpatient Medications:     acetaminophen (Tylenol) 500 mg tablet, Take 1 tablet (500 mg) by mouth every 6 hours if needed., Disp: , Rfl:     albuterol 90 mcg/actuation inhaler, Inhale 2 puffs every 6 hours if needed., Disp: , Rfl:     chlorhexidine (Peridex) 0.12 % solution, Take 15 mL by mouth twice a day., Disp: , Rfl:     cyclobenzaprine (Flexeril) 10 mg tablet, Take 1 tablet (10 mg) by mouth 3 times a day as needed for muscle spasms., Disp: 90 tablet, Rfl: 3    escitalopram (Lexapro) 20 mg tablet, Take 1 tablet (20 mg) by mouth once daily., Disp: 30 tablet, Rfl: 3    fentaNYL (Duragesic) 50 mcg/hr patch, Place 1 patch over 72 hours on the skin every 3rd day. Do not fill before 2024., Disp: 10 patch, Rfl: 0    gabapentin (Neurontin) 300 mg capsule, Take 2 capsules (600 mg) by mouth 3 times a day., Disp: 180 capsule, Rfl: 3    ibuprofen 600 mg tablet, Take 1 tablet (600 mg) by mouth every 6 hours if  needed for pain., Disp: , Rfl:     oxyCODONE (Roxicodone) 15 mg immediate release tablet, Take 1 tablet (15 mg) by mouth every 4 hours if needed (pain). MAX of 5 tablets per day Do not fill before December 12, 2024., Disp: 150 tablet, Rfl: 0    pantoprazole (ProtoNix) 40 mg EC tablet, Take 1 tablet (40 mg) by mouth once daily., Disp: 30 tablet, Rfl: 3  Allergies   Allergen Reactions    Benadryl [Diphenhydramine Hcl] Confusion and Drowsiness    Lorazepam Confusion and Drowsiness       Physical Examination:  General: well developed, awake/alert/oriented x3, no distress, alert and cooperative  Head/Neck: neck Supple, no apparent injury  ENMT: mucous membranes moist, no apparent injury, no lesions seen  Cardiovascular: no pitting edema, no JVD  Respiratory/Thorax: Normal breath sounds with good chest expansion, thorax symmetric  Skin: warm and dry, no lesions, no rashes    Neurological/Musculoskeletal:    - Posture: normal coronal & sagittal alignment    - Range of motion: full active & passive range of motion    - Muscle Bulk: normal and symmetric in all extremities    - Paraspinal muscle spasm/tenderness absent    - Motor Strength: 5/5 throughout all extremities    - Sensation: intact to light touch    - Reflexes: normal, no clonus, negative Kim's sign      Results:  I personally reviewed and interpreted the imaging results, which included CT thoracic spine from 10/2024 which shows stable T7 pathologic fracture and new endplate changes at L2 likely secondary to pathologic fracture    Assessment and Plan:  Pamela Lopez is a 49 y.o. year old female who presents to the spine clinic in follow up. She has a history of Hodgkin's lymphoma with diffuse bony metastasis (s/p XRT, chemo), with pathologic fractures.      To review, she was evaluated by me on 09/26/2023. MRIs were reviewed and demonstrated metastatic disease at C5, T6 - 8, L3, and sacrum. T7 pathologic fracture was noted with mild retropulsion and moderate  stenosis. No significant cervical / lumbar stenosis. She was to continue treatment for Hodgkin's Lymphoma and was to follow up PRN.     She was seen in the [facility] ED on 07/23/2024, with bowel incontinence and saddle anesthesia, since 07/22/2024. She reported continuation of baseline back pain. She was ambulatory. MRI C/T/L spine completed with note of progression of T7 and new T8 fracture and of lumbar foraminal stenosis L4 - S1. Since her pain was controlled, she was discharged home and instructed to follow up in our office.     Today, her pain is about the same, with addition of some new lumbar pain. She has had some falls in the past months that could contribute to this. She is following with pain management who has recommended a pain pump. She is awaiting to hear back about scheduling that surgery. In the interim, her pain is well controlled with fentanyl patches.     She is not a surgical candidate and is understandig of this. She would likely to continue with pain management for her pain , which is working. She does not need any follow up with us anymore. We are happy to refer her to one of our functional colleagues if needed for evaluation for intrathecal morphine pump placement.

## 2025-01-09 PROBLEM — C81.90 HODGKIN LYMPHOMA: Status: ACTIVE | Noted: 2024-12-12

## 2025-01-13 ENCOUNTER — OFFICE VISIT (OUTPATIENT)
Dept: PALLIATIVE MEDICINE | Facility: CLINIC | Age: 50
End: 2025-01-13
Payer: COMMERCIAL

## 2025-01-13 VITALS
WEIGHT: 126.3 LBS | RESPIRATION RATE: 16 BRPM | SYSTOLIC BLOOD PRESSURE: 122 MMHG | BODY MASS INDEX: 24.67 KG/M2 | TEMPERATURE: 97 F | DIASTOLIC BLOOD PRESSURE: 66 MMHG | HEART RATE: 42 BPM | OXYGEN SATURATION: 98 %

## 2025-01-13 DIAGNOSIS — Z51.5 PALLIATIVE CARE ENCOUNTER: Primary | ICD-10-CM

## 2025-01-13 DIAGNOSIS — G89.3 CANCER RELATED PAIN: ICD-10-CM

## 2025-01-13 DIAGNOSIS — C81.48: ICD-10-CM

## 2025-01-13 DIAGNOSIS — R11.2 NAUSEA AND VOMITING, UNSPECIFIED VOMITING TYPE: ICD-10-CM

## 2025-01-13 PROCEDURE — 99214 OFFICE O/P EST MOD 30 MIN: CPT

## 2025-01-13 RX ORDER — OXYCODONE HYDROCHLORIDE 15 MG/1
15 TABLET ORAL EVERY 4 HOURS PRN
Qty: 150 TABLET | Refills: 0 | Status: SHIPPED | OUTPATIENT
Start: 2025-01-13 | End: 2025-02-12

## 2025-01-13 RX ORDER — FENTANYL 50 UG/1
1 PATCH TRANSDERMAL
Qty: 10 PATCH | Refills: 0 | Status: SHIPPED | OUTPATIENT
Start: 2025-01-21 | End: 2025-02-20

## 2025-01-13 ASSESSMENT — PAIN SCALES - GENERAL: PAINLEVEL_OUTOF10: 4

## 2025-01-13 NOTE — PROGRESS NOTES
SUPPORTIVE AND PALLIATIVE ONCOLOGY OUTPATIENT FOLLOW-UP      SERVICE DATE: 1/13/2025      Subjective   HISTORY OF PRESENT ILLNESS: Pamela Lopez is a 49 y.o. female who presents with newly diagnosed Hodgkin Lymphoma. She was admitted d/t back pain and a CT c/a/p revealed multiple hepatic masses and several osteoblastic  lesions. MRI brain was negative. She underwent biopsy of L3 on 6/9/23 which was nondiagnostic. PET scan showed FDG-avid lymph nodes in the neck, tonsil, bones, and liver. Biopsy of right tonsil 6/22 revealed Hodgkin Lymphoma. She underwent RT to T7 x10  fractions. She started treatment with AVD + Brentuximab 7/17/23. Recent PET scan with excellent response to treatment. PET scan 12/23 showed no evidence of recurrence.     Scheduled for intrathecal pump placement 2/5/25.     Pain Assessment:  Pain Score: 5/10  Location: mid-back and lower back  Description: throbbing pain in her lower back that is constant. She feels that her pain is well controlled on the Fentanyl patch. She tried to stretch this to 4 days and now has had an increase in pain across her lower back. She has been taking oxycodone 2-3 times per day which brings her pain down from a 6/10 to a 2/10.     Symptom Assessment:  Pain:very much   Headache: none  Dizziness:none  Lack of energy: a little.   Difficulty sleeping: none   Worrying: a little   Anxiety: none   Depression: a little.   Pain in mouth/swallowing: none  Dry mouth: none  Taste changes: none  Shortness of breath: none  Lack of appetite: none    Nausea: a little.    Vomiting: none   Constipation: none states she has smaller amounts of stool now.   Diarrhea: none  Sore muscles: none  Numbness or tingling in hands/feet/other: somewhat. Hands and feet are numb. Improved with Gabapentin  Weight loss: none  Other: none      Information obtained from: interview of patient  ______________________________________________________________________        Objective     No visits with  results within 1 Month(s) from this visit.   Latest known visit with results is:   Office Visit on 12/09/2024   Component Date Value Ref Range Status    WBC 12/09/2024 6.8  4.4 - 11.3 x10*3/uL Final    RBC 12/09/2024 4.40  4.00 - 5.20 x10*6/uL Final    Hemoglobin 12/09/2024 13.7  12.0 - 16.0 g/dL Final    Hematocrit 12/09/2024 41.6  36.0 - 46.0 % Final    MCV 12/09/2024 95  80 - 100 fL Final    MCH 12/09/2024 31.1  26.0 - 34.0 pg Final    MCHC 12/09/2024 32.9  32.0 - 36.0 g/dL Final    RDW 12/09/2024 12.5  11.5 - 14.5 % Final    Platelets 12/09/2024 168  150 - 450 x10*3/uL Final    Neutrophils % 12/09/2024 58.3  40.0 - 80.0 % Final    Immature Granulocytes %, Automated 12/09/2024 0.0  0.0 - 0.9 % Final    Immature Granulocyte Count (IG) includes promyelocytes, myelocytes and metamyelocytes but does not include bands. Percent differential counts (%) should be interpreted in the context of the absolute cell counts (cells/UL).    Lymphocytes % 12/09/2024 31.9  13.0 - 44.0 % Final    Monocytes % 12/09/2024 5.6  2.0 - 10.0 % Final    Eosinophils % 12/09/2024 3.3  0.0 - 6.0 % Final    Basophils % 12/09/2024 0.9  0.0 - 2.0 % Final    Neutrophils Absolute 12/09/2024 3.94  1.20 - 7.70 x10*3/uL Final    Percent differential counts (%) should be interpreted in the context of the absolute cell counts (cells/uL).    Immature Granulocytes Absolute, Au* 12/09/2024 0.00  0.00 - 0.70 x10*3/uL Final    Lymphocytes Absolute 12/09/2024 2.15  1.20 - 4.80 x10*3/uL Final    Monocytes Absolute 12/09/2024 0.38  0.10 - 1.00 x10*3/uL Final    Eosinophils Absolute 12/09/2024 0.22  0.00 - 0.70 x10*3/uL Final    Basophils Absolute 12/09/2024 0.06  0.00 - 0.10 x10*3/uL Final    Glucose 12/09/2024 123 (H)  74 - 99 mg/dL Final    Sodium 12/09/2024 136  136 - 145 mmol/L Final    Potassium 12/09/2024 3.8  3.5 - 5.3 mmol/L Final    Chloride 12/09/2024 104  98 - 107 mmol/L Final    Bicarbonate 12/09/2024 26  21 - 32 mmol/L Final    Anion Gap 12/09/2024  10  10 - 20 mmol/L Final    Urea Nitrogen 12/09/2024 14  6 - 23 mg/dL Final    Creatinine 12/09/2024 0.62  0.50 - 1.05 mg/dL Final    eGFR 12/09/2024 >90  >60 mL/min/1.73m*2 Final    Calculations of estimated GFR are performed using the 2021 CKD-EPI Study Refit equation without the race variable for the IDMS-Traceable creatinine methods.  https://jasn.asnjournals.org/content/early/2021/09/22/ASN.7154704285    Calcium 12/09/2024 9.1  8.6 - 10.3 mg/dL Final    Albumin 12/09/2024 3.8  3.4 - 5.0 g/dL Final    Alkaline Phosphatase 12/09/2024 105  33 - 110 U/L Final    Total Protein 12/09/2024 6.6  6.4 - 8.2 g/dL Final    AST 12/09/2024 16  9 - 39 U/L Final    Bilirubin, Total 12/09/2024 0.4  0.0 - 1.2 mg/dL Final    ALT 12/09/2024 9  7 - 45 U/L Final    Patients treated with Sulfasalazine may generate falsely decreased results for ALT.    LDH 12/09/2024 200  84 - 246 U/L Final     PHYSICAL EXAMINATION   Vital Signs:   Vitals:    01/13/25 1155   BP: 122/66   Pulse: (!) 42   Resp: 16   Temp: 36.1 °C (97 °F)   SpO2: 98%   Vital signs reviewed.       Physical Exam  HENT:      Head: Normocephalic.      Nose: Nose normal.   Eyes:      Pupils: Pupils are equal, round, and reactive to light.   Pulmonary:      Effort: Pulmonary effort is normal.   Musculoskeletal:         General: Normal range of motion.   Neurological:      Mental Status: She is alert and oriented to person, place, and time.   Psychiatric:         Mood and Affect: Mood normal.         Behavior: Behavior normal.          Social  Not . Lives with her 3 grown children.  History of drug use with Meth x8 years. Quit 1 year ago.  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     ASSESSMENT/PLAN    Pain  Pain is: cancer related pain  Type: somatic and neuropathic  Pain control: well-controlled  Home regimen:   - MSContin caused nausea, vomiting and heavy feeling  - Continue Fentanyl 50 mcg patch every 72 hours  - Continue  oxycodone 15 mg every 4 hours. Allow for 5 tabs per day.  - Saw Dr. Lockhart planning intrathecal pain pump on 2/5/25.   - Had an appointment with Dr. Wagner 12/31. Surgery is not an option at this time.   - Continue Gabapentin 600 mg TID. Recently increased this. She has been taking 300/300/600 mg   - Continue Advil + Tylenol as needed  - Continue with aquatic therapy   - Continue Flexeril 10 mg at bedtime  - Continue Tizanidine 2 mg during the day  - Continue Ropinirole .25 mg at bedtime for RLS as needed  consistently. Recommended taking daily at bedtime.   - Completed 2 sessions of aquatic therapy and now with increased pain    Opioid Use  Medication Management:   - OARRS report reviewed with no aberrant behavior; consistent with  prescriptions/records and patient history  - MED 45.  Overdose Risk Score 290.   This has been discussed with patient.   - We will continue to closely monitor the patient for signs of prescription misuse including UDS, OARRS review and subjective reports at each visit.  - No concurrent benzodiazepine use   - I am a provider who either is or has consulted and collaborated with a provider certified in Hospice and Palliative Medicine and have conducted a face-face visit and examination for this patient.  - Routine Urine Drug Screen completed 7/15/24 positive for oxycodone and negative for other illicit substances  - Controlled Substance Agreement: completed 10/14/24  - Specifically discussed that controlled substance prescriptions will only be provided by our group as outlined in the completed agreement  - Prescribed naloxone: Has prescription at home  - Red Flags: History of drug use with Meth. Quit 1 year ago     Nausea   Intermittent nausea without vomiting related to chemotherapy   - Continue Prochlorperazine 10 mg every 6 hours as needed  - Continue Ondansetron 8 mg every 8 hours as needed    Constipation  At risk for constipation related to opioids,  currently not  constipated  Usual bowel pattern: daily  Home regimen:   - Continue Senna-S 1 tab BID- no longer taking due to diarrhea  - Start Magnesium Citrate - may start with 1/2 bottle to 1 bottle if no BM within 48-72 hours     Altered Mood  Chronic anxiety and depression related to health concerns   controlled with home regimen  Home regimen:   - Restart Lexapro 20 mg- encouraged her to restart this since feeling more depressed and emotional   - no longer taking Wellbutrin 150 mg BID.   - Discussed meeting with a community therapist for therapy as well    Sleeping Difficulty:  Home regimen:    - Trazodone stopped inpatient r/t QTc concerns  - No longer taking Risperidone 0.5 mg nightly as needed.   - Flexeril has been effective for sleep   - Discussed taking 5-10 mg of Melatonin. Encouraged to restart this.    Decreased appetite  Related to malignancy, chemotherapy, and disease process  Nutrition following  Home regimen:    - Olanzapine 2.5 mg every day at bedtime- this was stopped inpatient d/t QTc  - Following with Martha Khan    Night Sweats  - Continue Oxybutynin 5-10 mg nightly as needed.       Advance Directives  Existence of Advance Directives:Yes, documentation or copy in medical record  Decision maker: AARON is Cynthia Xie  Code Status: Full code    Next Follow-Up Visit:  Return to clinic in 1 month virtually     Signature and billing  Medical complexity was low level due to due to complexity of problems, extensive data review, and high risk of management/treatment.  Time was spent on the following: Prep Time, Time Directly with Patient/Family/Caregiver, Documentation Time. Total time spent: 30      Data  Diagnostic tests and information reviewed for today's visit:  Most recent labs, Most recent imaging, Medications         Some elements copied from Cynthia porter on 12/17/24, the elements have been updated and all reflect current decision making from today, 1/13/2025.      Plan of Care  discussed with: Patient    SIGNATURE: MAGDA Downing-CNP    Contact information:  Supportive and Palliative Oncology  Monday-Friday 8 AM-5 PM  Phone:  [phone], press option #5, then option #1.   Or Epic Secure Chat

## 2025-01-14 ENCOUNTER — TELEPHONE (OUTPATIENT)
Dept: PAIN MEDICINE | Facility: CLINIC | Age: 50
End: 2025-01-14
Payer: COMMERCIAL

## 2025-01-14 NOTE — TELEPHONE ENCOUNTER
Called pt to notify of pump scheduling and to explain that surgery was scheduled w/son per secure chat message sent by BEENA Monroe requesting that we schedule w/him b/c pt is not able to answer her phone while at work.  This notification took place on 1/9 and a female answered the phone stating son Pradeep was unavailable and she was answering for him and could take information.

## 2025-01-14 NOTE — TELEPHONE ENCOUNTER
Called pt to notify of details of previous note about scheduling her intrathecal pump implant. LM ion VM to call office to be provided w/information on scheduled procedure and who it was scheduled with

## 2025-01-27 NOTE — CPM/PAT H&P
CPM/PAT Evaluation       Name: Pamela Lopez (Pamela Lopez)  /Age: 1975/49 y.o.     In-Person       Chief Complaint: pre op appointment for upcoming insertion of intrathecal catheter    COOPER MILLER is a 50 yo female who has diagnosis of Hodgkin's lymphoma since , underwent chemotherapy and upon recently PET scan there are suspicious metastasis with no evidence of recurrent of lymphoma.  She is suffering from daily chronic pains in back and radiating down left leg. She had a recent fall down the stairs which has exacerbated her back pains. She follows with pain management and has been treated with fentanyl patches as interim for pain. She is now scheduled for insertion of intrathecal catheter for permanent morphine pump to be used as pain management. Presents to CPM today for perioperative risk stratification and optimization. She endorses chronic daily lower back pains from fractures caused by the cancer. She also had dental/oral surgery 3 weeks ago and is having an issue on left side of mouth with a possible indwelling canal from tooth extraction and nasal cavity- she plans to meet to surgeon this week for any updates. Otherwise denies any recent illness, fever/chills, chest pains or shortness of breath.     Past Medical History:   Diagnosis Date    Asthma     Chronic back pain     Edentulous     History of drug use     Hodgkin's lymphoma (Multi)     Hyperlipidemia     PONV (postoperative nausea and vomiting)     HISTORY    Smoker      Past Surgical History:   Procedure Laterality Date    APPENDECTOMY      CT GUIDED PERCUTANEOUS BIOPSY BONE DEEP  2023    CT GUIDED PERCUTANEOUS BIOPSY BONE DEEP 2023 [facility] CT    DENTAL SURGERY      HYSTERECTOMY      MULTIPLE TOOTH EXTRACTIONS      OTHER SURGICAL HISTORY      foot surgery    TONSILLECTOMY       Patient  reports that she is not currently sexually active.    Family History   Problem Relation Name Age of Onset    Coronary artery disease Mother       Diabetes Mother      Hypertension Mother      Cancer Father      Diabetes Sister      Cancer Brother      Diabetes Brother      Hypertension Brother         Allergies   Allergen Reactions    Benadryl [Diphenhydramine Hcl] Confusion and Drowsiness    Lorazepam Confusion and Drowsiness       Prior to Admission medications    Medication Sig Start Date End Date Taking? Authorizing Provider   acetaminophen (Tylenol) 500 mg tablet Take 1 tablet (500 mg) by mouth every 6 hours if needed. 12/16/24   Historical Provider, MD   albuterol 90 mcg/actuation inhaler Inhale 2 puffs every 6 hours if needed. 1/12/23   Historical Provider, MD   chlorhexidine (Peridex) 0.12 % solution Take 15 mL by mouth twice a day. 12/16/24   Historical Provider, MD   cyclobenzaprine (Flexeril) 10 mg tablet Take 1 tablet (10 mg) by mouth 3 times a day as needed for muscle spasms. 1/27/25 2/26/25  THOMAS Downing   escitalopram (Lexapro) 20 mg tablet Take 1 tablet (20 mg) by mouth once daily. 9/16/24 12/31/24  THOMAS Downing   fentaNYL (Duragesic) 50 mcg/hr patch Place 1 patch over 72 hours on the skin every 3rd day. Do not fill before January 21, 2025. 1/21/25 2/20/25  THOMAS Downing   gabapentin (Neurontin) 300 mg capsule Take 2 capsules (600 mg) by mouth 3 times a day. 11/13/24 12/31/24  THOMAS Downing   ibuprofen 600 mg tablet Take 1 tablet (600 mg) by mouth every 6 hours if needed for pain. 12/16/24   Historical Provider, MD   oxyCODONE (Roxicodone) 15 mg immediate release tablet Take 1 tablet (15 mg) by mouth every 4 hours if needed (pain). MAX of 5 tablets per day 1/13/25 2/12/25  THOMAS Downing   pantoprazole (ProtoNix) 40 mg EC tablet Take 1 tablet (40 mg) by mouth once daily. 9/16/24   THOMAS Downing   cyclobenzaprine (Flexeril) 10 mg tablet Take 1 tablet (10 mg) by mouth 3 times a day as needed for muscle spasms. 9/16/24 1/27/25  Cynthia Monroe,  APRN-CNP        PAT ROS   Constitutional: Negative for fever, chills, or sweats     ENMT: Negative for nasal discharge, congestion, ear pain, mouth pain, throat pain:  + recently had teeth extracted and has left upper row issue with tooth cavity indwelling into nasal cavity     Respiratory: Negative for cough, wheezing, shortness of breath   Cardiac: Negative for chest pain, dyspnea on exertion, palpitations   Gastrointestinal: Negative for nausea, vomiting, diarrhea, constipation, abdominal pain  Genitourinary: Negative for dysuria, flank pain, frequency, hematuria     Musculoskeletal: Positive for decreased ROM, pain, swelling, weakness in lower back     Neurological: Negative for dizziness, confusion, headache  Psychiatric: Negative for mood changes : anxious   Skin: Negative for itching, rash, ulcer    Hematologic/Lymph: Negative for bruising, easy bleeding  Allergic/Immunologic: Negative itching, sneezing, swelling    Physical Exam  Constitutional:       Appearance: Normal appearance.   HENT:      Head: Normocephalic.      Mouth/Throat:      Mouth: Mucous membranes are moist.   Eyes:      Extraocular Movements: Extraocular movements intact.   Cardiovascular:      Rate and Rhythm: Normal rate and regular rhythm.   Pulmonary:      Effort: Pulmonary effort is normal.      Breath sounds: Normal breath sounds.   Abdominal:      General: Abdomen is flat.      Palpations: Abdomen is soft.   Musculoskeletal:         General: Normal range of motion.      Cervical back: Normal range of motion.   Skin:     General: Skin is warm and dry.   Neurological:      General: No focal deficit present.      Mental Status: She is alert.   Psychiatric:         Mood and Affect: Mood normal.        PAT AIRWAY:   Airway:     Mallampati::  II    Neck ROM::  Full  normal      Testing/Diagnostic:   Lab Results   Component Value Date    WBC 6.8 12/09/2024    HGB 13.7 12/09/2024    HCT 41.6 12/09/2024    MCV 95 12/09/2024      12/09/2024     Lab Results   Component Value Date    GLUCOSE 123 (H) 12/09/2024    CALCIUM 9.1 12/09/2024     12/09/2024    K 3.8 12/09/2024    CO2 26 12/09/2024     12/09/2024    BUN 14 12/09/2024    CREATININE 0.62 12/09/2024     Patient Specialist/PCP:   PCP: BEENA Monroe CNP  Palliative care: BEENA Monroe CNP  PM: Dr. Lockhart  Onc/hem: Dr. Gomez  Cards: Dr. Samaniego    Visit Vitals  BP (!) 95/39   Pulse (!) 48   Temp 36 °C (96.8 °F) (Temporal)   Resp 20   Ht 1.524 m (5')   Wt 56.3 kg (124 lb 1.9 oz)   SpO2 100%   BMI 24.24 kg/m²   OB Status Chemotherapy/radiation   Smoking Status Every Day   BSA 1.54 m²       DASI Risk Score      Flowsheet Row Pre-Admission Testing from 1/28/2025 in [facility]   Can you take care of yourself (eat, dress, bathe, or use toilet)?  2.75 filed at 01/28/2025 0830   Can you walk indoors, such as around your house? 1.75 filed at 01/28/2025 0830   Can you walk a block or two on level ground?  2.75 filed at 01/28/2025 0830   Can you climb a flight of stairs or walk up a hill? 5.5 filed at 01/28/2025 0830   Can you run a short distance? 0 filed at 01/28/2025 0830   Can you do light work around the house like dusting or washing dishes? 2.7 filed at 01/28/2025 0830   Can you do moderate work around the house like vacuuming, sweeping floors or carrying groceries? 3.5  [NEEDS TO TAKE MULTIPLE BREAKS] filed at 01/28/2025 0830   Can you do heavy work around the house like scrubbing floors or lifting and moving heavy furniture?  0 filed at 01/28/2025 0830   Can you do yard work like raking leaves, weeding or pushing a mower? 0 filed at 01/28/2025 0830   Can you have sexual relations? 0 filed at 01/28/2025 0830   Can you participate in moderate recreational activities like golf, bowling, dancing, doubles tennis or throwing a baseball or football? 0 filed at 01/28/2025 0830   Can you participate in strenous sports like swimming, singles tennis, football, basketball, or  skiing? 0 filed at 01/28/2025 0830   DASI SCORE 18.95 filed at 01/28/2025 0830   METS Score (Will be calculated only when all the questions are answered) 5.1 filed at 01/28/2025 0830          Caprini DVT Assessment      Flowsheet Row Pre-Admission Testing from 1/28/2025 in [facility] ED to Hosp-Admission (Discharged) from 12/11/2023 in [facility] 5 with Miko Lloyd MD and Jeffery Garcia MD MPH   DVT Score (IF A SCORE IS NOT CALCULATING, MUST SELECT A BMI TO COMPLETE) -- 4 filed at 12/11/2023 1558   Medical Factors Present cancer, chemotherapy, or previous malignancy filed at 01/28/2025 0828 --   Surgical Factors Minor surgery planned filed at 01/28/2025 0828 --   BMI (BMI MUST BE CHOSEN) -- 30 or less filed at 12/11/2023 1558   RETIRED: Current Status -- Present cancer or chemotherapy filed at 12/11/2023 1558   RETIRED: Age -- 40-59 years filed at 12/11/2023 1558          Modified Frailty Index      Flowsheet Row Pre-Admission Testing from 1/28/2025 in [facility]   Non-independent functional status (problems with dressing, bathing, personal grooming, or cooking) 0 filed at 01/28/2025 0831   History of diabetes mellitus  0 filed at 01/28/2025 0831   History of COPD 0 filed at 01/28/2025 0831   History of CHF No filed at 01/28/2025 0831   History of MI 0 filed at 01/28/2025 0831   History of Percutaneous Coronary Intervention, Cardiac Surgery, or Angina No filed at 01/28/2025 0831   Hypertension requiring the use of medication  0 filed at 01/28/2025 0831   Peripheral vascular disease 0 filed at 01/28/2025 0831   Impaired sensorium (cognitive impairement or loss, clouding, or delirium) 0 filed at 01/28/2025 0831   TIA or CVA withouy residual deficit 0 filed at 01/28/2025 0831   Cerebrovascular accident with deficit 0 filed at 01/28/2025 0831   Modified Frailty Index Calculator 0 filed at 01/28/2025 0831          CHADS2 Stroke Risk  Current as of 13 minutes ago         N/A 3 to 100%: High Risk   2 to < 3%: Medium Risk   0 to < 2%: Low Risk     Last Change: N/A          This score determines the patient's risk of having a stroke if the patient has atrial fibrillation.        This score is not applicable to this patient. Components are not calculated.          Revised Cardiac Risk Index      Flowsheet Row Pre-Admission Testing from 1/28/2025 in [facility]   High-Risk Surgery (Intraperitoneal, Intrathoracic,Suprainguinal vascular) 0 filed at 01/28/2025 0830   History of ischemic heart disease (History of MI, History of positive exercuse test, Current chest paint considered due to myocardial ischemia, Use of nitrate therapy, ECG with pathological Q Waves) 0 filed at 01/28/2025 0830   History of congestive heart failure (pulmonary edemia, bilateral rales or S3 gallop, Paroxysmal nocturnal dyspnea, CXR showing pulmonary vascular redistribution) 0 filed at 01/28/2025 0830   History of cerebrovascular disease (Prior TIA or stroke) 0 filed at 01/28/2025 0830   Pre-operative insulin treatment 0 filed at 01/28/2025 0830   Pre-operative creatinine>2 mg/dl 0 filed at 01/28/2025 0830   Revised Cardiac Risk Calculator 0 filed at 01/28/2025 0830          Apfel Simplified Score      Flowsheet Row Pre-Admission Testing from 1/28/2025 in [facility]   Smoking status 0 filed at 01/28/2025 0830   History of motion sickness or PONV  1 filed at 01/28/2025 0830   Use of postoperative opioids 1 filed at 01/28/2025 0830   Gender - Female 1=Yes filed at 01/28/2025 0830   Apfel Simplified Score Calculator 3 filed at 01/28/2025 0830          Risk Analysis Index Results This Encounter         1/28/2025  0831             Do you live in a place other than your own home?: 0    When did you begin living in the place you are currently residing?: Greater than one year ago    Any kidney failure, kidney not working well, or seeing a kidney doctor (nephrologist)? If yes, was this  for kidney stones or another problem?: 8 Other (Comment)    Any history of chronic (long-term) congestive heart failure (CHF)?: 0 No    Any shortness of breath when resting?: 0 No    In the past five years, have you been diagnosed with or treated for cancer?: Yes    During the last 3 months has it become difficult for you to remember things or organize your thoughts?: 1 Yes      CHEMO BRAIN FOG    Have you lost weight of 10 pounds or more in the past 3 months without trying?: 4 Yes    Do you have any loss of appetitie?: 4 Yes    Getting Around (Mobility): 0 Can get around without help    Eatin Can plan and prepare own meals    Toiletin Can use toilet without any help    Personal Hygiene (Bathing, Hand Washing, Changing Clothes): 0 Can shower or bathe without any help    MCKEON Cancer History: Patient indicates history of cancer    Total Risk Analysis Index Score Without Cancer: 33    Total Risk Analysis Index Score: 52          Stop Bang Score      Flowsheet Row Pre-Admission Testing from 2025 in [facility]   Do you snore loudly? 0 filed at 2025   Do you often feel tired or fatigued after your sleep? 0 filed at 2025   Has anyone ever observed you stop breathing in your sleep? 0 filed at 2025   Do you have or are you being treated for high blood pressure? 0 filed at 2025   Recent BMI (Calculated) 24.2 filed at 2025   Is BMI greater than 35 kg/m2? 0=No filed at 2025   Age older than 50 years old? 0=No filed at 2025   Is your neck circumference greater than 17 inches (Male) or 16 inches (Female)? 0 filed at 2025   Gender - Male 0=No filed at 2025   STOP-BANG Total Score 0 filed at 2025          Prodigy: High Risk  Total Score: 3              Prodigy Previous Opioid Use Score           ARISCAT Score for Postoperative Pulmonary Complications      Flowsheet Row Pre-Admission Testing from  1/28/2025 in [facility]   Age Calculated Score 0 filed at 01/28/2025 0832   Preoperative SpO2 0 filed at 01/28/2025 0832   Respiratory infection in the last month Either upper or lower (i.e., URI, bronchitis, pneumonia), with fever and antibiotic treatment 0 filed at 01/28/2025 0832   Preoperative anemia (Hgb less than 10 g/dl) 0 filed at 01/28/2025 0832   Surgical incision  0 filed at 01/28/2025 0832   Duration of surgery  0 filed at 01/28/2025 0832   Emergency Procedure  0 filed at 01/28/2025 0832   ARISCAT Total Score  0 filed at 01/28/2025 0832          Maddie Perioperative Risk for Myocardial Infarction or Cardiac Arrest (CHONG)      Flowsheet Row Pre-Admission Testing from 1/28/2025 in [facility]   Calculated Age Score 0.96 filed at 01/28/2025 0832   Functional Status  0 filed at 01/28/2025 0832   ASA Class  -3.29 filed at 01/28/2025 0832   Creatinine 0 filed at 01/28/2025 0832   Type of Procedure  0.21 filed at 01/28/2025 0832   CHONG Total Score  -7.37 filed at 01/28/2025 0832   CHONG % 0.06 filed at 01/28/2025 0832            Assessment and Plan:     Pre-Op  50 yo female scheduled for INSERTION, CATHETER, PERMANENT, INTRATHECAL on 2/5/2024 with NAVIN Lockhart.  previous labs from 1212/9/2024. MRSA ordered with oral chlorhexidine. Urine  hCG ordered for the morning of surgery. Otherwise no further orders indicated.     Cardiac  PVCs- asymptomatic   DASI Score: 18.95   MET Score: 5.1  RCRI  0 which is 3.9% 30 day risk of MACE (risk for cardiac death, nonfatal myocardial infarction, and nonfactal cardiac arrest)  CHONG score which indicates a   % risk of intraoperative or 30-day postoperative MACE    Pulmonary  Asthma, triggered by URIs, has not use a inhaler in over 10 years    The patient is current tobacco user.  Advised to quit smoking to improve overall health and to decrease risks for anesthesia related complications as well as postoperative wound healing complications.  Smoking  "cessation educational handout provided to patient during appointment.    Preoperative deep breathing educational handout provided to patient.  STOP BAN   points which is a low risk for moderate to severe COLLIN  ARISCAT:   0   points which is a low (1.6%) risk of in-hospital post-op pulmonary complications     Neuro  Night sweats, taking oxybutynin  Chronic back pain    No neurologic diagnoses, however, the patient is at an increased risk for post operative delirium secondary to decreased functinoal status and type and duration of surgery.  Preoperative brain exercise educational handout provided to patient.    The patient is at an increased risk for perioperative stroke secondary to female sex  and general anesthesia.    GI  Decreased appetite, secondary to malignancy and chemotherapy  Apfel: 3 points 61% risk for post operative N/V    /Renal  Counseled on avoiding NSAIDs, adequate hydration    Musculoskeletal   Osteoarthritis    Hematologic  Hodgkin lymphoma, unspecified Hodgkin lymphoma type, unspecified body region,  with diffuse bony metastasis since -  follows with pain management and is scheduled for upcoming intrathecal pump placement  - follows with oncology/hematology and palliative care    Caprini score of 1 patient is at low risk for perioperative DVT, informative education handout provided    Psychiatric  Anxiety  Depression  -compliant with Lexapro    Skin check: Patient was instructed to make surgeon aware of any skin changes/concerns prior to surgery.     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

## 2025-01-27 NOTE — PREPROCEDURE INSTRUCTIONS
Thank you for visiting Preadmission Testing at [facility]. If you have any changes to your health condition, please call the SURGEON's office to alert them and give them details of your symptoms.        Preoperative Brain Exercises    What are brain exercises?  A brain exercise is any activity that engages your thinking (cognitive) skills.    What types of activities are considered brain exercises?  Jigsaw puzzles, crossword puzzles, word jumble, memory games, word search, and many more.  Many can be found free online or on your phone via a mobile stephan.    Why should I do brain exercises before my surgery?  More recent research has shown brain exercise before surgery can lower the risk of postoperative delirium (confusion) which can be especially important for older adults.  Patients who did brain exercises for 5 to 10 hours the days before surgery, cut their risk of postoperative delirium in half up to 1 week after surgery.      Preoperative Deep Breathing Exercises    Why it is important to do deep breathing exercises before my surgery?  Deep breathing exercises strengthen your breathing muscles.  This helps you to recover after your surgery and decreases the chance of breathing complications.    How are the deep breathing exercises done?  Sit straight with your back supported.  Breathe in deeply and slowly through your nose. Your lower rib cage should expand and your abdomen may move forward.  Hold that breath for 3 to 5 seconds.  Breathe out through pursed lips, slowly and completely.  Rest and repeat 10 times every hour while awake.  Rest longer if you become dizzy or lightheaded.      Patient and Family Education   Ways You Can Help Prevent Blood Clots     This handout explains some simple things you can do to help prevent blood clots.      Blood clots are blockages that can form in the body's veins. When a blood clot forms in your deep veins, it may be called a deep vein thrombosis, or DVT for short. Blood clots can  happen in any part of the body where blood flows, but they are most common in the arms and legs. If a piece of a blood clot breaks free and travels to the lungs, it is called a pulmonary embolus (PE). A PE can be a very serious problem.      Being in the hospital or having surgery can raise your chances of getting a blood clot because you may not be well enough to move around as much as you normally do.      Ways you can help prevent blood clots in the hospital         Wearing SCDs. SCDs stands for Sequential Compression Devices.   SCDs are special sleeves that wrap around your legs  They attach to a pump that fills them with air to gently squeeze your legs every few minutes.   This helps return the blood in your legs to your heart.   SCDs should only be taken off when walking or bathing.   SCDs may not be comfortable, but they can help save your life.               Wearing compression stockings - if your doctor orders them. These special snug fitting stockings gently squeeze your legs to help blood flow.       Walking. Walking helps move the blood in your legs.   If your doctor says it is ok, try walking the halls at least   5 times a day. Ask us to help you get up, so you don't fall.      Taking any blood thinning medicines your doctor orders.          ©[facility]; 3/23        Ways you can help prevent blood clots at home       Wearing compression stockings - if your doctor orders them. ? Walking - to help move the blood in your legs.       Taking any blood thinning medicines your doctor orders.      Signs of a blood clot or PE      Tell your doctor or nurse know right away if you have of the problems listed below.    If you are at home, seek medical care right away. Call 911 for chest pain or problems breathing.          Signs of a blood clot (DVT) - such as pain,  swelling, redness or warmth in your arm or leg      Signs of a pulmonary embolism (PE) - such as chest     pain or feeling short of breath                              Patient and Family Education Quitting Smoking or Tobacco       Recognizing Dangerous Situations:   Alcohol use during the first month after quitting   Being around smoke or someone who smokes    Times situation routinely smoked   Triggers: car, breaks, coffee, when awakening, social events     Coping Skills:   Learning new ways to manage stress   Exercising   Relaxation breathing   Change routines   Distraction techniques     Websites:   Smoke-Free - offers free text messages and an stephan to help you quit. Info includes eating and mood issues that may come with quitting. There is a Live Helpline to talk to an expert. Go to smokefree.gov     Become an Ex-Smoker - the free EX Plan is based on scientific research and useful advice from ex-smokers. It isn't just about quitting smoking. It's about re-learning life without cigarettes using a 3-step program. Go to becomeanex.Copiny     Centers for Disease Control - offer many suggestions for helping you quit. Includes a Quit Guide and real-life stories. There are sections for specific groups such as LGBT, , different ethnic groups, and pregnant women. Go to cdc.gov/tobacco/campaign/tips     Other Resources:     [place] Tobacco Quit Line - call 1-800-QUIT-NOW or 1-447.402.9747.   Leondra music 2-1-1 - to find local programs and resources. Call 211 or go to 211.Copiny. [facility] Tobacco Cessation Program - call 183-495-2697.   American Lung Association - offers classes for quitting smoking. Some places may charge a fee. For a list of classes, go to lung.org or call 5-354-LUNG-USA.     Some things to think about:   The health benefits of quitting smoking can help most of the major parts of your body.   There is no safe amount of cigarette smoke. Quitting smoking can add years to your life.   When you quit, you'll also protect your loved ones from dangerous secondhand smoke.   Make a plan, join a support group, and talk to your physician to  assist in quitting smoking.     ©[facility], 11/20; [id number] (6/22)

## 2025-01-28 ENCOUNTER — TELEPHONE (OUTPATIENT)
Dept: PALLIATIVE MEDICINE | Facility: HOSPITAL | Age: 50
End: 2025-01-28

## 2025-01-28 ENCOUNTER — PRE-ADMISSION TESTING (OUTPATIENT)
Dept: PREADMISSION TESTING | Facility: HOSPITAL | Age: 50
End: 2025-01-28
Payer: COMMERCIAL

## 2025-01-28 VITALS
SYSTOLIC BLOOD PRESSURE: 95 MMHG | HEIGHT: 60 IN | TEMPERATURE: 96.8 F | OXYGEN SATURATION: 100 % | RESPIRATION RATE: 20 BRPM | DIASTOLIC BLOOD PRESSURE: 39 MMHG | HEART RATE: 48 BPM | BODY MASS INDEX: 24.37 KG/M2 | WEIGHT: 124.12 LBS

## 2025-01-28 DIAGNOSIS — Z01.818 PRE-OP TESTING: ICD-10-CM

## 2025-01-28 DIAGNOSIS — C81.90 HODGKIN LYMPHOMA, UNSPECIFIED HODGKIN LYMPHOMA TYPE, UNSPECIFIED BODY REGION (MULTI): ICD-10-CM

## 2025-01-28 DIAGNOSIS — Z01.818 PRE-OP EXAMINATION: Primary | ICD-10-CM

## 2025-01-28 DIAGNOSIS — G89.3 CANCER RELATED PAIN: ICD-10-CM

## 2025-01-28 PROCEDURE — 99202 OFFICE O/P NEW SF 15 MIN: CPT | Performed by: NURSE PRACTITIONER

## 2025-01-28 PROCEDURE — 87081 CULTURE SCREEN ONLY: CPT | Mod: STJLAB

## 2025-01-28 PROCEDURE — 99406 BEHAV CHNG SMOKING 3-10 MIN: CPT | Performed by: NURSE PRACTITIONER

## 2025-01-28 RX ORDER — FENTANYL 50 UG/1
1 PATCH TRANSDERMAL
Qty: 10 PATCH | Refills: 0 | Status: SHIPPED | OUTPATIENT
Start: 2025-01-28 | End: 2025-02-27

## 2025-01-28 RX ORDER — CHLORHEXIDINE GLUCONATE ORAL RINSE 1.2 MG/ML
SOLUTION DENTAL
Qty: 473 ML | Refills: 0 | Status: SHIPPED | OUTPATIENT
Start: 2025-01-28

## 2025-01-28 RX ORDER — FENTANYL 25 UG/1
1 PATCH TRANSDERMAL
Qty: 10 PATCH | Refills: 0 | Status: SHIPPED | OUTPATIENT
Start: 2025-01-28 | End: 2025-02-27

## 2025-01-28 ASSESSMENT — DUKE ACTIVITY SCORE INDEX (DASI)
CAN YOU DO LIGHT WORK AROUND THE HOUSE LIKE DUSTING OR WASHING DISHES: YES
CAN YOU TAKE CARE OF YOURSELF (EAT, DRESS, BATHE, OR USE TOILET): YES
CAN YOU PARTICIPATE IN STRENOUS SPORTS LIKE SWIMMING, SINGLES TENNIS, FOOTBALL, BASKETBALL, OR SKIING: NO
CAN YOU PARTICIPATE IN MODERATE RECREATIONAL ACTIVITIES LIKE GOLF, BOWLING, DANCING, DOUBLES TENNIS OR THROWING A BASEBALL OR FOOTBALL: NO
CAN YOU DO MODERATE WORK AROUND THE HOUSE LIKE VACUUMING, SWEEPING FLOORS OR CARRYING GROCERIES: YES
CAN YOU WALK A BLOCK OR TWO ON LEVEL GROUND: YES
CAN YOU WALK INDOORS, SUCH AS AROUND YOUR HOUSE: YES
CAN YOU HAVE SEXUAL RELATIONS: NO
CAN YOU RUN A SHORT DISTANCE: NO
CAN YOU CLIMB A FLIGHT OF STAIRS OR WALK UP A HILL: YES
CAN YOU DO YARD WORK LIKE RAKING LEAVES, WEEDING OR PUSHING A MOWER: NO
CAN YOU DO HEAVY WORK AROUND THE HOUSE LIKE SCRUBBING FLOORS OR LIFTING AND MOVING HEAVY FURNITURE: NO
DASI METS SCORE: 5.1
TOTAL_SCORE: 18.95

## 2025-01-28 ASSESSMENT — PAIN SCALES - GENERAL: PAINLEVEL_OUTOF10: 0 - NO PAIN

## 2025-01-28 ASSESSMENT — ACTIVITIES OF DAILY LIVING (ADL): ADL_SCORE: 0

## 2025-01-28 ASSESSMENT — PAIN - FUNCTIONAL ASSESSMENT: PAIN_FUNCTIONAL_ASSESSMENT: 0-10

## 2025-01-28 ASSESSMENT — LIFESTYLE VARIABLES: SMOKING_STATUS: SMOKER

## 2025-01-28 NOTE — TELEPHONE ENCOUNTER
PA is required for Fentanyl  25 mcg patches. Request sent to [facility]. Awaiting response. Patient updated via SpeakPhonehart.

## 2025-01-28 NOTE — TELEPHONE ENCOUNTER
Reviewed with Provider  Mabel DAVIES  who will refill the 50 mcg patch and will add  a 25 mcg patch.  Scripts pended to provider. Patient updated via Photocollect.

## 2025-01-28 NOTE — PREPROCEDURE INSTRUCTIONS
Medication List            Accurate as of January 28, 2025  8:40 AM. Always use your most recent med list.                acetaminophen 500 mg tablet  Commonly known as: Tylenol  Additional Medication Adjustments for Surgery: Other (Comment)  Notes to patient: Tylenol is okay to take up until the morning of surgery for pain or headache if needed      albuterol 90 mcg/actuation inhaler  Medication Adjustments for Surgery: Take/Use as prescribed     * chlorhexidine 0.12 % solution  Commonly known as: Peridex     * chlorhexidine 0.12 % solution  Commonly known as: Peridex  Swish and spit 15 ml for 2 doses, 15mL the night before surgery and 15 ml morning of surgery - swish for 30 seconds -DO NOT SWALLOW, SPIT OUT     cyclobenzaprine 10 mg tablet  Commonly known as: Flexeril  Take 1 tablet (10 mg) by mouth 3 times a day as needed for muscle spasms.  Medication Adjustments for Surgery: Take/Use as prescribed     escitalopram 20 mg tablet  Commonly known as: Lexapro  Take 1 tablet (20 mg) by mouth once daily.  Medication Adjustments for Surgery: Take/Use as prescribed     fentaNYL 50 mcg/hr patch  Commonly known as: Duragesic  Place 1 patch over 72 hours on the skin every 3rd day. Do not fill before January 21, 2025.  Medication Adjustments for Surgery: Do Not take on the morning of surgery     gabapentin 300 mg capsule  Commonly known as: Neurontin  Take 2 capsules (600 mg) by mouth 3 times a day.  Medication Adjustments for Surgery: Take/Use as prescribed     ibuprofen 600 mg tablet  Additional Medication Adjustments for Surgery: Take last dose 7 days before surgery  Notes to patient: STOP all NSAIDS (Ibuprofen, Motrin, Aleve), vitamins, herbals, supplements, and all over the counter medications for 7 days prior to surgery     oxyCODONE 15 mg immediate release tablet  Commonly known as: Roxicodone  Take 1 tablet (15 mg) by mouth every 4 hours if needed (pain). MAX of 5 tablets per day  Medication Adjustments for  Surgery: Take/Use as prescribed  Notes to patient: Coordinate with prescribing provider for further instructions on this medications prior to surgery.      pantoprazole 40 mg EC tablet  Commonly known as: ProtoNix  Take 1 tablet (40 mg) by mouth once daily.  Medication Adjustments for Surgery: Take/Use as prescribed           * This list has 2 medication(s) that are the same as other medications prescribed for you. Read the directions carefully, and ask your doctor or other care provider to review them with you.                                  NPO Instructions:    PRE-OPERATIVE INSTRUCTIONS    You will receive notification one business day prior to your procedure to confirm your arrival time. It is important that you answer your phone and/or check your messages during this time. If you do not hear from the surgery center by 5 pm. the day before your procedure, please call [phone].     Please enter the building through the Outpatient entrance and take the elevator off the lobby to the 2nd floor then check in at the Outpatient Surgery desk on the 2nd floor.    INSTRUCTIONS:  Talk to your surgeon for instructions if you should stop your aspirin, blood thinner, or diabetes medicines.  DO NOT take any multivitamins or over the counter supplements for 7-10 days before surgery.  If not being admitted, you must have an adult immediately available to drive you home after surgery. We also highly recommend you have someone stay with you for the entire day and night of your surgery.  For children having surgery, a parent or legal guardian must accompany them to the surgery center. If this is not possible, please call [phone] to make additional arrangements.  For adults who are unable to consent or make medical decisions for themselves, a legal guardian or Power of  must accompany them to the surgery center. If this is not possible, please call [phone] to make additional arrangements.  Wear comfortable,  loose fitting clothing.  All jewelry and piercings must be removed. If you are unable to remove an item or have a dermal piercing, please be sure to tell the nurse when you arrive for surgery.  Nail polish and make-up must be removed.  Avoid smoking or consuming alcohol for 24 hours before surgery.  To help prevent infection, please take a shower/bath and wash your hair the night before and/or morning of surgery (or follow other specific bathing instructions provided).    Preoperative Fasting Guidelines    Why must I stop eating and drinking near surgery time?  With sedation, food or liquid in your stomach can enter your lungs causing serious complications  Increases nausea and vomiting    When do I need to stop eating and drinking before my surgery?  Do not eat any solid food after midnight the night before your surgery/procedure unless otherwise instructed by your surgeon.   You may have up to 13.5 ounces of clear liquid until TWO hours before your instructed arrival time to the hospital.  This includes water, black tea/coffee, (no milk or cream) apple juice, and electrolyte drinks (Gatorade).   You may chew gum until TWO hours before your surgery/procedure      If applicable, notify your surgeons office immediately of any new skin changes that occur to the surgical limb.      If you have any questions or concerns, please call Pre-Admission Testing at [phone].

## 2025-01-31 LAB — STAPHYLOCOCCUS SPEC CULT: ABNORMAL

## 2025-01-31 NOTE — TELEPHONE ENCOUNTER
PA approved for fentanyl 25 mcg patch by [place] [employer] until 3/4/25.  PA # [id number].  [facility] called and updated. Patient picked up prescription on 1/28.

## 2025-02-05 ENCOUNTER — ANESTHESIA EVENT (OUTPATIENT)
Dept: OPERATING ROOM | Facility: HOSPITAL | Age: 50
End: 2025-02-05
Payer: COMMERCIAL

## 2025-02-05 ENCOUNTER — APPOINTMENT (OUTPATIENT)
Dept: RADIOLOGY | Facility: HOSPITAL | Age: 50
End: 2025-02-05
Payer: COMMERCIAL

## 2025-02-05 ENCOUNTER — HOSPITAL ENCOUNTER (OUTPATIENT)
Facility: HOSPITAL | Age: 50
Setting detail: OUTPATIENT SURGERY
Discharge: HOME | End: 2025-02-05
Attending: PAIN MEDICINE | Admitting: PAIN MEDICINE
Payer: COMMERCIAL

## 2025-02-05 ENCOUNTER — ANESTHESIA (OUTPATIENT)
Dept: OPERATING ROOM | Facility: HOSPITAL | Age: 50
End: 2025-02-05
Payer: COMMERCIAL

## 2025-02-05 VITALS
HEIGHT: 60 IN | DIASTOLIC BLOOD PRESSURE: 48 MMHG | RESPIRATION RATE: 12 BRPM | BODY MASS INDEX: 24.35 KG/M2 | SYSTOLIC BLOOD PRESSURE: 80 MMHG | OXYGEN SATURATION: 94 % | WEIGHT: 124 LBS | TEMPERATURE: 96.8 F | HEART RATE: 44 BPM

## 2025-02-05 DIAGNOSIS — C81.90 HODGKIN LYMPHOMA, UNSPECIFIED HODGKIN LYMPHOMA TYPE, UNSPECIFIED BODY REGION (MULTI): Primary | ICD-10-CM

## 2025-02-05 DIAGNOSIS — G89.3 CANCER RELATED PAIN: ICD-10-CM

## 2025-02-05 PROCEDURE — 62350 IMPLANT SPINAL CANAL CATH: CPT | Performed by: PAIN MEDICINE

## 2025-02-05 PROCEDURE — A62362 PR INSERT/ REPLACE INFUSN PUMP,PROGRAMMABLE: Performed by: ANESTHESIOLOGIST ASSISTANT

## 2025-02-05 PROCEDURE — 2500000004 HC RX 250 GENERAL PHARMACY W/ HCPCS (ALT 636 FOR OP/ED): Performed by: ANESTHESIOLOGY

## 2025-02-05 PROCEDURE — 3600000007 HC OR TIME - EACH INCREMENTAL 1 MINUTE - PROCEDURE LEVEL TWO: Performed by: PAIN MEDICINE

## 2025-02-05 PROCEDURE — 3700000001 HC GENERAL ANESTHESIA TIME - INITIAL BASE CHARGE: Performed by: PAIN MEDICINE

## 2025-02-05 PROCEDURE — 3700000002 HC GENERAL ANESTHESIA TIME - EACH INCREMENTAL 1 MINUTE: Performed by: PAIN MEDICINE

## 2025-02-05 PROCEDURE — 7100000009 HC PHASE TWO TIME - INITIAL BASE CHARGE: Performed by: PAIN MEDICINE

## 2025-02-05 PROCEDURE — A62362 PR INSERT/ REPLACE INFUSN PUMP,PROGRAMMABLE: Performed by: ANESTHESIOLOGY

## 2025-02-05 PROCEDURE — 3600000002 HC OR TIME - INITIAL BASE CHARGE - PROCEDURE LEVEL TWO: Performed by: PAIN MEDICINE

## 2025-02-05 PROCEDURE — C1772 INFUSION PUMP, PROGRAMMABLE: HCPCS | Performed by: PAIN MEDICINE

## 2025-02-05 PROCEDURE — 2500000004 HC RX 250 GENERAL PHARMACY W/ HCPCS (ALT 636 FOR OP/ED): Performed by: PAIN MEDICINE

## 2025-02-05 PROCEDURE — 2780000003 HC OR 278 NO HCPCS: Performed by: PAIN MEDICINE

## 2025-02-05 PROCEDURE — 62362 IMPLANT SPINE INFUSION PUMP: CPT | Performed by: PAIN MEDICINE

## 2025-02-05 PROCEDURE — 2500000004 HC RX 250 GENERAL PHARMACY W/ HCPCS (ALT 636 FOR OP/ED): Performed by: ANESTHESIOLOGIST ASSISTANT

## 2025-02-05 PROCEDURE — 2500000005 HC RX 250 GENERAL PHARMACY W/O HCPCS: Performed by: ANESTHESIOLOGY

## 2025-02-05 PROCEDURE — C1755 CATHETER, INTRASPINAL: HCPCS | Performed by: PAIN MEDICINE

## 2025-02-05 PROCEDURE — 7100000002 HC RECOVERY ROOM TIME - EACH INCREMENTAL 1 MINUTE: Performed by: PAIN MEDICINE

## 2025-02-05 PROCEDURE — 7100000001 HC RECOVERY ROOM TIME - INITIAL BASE CHARGE: Performed by: PAIN MEDICINE

## 2025-02-05 PROCEDURE — 2720000007 HC OR 272 NO HCPCS: Performed by: PAIN MEDICINE

## 2025-02-05 PROCEDURE — 7100000010 HC PHASE TWO TIME - EACH INCREMENTAL 1 MINUTE: Performed by: PAIN MEDICINE

## 2025-02-05 PROCEDURE — 2500000005 HC RX 250 GENERAL PHARMACY W/O HCPCS: Performed by: ANESTHESIOLOGIST ASSISTANT

## 2025-02-05 DEVICE — IMPLANTABLE DEVICE: Type: IMPLANTABLE DEVICE | Site: ABDOMEN | Status: FUNCTIONAL

## 2025-02-05 DEVICE — CATHETER, ASCENDA INTRATHECAL, SHORT EMAN: Type: IMPLANTABLE DEVICE | Site: ABDOMEN | Status: FUNCTIONAL

## 2025-02-05 RX ORDER — SODIUM CHLORIDE, SODIUM LACTATE, POTASSIUM CHLORIDE, CALCIUM CHLORIDE 600; 310; 30; 20 MG/100ML; MG/100ML; MG/100ML; MG/100ML
100 INJECTION, SOLUTION INTRAVENOUS CONTINUOUS
Status: DISCONTINUED | OUTPATIENT
Start: 2025-02-05 | End: 2025-02-05 | Stop reason: HOSPADM

## 2025-02-05 RX ORDER — ONDANSETRON HYDROCHLORIDE 2 MG/ML
INJECTION, SOLUTION INTRAVENOUS AS NEEDED
Status: DISCONTINUED | OUTPATIENT
Start: 2025-02-05 | End: 2025-02-05

## 2025-02-05 RX ORDER — HYDRALAZINE HYDROCHLORIDE 20 MG/ML
5 INJECTION INTRAMUSCULAR; INTRAVENOUS EVERY 30 MIN PRN
Status: DISCONTINUED | OUTPATIENT
Start: 2025-02-05 | End: 2025-02-05 | Stop reason: HOSPADM

## 2025-02-05 RX ORDER — METOCLOPRAMIDE HYDROCHLORIDE 5 MG/ML
10 INJECTION INTRAMUSCULAR; INTRAVENOUS ONCE AS NEEDED
Status: COMPLETED | OUTPATIENT
Start: 2025-02-05 | End: 2025-02-05

## 2025-02-05 RX ORDER — HYDROMORPHONE HYDROCHLORIDE 1 MG/ML
1 INJECTION, SOLUTION INTRAMUSCULAR; INTRAVENOUS; SUBCUTANEOUS EVERY 5 MIN PRN
Status: DISCONTINUED | OUTPATIENT
Start: 2025-02-05 | End: 2025-02-05 | Stop reason: HOSPADM

## 2025-02-05 RX ORDER — ALBUTEROL SULFATE 0.83 MG/ML
2.5 SOLUTION RESPIRATORY (INHALATION)
Status: DISCONTINUED | OUTPATIENT
Start: 2025-02-05 | End: 2025-02-05 | Stop reason: HOSPADM

## 2025-02-05 RX ORDER — LABETALOL HYDROCHLORIDE 5 MG/ML
5 INJECTION, SOLUTION INTRAVENOUS
Status: DISCONTINUED | OUTPATIENT
Start: 2025-02-05 | End: 2025-02-05 | Stop reason: HOSPADM

## 2025-02-05 RX ORDER — HEPARIN 100 UNIT/ML
5 SYRINGE INTRAVENOUS ONCE
Status: COMPLETED | OUTPATIENT
Start: 2025-02-05 | End: 2025-02-05

## 2025-02-05 RX ORDER — ROCURONIUM BROMIDE 50 MG/5 ML
SYRINGE (ML) INTRAVENOUS AS NEEDED
Status: DISCONTINUED | OUTPATIENT
Start: 2025-02-05 | End: 2025-02-05

## 2025-02-05 RX ORDER — SCOPOLAMINE 1 MG/3D
PATCH, EXTENDED RELEASE TRANSDERMAL AS NEEDED
Status: DISCONTINUED | OUTPATIENT
Start: 2025-02-05 | End: 2025-02-05

## 2025-02-05 RX ORDER — HYDROCODONE BITARTRATE AND ACETAMINOPHEN 5; 325 MG/1; MG/1
1 TABLET ORAL EVERY 4 HOURS PRN
Status: DISCONTINUED | OUTPATIENT
Start: 2025-02-05 | End: 2025-02-05 | Stop reason: HOSPADM

## 2025-02-05 RX ORDER — LIDOCAINE HYDROCHLORIDE 20 MG/ML
INJECTION, SOLUTION EPIDURAL; INFILTRATION; INTRACAUDAL; PERINEURAL AS NEEDED
Status: DISCONTINUED | OUTPATIENT
Start: 2025-02-05 | End: 2025-02-05

## 2025-02-05 RX ORDER — CEFAZOLIN SODIUM 2 G/100ML
INJECTION, SOLUTION INTRAVENOUS AS NEEDED
Status: DISCONTINUED | OUTPATIENT
Start: 2025-02-05 | End: 2025-02-05

## 2025-02-05 RX ORDER — MIDAZOLAM HYDROCHLORIDE 1 MG/ML
INJECTION, SOLUTION INTRAMUSCULAR; INTRAVENOUS AS NEEDED
Status: DISCONTINUED | OUTPATIENT
Start: 2025-02-05 | End: 2025-02-05

## 2025-02-05 RX ORDER — FENTANYL CITRATE 50 UG/ML
INJECTION, SOLUTION INTRAMUSCULAR; INTRAVENOUS AS NEEDED
Status: DISCONTINUED | OUTPATIENT
Start: 2025-02-05 | End: 2025-02-05

## 2025-02-05 RX ORDER — PROPOFOL 10 MG/ML
INJECTION, EMULSION INTRAVENOUS AS NEEDED
Status: DISCONTINUED | OUTPATIENT
Start: 2025-02-05 | End: 2025-02-05

## 2025-02-05 RX ORDER — LIDOCAINE HYDROCHLORIDE AND EPINEPHRINE 10; 10 UG/ML; MG/ML
INJECTION, SOLUTION INFILTRATION; PERINEURAL AS NEEDED
Status: DISCONTINUED | OUTPATIENT
Start: 2025-02-05 | End: 2025-02-05 | Stop reason: HOSPADM

## 2025-02-05 RX ORDER — PHENYLEPHRINE 10 MG/250 ML(40 MCG/ML)IN 0.9 % SOD.CHLORIDE INTRAVENOUS
CONTINUOUS PRN
Status: DISCONTINUED | OUTPATIENT
Start: 2025-02-05 | End: 2025-02-05

## 2025-02-05 RX ORDER — KETOROLAC TROMETHAMINE 30 MG/ML
INJECTION, SOLUTION INTRAMUSCULAR; INTRAVENOUS AS NEEDED
Status: DISCONTINUED | OUTPATIENT
Start: 2025-02-05 | End: 2025-02-05

## 2025-02-05 RX ORDER — PHENYLEPHRINE HCL IN 0.9% NACL 1 MG/10 ML
SYRINGE (ML) INTRAVENOUS AS NEEDED
Status: DISCONTINUED | OUTPATIENT
Start: 2025-02-05 | End: 2025-02-05

## 2025-02-05 RX ADMIN — SCOLOPAMINE TRANSDERMAL SYSTEM 1 PATCH: 1 PATCH, EXTENDED RELEASE TRANSDERMAL at 09:38

## 2025-02-05 RX ADMIN — Medication 3 L/MIN: at 11:53

## 2025-02-05 RX ADMIN — SCOLOPAMINE TRANSDERMAL SYSTEM 1 PATCH: 1 PATCH, EXTENDED RELEASE TRANSDERMAL at 09:00

## 2025-02-05 RX ADMIN — Medication 200 MCG: at 10:32

## 2025-02-05 RX ADMIN — Medication 200 MCG: at 10:21

## 2025-02-05 RX ADMIN — PROPOFOL 25 MCG/KG/MIN: 10 INJECTION, EMULSION INTRAVENOUS at 10:05

## 2025-02-05 RX ADMIN — SODIUM CHLORIDE, POTASSIUM CHLORIDE, SODIUM LACTATE AND CALCIUM CHLORIDE: 600; 310; 30; 20 INJECTION, SOLUTION INTRAVENOUS at 10:58

## 2025-02-05 RX ADMIN — KETOROLAC TROMETHAMINE 30 MG: 30 INJECTION, SOLUTION INTRAMUSCULAR; INTRAVENOUS at 11:12

## 2025-02-05 RX ADMIN — MIDAZOLAM 2 MG: 1 INJECTION INTRAMUSCULAR; INTRAVENOUS at 09:37

## 2025-02-05 RX ADMIN — PROPOFOL 80 MG: 10 INJECTION, EMULSION INTRAVENOUS at 09:42

## 2025-02-05 RX ADMIN — Medication 0.5 MCG/KG/MIN: at 10:35

## 2025-02-05 RX ADMIN — Medication 200 MCG: at 10:35

## 2025-02-05 RX ADMIN — HYDROMORPHONE HYDROCHLORIDE 0.5 MG: 1 INJECTION, SOLUTION INTRAMUSCULAR; INTRAVENOUS; SUBCUTANEOUS at 09:12

## 2025-02-05 RX ADMIN — Medication 6 L/MIN: at 11:35

## 2025-02-05 RX ADMIN — SUGAMMADEX 200 MG: 100 INJECTION, SOLUTION INTRAVENOUS at 11:22

## 2025-02-05 RX ADMIN — METOCLOPRAMIDE 10 MG: 5 INJECTION, SOLUTION INTRAMUSCULAR; INTRAVENOUS at 12:20

## 2025-02-05 RX ADMIN — SODIUM CHLORIDE, POTASSIUM CHLORIDE, SODIUM LACTATE AND CALCIUM CHLORIDE: 600; 310; 30; 20 INJECTION, SOLUTION INTRAVENOUS at 09:42

## 2025-02-05 RX ADMIN — Medication 200 MCG: at 09:46

## 2025-02-05 RX ADMIN — HYDROMORPHONE HYDROCHLORIDE 0.5 MG: 1 INJECTION, SOLUTION INTRAMUSCULAR; INTRAVENOUS; SUBCUTANEOUS at 12:20

## 2025-02-05 RX ADMIN — HEPARIN 500 UNITS: 100 SYRINGE at 14:50

## 2025-02-05 RX ADMIN — LIDOCAINE HYDROCHLORIDE 80 MG: 20 INJECTION, SOLUTION EPIDURAL; INFILTRATION; INTRACAUDAL; PERINEURAL at 09:42

## 2025-02-05 RX ADMIN — ONDANSETRON 4 MG: 2 INJECTION, SOLUTION INTRAMUSCULAR; INTRAVENOUS at 10:40

## 2025-02-05 RX ADMIN — Medication 200 MCG: at 10:09

## 2025-02-05 RX ADMIN — DEXAMETHASONE SODIUM PHOSPHATE 4 MG: 4 INJECTION INTRA-ARTICULAR; INTRALESIONAL; INTRAMUSCULAR; INTRAVENOUS; SOFT TISSUE at 10:01

## 2025-02-05 RX ADMIN — FENTANYL CITRATE 50 MCG: 50 INJECTION, SOLUTION INTRAMUSCULAR; INTRAVENOUS at 11:30

## 2025-02-05 RX ADMIN — Medication 50 MG: at 09:42

## 2025-02-05 RX ADMIN — Medication 20 MG: at 10:50

## 2025-02-05 RX ADMIN — HYDROMORPHONE HYDROCHLORIDE 1 MG: 1 INJECTION, SOLUTION INTRAMUSCULAR; INTRAVENOUS; SUBCUTANEOUS at 11:53

## 2025-02-05 RX ADMIN — CEFAZOLIN SODIUM 2 G: 2 INJECTION, SOLUTION INTRAVENOUS at 09:53

## 2025-02-05 RX ADMIN — Medication 20 MG: at 10:39

## 2025-02-05 SDOH — HEALTH STABILITY: MENTAL HEALTH: CURRENT SMOKER: 0

## 2025-02-05 ASSESSMENT — PAIN SCALES - GENERAL
PAINLEVEL_OUTOF10: 6
PAINLEVEL_OUTOF10: 8
PAINLEVEL_OUTOF10: 6
PAINLEVEL_OUTOF10: 7
PAINLEVEL_OUTOF10: 6
PAINLEVEL_OUTOF10: 6
PAINLEVEL_OUTOF10: 8
PAINLEVEL_OUTOF10: 6

## 2025-02-05 ASSESSMENT — PAIN - FUNCTIONAL ASSESSMENT
PAIN_FUNCTIONAL_ASSESSMENT: 0-10
PAIN_FUNCTIONAL_ASSESSMENT: UNABLE TO SELF-REPORT
PAIN_FUNCTIONAL_ASSESSMENT: 0-10
PAIN_FUNCTIONAL_ASSESSMENT: UNABLE TO SELF-REPORT
PAIN_FUNCTIONAL_ASSESSMENT: 0-10

## 2025-02-05 ASSESSMENT — PAIN DESCRIPTION - DESCRIPTORS
DESCRIPTORS: ACHING;SHARP
DESCRIPTORS: SHARP;SORE

## 2025-02-05 ASSESSMENT — PAIN DESCRIPTION - ORIENTATION
ORIENTATION: LEFT;LOWER
ORIENTATION: LEFT

## 2025-02-05 ASSESSMENT — PAIN DESCRIPTION - LOCATION
LOCATION: ABDOMEN
LOCATION: ABDOMEN

## 2025-02-05 NOTE — DISCHARGE INSTRUCTIONS
Post-Surgery instructions:    Your pain may not be gone immediately after the procedure--it usually takes few days to start working.  It is expected to have some incisional pain at the site of the surgery   Keep wearing the abdominal binder to keep the area of the wound nicely tucked  We will be working with your company representative to assist you with reprogramming of the pump to achieve a good pain control     Activity: Avoid strenuous activity for 1 to 2 weeks to prevent the pump  displacement after that return to your normal activity level gradually    Bandages: Keep the bandage intact for at least 1 week    Showering/Bathing: You may shower after bandage is removed prior to that you may take some sponge bath keep the area of the wound dry and clean    Follow up: CALL OFFICE to make an appointment to follow-up in 2 weeks    Call the doctor immediately: if you notice:     Excessive bleeding from procedure site (brisk bright red bleeding from the site or bleeding that soaks the bandages or does not stop)   Severe headache  Inability to walk, leg or arm weakness or numbness that is worse after the procedure   Uncontrolled pain   New urinary or fecal incontinence   Signs of infection: Fever above 101.5F, redness, swelling, pus or drainage from the site             INTRATHECAL PAIN PUMP REFILL DISCHARGE INSTRUCTIONS        DO NOT GET INJECTION SITE WET FOR 24 HOURS  IF BANDAGE HAS BEEN PLACED YOU MAY REMOVE AFTER 24 HOURS  MONITOR FOR SIGNS/SYMPTOMS OF INFECTION:FEVERS REDNESS OR INCREASED PAIN AT INJECTION SITE   YOU MAY RESUME YOUR NORMAL ACTIVITY       IF SIGNS OR SYMPTOMS OF OPIATE OVERDOSE ARE NOTED AFTER YOUR REFILL INCLUDING UNUSUAL SLEEPINESS, UNRESPONSIVENESS, SLOW OR ABSENT BREATHING ADMINISTER NARCAN AS DIRECTED AND CALL [phone]    BEFORE YOU LEAVE OUR OFFICE PLEASE SCHEDULE YOUR NEXT APPOINTMENT TO SCHEDULE YOUR NEXT REFILL APPOINTMENT-IT IS IMPORTANT TO KEEP YOUR APPOINTMENTS SO THAT YOUR PUMP DOES NOT  RUN OUT OF MEDICATION AS THIS WILL DAMAGE YOUR PUMP    SHOULD YOU HAVE ANY QUESTIONS OR CONCERNS PLEASE CALL THE OFFICE  [phone]       Next refill 4/19/2025 'stop using other opioid for risk of overdose

## 2025-02-05 NOTE — ANESTHESIA PREPROCEDURE EVALUATION
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 : N  Date of Last Liquid: 02/05/25  Time of Last Liquid: 0500  Date of Last Solid: 02/04/25  Time of Last Solid: 1900  Time of Last Void: 0700         Physical Exam    Airway  Mallampati: III  TM distance: >3 FB  Neck ROM: full     Cardiovascular   Rhythm: regular  Rate: normal     Dental   (+) upper dentures, lower dentures     Pulmonary   Breath sounds clear to auscultation     Abdominal - normal exam       Other findings: Right lateral position is preferred secondary to intense pain.  Immediately requesting \"pain medicine\" and \"something so I do not throw up\".          Anesthesia Plan    History of general anesthesia?: yes  History of complications of general anesthesia?: no    ASA 3     MAC and general     The patient is not a current smoker.    intravenous induction   Anesthetic plan and risks discussed with patient.    Plan discussed with CAA.      "

## 2025-02-05 NOTE — OP NOTE
INSERTION, CATHETER, PERMANENT, INTRATHECAL (L) Operative Note     Date: 2025  OR Location: [facility] OR    Name: Pamela Lopez, : 1975, Age: 49 y.o., MRN: [id number], Sex: female    Diagnosis  Pre-op Diagnosis      * Hodgkin lymphoma, unspecified Hodgkin lymphoma type, unspecified body region (Multi) [C81.90] Post-op Diagnosis     * Hodgkin lymphoma, unspecified Hodgkin lymphoma type, unspecified body region (Multi) [C81.90]     Procedures  INSERTION, CATHETER, PERMANENT, INTRATHECAL  78208 - [place] IMPLTJ REVJ/RPSG ITHCL/EDRL CATH  W/O ADAMSON    INSERTION, CATHETER, PERMANENT, INTRATHECAL  09498 - [place] IMPLTJ/RPLCMT ITHCL/EDRL DRUG NFS PRGRBL PUMP    Insertion of an intrathecal pump and catheter    Surgeons      * Jerome Lockhart - Primary    Resident/Fellow/Other Assistant:  Surgeons and Role:  * No surgeons found with a matching role *    Staff:   Scrub Person: Nuvia  Scrub Person: Krissy  Circulator: Debbie    Anesthesia Staff: Anesthesiologist: Johnnie Andrews MD  C-AA: ROBBIN Jackson    Procedure Summary  Anesthesia: General, Monitor Anesthesia Care  ASA: III  Estimated Blood Loss: 20mL  Intra-op Medications: Administrations occurring from 25 1223 to 25 1223:  * No intraprocedure medications in log *           Anesthesia Record               Intraprocedure I/O Totals          Intake    LR bolus 1000.00 mL    Phenylephrine Drip 0.00 mL    The total shown is the total volume documented since Anesthesia Start was filed.    Total Intake 1000 mL          Specimen: No specimens collected              Drains and/or Catheters: * None in log *    Tourniquet Times:         Implants:  Implants       Type Name Action Serial No.       ASCENDA CATHETER Implanted      Other SYNCHROMED III PROGRAMMABLE PUMP Implanted [id number]                  Indications: Pamela Lopez is an 49 y.o. female who is having surgery for Hodgkin lymphoma, unspecified Hodgkin lymphoma type, unspecified body region  (Multi) [C81.90].     The patient was seen in the preoperative area. The risks, benefits, complications, treatment options, non-operative alternatives, expected recovery and outcomes were discussed with the patient. The possibilities of reaction to medication, pulmonary aspiration, injury to surrounding structures, bleeding, recurrent infection, the need for additional procedures, failure to diagnose a condition, and creating a complication requiring transfusion or operation were discussed with the patient. The patient concurred with the proposed plan, giving informed consent.  The site of surgery was properly noted/marked if necessary per policy. The patient has been actively warmed in preoperative area. Preoperative antibiotics have been ordered and given within 1 hours of incision. Venous thrombosis prophylaxis have been ordered including bilateral sequential compression devices    Procedure Details  Patient taken to the operating room #6 placed into general anesthesia area lateral position with her left side up the back and the abdominal area were prepped and draped sterilely in the normal fashion under fluoroscopic guidance the L3-L4 space was identified skin and subcutaneous tissue was anesthetized with 2% lidocaine with epi 14-gauge Touhy needle was introduced in the L3-L4 space with positive CSF then the catheter was advanced until the tip of the catheter was at T11 incision was performed with the knife hemostasis was achieved a silk suture was placed around the needle the needle was removed free flow CSF continues to flow from the catheter the catheter was anchored with Medtronic anchor then attention was taken to the left abdominal lower abdominal area local anesthetic was injected total and the anesthetic used was 24 cc dissection with a knife was achieved and a small pocket was created using a neuro tunneler tunnel was created between the 2 incisions and the catheter was passed from the back into the  abdominal pocket shortened by 26.5 cm and then connected to the connector provided in the kit of the pump and then connected to the pump  Then with 26 g needle the pump was aspirated from the sideport of the pump the pump was refilled with 20 cc of 5 mg/mL morphine preservative-free and programmed to deliver a daily dose of 0.75 mg of morphine a bolus of 0.2 mg was given over 23 minutes and PTM was set at 0.15 mg lockout to 3 hours with a maximum of 3 injections/day with an alarm date set for April 19, 2025 the pump was secured with Prolene suture to the fascia irrigation was achieved into the 2 wound and then wound was closed with 3-0 Vicryl 4-0 Vicryl Dermabond was applied an abdominal binder was placed patient awakened from general anesthesia and taken to the recovery room allowed to recover for sufficient amount of time and then was discharged home in stable condition patient was advised to stop the usage of the fentanyl and oral opiate for risk of overdose I confirmed that the patient has Narcan at home I advised the patient to follow-up with the pain clinic within 2 weeks or if needed any sooner.            Thank you for allowing me to participate in the care of this patient.  Complications:  None; patient tolerated the procedure well.    Disposition: PACU - hemodynamically stable.  Condition: stable                 Additional Details: none     Attending Attestation:     Jerome Lockhart  Phone Number: [phone]

## 2025-02-05 NOTE — H&P
History Of Present Illness  Pamela Lopez is a 49 y.o. female presenting with chronic back pain on chronic opioid      Past Medical History  Past Medical History:   Diagnosis Date    Asthma     Chronic back pain     Edentulous     History of drug use     Hodgkin's lymphoma (Multi)     Hyperlipidemia     PONV (postoperative nausea and vomiting)     HISTORY    Smoker      Surgical History  Past Surgical History:   Procedure Laterality Date    APPENDECTOMY      CT GUIDED PERCUTANEOUS BIOPSY BONE DEEP  06/09/2023    CT GUIDED PERCUTANEOUS BIOPSY BONE DEEP 6/9/2023 [facility] CT    DENTAL SURGERY      HYSTERECTOMY      MULTIPLE TOOTH EXTRACTIONS      OTHER SURGICAL HISTORY      foot surgery    TONSILLECTOMY       Social History  She reports that she has been smoking cigarettes. She started smoking about 35 years ago. She has a 8.5 pack-year smoking history. She has never used smokeless tobacco. She reports that she does not currently use alcohol. She reports current drug use. Drugs: Oxycodone and Fentanyl.    Family History  Family History   Problem Relation Name Age of Onset    Coronary artery disease Mother      Diabetes Mother      Hypertension Mother      Cancer Father      Diabetes Sister      Cancer Brother      Diabetes Brother      Hypertension Brother          Allergies  Allergies   Allergen Reactions    Benadryl [Diphenhydramine Hcl] Confusion and Drowsiness    Lorazepam Confusion and Drowsiness     Review of Systems   All 13 systems were reviewed and are within normal levels except as noted below or per HPI. Positive and pertinent negative responses are noted below or in the HPI   Denied any fever or chills. No weight loss and no night sweats. No cough or sputum production. No diarrhea   No constipation  No bladder and bowel incontinence and no other changes in bladder and bowel. No skin changes.   Denied opioids diversion and abuse and denies alcoholism. Denies overuse of  pain medications.    Physical Exam        Past medical history no interval changes has been noted    On physical examination    General   Alert, oriented x3 pleasant and cooperative. Does not look in any major distress.    HEENT  Pupils normal in size. Ears, nose, mouth, and throat appear to be in normal condition.  Head atraumatic      No signs of sedation or signs of withdrawal apparent.    Psychiatric   No signs of depression apparent.    Neuro   No focal neurological deficit apparent. Ambulation at baseline.      Respiratory  No respiratory distress     Abdomen  no distention     Skin  No skin markings supportive of recent IV drug usage .    Cardiovascular  Regular rate and rhythm     Last Recorded Vitals  Blood pressure 106/52, pulse (!) 46, temperature 36 °C (96.8 °F), temperature source Temporal, resp. rate 16, height 1.524 m (5'), weight 56.2 kg (124 lb), SpO2 97%.    Assessment/Plan   Here for intrathecal morphine pump placement .     Jerome Lockhart MD

## 2025-02-05 NOTE — ANESTHESIA PROCEDURE NOTES
Airway  Date/Time: 2/5/2025 9:46 AM  Urgency: elective    Airway not difficult    Staffing  Performed: ROBBIN   Authorized by: Johnnie Andrews MD    Performed by: ROBBIN Jackson  Patient location during procedure: OR    Indications and Patient Condition  Indications for airway management: anesthesia  Spontaneous Ventilation: absent  Sedation level: deep  Preoxygenated: yes  Patient position: sniffing  Mask difficulty assessment: 1 - vent by mask    Final Airway Details  Final airway type: endotracheal airway      Successful airway: ETT  Cuffed: yes   Successful intubation technique: direct laryngoscopy  Facilitating devices/methods: intubating stylet  Endotracheal tube insertion site: oral  Blade: Maged  Blade size: #3  ETT size (mm): 7.0  Cormack-Lehane Classification: grade I - full view of glottis  Placement verified by: chest auscultation and capnometry   Measured from: lips  ETT to lips (cm): 21  Number of attempts at approach: 1

## 2025-02-06 NOTE — ANESTHESIA POSTPROCEDURE EVALUATION
Patient: Pamela Lopez    Procedure Summary       Date: 02/05/25 Room / Location: [facility] OR 06 / [facility]    Anesthesia Start: 0938 Anesthesia Stop: 1138    Procedure: INSERTION, CATHETER, PERMANENT, INTRATHECAL (Left: Abdomen) Diagnosis:       Hodgkin lymphoma, unspecified Hodgkin lymphoma type, unspecified body region (Multi)      (Hodgkin lymphoma, unspecified Hodgkin lymphoma type, unspecified body region (Multi) [C81.90])    Surgeons: Jerome Lockhart MD Responsible Provider: Johnnie Andrews MD    Anesthesia Type: MAC, general ASA Status: 3            Anesthesia Type: MAC, general    Vitals Value Taken Time   BP 76/45 02/05/25 1400   Temp 36 °C (96.8 °F) 02/05/25 1345   Pulse 52 02/05/25 1400   Resp 12 02/05/25 1400   SpO2 94 % 02/05/25 1413   Vitals shown include unfiled device data.    Anesthesia Post Evaluation    Patient location during evaluation: PACU  Patient participation: complete - patient participated  Level of consciousness: awake and alert  Pain management: satisfactory to patient  Airway patency: patent  Cardiovascular status: hypotensive (Hypotension treated with IVFs. Values improving)  Respiratory status: spontaneous ventilation  Hydration status: hypovolemic  Postoperative Nausea and Vomiting: none  Comments: Required IVF bolus resulting in gradual but steady improvement of blood pressure values. Fully conscious, maintains meaningful verbal contact. Appears to know about her blood pressure issues from prior encounters.        There were no known notable events for this encounter.

## 2025-02-10 ENCOUNTER — APPOINTMENT (OUTPATIENT)
Dept: HEMATOLOGY/ONCOLOGY | Facility: CLINIC | Age: 50
End: 2025-02-10
Payer: COMMERCIAL

## 2025-02-10 ENCOUNTER — TELEMEDICINE (OUTPATIENT)
Dept: PALLIATIVE MEDICINE | Facility: CLINIC | Age: 50
End: 2025-02-10
Payer: COMMERCIAL

## 2025-02-10 DIAGNOSIS — C81.48: ICD-10-CM

## 2025-02-10 DIAGNOSIS — Z51.5 PALLIATIVE CARE ENCOUNTER: ICD-10-CM

## 2025-02-10 DIAGNOSIS — G89.3 CANCER RELATED PAIN: Primary | ICD-10-CM

## 2025-02-10 PROCEDURE — 99215 OFFICE O/P EST HI 40 MIN: CPT

## 2025-02-10 RX ORDER — OXYCODONE HYDROCHLORIDE 15 MG/1
15 TABLET ORAL EVERY 4 HOURS PRN
Qty: 150 TABLET | Refills: 0 | Status: SHIPPED | OUTPATIENT
Start: 2025-02-10 | End: 2025-02-14 | Stop reason: SDUPTHER

## 2025-02-10 NOTE — PROGRESS NOTES
SUPPORTIVE AND PALLIATIVE ONCOLOGY OUTPATIENT FOLLOW-UP      SERVICE DATE: 2/10/2025    Virtual or Telephone Consent    An interactive audio and video telecommunication system which permits real time communications between the patient (at the originating site) and provider (at the distant site) was utilized to provide this telehealth service.   Verbal consent was requested and obtained from Pamela Lopez on this date, 02/10/25 for a telehealth visit.     Subjective   HISTORY OF PRESENT ILLNESS: Pamela Lopez is a 49 y.o. female who presents with newly diagnosed Hodgkin Lymphoma. She was admitted d/t back pain and a CT c/a/p revealed multiple hepatic masses and several osteoblastic  lesions. MRI brain was negative. She underwent biopsy of L3 on 6/9/23 which was nondiagnostic. PET scan showed FDG-avid lymph nodes in the neck, tonsil, bones, and liver. Biopsy of right tonsil 6/22 revealed Hodgkin Lymphoma. She underwent RT to T7 x10  fractions. She started treatment with AVD + Brentuximab 7/17/23. Recent PET scan with excellent response to treatment. PET scan 12/23 showed no evidence of recurrence.     She is s/p intrathecal pump placement 2/5/25.     Pain Assessment:  Pain Score: 5/10  Location: mid-back and lower back  Description: incisional pain in her lower back and and abdomen. She has aching and throbbing pain in her lower back where her incision is. The abdominal pain has improved over the last 5 days. She is taking oxycodone 2 times per day with good relief for her incision pain. She is taking ibuprofen and tylenol as well. Overall her back pain is 85-90% improved with her intrathecal pump.     Symptom Assessment:  Pain:a little  Headache: none  Dizziness:none  Lack of energy: a little.   Difficulty sleeping: none   Worrying: a little   Anxiety: none   Depression: a little.   Pain in mouth/swallowing: none  Dry mouth: none  Taste changes: none  Shortness of breath: none  Lack of appetite: none     Nausea: a little.    Vomiting: none   Constipation: none   Diarrhea: none  Sore muscles: none  Numbness or tingling in hands/feet/other: somewhat. Hands and feet are numb. Improved with Gabapentin  Weight loss: none  Other: none      Information obtained from: interview of patient  ______________________________________________________________________        Objective     Pre-Admission Testing on 01/28/2025   Component Date Value Ref Range Status    Staph/MRSA Screen Culture 01/28/2025 Isolated: Methicillin Susceptible Staphylococcus aureus (MSSA) (A)   Final     PHYSICAL EXAMINATION   Vital Signs: not completed due to virtual visit   There were no vitals filed for this visit.  Vital signs reviewed.       Physical Exam not completed due to virtual visit     Social  Not . Lives with her 3 grown children.  History of drug use with Meth x8 years. Quit 1 year ago.  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     ASSESSMENT/PLAN    Pain  Pain is: cancer related pain  Type: somatic and neuropathic  Pain control: well-controlled  Home regimen:   - MSContin caused nausea, vomiting and heavy feeling  - No longer on Fentanyl 50 mcg patch every 72 hours now that she is s/p intrathecal catheter placement   - Continue oxycodone 15 mg every 4 hours. Using 2 times per day now.   - s/p intrathecal pain pump on 2/5/25.   - Had an appointment with Dr. Wagner 12/31. Surgery is not an option at this time.   - Continue Gabapentin 600 mg TID. Recently increased this. She has been taking 300/300/600 mg   - Continue Advil + Tylenol as needed  - Continue with aquatic therapy   - Continue Flexeril 10 mg at bedtime  - Continue Tizanidine 2 mg during the day  - Continue Ropinirole .25 mg at bedtime for RLS as needed  consistently. Recommended taking daily at bedtime.   - Completed 2 sessions of aquatic therapy and now with increased pain    Opioid Use  Medication Management:   - OARRS  report reviewed with no aberrant behavior; consistent with  prescriptions/records and patient history  - MED 45.  Overdose Risk Score 290.   This has been discussed with patient.   - We will continue to closely monitor the patient for signs of prescription misuse including UDS, OARRS review and subjective reports at each visit.  - No concurrent benzodiazepine use   - I am a provider who either is or has consulted and collaborated with a provider certified in Hospice and Palliative Medicine and have conducted a face-face visit and examination for this patient.  - Routine Urine Drug Screen completed 7/15/24 positive for oxycodone and negative for other illicit substances  - Controlled Substance Agreement: completed 10/14/24  - Specifically discussed that controlled substance prescriptions will only be provided by our group as outlined in the completed agreement  - Prescribed naloxone: Has prescription at home  - Red Flags: History of drug use with Meth. Quit 1 year ago     Nausea   Intermittent nausea without vomiting related to chemotherapy   - Continue Prochlorperazine 10 mg every 6 hours as needed  - Continue Ondansetron 8 mg every 8 hours as needed    Constipation  At risk for constipation related to opioids,  currently not constipated  Usual bowel pattern: daily  Home regimen:   - Continue Senna-S 1 tab BID- no longer taking due to diarrhea  - Start Magnesium Citrate - may start with 1/2 bottle to 1 bottle if no BM within 48-72 hours     Altered Mood  Chronic anxiety and depression related to health concerns   controlled with home regimen  Home regimen:   - Restart Lexapro 20 mg- encouraged her to restart this since feeling more depressed and emotional   - no longer taking Wellbutrin 150 mg BID.   - Discussed meeting with a community therapist for therapy as well    Sleeping Difficulty:  Home regimen:    - Trazodone stopped inpatient r/t QTc concerns  - No longer taking Risperidone 0.5 mg nightly as needed.   -  Flexeril has been effective for sleep   - Discussed taking 5-10 mg of Melatonin. Encouraged to restart this.    Decreased appetite  Related to malignancy, chemotherapy, and disease process  Nutrition following  Home regimen:    - Olanzapine 2.5 mg every day at bedtime- this was stopped inpatient d/t QTc  - Following with Martha Erin    Night Sweats  - Continue Oxybutynin 5-10 mg nightly as needed.       Advance Directives  Existence of Advance Directives:Yes, documentation or copy in medical record  Decision maker: AARON is Cynthia Xie  Code Status: Full code    Next Follow-Up Visit:  Return to clinic in 2 months    Signature and billing  Medical complexity was low level due to due to complexity of problems, extensive data review, and high risk of management/treatment.  Time was spent on the following: Prep Time, Time Directly with Patient/Family/Caregiver, Documentation Time. Total time spent: 45      Data  Diagnostic tests and information reviewed for today's visit:  Most recent labs, Most recent imaging, Medications         Some elements copied from Cynthia porter on 1/13/25, the elements have been updated and all reflect current decision making from today, 2/10/2025.      Plan of Care discussed with: Patient    SIGNATURE: MAGDA Downing-CNP    Contact information:  Supportive and Palliative Oncology  Monday-Friday 8 AM-5 PM  Phone:  [phone], press option #5, then option #1.   Or Epic Secure Chat

## 2025-02-14 ENCOUNTER — PATIENT MESSAGE (OUTPATIENT)
Dept: PALLIATIVE MEDICINE | Facility: HOSPITAL | Age: 50
End: 2025-02-14
Payer: COMMERCIAL

## 2025-02-14 ENCOUNTER — TELEPHONE (OUTPATIENT)
Dept: PALLIATIVE MEDICINE | Facility: HOSPITAL | Age: 50
End: 2025-02-14
Payer: COMMERCIAL

## 2025-02-14 DIAGNOSIS — G89.3 CANCER RELATED PAIN: ICD-10-CM

## 2025-02-14 RX ORDER — OXYCODONE HYDROCHLORIDE 15 MG/1
15 TABLET ORAL EVERY 4 HOURS PRN
Qty: 75 TABLET | Refills: 0 | Status: SHIPPED | OUTPATIENT
Start: 2025-02-14 | End: 2025-03-01

## 2025-02-14 NOTE — TELEPHONE ENCOUNTER
Called and left VM pts [facility] said oxycodone is on backorder. Called [facility] they were unable to clarify if it is on back order but [facility] in [place] does have 75 in stock at the moment.

## 2025-02-18 ENCOUNTER — OFFICE VISIT (OUTPATIENT)
Dept: PAIN MEDICINE | Facility: CLINIC | Age: 50
End: 2025-02-18
Payer: COMMERCIAL

## 2025-02-18 VITALS — TEMPERATURE: 96.9 F | OXYGEN SATURATION: 97 % | RESPIRATION RATE: 18 BRPM | HEART RATE: 49 BPM

## 2025-02-18 DIAGNOSIS — C81.90 HODGKIN LYMPHOMA, UNSPECIFIED HODGKIN LYMPHOMA TYPE, UNSPECIFIED BODY REGION (MULTI): Primary | ICD-10-CM

## 2025-02-18 PROCEDURE — 99213 OFFICE O/P EST LOW 20 MIN: CPT | Performed by: PAIN MEDICINE

## 2025-02-18 ASSESSMENT — PAIN SCALES - GENERAL: PAINLEVEL_OUTOF10: 0-NO PAIN

## 2025-02-18 NOTE — H&P
History Of Present Illness  Pamela Lopez is a 49 y.o. female presenting with back pain she is status post an intrathecal pump replacement currently content with the results of the pump was interrogated and showed appropriate function the patient is averaging 1 injection/day with the current setting the pump will last her until April 23, 2024 currently running at the morphine 5 mg/mL with a daily dose of 0.75 mg of morphine per day the patient denies any side effect associated with the usage of the pump and she is happy with the results achieved.  Currently her pain score at a 0 out of 10     Past Medical History  Past Medical History:   Diagnosis Date    Asthma     Chronic back pain     Edentulous     History of drug use     Hodgkin's lymphoma (Multi)     Hyperlipidemia     PONV (postoperative nausea and vomiting)     HISTORY    Smoker      Surgical History  Past Surgical History:   Procedure Laterality Date    APPENDECTOMY      CT GUIDED PERCUTANEOUS BIOPSY BONE DEEP  06/09/2023    CT GUIDED PERCUTANEOUS BIOPSY BONE DEEP 6/9/2023 [facility]    DENTAL SURGERY      HYSTERECTOMY      MULTIPLE TOOTH EXTRACTIONS      OTHER SURGICAL HISTORY      foot surgery    TONSILLECTOMY       Social History  She reports that she has been smoking cigarettes. She started smoking about 35 years ago. She has a 8.5 pack-year smoking history. She has never used smokeless tobacco. She reports that she does not currently use alcohol. She reports current drug use. Drugs: Oxycodone and Fentanyl.    Family History  Family History   Problem Relation Name Age of Onset    Coronary artery disease Mother      Diabetes Mother      Hypertension Mother      Cancer Father      Diabetes Sister      Cancer Brother      Diabetes Brother      Hypertension Brother          Allergies  Allergies   Allergen Reactions    Benadryl [Diphenhydramine Hcl] Confusion and Drowsiness    Lorazepam Confusion and Drowsiness     Review of Systems   All 13 systems were  reviewed and are within normal levels except as noted below or per HPI. Positive and pertinent negative responses are noted below or in the HPI   Denied any fever or chills. No weight loss and no night sweats. No cough or sputum production. No diarrhea   No constipation  No bladder and bowel incontinence and no other changes in bladder and bowel. No skin changes.   Denied opioids diversion and abuse and denies alcoholism. Denies overuse of  pain medications.    Physical Exam       Past medical history no interval changes has been noted    On physical examination    General   Alert, oriented x3 pleasant and cooperative. Does not look in any major distress.    HEENT  Pupils normal in size. Ears, nose, mouth, and throat appear to be in normal condition.  Head atraumatic      No signs of sedation or signs of withdrawal apparent.    Psychiatric   No signs of depression apparent.    Neuro   No focal neurological deficit apparent. Ambulation at baseline.      Respiratory  No respiratory distress     Abdomen  no distention     Skin  No skin markings supportive of recent IV drug usage .    Cardiovascular  Regular rate and rhythm    Last Recorded Vitals  Temperature 36.1 °C (96.9 °F).    Assessment/Plan   Of 49 years old with history and physical examination supportive of chronic back pain status post intrathecal pump placement with positive response      Congratulated the patient on the results achieved I advised her about the setting of  her pump knowing that she would need a refill before April 23, 2025 she was advised to make an appointment prior to that date to have her refill patient verbalized understanding and agreement with the plan and she will be followed up in the pain clinic accordingly      The above clinical summary has been dictated with voice recognition software. It has not been proofread for grammatical errors, typographical mistakes, or other semantic inconsistencies.    Thank you for visiting our office  today. It was our pleasure to take part in your healthcare.     Please do not hesitate to contact the pain clinic after your visit with any questions or concerns at  M-F 8-4 pm       Jerome Lockhart M.D.  Medical Director , Division of Pain Medicine [facility]   of Anesthesiology and Pain Medicine  [facility] [place] [address]     Office: [phone]  Fax: [phone]      Jerome Lockhart MD

## 2025-02-19 DIAGNOSIS — C81.48: Primary | ICD-10-CM

## 2025-03-07 DIAGNOSIS — C81.48: ICD-10-CM

## 2025-03-07 RX ORDER — ACYCLOVIR 200 MG/1
200 CAPSULE ORAL DAILY
Qty: 30 CAPSULE | Refills: 1 | Status: SHIPPED | OUTPATIENT
Start: 2025-03-07

## 2025-03-17 ENCOUNTER — TELEPHONE (OUTPATIENT)
Dept: PALLIATIVE MEDICINE | Facility: HOSPITAL | Age: 50
End: 2025-03-17
Payer: COMMERCIAL

## 2025-03-17 DIAGNOSIS — G89.3 CANCER RELATED PAIN: Primary | ICD-10-CM

## 2025-03-17 RX ORDER — OXYCODONE HYDROCHLORIDE 15 MG/1
15 TABLET ORAL EVERY 4 HOURS PRN
Qty: 75 TABLET | Refills: 0 | Status: SHIPPED | OUTPATIENT
Start: 2025-03-17 | End: 2025-04-01

## 2025-03-17 NOTE — TELEPHONE ENCOUNTER
OARRS reviewed and no concerns noted. Per last visit with Cynthia Monroe NP  on 2/10/25 patient to continue Oxycodone. F/U visit scheduled for 4/9/25.  Refills sent to provider for review/approval .  Teena Morales on [address] becerra snot have the medication. Script sent to [facility]. Patient updated.

## 2025-03-24 ENCOUNTER — INFUSION (OUTPATIENT)
Dept: HEMATOLOGY/ONCOLOGY | Facility: CLINIC | Age: 50
End: 2025-03-24
Payer: COMMERCIAL

## 2025-03-24 DIAGNOSIS — C81.48: ICD-10-CM

## 2025-03-24 PROCEDURE — 36591 DRAW BLOOD OFF VENOUS DEVICE: CPT

## 2025-03-27 ENCOUNTER — OFFICE VISIT (OUTPATIENT)
Dept: PAIN MEDICINE | Facility: CLINIC | Age: 50
End: 2025-03-27
Payer: COMMERCIAL

## 2025-03-27 DIAGNOSIS — C81.90 HODGKIN LYMPHOMA, UNSPECIFIED HODGKIN LYMPHOMA TYPE, UNSPECIFIED BODY REGION (MULTI): Primary | ICD-10-CM

## 2025-03-27 PROCEDURE — 99213 OFFICE O/P EST LOW 20 MIN: CPT | Performed by: PAIN MEDICINE

## 2025-03-27 ASSESSMENT — PAIN SCALES - GENERAL: PAINLEVEL_OUTOF10: 2

## 2025-03-27 ASSESSMENT — PAIN - FUNCTIONAL ASSESSMENT: PAIN_FUNCTIONAL_ASSESSMENT: 0-10

## 2025-03-27 ASSESSMENT — PAIN DESCRIPTION - DESCRIPTORS: DESCRIPTORS: DULL;ACHING

## 2025-03-27 NOTE — H&P
History Of Present Illness  Pamela Lopez is a 49 y.o. female presenting with neck back pain she is status post an intrathecal pump placement confirming that for the first few weeks with the pump implant she had a great pain relief she was able to increase her activity level now that she is going at full speed she is feeling the pain now well under control  Time of my evaluation she was rating the pain at the level of 2 out of 10 but it could go up to a level of 8 out of 10 please she works 6 days in the row with 7 hours daily and after that she started experiencing the pain  Pump was interrogated and showed appropriate function the patient is averaging 1.4 injection of her morphine per day and her next refill is scheduled to be on 4/29/2025 her allowance with the PTM is up to 3 injections/day    Past Medical History  Past Medical History:   Diagnosis Date    Asthma     Chronic back pain     Edentulous     History of drug use     Hodgkin's lymphoma (Multi)     Hyperlipidemia     PONV (postoperative nausea and vomiting)     HISTORY    Smoker      Surgical History  Past Surgical History:   Procedure Laterality Date    APPENDECTOMY      CT GUIDED PERCUTANEOUS BIOPSY BONE DEEP  06/09/2023    CT GUIDED PERCUTANEOUS BIOPSY BONE DEEP 6/9/2023 [facility]    DENTAL SURGERY      HYSTERECTOMY      MULTIPLE TOOTH EXTRACTIONS      OTHER SURGICAL HISTORY      foot surgery    TONSILLECTOMY       Social History  She reports that she has been smoking cigarettes. She started smoking about 35 years ago. She has a 8.6 pack-year smoking history. She has never used smokeless tobacco. She reports that she does not currently use alcohol. She reports current drug use. Drugs: Oxycodone and Fentanyl.    Family History  Family History   Problem Relation Name Age of Onset    Coronary artery disease Mother      Diabetes Mother      Hypertension Mother      Cancer Father      Diabetes Sister      Cancer Brother      Diabetes Brother       Hypertension Brother          Allergies  Allergies   Allergen Reactions    Benadryl [Diphenhydramine Hcl] Confusion and Drowsiness    Lorazepam Confusion and Drowsiness     Review of Systems   All 13 systems were reviewed and are within normal levels except as noted below or per HPI. Positive and pertinent negative responses are noted below or in the HPI   Denied any fever or chills. No weight loss and no night sweats. No cough or sputum production. No diarrhea   No constipation  No bladder and bowel incontinence and no other changes in bladder and bowel. No skin changes.   Denied opioids diversion and abuse and denies alcoholism. Denies overuse of  pain medications.   Physical Exam       Past medical history no interval changes has been noted    On physical examination    General   Alert, oriented x3 pleasant and cooperative. Does not look in any major distress.    HEENT  Pupils normal in size. Ears, nose, mouth, and throat appear to be in normal condition.  Head atraumatic      No signs of sedation or signs of withdrawal apparent.    Psychiatric   No signs of depression apparent.    Neuro   No focal neurological deficit apparent. Ambulation at baseline.      Respiratory  No respiratory distress     Abdomen  no distention     Skin  No skin markings supportive of recent IV drug usage .    Cardiovascular  Regular rate and rhythm    Last Recorded Vitals  There were no vitals taken for this visit.    Assessment/Plan   81 years old with history and physical examination supportive of chronic back pain status post intrathecal pump placement with positive response    Plan  Explained to the patient that the pump was meant to improve her quality of life which did the patient seems to be taking it a little bit to the extreme by working 6 days in a row multiple hours daily and she still have some PTM available for her to be pushed since she is only averaging 1.4 injections a day with her allowance being 3 injections/day  therefore I did not change the setting of her pump and I advised the patient to fit her activity to what her pump is allowing her to perform and to follow-up with the pain clinic around April 29, 2025 for a pump refill patient verbalized understanding and agreement to the plan     Jerome Lockhart MD

## 2025-04-08 DIAGNOSIS — Z79.899 ENCOUNTER FOR MONITORING CARDIOTOXIC DRUG THERAPY: Primary | ICD-10-CM

## 2025-04-08 DIAGNOSIS — R94.31 ABNORMAL EKG: ICD-10-CM

## 2025-04-08 DIAGNOSIS — I35.1 AORTIC VALVE INSUFFICIENCY, ETIOLOGY OF CARDIAC VALVE DISEASE UNSPECIFIED: ICD-10-CM

## 2025-04-08 DIAGNOSIS — Z51.81 ENCOUNTER FOR MONITORING CARDIOTOXIC DRUG THERAPY: Primary | ICD-10-CM

## 2025-04-08 DIAGNOSIS — Z92.21 STATUS POST ADMINISTRATION OF CARDIOTOXIC CHEMOTHERAPY: ICD-10-CM

## 2025-04-08 DIAGNOSIS — I49.1 PAC (PREMATURE ATRIAL CONTRACTION): ICD-10-CM

## 2025-04-08 DIAGNOSIS — I35.1 NONRHEUMATIC AORTIC VALVE INSUFFICIENCY: Primary | ICD-10-CM

## 2025-04-09 ENCOUNTER — OFFICE VISIT (OUTPATIENT)
Dept: PALLIATIVE MEDICINE | Facility: CLINIC | Age: 50
End: 2025-04-09
Payer: COMMERCIAL

## 2025-04-09 VITALS
WEIGHT: 126.54 LBS | RESPIRATION RATE: 16 BRPM | SYSTOLIC BLOOD PRESSURE: 90 MMHG | HEART RATE: 40 BPM | BODY MASS INDEX: 24.71 KG/M2 | OXYGEN SATURATION: 95 % | DIASTOLIC BLOOD PRESSURE: 46 MMHG | TEMPERATURE: 97 F

## 2025-04-09 DIAGNOSIS — G89.3 CANCER RELATED PAIN: Primary | ICD-10-CM

## 2025-04-09 DIAGNOSIS — C81.48: ICD-10-CM

## 2025-04-09 DIAGNOSIS — Z51.5 PALLIATIVE CARE ENCOUNTER: ICD-10-CM

## 2025-04-09 PROCEDURE — 99214 OFFICE O/P EST MOD 30 MIN: CPT

## 2025-04-09 RX ORDER — OXYCODONE HYDROCHLORIDE 15 MG/1
15 TABLET ORAL EVERY 4 HOURS PRN
Qty: 75 TABLET | Refills: 0 | Status: SHIPPED | OUTPATIENT
Start: 2025-04-09 | End: 2025-04-24

## 2025-04-09 ASSESSMENT — PAIN SCALES - GENERAL: PAINLEVEL_OUTOF10: 8

## 2025-04-09 NOTE — PROGRESS NOTES
SUPPORTIVE AND PALLIATIVE ONCOLOGY OUTPATIENT FOLLOW-UP      SERVICE DATE: 4/9/2025      Subjective   HISTORY OF PRESENT ILLNESS: Pamela Lopez is a 49 y.o. female who presents with newly diagnosed Hodgkin Lymphoma. She was admitted d/t back pain and a CT c/a/p revealed multiple hepatic masses and several osteoblastic  lesions. MRI brain was negative. She underwent biopsy of L3 on 6/9/23 which was nondiagnostic. PET scan showed FDG-avid lymph nodes in the neck, tonsil, bones, and liver. Biopsy of right tonsil 6/22 revealed Hodgkin Lymphoma. She underwent RT to T7 x10  fractions. She started treatment with AVD + Brentuximab 7/17/23. Recent PET scan with excellent response to treatment. PET scan 12/23 showed no evidence of recurrence.     She is s/p intrathecal pump placement 2/5/25.     Pain Assessment:  Pain Score: 5/10  Location: mid-back and lower back  Description: she reports aching and throbbing pain. She has sharp, shooting pain today after moving this morning in the wrong direction. She has more pain with increased activity and movement. She is taking oxycodone 2-4 times per day with good relief.      Symptom Assessment:  Pain:a little  Headache: none  Dizziness:none  Lack of energy: a little.   Difficulty sleeping: none   Worrying: a little   Anxiety: none   Depression: a little.   Pain in mouth/swallowing: none  Dry mouth: none  Taste changes: none  Shortness of breath: none  Lack of appetite: none    Nausea: a little.    Vomiting: none   Constipation: none   Diarrhea: none  Sore muscles: none  Numbness or tingling in hands/feet/other: somewhat. Hands and feet are numb. Improved with Gabapentin  Weight loss: none  Other: none      Information obtained from: interview of patient  ______________________________________________________________________        Objective     No visits with results within 1 Month(s) from this visit.   Latest known visit with results is:   Pre-Admission Testing on 01/28/2025    Component Date Value Ref Range Status    Staph/MRSA Screen Culture 01/28/2025 Isolated: Methicillin Susceptible Staphylococcus aureus (MSSA) (A)   Final     PHYSICAL EXAMINATION   Vital Signs:  Vitals:    04/09/25 1611   BP: (!) 90/46   Pulse: (!) 40   Resp: 16   Temp: 36.1 °C (97 °F)   SpO2: 95%   Vital signs reviewed.       Physical Exam  HENT:      Head: Normocephalic.      Nose: Nose normal.   Eyes:      Pupils: Pupils are equal, round, and reactive to light.   Pulmonary:      Effort: Pulmonary effort is normal.   Musculoskeletal:         General: Normal range of motion.   Neurological:      Mental Status: She is alert and oriented to person, place, and time.   Psychiatric:         Mood and Affect: Mood normal.         Behavior: Behavior normal.          Social  Not . Lives with her 3 grown children.  History of drug use with Meth x8 years. Quit 1 year ago.  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     ASSESSMENT/PLAN    Pain  Pain is: cancer related pain  Type: somatic and neuropathic  Pain control: well-controlled  Home regimen:   - Continue oxycodone 15 mg every 4 hours.   - s/p intrathecal pain pump on 2/5/25. Following with Dr. Lockhart  - Had an appointment with Dr. Wagner 12/31. Surgery is not an option at this time.   - Continue Gabapentin 600 mg TID. Recently increased this. She has been taking 300/300/600 mg   - Continue Advil + Tylenol as needed  - Continue with aquatic therapy   - Continue Flexeril 10 mg at bedtime  - Continue Tizanidine 2 mg during the day  - Continue Ropinirole .25 mg at bedtime for RLS as needed  consistently. Recommended taking daily at bedtime.   - Completed 2 sessions of aquatic therapy and now with increased pain  - MSContin caused nausea, vomiting and heavy feeling  - No longer on Fentanyl 50 mcg patch every 72 hours now that she is s/p intrathecal catheter placement     Opioid Use  Medication Management:   - OARRS  report reviewed with no aberrant behavior; consistent with  prescriptions/records and patient history  - MED 45.  Overdose Risk Score 290.   This has been discussed with patient.   - We will continue to closely monitor the patient for signs of prescription misuse including UDS, OARRS review and subjective reports at each visit.  - No concurrent benzodiazepine use   - I am a provider who either is or has consulted and collaborated with a provider certified in Hospice and Palliative Medicine and have conducted a face-face visit and examination for this patient.  - Routine Urine Drug Screen completed 7/15/24 positive for oxycodone and negative for other illicit substances  - Controlled Substance Agreement: completed 10/14/24  - Specifically discussed that controlled substance prescriptions will only be provided by our group as outlined in the completed agreement  - Prescribed naloxone: Has prescription at home  - Red Flags: History of drug use with Meth. Quit 1 year ago     Nausea   Intermittent nausea without vomiting related to chemotherapy   - Continue Prochlorperazine 10 mg every 6 hours as needed  - Continue Ondansetron 8 mg every 8 hours as needed    Constipation  At risk for constipation related to opioids,  currently not constipated  Usual bowel pattern: daily  Home regimen:   - Continue Senna-S 1 tab BID- no longer taking due to diarrhea  - Start Magnesium Citrate - may start with 1/2 bottle to 1 bottle if no BM within 48-72 hours     Altered Mood  Chronic anxiety and depression related to health concerns   controlled with home regimen  Home regimen:   - Restart Lexapro 20 mg- encouraged her to restart this since feeling more depressed and emotional   - no longer taking Wellbutrin 150 mg BID.   - Discussed meeting with a community therapist for therapy as well    Sleeping Difficulty:  Home regimen:    - Trazodone stopped inpatient r/t QTc concerns  - No longer taking Risperidone 0.5 mg nightly as needed.   -  Flexeril has been effective for sleep   - Discussed taking 5-10 mg of Melatonin. Encouraged to restart this.    Decreased appetite  Related to malignancy, chemotherapy, and disease process  Nutrition following  Home regimen:    - Olanzapine 2.5 mg every day at bedtime- this was stopped inpatient d/t QTc  - Following with Martha Erin    Night Sweats  - Continue Oxybutynin 5-10 mg nightly as needed.       Advance Directives  Existence of Advance Directives:Yes, documentation or copy in medical record  Decision maker: AARON is Cynthia Xie  Code Status: Full code    Next Follow-Up Visit:  Return to clinic in 3 months    Signature and billing  Medical complexity was low level due to due to complexity of problems, extensive data review, and high risk of management/treatment.  Time was spent on the following: Prep Time, Time Directly with Patient/Family/Caregiver, Documentation Time. Total time spent: 45      Data  Diagnostic tests and information reviewed for today's visit:  Most recent labs, Most recent imaging, Medications         Some elements copied from Cynthia porter on 1/13/25, the elements have been updated and all reflect current decision making from today, 4/9/2025.      Plan of Care discussed with: Patient    SIGNATURE: MAGDA Downing-CNP    Contact information:  Supportive and Palliative Oncology  Monday-Friday 8 AM-5 PM  Phone:  [phone], press option #5, then option #1.   Or Epic Secure Chat

## 2025-04-11 ENCOUNTER — DOCUMENTATION (OUTPATIENT)
Dept: CARDIOLOGY | Facility: CLINIC | Age: 50
End: 2025-04-11
Payer: COMMERCIAL

## 2025-04-11 NOTE — PROGRESS NOTES
P2P performed with [employer] for approval of echocardiogram.  Authorization number [id number].  Order approved through 5/30/25.    Juanis Mccann, MAGDA-CNP

## 2025-04-15 ENCOUNTER — APPOINTMENT (OUTPATIENT)
Dept: CARDIOLOGY | Facility: CLINIC | Age: 50
End: 2025-04-15
Payer: COMMERCIAL

## 2025-04-15 ENCOUNTER — HOSPITAL ENCOUNTER (OUTPATIENT)
Dept: CARDIOLOGY | Facility: CLINIC | Age: 50
Discharge: HOME | End: 2025-04-15
Payer: COMMERCIAL

## 2025-04-15 VITALS
BODY MASS INDEX: 24.74 KG/M2 | HEIGHT: 60 IN | DIASTOLIC BLOOD PRESSURE: 46 MMHG | WEIGHT: 126 LBS | SYSTOLIC BLOOD PRESSURE: 90 MMHG

## 2025-04-15 DIAGNOSIS — I35.1 NONRHEUMATIC AORTIC VALVE INSUFFICIENCY: ICD-10-CM

## 2025-04-15 DIAGNOSIS — R94.31 ABNORMAL EKG: ICD-10-CM

## 2025-04-15 DIAGNOSIS — Z79.899 OTHER LONG TERM (CURRENT) DRUG THERAPY: ICD-10-CM

## 2025-04-15 DIAGNOSIS — I06.1 RHEUMATIC AORTIC INSUFFICIENCY: ICD-10-CM

## 2025-04-15 DIAGNOSIS — Z92.21 STATUS POST ADMINISTRATION OF CARDIOTOXIC CHEMOTHERAPY: ICD-10-CM

## 2025-04-15 PROCEDURE — 93306 TTE W/DOPPLER COMPLETE: CPT | Performed by: STUDENT IN AN ORGANIZED HEALTH CARE EDUCATION/TRAINING PROGRAM

## 2025-04-15 PROCEDURE — 93306 TTE W/DOPPLER COMPLETE: CPT

## 2025-04-16 LAB
AORTIC VALVE MEAN GRADIENT: 4 MMHG
AORTIC VALVE PEAK VELOCITY: 1.3 M/S
AV PEAK GRADIENT: 7 MMHG
AVA (PEAK VEL): 2.57 CM2
AVA (VTI): 2.77 CM2
BODY SURFACE AREA: 1.56 M2
EJECTION FRACTION APICAL 4 CHAMBER: 65.9
EJECTION FRACTION: 58 %
LEFT ATRIUM VOLUME AREA LENGTH INDEX BSA: 30.1 ML/M2
LEFT VENTRICLE INTERNAL DIMENSION DIASTOLE: 4.64 CM (ref 3.5–6)
LEFT VENTRICULAR OUTFLOW TRACT DIAMETER: 2.02 CM
MITRAL VALVE E/A RATIO: 0.78
RIGHT VENTRICLE FREE WALL PEAK S': 0.7 CM/S
RIGHT VENTRICLE PEAK SYSTOLIC PRESSURE: 18 MMHG

## 2025-04-21 ENCOUNTER — HOSPITAL ENCOUNTER (OUTPATIENT)
Dept: PAIN MEDICINE | Facility: CLINIC | Age: 50
Discharge: HOME | End: 2025-04-21
Payer: COMMERCIAL

## 2025-04-21 VITALS
SYSTOLIC BLOOD PRESSURE: 151 MMHG | HEART RATE: 45 BPM | RESPIRATION RATE: 18 BRPM | OXYGEN SATURATION: 95 % | DIASTOLIC BLOOD PRESSURE: 65 MMHG

## 2025-04-21 DIAGNOSIS — C81.48: ICD-10-CM

## 2025-04-21 PROCEDURE — 2720000007 HC OR 272 NO HCPCS

## 2025-04-21 PROCEDURE — C1894 INTRO/SHEATH, NON-LASER: HCPCS

## 2025-04-21 PROCEDURE — 62370 ANL SP INF PMP W/MDREPRG&FIL: CPT | Performed by: PAIN MEDICINE

## 2025-04-21 ASSESSMENT — PAIN SCALES - GENERAL: PAINLEVEL_OUTOF10: 6

## 2025-04-21 ASSESSMENT — PAIN - FUNCTIONAL ASSESSMENT: PAIN_FUNCTIONAL_ASSESSMENT: 0-10

## 2025-04-21 NOTE — DISCHARGE INSTRUCTIONS
INTRATHECAL PAIN PUMP REFILL DISCHARGE INSTRUCTIONS        DO NOT GET INJECTION SITE WET FOR 24 HOURS  IF BANDAGE HAS BEEN PLACED YOU MAY REMOVE AFTER 24 HOURS  MONITOR FOR SIGNS/SYMPTOMS OF INFECTION:FEVERS REDNESS OR INCREASED PAIN AT INJECTION SITE   YOU MAY RESUME YOUR NORMAL ACTIVITY       IF SIGNS OR SYMPTOMS OF OPIATE OVERDOSE ARE NOTED AFTER YOUR REFILL INCLUDING UNUSUAL SLEEPINESS, UNRESPONSIVENESS, SLOW OR ABSENT BREATHING ADMINISTER NARCAN AS DIRECTED AND CALL 911    BEFORE YOU LEAVE OUR OFFICE PLEASE SCHEDULE YOUR NEXT APPOINTMENT TO SCHEDULE YOUR NEXT REFILL APPOINTMENT-IT IS IMPORTANT TO KEEP YOUR APPOINTMENTS SO THAT YOUR PUMP DOES NOT RUN OUT OF MEDICATION AS THIS WILL DAMAGE YOUR PUMP    SHOULD YOU HAVE ANY QUESTIONS OR CONCERNS PLEASE CALL THE OFFICE  [phone]

## 2025-04-24 ENCOUNTER — TELEPHONE (OUTPATIENT)
Dept: PALLIATIVE MEDICINE | Facility: HOSPITAL | Age: 50
End: 2025-04-24
Payer: COMMERCIAL

## 2025-04-24 ENCOUNTER — TELEPHONE (OUTPATIENT)
Dept: CARDIOLOGY | Facility: HOSPITAL | Age: 50
End: 2025-04-24
Payer: COMMERCIAL

## 2025-04-24 DIAGNOSIS — G89.3 CANCER RELATED PAIN: ICD-10-CM

## 2025-04-24 RX ORDER — OXYCODONE HYDROCHLORIDE 15 MG/1
15 TABLET ORAL EVERY 4 HOURS PRN
Qty: 75 TABLET | Refills: 0 | Status: SHIPPED | OUTPATIENT
Start: 2025-04-24 | End: 2025-05-09

## 2025-04-24 NOTE — TELEPHONE ENCOUNTER
Hi all patient called in to leave a msg for call back. Wants to know why appointment was cancelled. If can please call patient back.

## 2025-04-24 NOTE — TELEPHONE ENCOUNTER
Reason for Conversation  Pamela states that likely  she threw away the bottle of Oxycodone while cleaning.   She is very upset .   Background       Disposition   Per [facility] she is due for a refill. Provider Mabel aware of situation and approves of refill. Script pended to provider. Pamela  updated via Div[employer].

## 2025-04-24 NOTE — TELEPHONE ENCOUNTER
Spoke with patient.  Appointment was cancelled due to change in Dr. Samaniego's schedule.  Will have added on 6/15 at 1:45 pm.

## 2025-05-05 ENCOUNTER — APPOINTMENT (OUTPATIENT)
Dept: CARDIOLOGY | Facility: CLINIC | Age: 50
End: 2025-05-05
Payer: COMMERCIAL

## 2025-05-05 ENCOUNTER — APPOINTMENT (OUTPATIENT)
Dept: HEMATOLOGY/ONCOLOGY | Facility: CLINIC | Age: 50
End: 2025-05-05
Payer: COMMERCIAL

## 2025-05-09 ENCOUNTER — HOSPITAL ENCOUNTER (EMERGENCY)
Facility: HOSPITAL | Age: 50
Discharge: STILL ADMITTED OR ADMINISTRATIVE | End: 2025-05-09
Payer: COMMERCIAL

## 2025-05-09 PROCEDURE — 4500999001 HC ED NO CHARGE

## 2025-05-19 ENCOUNTER — APPOINTMENT (OUTPATIENT)
Dept: CARDIOLOGY | Facility: CLINIC | Age: 50
End: 2025-05-19
Payer: COMMERCIAL

## 2025-05-19 DIAGNOSIS — C81.48: ICD-10-CM

## 2025-05-19 DIAGNOSIS — K04.7 INFECTED TOOTH: ICD-10-CM

## 2025-05-19 RX ORDER — PREDNISONE 20 MG/1
40 TABLET ORAL DAILY
Qty: 10 TABLET | Refills: 0 | Status: SHIPPED | OUTPATIENT
Start: 2025-05-19 | End: 2025-05-24

## 2025-05-19 RX ORDER — AMOXICILLIN AND CLAVULANATE POTASSIUM 875; 125 MG/1; MG/1
875 TABLET, FILM COATED ORAL 2 TIMES DAILY
Qty: 14 TABLET | Refills: 0 | Status: SHIPPED | OUTPATIENT
Start: 2025-05-19 | End: 2025-05-26

## 2025-05-20 ENCOUNTER — INFUSION (OUTPATIENT)
Dept: HEMATOLOGY/ONCOLOGY | Facility: CLINIC | Age: 50
End: 2025-05-20
Payer: COMMERCIAL

## 2025-05-20 DIAGNOSIS — C81.48: ICD-10-CM

## 2025-05-20 LAB
ALBUMIN SERPL BCP-MCNC: 4.1 G/DL (ref 3.4–5)
ALP SERPL-CCNC: 91 U/L (ref 33–110)
ALT SERPL W P-5'-P-CCNC: 11 U/L (ref 7–45)
ANION GAP SERPL CALC-SCNC: 14 MMOL/L (ref 10–20)
APTT PPP: 26 SECONDS (ref 26–36)
AST SERPL W P-5'-P-CCNC: 11 U/L (ref 9–39)
BASOPHILS # BLD AUTO: 0.01 X10*3/UL (ref 0–0.1)
BASOPHILS NFR BLD AUTO: 0.1 %
BILIRUB SERPL-MCNC: 0.4 MG/DL (ref 0–1.2)
BUN SERPL-MCNC: 17 MG/DL (ref 6–23)
CALCIUM SERPL-MCNC: 9.6 MG/DL (ref 8.6–10.3)
CHLORIDE SERPL-SCNC: 102 MMOL/L (ref 98–107)
CO2 SERPL-SCNC: 24 MMOL/L (ref 21–32)
CREAT SERPL-MCNC: 0.71 MG/DL (ref 0.5–1.05)
EGFRCR SERPLBLD CKD-EPI 2021: >90 ML/MIN/1.73M*2
EOSINOPHIL # BLD AUTO: 0 X10*3/UL (ref 0–0.7)
EOSINOPHIL NFR BLD AUTO: 0 %
ERYTHROCYTE [DISTWIDTH] IN BLOOD BY AUTOMATED COUNT: 12.7 % (ref 11.5–14.5)
GLUCOSE SERPL-MCNC: 151 MG/DL (ref 74–99)
HCT VFR BLD AUTO: 43.9 % (ref 36–46)
HGB BLD-MCNC: 14.4 G/DL (ref 12–16)
IMM GRANULOCYTES # BLD AUTO: 0.06 X10*3/UL (ref 0–0.7)
IMM GRANULOCYTES NFR BLD AUTO: 0.3 % (ref 0–0.9)
INR PPP: 1 (ref 0.9–1.1)
LYMPHOCYTES # BLD AUTO: 1.5 X10*3/UL (ref 1.2–4.8)
LYMPHOCYTES NFR BLD AUTO: 8.3 %
MCH RBC QN AUTO: 30.8 PG (ref 26–34)
MCHC RBC AUTO-ENTMCNC: 32.8 G/DL (ref 32–36)
MCV RBC AUTO: 94 FL (ref 80–100)
MONOCYTES # BLD AUTO: 0.56 X10*3/UL (ref 0.1–1)
MONOCYTES NFR BLD AUTO: 3.1 %
NEUTROPHILS # BLD AUTO: 15.99 X10*3/UL (ref 1.2–7.7)
NEUTROPHILS NFR BLD AUTO: 88.2 %
PLATELET # BLD AUTO: 207 X10*3/UL (ref 150–450)
POTASSIUM SERPL-SCNC: 4.1 MMOL/L (ref 3.5–5.3)
PROT SERPL-MCNC: 7.3 G/DL (ref 6.4–8.2)
PROTHROMBIN TIME: 11.5 SECONDS (ref 9.8–12.4)
RBC # BLD AUTO: 4.67 X10*6/UL (ref 4–5.2)
SODIUM SERPL-SCNC: 136 MMOL/L (ref 136–145)
WBC # BLD AUTO: 18.1 X10*3/UL (ref 4.4–11.3)

## 2025-05-20 PROCEDURE — 85025 COMPLETE CBC W/AUTO DIFF WBC: CPT

## 2025-05-20 PROCEDURE — 85730 THROMBOPLASTIN TIME PARTIAL: CPT

## 2025-05-20 PROCEDURE — 85610 PROTHROMBIN TIME: CPT

## 2025-05-20 PROCEDURE — 36591 DRAW BLOOD OFF VENOUS DEVICE: CPT

## 2025-05-20 PROCEDURE — 80053 COMPREHEN METABOLIC PANEL: CPT

## 2025-05-20 RX ORDER — HEPARIN 100 UNIT/ML
500 SYRINGE INTRAVENOUS AS NEEDED
OUTPATIENT
Start: 2025-05-20

## 2025-05-20 RX ORDER — HEPARIN SODIUM,PORCINE/PF 10 UNIT/ML
50 SYRINGE (ML) INTRAVENOUS AS NEEDED
OUTPATIENT
Start: 2025-05-20

## 2025-06-16 ENCOUNTER — OFFICE VISIT (OUTPATIENT)
Dept: CARDIOLOGY | Facility: CLINIC | Age: 50
End: 2025-06-16
Payer: COMMERCIAL

## 2025-06-16 ENCOUNTER — SOCIAL WORK (OUTPATIENT)
Dept: HEMATOLOGY/ONCOLOGY | Facility: CLINIC | Age: 50
End: 2025-06-16

## 2025-06-16 ENCOUNTER — INFUSION (OUTPATIENT)
Dept: HEMATOLOGY/ONCOLOGY | Facility: CLINIC | Age: 50
End: 2025-06-16
Payer: COMMERCIAL

## 2025-06-16 VITALS
WEIGHT: 130.07 LBS | OXYGEN SATURATION: 96 % | DIASTOLIC BLOOD PRESSURE: 45 MMHG | SYSTOLIC BLOOD PRESSURE: 86 MMHG | BODY MASS INDEX: 25.4 KG/M2 | TEMPERATURE: 97.2 F | RESPIRATION RATE: 16 BRPM | HEART RATE: 93 BPM

## 2025-06-16 DIAGNOSIS — C81.90 HODGKIN LYMPHOMA, UNSPECIFIED HODGKIN LYMPHOMA TYPE, UNSPECIFIED BODY REGION (MULTI): ICD-10-CM

## 2025-06-16 DIAGNOSIS — Z51.81 ENCOUNTER FOR MONITORING CARDIOTOXIC DRUG THERAPY: Primary | ICD-10-CM

## 2025-06-16 DIAGNOSIS — Z79.899 ENCOUNTER FOR MONITORING CARDIOTOXIC DRUG THERAPY: Primary | ICD-10-CM

## 2025-06-16 DIAGNOSIS — C81.48: ICD-10-CM

## 2025-06-16 DIAGNOSIS — Z92.21 STATUS POST ADMINISTRATION OF CARDIOTOXIC CHEMOTHERAPY: ICD-10-CM

## 2025-06-16 PROBLEM — I95.0 IDIOPATHIC HYPOTENSION: Status: ACTIVE | Noted: 2025-06-16

## 2025-06-16 LAB
ALBUMIN SERPL BCP-MCNC: 4.1 G/DL (ref 3.4–5)
ALP SERPL-CCNC: 88 U/L (ref 33–110)
ALT SERPL W P-5'-P-CCNC: 12 U/L (ref 7–45)
ANION GAP SERPL CALC-SCNC: 11 MMOL/L (ref 10–20)
AST SERPL W P-5'-P-CCNC: 18 U/L (ref 9–39)
BASOPHILS # BLD AUTO: 0.05 X10*3/UL (ref 0–0.1)
BASOPHILS NFR BLD AUTO: 0.8 %
BILIRUB SERPL-MCNC: 0.4 MG/DL (ref 0–1.2)
BUN SERPL-MCNC: 11 MG/DL (ref 6–23)
CALCIUM SERPL-MCNC: 9.5 MG/DL (ref 8.6–10.3)
CHLORIDE SERPL-SCNC: 102 MMOL/L (ref 98–107)
CO2 SERPL-SCNC: 27 MMOL/L (ref 21–32)
CREAT SERPL-MCNC: 0.63 MG/DL (ref 0.5–1.05)
EGFRCR SERPLBLD CKD-EPI 2021: >90 ML/MIN/1.73M*2
EOSINOPHIL # BLD AUTO: 0.16 X10*3/UL (ref 0–0.7)
EOSINOPHIL NFR BLD AUTO: 2.4 %
ERYTHROCYTE [DISTWIDTH] IN BLOOD BY AUTOMATED COUNT: 13 % (ref 11.5–14.5)
GLUCOSE SERPL-MCNC: 107 MG/DL (ref 74–99)
HCT VFR BLD AUTO: 44.5 % (ref 36–46)
HGB BLD-MCNC: 14.5 G/DL (ref 12–16)
IMM GRANULOCYTES # BLD AUTO: 0.01 X10*3/UL (ref 0–0.7)
IMM GRANULOCYTES NFR BLD AUTO: 0.2 % (ref 0–0.9)
LDH SERPL L TO P-CCNC: 183 U/L (ref 84–246)
LYMPHOCYTES # BLD AUTO: 2.31 X10*3/UL (ref 1.2–4.8)
LYMPHOCYTES NFR BLD AUTO: 34.9 %
MCH RBC QN AUTO: 31.5 PG (ref 26–34)
MCHC RBC AUTO-ENTMCNC: 32.6 G/DL (ref 32–36)
MCV RBC AUTO: 97 FL (ref 80–100)
MONOCYTES # BLD AUTO: 0.51 X10*3/UL (ref 0.1–1)
MONOCYTES NFR BLD AUTO: 7.7 %
NEUTROPHILS # BLD AUTO: 3.58 X10*3/UL (ref 1.2–7.7)
NEUTROPHILS NFR BLD AUTO: 54 %
PLATELET # BLD AUTO: 202 X10*3/UL (ref 150–450)
POTASSIUM SERPL-SCNC: 4.5 MMOL/L (ref 3.5–5.3)
PROT SERPL-MCNC: 7 G/DL (ref 6.4–8.2)
RBC # BLD AUTO: 4.61 X10*6/UL (ref 4–5.2)
SODIUM SERPL-SCNC: 135 MMOL/L (ref 136–145)
WBC # BLD AUTO: 6.6 X10*3/UL (ref 4.4–11.3)

## 2025-06-16 PROCEDURE — 83615 LACTATE (LD) (LDH) ENZYME: CPT

## 2025-06-16 PROCEDURE — 99213 OFFICE O/P EST LOW 20 MIN: CPT | Performed by: INTERNAL MEDICINE

## 2025-06-16 PROCEDURE — 85025 COMPLETE CBC W/AUTO DIFF WBC: CPT

## 2025-06-16 PROCEDURE — 84075 ASSAY ALKALINE PHOSPHATASE: CPT

## 2025-06-16 PROCEDURE — 36591 DRAW BLOOD OFF VENOUS DEVICE: CPT

## 2025-06-16 RX ORDER — HEPARIN SODIUM,PORCINE/PF 10 UNIT/ML
50 SYRINGE (ML) INTRAVENOUS AS NEEDED
OUTPATIENT
Start: 2025-06-16

## 2025-06-16 RX ORDER — HEPARIN 100 UNIT/ML
500 SYRINGE INTRAVENOUS AS NEEDED
OUTPATIENT
Start: 2025-06-16

## 2025-06-16 ASSESSMENT — ENCOUNTER SYMPTOMS
WHEEZING: 1
DIZZINESS: 0
SLEEP DISTURBANCE: 1
LIGHT-HEADEDNESS: 0
ARTHRALGIAS: 1
NUMBNESS: 1
UNEXPECTED WEIGHT CHANGE: 1
CARDIOVASCULAR NEGATIVE: 1
SINUS PRESSURE: 1
GASTROINTESTINAL NEGATIVE: 1
COUGH: 1
NECK PAIN: 1
BACK PAIN: 1

## 2025-06-16 ASSESSMENT — PAIN SCALES - GENERAL: PAINLEVEL_OUTOF10: 0-NO PAIN

## 2025-06-16 NOTE — PROGRESS NOTES
Patient:  Pamela Lopez  YOB: 1975  MRN: [id number]     ONCOLOGIST:  Chief Complaint/Active Symptoms:       Pamela Lopez is a 49 y.o. female who returns today for cardiac follow-up.    Patient chronically mildly hypotensive without dizziness or lightheadedness. No chest pain or discomfort, no shortness of breath, no palpitations.     Problems with chronic recurrent aspiration after removal of molar. Is awaiting ENT evaluation (food goes into nasal cavity). Constant rhonchi and wheezing and cough. Food coming out of nares. Has not seen pulmonary for symptoms.     Had pneumonia since last seen.     Cancer Diagnosis: Hodgkins lymphoma - initial diagnosis of metastatic carcinoma to vertebra and ribs of unknown primary dates back to 4/2023  Treatment team: Jess Rajput Gilbert Padula - radiation oncology  Treatment: AVD + brentuximab per ECHELON-1 trial  Radiation: palliative radiotherapy to C5 planned, received T7 vertebral body radiation for palliative care  Total dose: 303.45 mg/m2, last 1/22/24    Testing:  Echo 4/15/25: EF 55-60%  Echo 4/24/24: EF 55-60%  Echo 8/22/23: EF 55-60%  Echo 7/11/23: EF 55-60%  CT angio chest 7/10/23: no coronary calcifications      Review of Systems   Constitutional:  Positive for unexpected weight change.   HENT:  Positive for congestion, dental problem and sinus pressure.    Respiratory:  Positive for cough and wheezing.    Cardiovascular: Negative.    Gastrointestinal: Negative.    Genitourinary: Negative.    Musculoskeletal:  Positive for arthralgias, back pain and neck pain.   Neurological:  Positive for numbness (hands). Negative for dizziness, syncope and light-headedness.   Psychiatric/Behavioral:  Positive for sleep disturbance.        Objective:     Vitals:    06/16/25 1405   BP: (!) 86/45   Pulse: 93   Resp: 16   Temp: 36.2 °C (97.2 °F)   SpO2: 96%     BP      Temp      Pulse     Resp      SpO2          Vitals:    06/16/25 1405   Weight: 59  kg (130 lb 1.1 oz)       Allergies:     Allergies[1]       Medications:     Current Outpatient Medications   Medication Instructions    acetaminophen (Tylenol) 500 mg tablet 1 tablet, Every 6 hours PRN    acyclovir (ZOVIRAX) 200 mg, oral, Daily    albuterol 90 mcg/actuation inhaler 2 puffs, Every 6 hours PRN    chlorhexidine (Peridex) 0.12 % solution 15 mL, 2 times daily    chlorhexidine (Peridex) 0.12 % solution Swish and spit 15 ml for 2 doses, 15mL the night before surgery and 15 ml morning of surgery - swish for 30 seconds -DO NOT SWALLOW, SPIT OUT    cyclobenzaprine (FLEXERIL) 10 mg, oral, 3 times daily PRN    escitalopram (LEXAPRO) 20 mg, oral, Daily    gabapentin (NEURONTIN) 600 mg, oral, 3 times daily    ibuprofen 600 mg tablet 1 tablet, Every 6 hours PRN    pantoprazole (PROTONIX) 40 mg, oral, Daily       Physical Examination:   GENERAL:  Well developed, well nourished, in no acute distress.  HEENT: NC AT, EOMI with anicteric sclera  NECK:  Supple, no JVD, no bruit.  CHEST:  Symmetric and nontender.  LUNGS:  Non-labored respirations. Heavy rhonchi all lung fields with exp wheeze, no egophony/dullness  HEART:  PMI is nondisplaced. RRR with normal S1 and S2, no S3, no mumur or rub. No carotid or abdominal bruits  EXTREMITIES:  Warm with good color, no clubbing or cyanosis.  There is no edema noted.  PERIPHERAL VASCULAR:  Pulses present and equally palpable; 2+ throughout.  MUSCULOSKELETAL: No significant joint or limb deformity  NEURO/PSYCH:  Alert and oriented times three with approppriate behavior and responses. Nonfocal motor examination with normal gait and ambulation  Skin: no rash or lesions on exposed skin or reported.    Lab:     CBC:   Lab Results   Component Value Date    WBC 6.6 06/16/2025    RBC 4.61 06/16/2025    HGB 14.5 06/16/2025    HCT 44.5 06/16/2025     06/16/2025        CMP:    Lab Results   Component Value Date     (L) 06/16/2025    K 4.5 06/16/2025     06/16/2025     "CO2 27 06/16/2025    BUN 11 06/16/2025    CREATININE 0.63 06/16/2025    GLUCOSE 107 (H) 06/16/2025    CALCIUM 9.5 06/16/2025       Magnesium:    Lab Results   Component Value Date    MG 1.90 06/10/2024       Lipid Profile:    No results found for: \"CHLPL\", \"TRIG\", \"HDL\", \"LDLCALC\", \"LDLDIRECT\"    TSH:    Lab Results   Component Value Date    TSH 1.02 07/10/2023       BNP:   Lab Results   Component Value Date     (H) 07/10/2023        PT/INR:    Lab Results   Component Value Date    PROTIME 11.5 05/20/2025    INR 1.0 05/20/2025       HgBA1c:    Lab Results   Component Value Date    HGBA1C 6.3 (A) 06/08/2023       BMP:  Lab Results   Component Value Date     (L) 06/16/2025     05/20/2025     12/09/2024    K 4.5 06/16/2025    K 4.1 05/20/2025    K 3.8 12/09/2024     06/16/2025     05/20/2025     12/09/2024    CO2 27 06/16/2025    CO2 24 05/20/2025    CO2 26 12/09/2024    BUN 11 06/16/2025    BUN 17 05/20/2025    BUN 14 12/09/2024    CREATININE 0.63 06/16/2025    CREATININE 0.71 05/20/2025    CREATININE 0.62 12/09/2024       Cardiac Enzymes:    Lab Results   Component Value Date    TROPHS 11 07/10/2023    TROPHS 10 07/04/2023    TROPHS CANCELED 07/03/2023       Hepatic Function Panel:    Lab Results   Component Value Date    ALKPHOS 88 06/16/2025    ALT 12 06/16/2025    AST 18 06/16/2025    PROT 7.0 06/16/2025    BILITOT 0.4 06/16/2025    BILIDIR 0.1 07/22/2024     Labs reviewed    Diagnostic Studies:     Transthoracic echo (TTE) complete  Result Date: 4/16/2025   [facility], [address] [place] [address]           Tel [phone] and Fax [phone] TRANSTHORACIC ECHOCARDIOGRAM REPORT  Patient Name:       KOFFI Negrete Physician:    [id number] Robert Renee MD Study Date:         4/15/2025           Ordering Provider:    [id number] THIERNO KEENAN"             HAGEN MRN/PID:            [id number]            Fellow: Accession#:         [id number]        Nurse: Date of Birth/Age:  1975 / 49      Sonographer:          Berlin palafox                                     BS, RVT, RDCS Gender assigned at  F                   Additional Staff: Birth: Height:             152.40 cm           Admit Date:           4/15/2025 Weight:             57.15 kg            Admission Status:     Outpatient BSA / BMI:          1.53 m2 / 24.61     Encounter#:           [id number]                     kg/m2 Blood Pressure:     90/46 mmHg          Department Location:  [facility] Outpatient Study Type:    TRANSTHORACIC ECHO (TTE) COMPLETE Diagnosis/ICD: Rheumatic aortic insufficiency-I06.1; Personal history of                antineoplastic chemotherapy-Z92.21; Other long term (current)                drug therapy-Z79.899 Indication:    AR, Follow-up examination, following chemotherapy, Long term                (current) use of other medications CPT Code:      Echo Complete w Full Doppler-[id number] Patient History: Valve Disorders:   Aortic Insufficiency, Mitral Regurgitation, Tricuspid                    Regurgitation and Pulmonic Insufficiency. Pertinent History: Cancer. - currently on meds for pain, morphine, oxycodone                    - Abnormal EKG - bigiminy                    - Hx: post Chemo therapy, long term med use. Study Detail: The following Echo studies were performed: 2D, M-Mode, Doppler and               color flow. Technically challenging study due to body habitus and               prominent lung artifact. Patient's heart rhythm is sinus               arrhythmia. The patient was awake.  PHYSICIAN INTERPRETATION: Left Ventricle: Left ventricular ejection fraction is normal, by visual estimate at 55-60%. There is moderate concentric left ventricular hypertrophy. There are no regional left ventricular wall motion abnormalities. The left ventricular  cavity size is normal. There is mildly increased septal and mildly increased posterior left ventricular wall thickness. Spectral Doppler shows a Grade I (impaired relaxation pattern) of left ventricular diastolic filling with normal left atrial filling pressure. Left Atrium: The left atrium is mildly dilated. Right Ventricle: The right ventricle is normal in size. There is normal right ventricular global systolic function. Right Atrium: The right atrium is normal in size. Aortic Valve: The aortic valve is trileaflet. The aortic valve dimensionless index is 0.87. There is mild aortic valve regurgitation. The peak instantaneous gradient of the aortic valve is 7 mmHg. The mean gradient of the aortic valve is 4 mmHg. Mitral Valve: The mitral valve is mildly thickened. There is mild mitral valve regurgitation. Tricuspid Valve: The tricuspid valve is structurally normal. There is trace tricuspid regurgitation. The Doppler estimated RVSP is within normal limits at 18.0 mmHg. Pulmonic Valve: The pulmonic valve is structurally normal. There is no indication of pulmonic valve regurgitation. Pericardium: There is no pericardial effusion noted. Aorta: The aortic root is normal. Systemic Veins: The inferior vena cava appears dilated.  CONCLUSIONS:  1. Left ventricular ejection fraction is normal, by visual estimate at 55-60%.  2. Spectral Doppler shows a Grade I (impaired relaxation pattern) of left ventricular diastolic filling with normal left atrial filling pressure.  3. There is moderate concentric left ventricular hypertrophy.  4. There is normal right ventricular global systolic function.  5. The left atrium is mildly dilated.  6. Right ventricular systolic pressure is within normal limits.  7. Mild aortic valve regurgitation. QUANTITATIVE DATA SUMMARY:  2D MEASUREMENTS:          Normal Ranges: LAs:             4.04 cm  (2.7-4.0cm) RVIDd:           3.29 cm  (0.9-3.6cm) IVSd:            1.03 cm  (0.6-1.1cm) LVPWd:            1.09 cm  (0.6-1.1cm) LVIDd:           4.64 cm  (3.9-5.9cm) LVIDs:           3.51 cm LV Mass Index:   114 g/m2 LVEDV Index:     50 ml/m2 LV % FS          24.5 %  LEFT ATRIUM:                  Normal Ranges: LA Vol A4C:        54.9 ml    (22+/-6mL/m2) LA Vol A2C:        35.2 ml LA Vol BP:         46.2 ml LA Vol Index A4C:  35.8 ml/m2 LA Vol Index A2C:  23.0 ml/m2 LA Vol Index BP:   30.1 ml/m2 LA Area A4C:       18.5 cm2 LA Area A2C:       14.1 cm2 LA Major Axis A4C: 5.3 cm LA Major Axis A2C: 4.8 cm LA Vol A4C:        52.0 ml LA Vol A2C:        34.6 ml LA Vol Index BSA:  28.2 ml/m2  RIGHT ATRIUM:                 Normal Ranges: RA Vol A4C:        27.4 ml    (8.3-19.5ml) RA Vol Index A4C:  17.8 ml/m2 RA Area A4C:       11.9 cm2 RA Major Axis A4C: 4.4 cm  M-MODE MEASUREMENTS:         Normal Ranges: Ao Root:             3.10 cm (2.0-3.7cm) AoV Exc:             1.99 cm (1.5-2.5cm) LAs:                 3.54 cm (2.7-4.0cm)  AORTA MEASUREMENTS:         Normal Ranges: AoV Exc:            1.99 cm (1.5-2.5cm) AoV Kiersten,s:          2.00 cm (1.4-2.6cm) Ao Sinus, d:        3.30 cm (2.1-3.5cm) Ao STJ, d:          2.60 cm (1.7-3.4cm) Asc Ao, d:          3.10 cm (2.1-3.4cm) Ao Arch:            2.90 cm (2.0-3.6cm)  LV SYSTOLIC FUNCTION:                      Normal Ranges: EF-A4C View:    66 % (>=55%) EF-A2C View:    51 % EF-Biplane:     60 % EF-Visual:      58 % LV EF Reported: 58 %  LV DIASTOLIC FUNCTION:             Normal Ranges: MV Peak E:             0.46 m/s    (0.7-1.2 m/s) MV Peak A:             0.59 m/s    (0.42-0.7 m/s) E/A Ratio:             0.78        (1.0-2.2) MV e'                  0.075 m/s   (>8.0) MV lateral e'          0.07 m/s MV medial e'           0.08 m/s MV A Dur:              128.03 msec E/e' Ratio:            6.20        (<8.0) PulmV Sys Florin:         48.39 cm/s PulmV Gloria Florin:        31.02 cm/s PulmV S/D Florin:         1.56 PulmV A Revs Florin:      23.33 cm/s PulmV A Revs Dur:      85.35 msec  MITRAL VALVE:           Normal Ranges: MV DT:        202 msec (150-240msec)  MITRAL INSUFFICIENCY:             Normal Ranges: MR VTI:               153.96 cm MR Vmax:              460.22 cm/s  AORTIC VALVE:                     Normal Ranges: AoV Vmax:                1.30 m/s (<=1.7m/s) AoV Peak P.8 mmHg (<20mmHg) AoV Mean PG:             3.7 mmHg (1.7-11.5mmHg) LVOT Max Florin:            1.05 m/s (<=1.1m/s) AoV VTI:                 27.09 cm (18-25cm) LVOT VTI:                23.49 cm LVOT Diameter:           2.02 cm  (1.8-2.4cm) AoV Area, VTI:           2.77 cm2 (2.5-5.5cm2) AoV Area,Vmax:           2.57 cm2 (2.5-4.5cm2) AoV Dimensionless Index: 0.87  AORTIC INSUFFICIENCY: AI Vmax:       3.59 m/s AI Half-time:  735 msec AI Decel Time: 2533 msec AI Decel Rate: 145.53 cm/s2  RIGHT VENTRICLE: RV Basal 3.10 cm RV Mid   2.50 cm RV Major 6.6 cm RV s'    0.01 m/s  TRICUSPID VALVE/RVSP:          Normal Ranges: Peak TR Velocity:     1.94 m/s RV Syst Pressure:     18 mmHg  (< 30mmHg) IVC Diam:             2.50 cm  PULMONARY VEINS: PulmV A Revs Dur: 85.35 msec PulmV A Revs Florin: 23.33 cm/s PulmV Gloria Florin:   31.02 cm/s PulmV S/D Florin:    1.56 PulmV Sys Florin:    48.39 cm/s  AORTA: Asc Ao Diam 3.06 cm  [id number] Robert Renee MD Electronically signed on 4/15/2025 at 12:16:39 PM  ** Final **     Echo results reviewed    Radiology:     Transthoracic echo (TTE) complete    (Results Pending)         ASSESSMENT     Problem List Items Addressed This Visit       Encounter for monitoring cardiotoxic drug therapy - Primary    Relevant Orders    Transthoracic echo (TTE) complete    Hodgkin lymphoma    Relevant Orders    Transthoracic echo (TTE) complete    Follow Up In Cardiology    Status post administration of cardiotoxic chemotherapy    Relevant Orders    Follow Up In Cardiology       PLAN     The patient is now a year out from her cardiotoxic chemotherapy with no change or deterioration in her heart function. No signs or symptoms of CHF or  angina. No arrhythmia. Will repeat echo in 1 year with follow-up after it. Reviewed signs/symptoms of heart failure and when to call for earlier appointment.     Pulmonary issues are most likely related to complicated dental/sinus connection. Told patient she may benefit from pulmonary assistance. Sees ENT in the next 2 weeks.     Cardio-Oncology can see her earlier as needed.                    [1]   Allergies  Allergen Reactions    Benadryl [Diphenhydramine Hcl] Confusion and Drowsiness    Lorazepam Confusion and Drowsiness

## 2025-06-16 NOTE — PROGRESS NOTES
Social work continues to follow this patient for ongoing assessment and support. The patient stopped in the social work office today. She mentioned that she noticed the activity bags, blankets, etc. that we have available in the lobby for patients and inquired about which groups puts them together. She shared that she would be interested in getting involved with these groups or would even put bags together on her own; she shared that she has been doing much better since finishing treatment and does best when she remains active. I thanked the patient for her efforts and willingness to help other patients. The groups who donate items to our center are not close to the patient in [place]; I offered to reach out to our  in [place] to see if she knows of any groups in the area. The patient is agreeable and is aware that I will let her know what I hear. Social work will remain available to assist this patient.    BREE Barry, ASHELY

## 2025-06-17 NOTE — PROGRESS NOTES
Patient ID: Pamela Lopez is a 49 y.o. female.    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 female presenting for follow up for Hodgkin's Lymphoma. Labs are unremarkable.     Today she reports back pain which has progressively worsened.     Patient's past medical history, surgical history, family history and social history reviewed.    Review of Systems:   Review of Systems:    Positive per HPI, otherwise negative.        Objective    Visit Vitals  /79   Pulse (!) 45   Temp 36.5 °C (97.7 °F)   Resp 18   Wt 60.1 kg (132 lb 7.9 oz)   SpO2 98%   BMI 25.88 kg/m²   OB Status Chemotherapy/radiation   Smoking Status Every Day   BSA 1.6 m²     Physical Exam  Gen: appears well in clinic, NAD  HEENT: atraumatic head, normocephalic, EOMI, conjunctiva normal  LUNG: no increased WOB, CTAB  CV: No JVD. RRR  GI: soft, NT, ND  LE: no LE edema  Skin: no obvious rashes or lesions on visible skin  Neuro: interactive, no focal deficits noted  Psych: normal mood and affect      Performance Status:  Symptomatic; fully ambulatory    Labs/Imaging/Pathology: personally reviewed reports and images in Epic electronic medical record system. Pertinent results as it related to the plan represented in below in assessment and plan.       Assessment/Plan   1. Classical Hodgkin lymphoma, lymphocyte- rich subtype, stage IVB     - developed tonsil enlargement initially over two years ago and thought to be related to infection  - 6/5/23- 6/723 admitted for new pain and found to have multiple spine mets, neck lymphadenopathy and possible liver lesions  -  biopsy 6/9/23 of her L3 was nondiagnostic  - 6/13/23  PET/CT with FDG avid right palantine tonsil, b/l cervical, portacaval, RP nodes, hepatic met, osseous lesions in axial skeleton  - 6/19/23 planned for 10 fx to T7  - 6/22/23 tonsillectomy path consistent with classical Hodgkin lymphoma  - Discussed diagnosis of classical Hodgkin's lymphoma with patient and given stage IV disease with B symptom along with history of strong tobacco use, recommend brentuximab plus AVD per ECHELON-1 trial (Lisa, et al. Banner 2018)  - Reviewed side effects and consent signed  - pt is planning to see dental next week as she has active abscess with pain and still has RT next week  - 7/17/12 AVD+ brentuximab  -hospitalized for fever and discharge 8/27/23 and no infectious cause found.  - delays due to neutropenia, cycle 3 day on 9/25/23.  - 10/9/23 PET CT interim with significant treatment response   11/19/23 C5D1 reduced brentuximab to 0.9 mg/mkg   12/11/2023 C5 D15 delayed due to hypotension and tooth infection  - 1/22/24 C6 D15     3/5/24:  - PET CT on 3/4/24 shows a complete response.   - Overall, she continues to have some neuropathy. We discussed that this will take some time to improve.  - She is still having trouble with weight gain. She is going to work on meals.  - She will continue her electrolytes.  - We will plan for a port flush in 6 weeks and follow-up with me in 3 months with repeat labs.  - RTC with me in 3 months.      6/10/2024:  - Presents for 3 month follow up visit   - Having port flushed every 6 weeks   - Labs reviewed, we will have stop her oral iron   - We will recheck her LFTs at night port flush/labs in 6 weeks due to elevated Alk Phos  - She prefers to stay on Acyclovir for now and I discussed with Dr. Gomez who was ok with refilling for now   - She continues following with palliative medicine   - She denies B symptoms and no abnormalities noted on exam   - She will therefore return in 12 weeks for next FUV with Dr. Gomez      9/9/24:  - Patient had some leg  "spikes, however, nothing like she was having in the past   - We discussed ultimately her biggest concern is her back..   - She does have shattered fracture in her cervical and thoracic spine.   - She met with a nurse practitioner for neurosurgery and now is planned for imaging end of October and neurosurgery visit in December.   - She is hoping to expedite this workup.   - Will plan for labs today  and I will reach out to their team if anything can be done sooner.  - She is having depression from not being able to go to work post treatment and would like to meet with psychiatrist. Previously met with Trav   - RTC in 3 months with Lamonte   - She will call us with new symptoms or new concerns.      12/9/2024:   - Presents for follow up visit   - Labs via mediport unremarkable, LDH still pending  - Doing well other than new left side pain radiating to her back   - Notes acute on chronic lower back pain   - She feels worried about pains and cancer recurrence and prefers a PET scan  - CT T spine on 10/23/24 showing \"suspected blastic metastasis in the posteromedial left 7th rib\" so we will order PET scan due to pain of left side and worsening fatigue   - Advised to go to the ED if she has worsening pain or new symptoms in the meantime   - She will RTC in 2 weeks for PET scan with a FUV in 3 weeks with Dr. Gomez to review results (she prefers in person).   - She will then need port flushes depending on FUV schedule      12/27/24: Telephone visit  - Reviewed recent PET CT that shows no evidence of recurrence of lymphoma.   - I do believe her back pain is related to recent fall.  - She is planned to see Dr. Wagner again to discuss pain pump vs. Surgery.  - She will call us with any new concerns in the meantime.   - RTC in 6 months.    6/19/25:   - Given new acute back pain which is similar to how she initially presented   - We will plan for stat CT abdomen and pelvis   - She could not do her scan today or next week, now " scheduled for 6/30/25   - Will plan for port access prior   - I am off that week; will plan for follow up 1-2 days after with Lamonte who can reach out to me with any questions   - RTC with Lamonte after CT scans       Reviewed ongoing medical problems and how they relate to her Hodgkin's lymphoma, will continue long term monitoring.    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  Time Spent  Prep time on day of patient encounter: 5 minutes  Time spent directly with patient, family or caregiver: 20 minutes  Additional Time Spent on Patient Care Activities: 5 minutes  Documentation Time: 5 minutes  Other Time Spent: 0 minutes  Total: 35 minutes

## 2025-06-18 ENCOUNTER — PATIENT MESSAGE (OUTPATIENT)
Dept: PALLIATIVE MEDICINE | Facility: CLINIC | Age: 50
End: 2025-06-18
Payer: COMMERCIAL

## 2025-06-18 DIAGNOSIS — G89.3 CANCER RELATED PAIN: Primary | ICD-10-CM

## 2025-06-18 RX ORDER — OXYCODONE HYDROCHLORIDE 15 MG/1
15 TABLET ORAL EVERY 4 HOURS PRN
Qty: 75 TABLET | Refills: 0 | Status: SHIPPED | OUTPATIENT
Start: 2025-06-18 | End: 2025-07-03

## 2025-06-19 ENCOUNTER — OFFICE VISIT (OUTPATIENT)
Dept: HEMATOLOGY/ONCOLOGY | Facility: CLINIC | Age: 50
End: 2025-06-19
Payer: COMMERCIAL

## 2025-06-19 VITALS
WEIGHT: 132.5 LBS | OXYGEN SATURATION: 98 % | HEART RATE: 45 BPM | RESPIRATION RATE: 18 BRPM | SYSTOLIC BLOOD PRESSURE: 118 MMHG | DIASTOLIC BLOOD PRESSURE: 79 MMHG | TEMPERATURE: 97.7 F | BODY MASS INDEX: 25.88 KG/M2

## 2025-06-19 DIAGNOSIS — C81.48: ICD-10-CM

## 2025-06-19 PROCEDURE — 99214 OFFICE O/P EST MOD 30 MIN: CPT | Performed by: INTERNAL MEDICINE

## 2025-06-19 ASSESSMENT — PAIN SCALES - GENERAL: PAINLEVEL_OUTOF10: 7

## 2025-06-23 ENCOUNTER — APPOINTMENT (OUTPATIENT)
Dept: RADIOLOGY | Facility: CLINIC | Age: 50
End: 2025-06-23
Payer: COMMERCIAL

## 2025-06-30 ENCOUNTER — HOSPITAL ENCOUNTER (OUTPATIENT)
Dept: PAIN MEDICINE | Facility: CLINIC | Age: 50
Discharge: HOME | End: 2025-06-30
Payer: COMMERCIAL

## 2025-06-30 ENCOUNTER — APPOINTMENT (OUTPATIENT)
Dept: HEMATOLOGY/ONCOLOGY | Facility: CLINIC | Age: 50
End: 2025-06-30
Payer: COMMERCIAL

## 2025-06-30 ENCOUNTER — INFUSION (OUTPATIENT)
Dept: HEMATOLOGY/ONCOLOGY | Facility: CLINIC | Age: 50
End: 2025-06-30
Payer: COMMERCIAL

## 2025-06-30 ENCOUNTER — TELEPHONE (OUTPATIENT)
Dept: PAIN MEDICINE | Facility: CLINIC | Age: 50
End: 2025-06-30

## 2025-06-30 ENCOUNTER — HOSPITAL ENCOUNTER (OUTPATIENT)
Dept: RADIOLOGY | Facility: CLINIC | Age: 50
Discharge: HOME | End: 2025-06-30
Payer: COMMERCIAL

## 2025-06-30 VITALS
TEMPERATURE: 96.6 F | OXYGEN SATURATION: 95 % | SYSTOLIC BLOOD PRESSURE: 106 MMHG | RESPIRATION RATE: 18 BRPM | HEART RATE: 54 BPM | DIASTOLIC BLOOD PRESSURE: 53 MMHG

## 2025-06-30 DIAGNOSIS — C81.48: ICD-10-CM

## 2025-06-30 DIAGNOSIS — C81.90 HODGKIN LYMPHOMA, UNSPECIFIED HODGKIN LYMPHOMA TYPE, UNSPECIFIED BODY REGION (MULTI): Primary | ICD-10-CM

## 2025-06-30 PROCEDURE — 74177 CT ABD & PELVIS W/CONTRAST: CPT | Performed by: STUDENT IN AN ORGANIZED HEALTH CARE EDUCATION/TRAINING PROGRAM

## 2025-06-30 PROCEDURE — 62370 ANL SP INF PMP W/MDREPRG&FIL: CPT | Performed by: PAIN MEDICINE

## 2025-06-30 PROCEDURE — 71260 CT THORAX DX C+: CPT | Performed by: STUDENT IN AN ORGANIZED HEALTH CARE EDUCATION/TRAINING PROGRAM

## 2025-06-30 PROCEDURE — 2550000001 HC RX 255 CONTRASTS: Performed by: INTERNAL MEDICINE

## 2025-06-30 PROCEDURE — 2720000007 HC OR 272 NO HCPCS: Performed by: PAIN MEDICINE

## 2025-06-30 PROCEDURE — 96523 IRRIG DRUG DELIVERY DEVICE: CPT

## 2025-06-30 PROCEDURE — 74177 CT ABD & PELVIS W/CONTRAST: CPT

## 2025-06-30 PROCEDURE — C1894 INTRO/SHEATH, NON-LASER: HCPCS | Performed by: PAIN MEDICINE

## 2025-06-30 RX ORDER — HEPARIN 100 UNIT/ML
500 SYRINGE INTRAVENOUS AS NEEDED
Status: DISCONTINUED | OUTPATIENT
Start: 2025-06-30 | End: 2025-06-30 | Stop reason: HOSPADM

## 2025-06-30 RX ORDER — HEPARIN SODIUM,PORCINE/PF 10 UNIT/ML
50 SYRINGE (ML) INTRAVENOUS AS NEEDED
OUTPATIENT
Start: 2025-06-30

## 2025-06-30 RX ORDER — HEPARIN SODIUM,PORCINE/PF 10 UNIT/ML
50 SYRINGE (ML) INTRAVENOUS AS NEEDED
Status: DISCONTINUED | OUTPATIENT
Start: 2025-06-30 | End: 2025-06-30 | Stop reason: HOSPADM

## 2025-06-30 RX ORDER — HEPARIN 100 UNIT/ML
500 SYRINGE INTRAVENOUS AS NEEDED
OUTPATIENT
Start: 2025-06-30

## 2025-06-30 RX ADMIN — IOHEXOL 90 ML: 350 INJECTION, SOLUTION INTRAVENOUS at 13:09

## 2025-06-30 ASSESSMENT — PAIN DESCRIPTION - DESCRIPTORS: DESCRIPTORS: ACHING;STABBING

## 2025-06-30 ASSESSMENT — PAIN SCALES - GENERAL: PAINLEVEL_OUTOF10: 6

## 2025-06-30 ASSESSMENT — PAIN - FUNCTIONAL ASSESSMENT: PAIN_FUNCTIONAL_ASSESSMENT: 0-10

## 2025-06-30 NOTE — PROCEDURES
Operation  Intrathecal pump refill.    Surgical Indications  The patient is here today to receive an intrathecal pump refill to assist with the pain.    The patient was taken to the procedure area, was placed into a supine positioning. The pump was interrogated and showed a refill volume of 4 mL. The pump then was emptied and a new solution of morphine preservative-free at a dose of 5 mg/mL, a total volume of 20 mL was injected into the pump, and the pump was reprogrammed to deliver a dose of 0.75mg of morphine with ptm 0.15 mg of morphine p/injection with maximum 3 per day.  The alarm date was set for 9/13/2025. The patient recovered for sufficient amount of time and then was discharged home in stable condition. Patient was advised to followup with [facility] to refill his pump accordingly.

## 2025-06-30 NOTE — PROGRESS NOTES
Patient in clinic for port access prior to CT scan. Returned following scan for removal. Mediport removed per protocol. Patient tolerated. Bandaid applied to site. Patient dc to home in stable condition

## 2025-06-30 NOTE — TELEPHONE ENCOUNTER
TRIED CALLING PATIENT TO SCHEDULE PUMP REFILL VOICEMAIL BOX WAS FULL AND COULD NOT LEAVE ANY MESSAGES.

## 2025-06-30 NOTE — PROGRESS NOTES
Patient education:  Learner: patient  Educated on: Plan of care. Port access   Readiness: acceptance  Preferred learning method: preferred: listening  Method used: explanation  Response: demonstrated understanding  Barriers: None  Preferred language: English

## 2025-06-30 NOTE — DISCHARGE INSTRUCTIONS
INTRATHECAL PAIN PUMP REFILL DISCHARGE INSTRUCTIONS        DO NOT GET INJECTION SITE WET FOR 24 HOURS  IF BANDAGE HAS BEEN PLACED YOU MAY REMOVE AFTER 24 HOURS  MONITOR FOR SIGNS/SYMPTOMS OF INFECTION:FEVERS REDNESS OR INCREASED PAIN AT INJECTION SITE   YOU MAY RESUME YOUR NORMAL ACTIVITY       IF SIGNS OR SYMPTOMS OF OPIATE OVERDOSE ARE NOTED AFTER YOUR REFILL INCLUDING UNUSUAL SLEEPINESS, UNRESPONSIVENESS, SLOW OR ABSENT BREATHING ADMINISTER NARCAN AS DIRECTED AND CALL 911    BEFORE YOU LEAVE OUR OFFICE PLEASE SCHEDULE YOUR NEXT APPOINTMENT TO SCHEDULE YOUR NEXT REFILL APPOINTMENT-IT IS IMPORTANT TO KEEP YOUR APPOINTMENTS SO THAT YOUR PUMP DOES NOT RUN OUT OF MEDICATION AS THIS WILL DAMAGE YOUR PUMP    SHOULD YOU HAVE ANY QUESTIONS OR CONCERNS PLEASE CALL THE OFFICE  [phone]

## 2025-07-02 ENCOUNTER — DOCUMENTATION (OUTPATIENT)
Dept: HEMATOLOGY/ONCOLOGY | Facility: CLINIC | Age: 50
End: 2025-07-02
Payer: COMMERCIAL

## 2025-07-02 NOTE — PROGRESS NOTES
"Patient messaged in looking for CT results. Set up time to reach her but due to clinic flow I was unable to call at that time. Called at approximately 3 and 4 pm today with no answer, unable to leave a VM due to full mailbox. Patient had asked for results over [employer] so since I could not reach her and patient is anxious I did send her a message. Patient updated on Dr. Gomez's interpretation of CT c/a/p dated 6/30/25, \"Yes can we please tell her negative for any lymphoma but she should follow with her neurosurgeon re back which did show degenerative changes\" via [employer] message. Patient does not have a FUV.   "

## 2025-07-09 ENCOUNTER — LAB (OUTPATIENT)
Dept: LAB | Facility: CLINIC | Age: 50
End: 2025-07-09
Payer: COMMERCIAL

## 2025-07-09 ENCOUNTER — OFFICE VISIT (OUTPATIENT)
Dept: PALLIATIVE MEDICINE | Facility: CLINIC | Age: 50
End: 2025-07-09
Payer: COMMERCIAL

## 2025-07-09 DIAGNOSIS — Z79.891 ENCOUNTER FOR MONITORING OPIOID MAINTENANCE THERAPY: ICD-10-CM

## 2025-07-09 DIAGNOSIS — G89.3 CANCER RELATED PAIN: ICD-10-CM

## 2025-07-09 DIAGNOSIS — T45.1X5A CHEMOTHERAPY-INDUCED PERIPHERAL NEUROPATHY (MULTI): ICD-10-CM

## 2025-07-09 DIAGNOSIS — Z51.81 ENCOUNTER FOR MONITORING OPIOID MAINTENANCE THERAPY: Primary | ICD-10-CM

## 2025-07-09 DIAGNOSIS — G62.0 CHEMOTHERAPY-INDUCED PERIPHERAL NEUROPATHY (MULTI): ICD-10-CM

## 2025-07-09 DIAGNOSIS — Z79.891 ENCOUNTER FOR MONITORING OPIOID MAINTENANCE THERAPY: Primary | ICD-10-CM

## 2025-07-09 DIAGNOSIS — C81.48: ICD-10-CM

## 2025-07-09 DIAGNOSIS — Z51.5 PALLIATIVE CARE ENCOUNTER: ICD-10-CM

## 2025-07-09 DIAGNOSIS — Z51.81 ENCOUNTER FOR MONITORING OPIOID MAINTENANCE THERAPY: ICD-10-CM

## 2025-07-09 PROCEDURE — 80365 DRUG SCREENING OXYCODONE: CPT

## 2025-07-09 PROCEDURE — 99215 OFFICE O/P EST HI 40 MIN: CPT

## 2025-07-09 PROCEDURE — 80307 DRUG TEST PRSMV CHEM ANLYZR: CPT

## 2025-07-09 NOTE — PROGRESS NOTES
SUPPORTIVE AND PALLIATIVE ONCOLOGY OUTPATIENT FOLLOW-UP      SERVICE DATE: 7/9/2025      Subjective   HISTORY OF PRESENT ILLNESS: Pamela Lopez is a 49 y.o. female who presents with newly diagnosed Hodgkin Lymphoma. She was admitted d/t back pain and a CT c/a/p revealed multiple hepatic masses and several osteoblastic  lesions. MRI brain was negative. She underwent biopsy of L3 on 6/9/23 which was nondiagnostic. PET scan showed FDG-avid lymph nodes in the neck, tonsil, bones, and liver. Biopsy of right tonsil 6/22 revealed Hodgkin Lymphoma. She underwent RT to T7 x10  fractions. She started treatment with AVD + Brentuximab 7/17/23. Recent PET scan with excellent response to treatment. PET scan 12/23 showed no evidence of recurrence.     She is s/p intrathecal pump placement 2/5/25.     Pain Assessment:  Pain Score: 5/10  Location: mid-back and lower back  Description: she reports sharp, shooting pain in her mid back. The pain has increased. It is a 7-8/10 at its worst. She has been taking oxycodone 4-6 times per day which brings her pain level down to a 4/10. Recently increased her gabapentin to 600 mg TID    Symptom Assessment:  Pain:a little  Headache: none  Dizziness:none  Lack of energy: a little.   Difficulty sleeping: somewhat up every 2 hours due to pain   Worrying: a little   Anxiety: none   Depression: a little.   Pain in mouth/swallowing: none  Dry mouth: none  Taste changes: none  Shortness of breath: none  Lack of appetite: none    Nausea: a little.    Vomiting: none   Constipation: none   Diarrhea: none reports incontinence of stool at times  Sore muscles: none  Numbness or tingling in hands/feet/other: somewhat. Hands and feet are numb.   Weight loss: none  Other: none      Information obtained from: interview of patient  ______________________________________________________________________        Objective     Infusion on 06/16/2025   Component Date Value Ref Range Status    WBC 06/16/2025 6.6   4.4 - 11.3 x10*3/uL Final    RBC 06/16/2025 4.61  4.00 - 5.20 x10*6/uL Final    Hemoglobin 06/16/2025 14.5  12.0 - 16.0 g/dL Final    Hematocrit 06/16/2025 44.5  36.0 - 46.0 % Final    MCV 06/16/2025 97  80 - 100 fL Final    MCH 06/16/2025 31.5  26.0 - 34.0 pg Final    MCHC 06/16/2025 32.6  32.0 - 36.0 g/dL Final    RDW 06/16/2025 13.0  11.5 - 14.5 % Final    Platelets 06/16/2025 202  150 - 450 x10*3/uL Final    Neutrophils % 06/16/2025 54.0  40.0 - 80.0 % Final    Immature Granulocytes %, Automated 06/16/2025 0.2  0.0 - 0.9 % Final    Immature Granulocyte Count (IG) includes promyelocytes, myelocytes and metamyelocytes but does not include bands. Percent differential counts (%) should be interpreted in the context of the absolute cell counts (cells/UL).    Lymphocytes % 06/16/2025 34.9  13.0 - 44.0 % Final    Monocytes % 06/16/2025 7.7  2.0 - 10.0 % Final    Eosinophils % 06/16/2025 2.4  0.0 - 6.0 % Final    Basophils % 06/16/2025 0.8  0.0 - 2.0 % Final    Neutrophils Absolute 06/16/2025 3.58  1.20 - 7.70 x10*3/uL Final    Percent differential counts (%) should be interpreted in the context of the absolute cell counts (cells/uL).    Immature Granulocytes Absolute, Au* 06/16/2025 0.01  0.00 - 0.70 x10*3/uL Final    Lymphocytes Absolute 06/16/2025 2.31  1.20 - 4.80 x10*3/uL Final    Monocytes Absolute 06/16/2025 0.51  0.10 - 1.00 x10*3/uL Final    Eosinophils Absolute 06/16/2025 0.16  0.00 - 0.70 x10*3/uL Final    Basophils Absolute 06/16/2025 0.05  0.00 - 0.10 x10*3/uL Final    Glucose 06/16/2025 107 (H)  74 - 99 mg/dL Final    Sodium 06/16/2025 135 (L)  136 - 145 mmol/L Final    Potassium 06/16/2025 4.5  3.5 - 5.3 mmol/L Final    Chloride 06/16/2025 102  98 - 107 mmol/L Final    Bicarbonate 06/16/2025 27  21 - 32 mmol/L Final    Anion Gap 06/16/2025 11  10 - 20 mmol/L Final    Urea Nitrogen 06/16/2025 11  6 - 23 mg/dL Final    Creatinine 06/16/2025 0.63  0.50 - 1.05 mg/dL Final    eGFR 06/16/2025 >90  >60  mL/min/1.73m*2 Final    Calculations of estimated GFR are performed using the 2021 CKD-EPI Study Refit equation without the race variable for the IDMS-Traceable creatinine methods.  https://jasn.asnjournals.org/content/early/2021/09/22/ASN.8807096483    Calcium 06/16/2025 9.5  8.6 - 10.3 mg/dL Final    Albumin 06/16/2025 4.1  3.4 - 5.0 g/dL Final    Alkaline Phosphatase 06/16/2025 88  33 - 110 U/L Final    Total Protein 06/16/2025 7.0  6.4 - 8.2 g/dL Final    AST 06/16/2025 18  9 - 39 U/L Final    Bilirubin, Total 06/16/2025 0.4  0.0 - 1.2 mg/dL Final    ALT 06/16/2025 12  7 - 45 U/L Final    Patients treated with Sulfasalazine may generate falsely decreased results for ALT.    LDH 06/16/2025 183  84 - 246 U/L Final     CT chest abdomen pelvis w IV contrast  Result Date: 7/1/2025  Interpreted By:  Kashmir Jaime, STUDY: CT CHEST ABDOMEN PELVIS W IV CONTRAST;  6/30/2025 1:08 pm   INDICATION: Signs/Symptoms:follow up hodgkin lymphoma, new back pain like she initially presented.   COMPARISON: PET-CT 12/23/2024   ACCESSION NUMBER(S): [id number]   ORDERING CLINICIAN: DANILO GERBER   TECHNIQUE: CT of the chest, abdomen, and pelvis was performed.  Contiguous axial images were obtained at 3 mm slice thickness through the chest, abdomen and pelvis. Coronal and sagittal reconstructions at 3 mm slice thickness were performed.   90 ML of Omnipaque 350were administered intravenously without immediate complication.   FINDINGS: CHEST:   LOWER NECK, MEDIASTINUM/BELLA, AND AXILLA: Visualized thyroid gland is within normal limits.   No thoracic lymphadenopathy by CT criteria. Ill-defined low-attenuation soft tissue within the anterior superior mediastinum compatible with residual thymus versus thymic rebound.   Esophagus within normal limits.   HEART AND VESSELS: Normal cardiac size. No evidence of pericardial effusion. Right IJ central venous port catheter tip terminates at the superior cavoatrial junction   Mild coronary artery  calcifications. The study is not optimized for evaluation of coronary arteries.   Main pulmonary artery and its branches are unremarkable in caliber.   No thoracic aortic aneurysm. Mild calcific atherosclerosis of the thoracic aorta.   LUNGS AND AIRWAYS: Trachea and central airways are patent. No endobronchial lesion.   Negative for pneumonia, pulmonary edema or mass. No pneumothorax or pleural effusion. Moderate emphysema. Pulmonary nodule along the right major fissure measuring up to 7 mm (series 202, image 138) are unchanged. Left lower lobe pulmonary nodule measuring 9 mm (image 135) unchanged. Unchanged subpleural nodule lateral right middle lobe. No new or enlarging pulmonary nodules.   ABDOMEN: LIVER: Unremarkable.   GALLBLADDER/BILE DUCTS: No calcified gallstones.No significant biliary dilatation.   SPLEEN: Unremarkable.   PANCREAS: Unremarkable.   ADRENAL GLANDS: Unremarkable.   KIDNEYS AND URETERS: Symmetric renal parenchymal enhancement. No hydroureteronephrosis or radiopaque urolithiasis.  Simple interpolar left renal cyst measuring 1.5 x 1.8 cm. Subcentimeter lower pole left renal lesion too small to characterize similar in size statistically likely benign.   PERITONEUM/RETROPERITONEUM/LYMPH NODES: No ascites or free air, no fluid collection. No enlarged mesenteric lymph nodes.   BOWEL: No bowel obstruction. Moderate colonic stool burden. Appendix not visualized. No pericecal inflammation to suggest acute appendicitis. Few distal colonic diverticula without acute diverticulitis. Moderate colonic stool burden.   PELVIS:   BLADDER: Unremarkable.   REPRODUCTIVE ORGANS: Hysterectomy and bilateral salpingo-oophorectomy.   VESSELS: Aorto bi-iliac atherosclerosis. No abdominal aortic aneurysm.   BONE AND SOFT TISSUE: No acute osseous abnormality. Multilevel degenerative changes throughout the imaged spine. No acute abnormality of the abdominal wall soft tissues. Artifact from pain pump within the left lower  ventral abdominal wall subcutaneous adipose tissue with catheter extending through the left flank and left lumbar soft tissues entering the spinal canal on the left at the level of L3-L4 and terminating along the dorsum of the spinal canal at the level of T11-T12 new relative to comparison. T7 vertebral body compression fracture with similar sclerosis and height loss relative to the CT thoracic spine 10/23/2024. Slight T12 vertebral body height loss and L2 superior endplate concavity unchanged. Patchy sclerosis left posteromedial 7th rib concerning for blastic metastases on the comparison CT thoracic spine is not significantly changed. Patchy lytic and blastic changes on the comparison CT thoracic spine within the T8 vertebral body less conspicuous on the current exam       Hodgkin's lymphoma restaging CT compared to PET-CT 12/23/2024 and CT thoracic spine 10/23/2024 1.  No pathologic lymphadenopathy above or below the diaphragm to suggest residual/recurrent lymphoma. 2. Nonspecific pulmonary nodules are stable in size. Continued attention on routine follow-up per oncologic protocol. 3. Pathologic T7 vertebral body compression fracture with similar height loss. 4. Blastic changes in the left posteromedial 7th rib and T8 vertebral body described on the comparison CT thoracic spine are less evident on the current exam. Continued attention on follow-up. 5. Additional chronic and/or ancillary findings detailed above.   MACRO: None   Signed by: Kashmir Jaime 7/1/2025 8:53 AM Dictation workstation:   [id number]      PHYSICAL EXAMINATION   Vital Signs:   There were no vitals filed for this visit.  Vital signs reviewed.       Physical Exam  HENT:      Head: Normocephalic.      Nose: Nose normal.   Eyes:      Pupils: Pupils are equal, round, and reactive to light.   Pulmonary:      Effort: Pulmonary effort is normal.   Musculoskeletal:         General: Normal range of motion.   Neurological:      Mental Status: She is  alert and oriented to person, place, and time.   Psychiatric:         Mood and Affect: Mood normal.         Behavior: Behavior normal.          Social  Not . Lives with her 3 grown children.  History of drug use with Meth x8 years. Quit 1 year ago.  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     ASSESSMENT/PLAN    Pain  Pain is: cancer related pain  Type: somatic and neuropathic  Pain control: well-controlled  Home regimen:   - Continue oxycodone 15 mg every 4 hours.   - s/p intrathecal pain pump on 2/5/25. Following with Dr. Lockhart  - Had an appointment with Dr. Wagner 12/31. Surgery is not an option at this time.   - Continue Gabapentin 600 mg TID.   - Continue Advil + Tylenol as needed  - Continue with aquatic therapy   - Continue Flexeril 10 mg at bedtime  - Continue Tizanidine 2 mg during the day  - Continue Ropinirole .25 mg at bedtime for RLS as needed  consistently. Recommended taking daily at bedtime.   - Completed 2 sessions of aquatic therapy and now with increased pain  - MSContin caused nausea, vomiting and heavy feeling  - No longer on Fentanyl 50 mcg patch every 72 hours now that she is s/p intrathecal catheter placement     Opioid Use  Medication Management:   - OARRS report reviewed with no aberrant behavior; consistent with  prescriptions/records and patient history  - MED 45.  Overdose Risk Score 290.   This has been discussed with patient.   - We will continue to closely monitor the patient for signs of prescription misuse including UDS, OARRS review and subjective reports at each visit.  - No concurrent benzodiazepine use   - I am a provider who either is or has consulted and collaborated with a provider certified in Hospice and Palliative Medicine and have conducted a face-face visit and examination for this patient.  - Routine Urine Drug Screen completed 7/9/25 results pending  - Controlled Substance Agreement: completed 10/14/24  -  Specifically discussed that controlled substance prescriptions will only be provided by our group as outlined in the completed agreement  - Prescribed naloxone: Has prescription at home  - Red Flags: History of drug use with Meth. Quit 1 year ago     Nausea   Intermittent nausea without vomiting related to chemotherapy   - Continue Prochlorperazine 10 mg every 6 hours as needed  - Continue Ondansetron 8 mg every 8 hours as needed    Constipation  At risk for constipation related to opioids,  currently not constipated  Usual bowel pattern: daily  Home regimen:   - Continue Senna-S 1 tab BID- no longer taking due to diarrhea  - Start Magnesium Citrate - may start with 1/2 bottle to 1 bottle if no BM within 48-72 hours     Altered Mood  Chronic anxiety and depression related to health concerns   controlled with home regimen  Home regimen:   - Restart Lexapro 20 mg- encouraged her to restart this since feeling more depressed and emotional   - no longer taking Wellbutrin 150 mg BID.   - Discussed meeting with a community therapist for therapy as well    Sleeping Difficulty:  Home regimen:    - Trazodone stopped inpatient r/t QTc concerns  - No longer taking Risperidone 0.5 mg nightly as needed.   - Flexeril has been effective for sleep   - Discussed taking 5-10 mg of Melatonin. Encouraged to restart this.    Decreased appetite  Related to malignancy, chemotherapy, and disease process  Nutrition following  Home regimen:    - Olanzapine 2.5 mg every day at bedtime- this was stopped inpatient d/t QTc  - Following with Martha Khan    Night Sweats  - Continue Oxybutynin 5-10 mg nightly as needed.       Advance Directives  Existence of Advance Directives:Yes, documentation or copy in medical record  Decision maker: AARON is Cynthia Xie  Code Status: Full code    Next Follow-Up Visit:  Return to clinic in 3 months    Signature and billing  Medical complexity was low level due to due to complexity of problems,  extensive data review, and high risk of management/treatment.  Time was spent on the following: Prep Time, Time Directly with Patient/Family/Caregiver, Documentation Time. Total time spent: 40      Data  Diagnostic tests and information reviewed for today's visit:  Most recent labs, Most recent imaging, Medications         Some elements copied from Cynthia Johnson note on 4/9/25, the elements have been updated and all reflect current decision making from today, 7/9/2025.      Plan of Care discussed with: Patient    SIGNATURE: MAGDA Downing-CNP    Contact information:  [facility]  Monday-Friday 8 AM-5 PM  Phone:  [phone], press option #5, then option #1.   Or Epic Secure Chat

## 2025-07-11 LAB
6MAM UR CFM-MCNC: <25 NG/ML
CODEINE UR CFM-MCNC: <50 NG/ML
HYDROCODONE CTO UR CFM-MCNC: <25 NG/ML
HYDROMORPHONE UR CFM-MCNC: <25 NG/ML
MORPHINE UR CFM-MCNC: 1378 NG/ML
NORHYDROCODONE UR CFM-MCNC: <25 NG/ML
NOROXYCODONE UR CFM-MCNC: >1000 NG/ML
OXYCODONE UR CFM-MCNC: >2500 NG/ML
OXYMORPHONE UR CFM-MCNC: >2500 NG/ML

## 2025-07-22 DIAGNOSIS — G62.0 CHEMOTHERAPY-INDUCED PERIPHERAL NEUROPATHY (MULTI): ICD-10-CM

## 2025-07-22 DIAGNOSIS — C81.48: ICD-10-CM

## 2025-07-22 DIAGNOSIS — T45.1X5A CHEMOTHERAPY-INDUCED PERIPHERAL NEUROPATHY (MULTI): ICD-10-CM

## 2025-07-22 RX ORDER — GABAPENTIN 300 MG/1
CAPSULE ORAL
Qty: 210 CAPSULE | Refills: 2 | Status: SHIPPED | OUTPATIENT
Start: 2025-07-22 | End: 2025-10-20

## 2025-07-22 NOTE — TELEPHONE ENCOUNTER
Per patient she has been taking gabapentin 900mg at night recently and she feels this helps her sleep better. Discussed with Cynthia Monroe who is okay with patient continuing the increased dosage. She can continue 600mg, 600mg midday and 900mg bedtime. Patient states she ran out early and had to wait for her refill, Per Cynthia we can send refill with this increased dosage then she will have enough for a month supply, with refills.   See [employer] message.

## 2025-08-21 ENCOUNTER — TELEPHONE (OUTPATIENT)
Dept: HEMATOLOGY/ONCOLOGY | Facility: CLINIC | Age: 50
End: 2025-08-21
Payer: COMMERCIAL

## 2025-08-21 DIAGNOSIS — C81.48: ICD-10-CM

## 2025-08-21 RX ORDER — ACYCLOVIR 200 MG/1
200 CAPSULE ORAL DAILY
Qty: 30 CAPSULE | Refills: 3 | Status: SHIPPED | OUTPATIENT
Start: 2025-08-21

## 2025-08-29 ENCOUNTER — APPOINTMENT (OUTPATIENT)
Dept: HEMATOLOGY/ONCOLOGY | Facility: CLINIC | Age: 50
End: 2025-08-29
Payer: COMMERCIAL

## (undated) DEVICE — DRESSING, GAUZE, PAD, 4 X 4 IN, STERILE

## (undated) DEVICE — STRIP, SKIN CLOSURE, STERI STRIP, NON-REINFORCED, 0.5 X 4 IN

## (undated) DEVICE — SUTURE, STRATAFIX, SPIRAL MONOCRYL PLUS, 3-0, PS-2 45CM, UNDYED

## (undated) DEVICE — Device

## (undated) DEVICE — SUTURE, MONOCRYL PLUS 4-0, PS-2, 18IN, UNDYED

## (undated) DEVICE — MANAGER, PERSONEL THERAPY

## (undated) DEVICE — DRAPE, SHEET, LAPAROSCOPY, 104 X 35 X 104IN, STERILE

## (undated) DEVICE — DRAPE, C-ARM IMAGE

## (undated) DEVICE — NEEDLE, HYPODERMIC, 23 GA X 1.5 IN

## (undated) DEVICE — ADHESIVE, SKIN, DERMABOND ADVANCED, 15CM, PEN-STYLE

## (undated) DEVICE — DRESSING, TRANSPARENT, TEGADERM, 8 X 12 IN

## (undated) DEVICE — CATHETER, PASSER, 38CM

## (undated) DEVICE — GAUZE, X-RAY, RF DETECTABLE, 16 PLY, 4 X 4IN, STERILE